# Patient Record
Sex: MALE | Race: WHITE | Employment: OTHER | URBAN - NONMETROPOLITAN AREA
[De-identification: names, ages, dates, MRNs, and addresses within clinical notes are randomized per-mention and may not be internally consistent; named-entity substitution may affect disease eponyms.]

---

## 2019-09-16 RX ORDER — SILDENAFIL 100 MG/1
100 TABLET, FILM COATED ORAL DAILY PRN
COMMUNITY

## 2019-09-16 RX ORDER — MELOXICAM 15 MG/1
15 TABLET ORAL DAILY
COMMUNITY
End: 2019-10-10 | Stop reason: HOSPADM

## 2019-09-16 RX ORDER — LOSARTAN POTASSIUM 100 MG/1
100 TABLET ORAL DAILY
COMMUNITY

## 2019-09-16 RX ORDER — PENTOXIFYLLINE 400 MG/1
400 TABLET, EXTENDED RELEASE ORAL
COMMUNITY

## 2019-09-16 RX ORDER — ASPIRIN 81 MG/1
81 TABLET ORAL DAILY
COMMUNITY
End: 2019-10-10 | Stop reason: HOSPADM

## 2019-09-16 RX ORDER — PRAVASTATIN SODIUM 40 MG
40 TABLET ORAL DAILY
COMMUNITY

## 2019-09-16 RX ORDER — LEVOTHYROXINE SODIUM 0.1 MG/1
100 TABLET ORAL DAILY
COMMUNITY

## 2019-09-20 ENCOUNTER — APPOINTMENT (OUTPATIENT)
Dept: GENERAL RADIOLOGY | Age: 69
End: 2019-09-20
Payer: OTHER GOVERNMENT

## 2019-09-20 ENCOUNTER — HOSPITAL ENCOUNTER (EMERGENCY)
Age: 69
Discharge: HOME | End: 2019-09-20
Payer: OTHER GOVERNMENT

## 2019-09-20 VITALS
BODY MASS INDEX: 25.07 KG/M2 | HEIGHT: 66 IN | DIASTOLIC BLOOD PRESSURE: 86 MMHG | RESPIRATION RATE: 17 BRPM | SYSTOLIC BLOOD PRESSURE: 155 MMHG | TEMPERATURE: 97.9 F | WEIGHT: 156 LBS | HEART RATE: 62 BPM | OXYGEN SATURATION: 96 %

## 2019-09-20 DIAGNOSIS — R07.89 CHEST WALL PAIN: Primary | ICD-10-CM

## 2019-09-20 DIAGNOSIS — R10.13 ABDOMINAL PAIN, EPIGASTRIC: ICD-10-CM

## 2019-09-20 LAB
ALBUMIN SERPL-MCNC: 5.2 G/DL (ref 3.5–5.2)
ALP BLD-CCNC: 118 U/L (ref 40–130)
ALT SERPL-CCNC: 41 U/L (ref 5–41)
ANION GAP SERPL CALCULATED.3IONS-SCNC: 12 MMOL/L (ref 7–19)
AST SERPL-CCNC: 35 U/L (ref 5–40)
BASOPHILS ABSOLUTE: 0.1 K/UL (ref 0–0.2)
BASOPHILS RELATIVE PERCENT: 0.6 % (ref 0–1)
BILIRUB SERPL-MCNC: 0.7 MG/DL (ref 0.2–1.2)
BUN BLDV-MCNC: 18 MG/DL (ref 8–23)
CALCIUM SERPL-MCNC: 10.2 MG/DL (ref 8.8–10.2)
CHLORIDE BLD-SCNC: 101 MMOL/L (ref 98–111)
CO2: 29 MMOL/L (ref 22–29)
CREAT SERPL-MCNC: 0.9 MG/DL (ref 0.5–1.2)
EOSINOPHILS ABSOLUTE: 0.2 K/UL (ref 0–0.6)
EOSINOPHILS RELATIVE PERCENT: 1.7 % (ref 0–5)
GFR NON-AFRICAN AMERICAN: >60
GLUCOSE BLD-MCNC: 109 MG/DL (ref 74–109)
HCT VFR BLD CALC: 54.3 % (ref 42–52)
HEMOGLOBIN: 17.3 G/DL (ref 14–18)
IMMATURE GRANULOCYTES #: 0 K/UL
LIPASE: 43 U/L (ref 13–60)
LYMPHOCYTES ABSOLUTE: 4.3 K/UL (ref 1.1–4.5)
LYMPHOCYTES RELATIVE PERCENT: 40.2 % (ref 20–40)
MCH RBC QN AUTO: 28.4 PG (ref 27–31)
MCHC RBC AUTO-ENTMCNC: 31.9 G/DL (ref 33–37)
MCV RBC AUTO: 89.2 FL (ref 80–94)
MONOCYTES ABSOLUTE: 0.5 K/UL (ref 0–0.9)
MONOCYTES RELATIVE PERCENT: 4.5 % (ref 0–10)
NEUTROPHILS ABSOLUTE: 5.6 K/UL (ref 1.5–7.5)
NEUTROPHILS RELATIVE PERCENT: 52.6 % (ref 50–65)
PDW BLD-RTO: 15.3 % (ref 11.5–14.5)
PLATELET # BLD: 207 K/UL (ref 130–400)
PMV BLD AUTO: 10.6 FL (ref 9.4–12.4)
POTASSIUM SERPL-SCNC: 3.8 MMOL/L (ref 3.5–5)
PRO-BNP: 72 PG/ML (ref 0–900)
RBC # BLD: 6.09 M/UL (ref 4.7–6.1)
SODIUM BLD-SCNC: 142 MMOL/L (ref 136–145)
TOTAL PROTEIN: 8.6 G/DL (ref 6.6–8.7)
TROPONIN: <0.01 NG/ML (ref 0–0.03)
WBC # BLD: 10.6 K/UL (ref 4.8–10.8)

## 2019-09-20 PROCEDURE — 84484 ASSAY OF TROPONIN QUANT: CPT

## 2019-09-20 PROCEDURE — 93005 ELECTROCARDIOGRAM TRACING: CPT | Performed by: PHYSICIAN ASSISTANT

## 2019-09-20 PROCEDURE — 99285 EMERGENCY DEPT VISIT HI MDM: CPT

## 2019-09-20 PROCEDURE — 83880 ASSAY OF NATRIURETIC PEPTIDE: CPT

## 2019-09-20 PROCEDURE — 36415 COLL VENOUS BLD VENIPUNCTURE: CPT

## 2019-09-20 PROCEDURE — 85025 COMPLETE CBC W/AUTO DIFF WBC: CPT

## 2019-09-20 PROCEDURE — 6370000000 HC RX 637 (ALT 250 FOR IP): Performed by: PHYSICIAN ASSISTANT

## 2019-09-20 PROCEDURE — 83690 ASSAY OF LIPASE: CPT

## 2019-09-20 PROCEDURE — 80053 COMPREHEN METABOLIC PANEL: CPT

## 2019-09-20 PROCEDURE — 71046 X-RAY EXAM CHEST 2 VIEWS: CPT

## 2019-09-20 PROCEDURE — 6360000002 HC RX W HCPCS: Performed by: PHYSICIAN ASSISTANT

## 2019-09-20 PROCEDURE — 96374 THER/PROPH/DIAG INJ IV PUSH: CPT

## 2019-09-20 RX ORDER — PANTOPRAZOLE SODIUM 20 MG/1
20 TABLET, DELAYED RELEASE ORAL DAILY
Qty: 30 TABLET | Refills: 0 | Status: SHIPPED | OUTPATIENT
Start: 2019-09-20

## 2019-09-20 RX ORDER — CYCLOBENZAPRINE HCL 10 MG
10 TABLET ORAL 3 TIMES DAILY PRN
Qty: 21 TABLET | Refills: 0 | Status: SHIPPED | OUTPATIENT
Start: 2019-09-20 | End: 2019-09-30

## 2019-09-20 RX ORDER — ORPHENADRINE CITRATE 30 MG/ML
60 INJECTION INTRAMUSCULAR; INTRAVENOUS ONCE
Status: COMPLETED | OUTPATIENT
Start: 2019-09-20 | End: 2019-09-20

## 2019-09-20 RX ADMIN — ORPHENADRINE CITRATE 60 MG: 30 INJECTION INTRAMUSCULAR; INTRAVENOUS at 10:52

## 2019-09-20 RX ADMIN — LIDOCAINE HYDROCHLORIDE: 20 SOLUTION ORAL; TOPICAL at 10:12

## 2019-09-20 ASSESSMENT — ENCOUNTER SYMPTOMS
BACK PAIN: 0
SHORTNESS OF BREATH: 0
PHOTOPHOBIA: 0
EYE DISCHARGE: 0
COUGH: 0
EYE ITCHING: 0
NAUSEA: 0
APNEA: 0
DIARRHEA: 0
ABDOMINAL PAIN: 1
VOMITING: 0
COLOR CHANGE: 0

## 2019-09-20 NOTE — ED PROVIDER NOTES
[facility] EMERGENCY DEPT  eMERGENCYdEPARTMENT eNCOUnter      Pt Name: Rupal Shen  MRN: [id number]  [place] 1950  Date of evaluation: 9/20/2019  Provider:ADRIÁN Yang    CHIEF COMPLAINT       Chief Complaint   Patient presents with    Chest Pain     arrived via EMS from [facility] in [address] [place] [address] with reports of chest pains         HISTORY OF PRESENT ILLNESS  (Location/Symptom, Timing/Onset, Context/Setting, Quality, Duration, Modifying Factors, Severity.)   Rupal Shen is a 71 y.o. male who presents to the emergency department with complaints of left axillary tenderness with pressure palpation that is reproduced. He also complains of epigastric pain that radiates up into his chest.  Both of these have been going on for 4 weeks. Patient is very active mows multiple yards daily. He has a familial cardiac history as well as history of reflux. Patient is afebrile no anxiousness or chest pain otherwise. No shortness of breath or dyspnea on exertion. The patient denies any other abdominal symptoms. He has normal urinary output no dysuria. Denies any bowel habit changes or presentation. Denies any rectal pain or blood in stool. Patient denies that this pain about his left axilla is pleuritic in character with inspiration or expiration. Again it can be reproduced with certain positions or pressure applied. HPI    Nursing Notes were reviewed and I agree. REVIEW OF SYSTEMS    (2-9 systems for level 4, 10 or more for level 5)     Review of Systems   Constitutional: Negative for activity change, appetite change, chills and fever. HENT: Negative for congestion and dental problem. Eyes: Negative for photophobia, discharge and itching. Respiratory: Negative for apnea, cough and shortness of breath. Cardiovascular: Negative for chest pain. Gastrointestinal: Positive for abdominal pain. Negative for diarrhea, nausea and vomiting. Musculoskeletal: Positive for arthralgias and myalgias.  Negative organization: None     Attends meetings of clubs or organizations: None     Relationship status: None    Intimate partner violence:     Fear of current or ex partner: None     Emotionally abused: None     Physically abused: None     Forced sexual activity: None   Other Topics Concern    None   Social History Narrative    None       SCREENINGS           PHYSICAL EXAM    (up to 7 forlevel 4, 8 or more for level 5)     ED Triage Vitals [09/20/19 0924]   BP Temp Temp src Pulse Resp SpO2 Height Weight   (!) 154/76 97.9 °F (36.6 °C) -- 65 22 96 % 5' 6\" (1.676 m) 156 lb (70.8 kg)       Physical Exam   Constitutional: He is oriented to person, place, and time. He appears well-developed and well-nourished. No distress. HENT:   Head: Normocephalic and atraumatic. Right Ear: External ear normal.   Left Ear: External ear normal.   Nose: Nose normal.   Mouth/Throat: Oropharynx is clear and moist.   Eyes: Pupils are equal, round, and reactive to light. Conjunctivae and EOM are normal.   Neck: Normal range of motion. Neck supple. No tracheal deviation present. Cardiovascular: Normal rate, regular rhythm, normal heart sounds and intact distal pulses. No murmur heard. Pulmonary/Chest: Effort normal and breath sounds normal. No stridor. He has no wheezes. He exhibits no tenderness. Abdominal: Soft. Bowel sounds are normal. He exhibits no distension. There is no tenderness. Musculoskeletal: Normal range of motion. He exhibits tenderness. Arms:  Neurological: He is alert and oriented to person, place, and time. He displays normal reflexes. No cranial nerve deficit or sensory deficit. He exhibits normal muscle tone. Coordination normal.   Skin: Skin is warm and dry. Capillary refill takes less than 2 seconds. He is not diaphoretic. Psychiatric: He has a normal mood and affect. His behavior is normal. Judgment and thought content normal.   Nursing note and vitals reviewed.         DIAGNOSTIC RESULTS     RADIOLOGY: Non-plain film images such as CT, Ultrasound and MRI are read by the radiologist. Plain radiographic images are visualized and preliminarilyinterpreted by No att. providers found with the below findings:    Interpretation per the Radiologist below, if available at the time of this note:    XR CHEST STANDARD (2 VW)   Final Result   No active cardiopulmonary disease. Signed by Dr Mirela Sylvester on 9/20/2019 10:12 AM          LABS:  Labs Reviewed   CBC WITH AUTO DIFFERENTIAL - Abnormal; Notable for the following components:       Result Value    Hematocrit 54.3 (*)     MCHC 31.9 (*)     RDW 15.3 (*)     Lymphocytes % 40.2 (*)     All other components within normal limits   COMPREHENSIVE METABOLIC PANEL   LIPASE   TROPONIN   BRAIN NATRIURETIC PEPTIDE   URINE RT REFLEX TO CULTURE       All other labs were within normal range or notreturned as of this dictation. RE-ASSESSMENT        EMERGENCY DEPARTMENT COURSE and DIFFERENTIAL DIAGNOSIS/MDM:   Vitals:    Vitals:    09/20/19 0924 09/20/19 1032   BP: (!) 154/76 (!) 155/86   Pulse: 65 62   Resp: 22 17   Temp: 97.9 °F (36.6 °C)    SpO2: 96% 96%   Weight: 156 lb (70.8 kg)    Height: 5' 6\" (1.676 m)        MDM  Patient has improvement with GI cocktail. He has improvement with Norflex as well. The plan will be for PPI usage [facility] followed by muscle relaxers and cardiology evaluation in outpatient setting he seems stable with normal initial tests all normal and normal sinus EKG. PROCEDURES:    Procedures      FINAL IMPRESSION      1. Chest wall pain    2.  Abdominal pain, epigastric          DISPOSITION/PLAN   DISPOSITION Decision To Discharge 09/20/2019 11:40:26 AM      PATIENT REFERRED TO:  [facility]  [address] [place] [address]    If symptoms worsen    Zuhair Pierson MD  [address] [place] [address]. [address] [place] [address]    Schedule an appointment as soon as possible for a visit As needed    Petra.   [address] [id number] [phone]  Schedule an appointment as soon as possible for a visit         DISCHARGE MEDICATIONS:  Discharge Medication List as of 9/20/2019 11:41 AM      START taking these medications    Details   pantoprazole (PROTONIX) 20 MG tablet Take 1 tablet by mouth daily, Disp-30 tablet, R-0Print      cyclobenzaprine (FLEXERIL) 10 MG tablet Take 1 tablet by mouth 3 times daily as needed for Muscle spasms, Disp-21 tablet, R-0Print             (Please note that portions of this note were completed with a voice recognition program.  Efforts were made to edit the dictations but occasionallywords are mis-transcribed.)    Michelle Davies [address] [place] [address]  09/20/19 8987

## 2019-09-22 LAB
EKG P AXIS: 68 DEGREES
EKG P-R INTERVAL: 126 MS
EKG Q-T INTERVAL: 398 MS
EKG QRS DURATION: 104 MS
EKG QTC CALCULATION (BAZETT): 398 MS
EKG T AXIS: 71 DEGREES

## 2019-09-22 PROCEDURE — 93010 ELECTROCARDIOGRAM REPORT: CPT | Performed by: INTERNAL MEDICINE

## 2019-09-26 ENCOUNTER — OFFICE VISIT (OUTPATIENT)
Dept: GASTROENTEROLOGY | Facility: CLINIC | Age: 69
End: 2019-09-26

## 2019-09-26 VITALS
WEIGHT: 151 LBS | HEIGHT: 69 IN | TEMPERATURE: 97.4 F | BODY MASS INDEX: 22.36 KG/M2 | DIASTOLIC BLOOD PRESSURE: 80 MMHG | OXYGEN SATURATION: 99 % | SYSTOLIC BLOOD PRESSURE: 126 MMHG | HEART RATE: 80 BPM

## 2019-09-26 DIAGNOSIS — K21.9 GASTROESOPHAGEAL REFLUX DISEASE, ESOPHAGITIS PRESENCE NOT SPECIFIED: ICD-10-CM

## 2019-09-26 DIAGNOSIS — R10.10 PAIN OF UPPER ABDOMEN: ICD-10-CM

## 2019-09-26 DIAGNOSIS — Z12.11 ENCOUNTER FOR SCREENING FOR MALIGNANT NEOPLASM OF COLON: Primary | ICD-10-CM

## 2019-09-26 DIAGNOSIS — R63.4 WEIGHT LOSS, ABNORMAL: ICD-10-CM

## 2019-09-26 PROCEDURE — 99204 OFFICE O/P NEW MOD 45 MIN: CPT | Performed by: NURSE PRACTITIONER

## 2019-09-26 NOTE — PROGRESS NOTES
Chief Complaint   Patient presents with   • Colonoscopy     [facility] last3 months has lost 30 pounds       PCP: Luis Carlos Cee PA  REFER: No ref. provider found    Subjective     HPI    Today denies abdominal pain, no changes in bowels.  Normal bowel habit described as daily, formed stool.  No bright red blood per rectum, no melena.  unintentional 30 lb weight loss in 3 months.  appetite stable, no changes.  No nausea/vomitting.  Sudden onset of  heartburn 3 weeks ago. Zantac not providing relief. No previous history GERD related symptoms.  [facility] ER evaluation  For upper abdominal pain/chest wall pain.  Negative cardiac workup.  Started on Protonix, this has improved heartburn.  No dysphagia.  No previous EGD.  Daily use of meloxicom x 5-6 years  [facility]     Past Medical History:   Diagnosis Date   • Disease of thyroid gland    • Hyperlipidemia    • Hypertension      Past Surgical History:   Procedure Laterality Date   • APPENDECTOMY       Outpatient Medications Marked as Taking for the 9/26/19 encounter (Office Visit) with Los Barraza APRN   Medication Sig Dispense Refill   • levothyroxine (SYNTHROID, LEVOTHROID) 100 MCG tablet Take 100 mcg by mouth Daily.     • losartan (COZAAR) 100 MG tablet Take 100 mg by mouth Daily.     • meloxicam (MOBIC) 15 MG tablet Take 15 mg by mouth Daily.     • pentoxifylline (TRENtal) 400 MG CR tablet Take 400 mg by mouth 3 (Three) Times a Day With Meals.     • pravastatin (PRAVACHOL) 40 MG tablet Take 40 mg by mouth Daily.     • sildenafil (VIAGRA) 100 MG tablet Take 100 mg by mouth Daily As Needed for erectile dysfunction.       Allergies   Allergen Reactions   • Augmentin [Amoxicillin-Pot Clavulanate] Other (See Comments)     Not sure   • Codeine Other (See Comments)     Not sure   • Hydroxyzine Other (See Comments)     Not sure   • Tramadol Other (See Comments)     Not sure     Social History     Socioeconomic  History   • Marital status:      Spouse name: Not on file   • Number of children: Not on file   • Years of education: Not on file   • Highest education level: Not on file   Tobacco Use   • Smoking status: Current Some Day Smoker     Packs/day: 0.25     Years: 50.00     Pack years: 12.50   • Smokeless tobacco: Never Used   Substance and Sexual Activity   • Alcohol use: No     Frequency: Never   • Drug use: No     Family History   Problem Relation Age of Onset   • Colon cancer Neg Hx    • Colon polyps Neg Hx    • Esophageal cancer Neg Hx      Review of Systems   Constitutional: Positive for unexpected weight change. Negative for fatigue and fever.   HENT: Negative for hearing loss, sore throat and voice change.    Eyes: Negative for visual disturbance.   Respiratory: Negative for cough, shortness of breath and wheezing.    Cardiovascular: Negative for chest pain and palpitations.   Gastrointestinal: Negative for abdominal pain, blood in stool and vomiting.   Endocrine: Negative for polydipsia and polyuria.   Genitourinary: Negative for difficulty urinating, dysuria, hematuria and urgency.   Musculoskeletal: Negative for joint swelling and myalgias.   Skin: Negative for color change, rash and wound.   Neurological: Negative for dizziness, tremors, seizures and syncope.   Hematological: Does not bruise/bleed easily.   Psychiatric/Behavioral: Negative for agitation and confusion. The patient is not nervous/anxious.      Objective   Vitals:    09/26/19 1007   BP: 126/80   Pulse: 80   Temp: 97.4 °F (36.3 °C)   SpO2: 99%     Physical Exam   Constitutional: He is oriented to person, place, and time. He appears well-developed and well-nourished. He is cooperative.   HENT:   Head: Normocephalic and atraumatic.   Eyes: Conjunctivae are normal. Pupils are equal, round, and reactive to light. No scleral icterus.   Neck: Normal range of motion. Neck supple. No JVD present. No thyroid mass and no thyromegaly present.    Cardiovascular: Normal rate, regular rhythm and normal heart sounds. Exam reveals no gallop and no friction rub.   No murmur heard.  Pulmonary/Chest: Effort normal and breath sounds normal. No accessory muscle usage. No respiratory distress. He has no wheezes. He has no rales.   Abdominal: Soft. Normal appearance and bowel sounds are normal. He exhibits no distension, no ascites and no mass. There is no hepatosplenomegaly. There is no tenderness. There is no rebound and no guarding.   Musculoskeletal: Normal range of motion. He exhibits no edema or tenderness.     Vascular Status -  His right foot exhibits normal foot vasculature  and no edema. His left foot exhibits normal foot vasculature  and no edema.  Lymphadenopathy:     He has no cervical adenopathy.   Neurological: He is alert and oriented to person, place, and time. He has normal strength. Gait normal.   Skin: Skin is warm, dry and intact. No rash noted.     Imaging Results (most recent)     None        Body mass index is 22.3 kg/m².    Assessment/Plan   Jose was seen today for colonoscopy.    Diagnoses and all orders for this visit:    Encounter for screening for malignant neoplasm of colon  -     Implement Anesthesia Orders Day of Procedure; Standing  -     Obtain Informed Consent; Standing    Gastroesophageal reflux disease, esophagitis presence not specified  -     Case Request; Standing  -     Implement Anesthesia Orders Day of Procedure; Standing  -     Obtain Informed Consent; Standing  -     Case Request    Weight loss, abnormal    Pain of upper abdomen  -     Case Request; Standing  -     Case Request  -     polyethylene glycol (GoLYTELY) 236 g solution; Take 3,785 mL by mouth 1 (One) Time for 1 dose. Take as directed      COLONOSCOPY WITH ANESTHESIA (N/A)  2. ENDOSCOPY WITH ANESTHESIA     Take PPI 30 min prior to breakfast     The risk of the endoscopy were discussed in detail.  We discussed the risk of perforation (one out of [id number],  riskier with dilation), bleeding (one out of 500), and the rare risks of infection, adverse reaction to anesthesia, respiratory failure, cardiac failure including MI and adverse reaction to medications, etc.  We discussed consequences that could occur if a risk were to develop such as the need for hospitalization, blood transfusion, surgical intervention, medications, pain and disability and death.  Alternatives include not doing anything, or pursuing an UGI series which only offers a diagnosis with potential less accuracy compared to egd.  The patient verbalizes understanding and agrees to proceed.    All risks, benefits, alternatives, and indications of colonoscopy procedure have been discussed with the patient. Risks to include perforation of the colon requiring possible surgery or colostomy, risk of bleeding from biopsies or removal of colon tissue, possibility of missing a colon polyp or cancer, or adverse drug reaction.  Benefits to include the diagnosis and management of disease of the colon and rectum. Alternatives to include barium enema, radiographic evaluation, lab testing or no intervention. He verbalizes understanding and agrees.         Patient Instructions   Gastroesophageal Reflux Disease, Adult    Gastroesophageal reflux disease (GERD) happens when acid from your stomach flows up into the esophagus. When acid comes in contact with the esophagus, the acid causes soreness (inflammation) in the esophagus. Over time, GERD may create small holes (ulcers) in the lining of the esophagus.  CAUSES    · Increased body weight. This puts pressure on the stomach, making acid rise from the stomach into the esophagus.  · Smoking. This increases acid production in the stomach.  · Drinking alcohol. This causes decreased pressure in the lower esophageal sphincter (valve or ring of muscle between the esophagus and stomach), allowing acid from the stomach into the esophagus.  · Late evening meals and a full stomach.  This increases pressure and acid production in the stomach.  · A malformed lower esophageal sphincter.  Sometimes, no cause is found.  SYMPTOMS    · Burning pain in the lower part of the mid-chest behind the breastbone and in the mid-stomach area. This may occur twice a week or more often.  · Trouble swallowing.  · Sore throat.  · Dry cough.  · Asthma-like symptoms including chest tightness, shortness of breath, or wheezing.  DIAGNOSIS    Your caregiver may be able to diagnose GERD based on your symptoms. In some cases, X-rays and other tests may be done to check for complications or to check the condition of your stomach and esophagus.  TREATMENT    Your caregiver may recommend over-the-counter or prescription medicines to help decrease acid production. Ask your caregiver before starting or adding any new medicines.    HOME CARE INSTRUCTIONS    · Change the factors that you can control. Ask your caregiver for guidance concerning weight loss, quitting smoking, and alcohol consumption.  · Avoid foods and drinks that make your symptoms worse, such as:  ¨ Caffeine or alcoholic drinks.  ¨ Chocolate.  ¨ Peppermint or mint flavorings.  ¨ Garlic and onions.  ¨ Spicy foods.  ¨ Citrus fruits, such as oranges, per, or limes.  ¨ Tomato-based foods such as sauce, chili, salsa, and pizza.  ¨ Fried and fatty foods.  · Avoid lying down for the 3 hours prior to your bedtime or prior to taking a nap.  · Eat small, frequent meals instead of large meals.  · Wear loose-fitting clothing. Do not wear anything tight around your waist that causes pressure on your stomach.  · Raise the head of your bed 6 to 8 inches with wood blocks to help you sleep. Extra pillows will not help.  · Only take over-the-counter or prescription medicines for pain, discomfort, or fever as directed by your caregiver.  · Do not take aspirin, ibuprofen, or other nonsteroidal anti-inflammatory drugs (NSAIDs).  SEEK IMMEDIATE MEDICAL CARE IF:    · You have pain  in your arms, neck, jaw, teeth, or back.  · Your pain increases or changes in intensity or duration.  · You develop nausea, vomiting, or sweating (diaphoresis).  · You develop shortness of breath, or you faint.  · Your vomit is green, yellow, black, or looks like coffee grounds or blood.  · Your stool is red, bloody, or black.  These symptoms could be signs of other problems, such as heart disease, gastric bleeding, or esophageal bleeding.  MAKE SURE YOU:    · Understand these instructions.  · Will watch your condition.  · Will get help right away if you are not doing well or get worse.     This information is not intended to replace advice given to you by your health care provider. Make sure you discuss any questions you have with your health care provider.     Document Released: 09/27/2006 Document Revised: 01/08/2016 Document Reviewed: 04/13/2016  [employer] Interactive Patient Education ©2016 [employer]

## 2019-09-26 NOTE — PATIENT INSTRUCTIONS
Gastroesophageal Reflux Disease, Adult    Gastroesophageal reflux disease (GERD) happens when acid from your stomach flows up into the esophagus. When acid comes in contact with the esophagus, the acid causes soreness (inflammation) in the esophagus. Over time, GERD may create small holes (ulcers) in the lining of the esophagus.  CAUSES    · Increased body weight. This puts pressure on the stomach, making acid rise from the stomach into the esophagus.  · Smoking. This increases acid production in the stomach.  · Drinking alcohol. This causes decreased pressure in the lower esophageal sphincter (valve or ring of muscle between the esophagus and stomach), allowing acid from the stomach into the esophagus.  · Late evening meals and a full stomach. This increases pressure and acid production in the stomach.  · A malformed lower esophageal sphincter.  Sometimes, no cause is found.  SYMPTOMS    · Burning pain in the lower part of the mid-chest behind the breastbone and in the mid-stomach area. This may occur twice a week or more often.  · Trouble swallowing.  · Sore throat.  · Dry cough.  · Asthma-like symptoms including chest tightness, shortness of breath, or wheezing.  DIAGNOSIS    Your caregiver may be able to diagnose GERD based on your symptoms. In some cases, X-rays and other tests may be done to check for complications or to check the condition of your stomach and esophagus.  TREATMENT    Your caregiver may recommend over-the-counter or prescription medicines to help decrease acid production. Ask your caregiver before starting or adding any new medicines.    HOME CARE INSTRUCTIONS    · Change the factors that you can control. Ask your caregiver for guidance concerning weight loss, quitting smoking, and alcohol consumption.  · Avoid foods and drinks that make your symptoms worse, such as:  ¨ Caffeine or alcoholic drinks.  ¨ Chocolate.  ¨ Peppermint or mint flavorings.  ¨ Garlic and onions.  ¨ Spicy foods.  ¨ Citrus  fruits, such as oranges, per, or limes.  ¨ Tomato-based foods such as sauce, chili, salsa, and pizza.  ¨ Fried and fatty foods.  · Avoid lying down for the 3 hours prior to your bedtime or prior to taking a nap.  · Eat small, frequent meals instead of large meals.  · Wear loose-fitting clothing. Do not wear anything tight around your waist that causes pressure on your stomach.  · Raise the head of your bed 6 to 8 inches with wood blocks to help you sleep. Extra pillows will not help.  · Only take over-the-counter or prescription medicines for pain, discomfort, or fever as directed by your caregiver.  · Do not take aspirin, ibuprofen, or other nonsteroidal anti-inflammatory drugs (NSAIDs).  SEEK IMMEDIATE MEDICAL CARE IF:    · You have pain in your arms, neck, jaw, teeth, or back.  · Your pain increases or changes in intensity or duration.  · You develop nausea, vomiting, or sweating (diaphoresis).  · You develop shortness of breath, or you faint.  · Your vomit is green, yellow, black, or looks like coffee grounds or blood.  · Your stool is red, bloody, or black.  These symptoms could be signs of other problems, such as heart disease, gastric bleeding, or esophageal bleeding.  MAKE SURE YOU:    · Understand these instructions.  · Will watch your condition.  · Will get help right away if you are not doing well or get worse.     This information is not intended to replace advice given to you by your health care provider. Make sure you discuss any questions you have with your health care provider.     Document Released: 09/27/2006 Document Revised: 01/08/2016 Document Reviewed: 04/13/2016  [employer] Interactive Patient Education ©2016 [employer]

## 2019-10-10 ENCOUNTER — HOSPITAL ENCOUNTER (OUTPATIENT)
Facility: HOSPITAL | Age: 69
Setting detail: HOSPITAL OUTPATIENT SURGERY
Discharge: HOME | End: 2019-10-10
Attending: INTERNAL MEDICINE | Admitting: INTERNAL MEDICINE

## 2019-10-10 ENCOUNTER — TELEPHONE (OUTPATIENT)
Dept: GASTROENTEROLOGY | Facility: CLINIC | Age: 69
End: 2019-10-10

## 2019-10-10 ENCOUNTER — ANESTHESIA EVENT (OUTPATIENT)
Dept: GASTROENTEROLOGY | Facility: HOSPITAL | Age: 69
End: 2019-10-10

## 2019-10-10 ENCOUNTER — ANESTHESIA (OUTPATIENT)
Dept: GASTROENTEROLOGY | Facility: HOSPITAL | Age: 69
End: 2019-10-10

## 2019-10-10 VITALS
BODY MASS INDEX: 21.62 KG/M2 | RESPIRATION RATE: 19 BRPM | SYSTOLIC BLOOD PRESSURE: 146 MMHG | TEMPERATURE: 97.9 F | OXYGEN SATURATION: 99 % | HEART RATE: 62 BPM | WEIGHT: 146 LBS | HEIGHT: 69 IN | DIASTOLIC BLOOD PRESSURE: 66 MMHG

## 2019-10-10 DIAGNOSIS — K21.9 GASTROESOPHAGEAL REFLUX DISEASE, ESOPHAGITIS PRESENCE NOT SPECIFIED: ICD-10-CM

## 2019-10-10 PROCEDURE — 87081 CULTURE SCREEN ONLY: CPT | Performed by: INTERNAL MEDICINE

## 2019-10-10 PROCEDURE — 43239 EGD BIOPSY SINGLE/MULTIPLE: CPT | Performed by: INTERNAL MEDICINE

## 2019-10-10 PROCEDURE — 25010000002 PROPOFOL 10 MG/ML EMULSION: Performed by: NURSE ANESTHETIST, CERTIFIED REGISTERED

## 2019-10-10 RX ORDER — CYCLOBENZAPRINE HCL 10 MG
10 TABLET ORAL 2 TIMES DAILY
Status: ON HOLD | COMMUNITY
End: 2019-10-14

## 2019-10-10 RX ORDER — PANTOPRAZOLE SODIUM 40 MG/1
40 TABLET, DELAYED RELEASE ORAL
COMMUNITY

## 2019-10-10 RX ORDER — PROPOFOL 10 MG/ML
VIAL (ML) INTRAVENOUS AS NEEDED
Status: DISCONTINUED | OUTPATIENT
Start: 2019-10-10 | End: 2019-10-10 | Stop reason: SURG

## 2019-10-10 RX ORDER — SODIUM CHLORIDE 0.9 % (FLUSH) 0.9 %
10 SYRINGE (ML) INJECTION AS NEEDED
Status: DISCONTINUED | OUTPATIENT
Start: 2019-10-10 | End: 2019-10-10 | Stop reason: HOSPADM

## 2019-10-10 RX ORDER — SODIUM CHLORIDE 9 MG/ML
500 INJECTION, SOLUTION INTRAVENOUS CONTINUOUS PRN
Status: DISCONTINUED | OUTPATIENT
Start: 2019-10-10 | End: 2019-10-10 | Stop reason: HOSPADM

## 2019-10-10 RX ADMIN — LIDOCAINE HYDROCHLORIDE 50 MG: 20 INJECTION, SOLUTION INTRAVENOUS at 11:16

## 2019-10-10 RX ADMIN — PROPOFOL 100 MG: 10 INJECTION, EMULSION INTRAVENOUS at 11:16

## 2019-10-10 RX ADMIN — SODIUM CHLORIDE 500 ML: 9 INJECTION, SOLUTION INTRAVENOUS at 10:02

## 2019-10-10 NOTE — ANESTHESIA PREPROCEDURE EVALUATION
Anesthesia Evaluation     Patient summary reviewed   no history of anesthetic complications:  NPO Solid Status: > 8 hours             Airway   Mallampati: II  TM distance: >3 FB  Neck ROM: full  Dental    (+) upper dentures and lower dentures    Pulmonary    (+) a smoker, COPD,   Cardiovascular   Exercise tolerance: good (4-7 METS)    (+) hypertension, hyperlipidemia,       Neuro/Psych- negative ROS  GI/Hepatic/Renal/Endo    (+)  GERD,  hypothyroidism,     Musculoskeletal     Abdominal    Substance History      OB/GYN          Other                        Anesthesia Plan    ASA 2     MAC     Anesthetic plan, all risks, benefits, and alternatives have been provided, discussed and informed consent has been obtained with: patient.

## 2019-10-10 NOTE — ANESTHESIA POSTPROCEDURE EVALUATION
"Patient: Jose Mendez    Procedure Summary     Date:  10/10/19 Room / Location:  [facility] ENDOSCOPY 5 /  [facility]    Anesthesia Start:  1113 Anesthesia Stop:  1121    Procedure:  ESOPHAGOGASTRODUODENOSCOPY WITH ANESTHESIA (N/A ) Diagnosis:       Gastroesophageal reflux disease, esophagitis presence not specified      (Gastroesophageal reflux disease, esophagitis presence not specified [K21.9])    Surgeon:  Crow Patton DO Provider:  Michelle Laurent CRNA    Anesthesia Type:  MAC ASA Status:  2          Anesthesia Type: MAC  Last vitals  BP   124/75 (10/10/19 0927)   Temp   97.9 °F (36.6 °C) (10/10/19 0927)   Pulse   75 (10/10/19 0927)   Resp   20 (10/10/19 0927)     SpO2   96 % (10/10/19 0927)     Post Anesthesia Care and Evaluation    Patient location during evaluation: PHASE II  Patient participation: complete - patient participated  Level of consciousness: awake and awake and alert  Pain score: 0  Pain management: adequate  Airway patency: patent  Anesthetic complications: No anesthetic complications  PONV Status: none  Cardiovascular status: acceptable  Respiratory status: acceptable  Hydration status: acceptable    Comments: Patient discharged according to acceptable Jed score per RN assessment. See nursing records for further information.     Blood pressure 124/75, pulse 75, temperature 97.9 °F (36.6 °C), temperature source Temporal, resp. rate 20, height 175.3 cm (69\"), weight 66.2 kg (146 lb), SpO2 96 %.        "

## 2019-10-11 LAB — UREASE TISS QL: NEGATIVE

## 2019-10-14 ENCOUNTER — TELEPHONE (OUTPATIENT)
Dept: GASTROENTEROLOGY | Facility: CLINIC | Age: 69
End: 2019-10-14

## 2019-10-14 ENCOUNTER — ANESTHESIA EVENT (OUTPATIENT)
Dept: GASTROENTEROLOGY | Facility: HOSPITAL | Age: 69
End: 2019-10-14

## 2019-10-14 ENCOUNTER — HOSPITAL ENCOUNTER (OUTPATIENT)
Facility: HOSPITAL | Age: 69
Setting detail: HOSPITAL OUTPATIENT SURGERY
Discharge: HOME | End: 2019-10-14
Attending: INTERNAL MEDICINE | Admitting: INTERNAL MEDICINE

## 2019-10-14 ENCOUNTER — ANESTHESIA (OUTPATIENT)
Dept: GASTROENTEROLOGY | Facility: HOSPITAL | Age: 69
End: 2019-10-14

## 2019-10-14 VITALS
HEART RATE: 60 BPM | OXYGEN SATURATION: 100 % | BODY MASS INDEX: 21.77 KG/M2 | SYSTOLIC BLOOD PRESSURE: 130 MMHG | TEMPERATURE: 98.5 F | DIASTOLIC BLOOD PRESSURE: 62 MMHG | HEIGHT: 69 IN | WEIGHT: 147 LBS | RESPIRATION RATE: 18 BRPM

## 2019-10-14 DIAGNOSIS — Z12.11 ENCOUNTER FOR SCREENING FOR MALIGNANT NEOPLASM OF COLON: ICD-10-CM

## 2019-10-14 PROCEDURE — 25010000002 PROPOFOL 10 MG/ML EMULSION: Performed by: NURSE ANESTHETIST, CERTIFIED REGISTERED

## 2019-10-14 PROCEDURE — 45385 COLONOSCOPY W/LESION REMOVAL: CPT | Performed by: INTERNAL MEDICINE

## 2019-10-14 RX ORDER — FINASTERIDE 5 MG/1
5 TABLET, FILM COATED ORAL DAILY
COMMUNITY

## 2019-10-14 RX ORDER — SODIUM CHLORIDE 0.9 % (FLUSH) 0.9 %
10 SYRINGE (ML) INJECTION AS NEEDED
Status: DISCONTINUED | OUTPATIENT
Start: 2019-10-14 | End: 2019-10-14 | Stop reason: HOSPADM

## 2019-10-14 RX ORDER — PROPOFOL 10 MG/ML
VIAL (ML) INTRAVENOUS AS NEEDED
Status: DISCONTINUED | OUTPATIENT
Start: 2019-10-14 | End: 2019-10-14 | Stop reason: SURG

## 2019-10-14 RX ORDER — CLOPIDOGREL BISULFATE 75 MG/1
75 TABLET ORAL DAILY
COMMUNITY

## 2019-10-14 RX ORDER — SODIUM CHLORIDE 9 MG/ML
500 INJECTION, SOLUTION INTRAVENOUS CONTINUOUS PRN
Status: DISCONTINUED | OUTPATIENT
Start: 2019-10-14 | End: 2019-10-14 | Stop reason: HOSPADM

## 2019-10-14 RX ADMIN — PROPOFOL 70 MG: 10 INJECTION, EMULSION INTRAVENOUS at 10:34

## 2019-10-14 RX ADMIN — PROPOFOL 50 MG: 10 INJECTION, EMULSION INTRAVENOUS at 10:49

## 2019-10-14 RX ADMIN — PROPOFOL 70 MG: 10 INJECTION, EMULSION INTRAVENOUS at 10:38

## 2019-10-14 RX ADMIN — SODIUM CHLORIDE 500 ML: 9 INJECTION, SOLUTION INTRAVENOUS at 10:13

## 2019-10-14 RX ADMIN — PROPOFOL 60 MG: 10 INJECTION, EMULSION INTRAVENOUS at 10:43

## 2019-10-14 RX ADMIN — LIDOCAINE HYDROCHLORIDE 50 MG: 20 INJECTION, SOLUTION INTRAVENOUS at 10:33

## 2019-10-14 NOTE — ANESTHESIA POSTPROCEDURE EVALUATION
Patient: Jose Mendez    Procedure Summary     Date:  10/14/19 Room / Location:  [facility] ENDOSCOPY 5 / [facility] ENDOSCOPY    Anesthesia Start:  1028 Anesthesia Stop:  1056    Procedure:  COLONOSCOPY WITH ANESTHESIA (N/A ) Diagnosis:       Encounter for screening for malignant neoplasm of colon      (Encounter for screening for malignant neoplasm of colon [Z12.11])    Surgeon:  Crow Patton DO Provider:  Tomas Kearsn CRNA    Anesthesia Type:  MAC ASA Status:  2          Anesthesia Type: MAC  Last vitals  BP   138/70 (10/14/19 0934)   Temp   98.5 °F (36.9 °C) (10/14/19 0934)   Pulse   65 (10/14/19 0934)   Resp   20 (10/14/19 0934)     SpO2   97 % (10/14/19 0934)     Post Anesthesia Care and Evaluation    Patient location during evaluation: PHASE II  Patient participation: complete - patient participated  Level of consciousness: awake  Pain score: 1  Pain management: adequate  Airway patency: patent  Anesthetic complications: No anesthetic complications  PONV Status: none  Cardiovascular status: acceptable  Respiratory status: acceptable  Hydration status: acceptable

## 2019-10-14 NOTE — ANESTHESIA PREPROCEDURE EVALUATION
Anesthesia Evaluation     Patient summary reviewed   no history of anesthetic complications:  NPO Solid Status: > 8 hours  NPO Liquid Status: > 2 hours           Airway   Mallampati: II  TM distance: >3 FB  Neck ROM: full  Dental    (+) upper dentures and lower dentures    Pulmonary    (+) a smoker Current Smoked day of surgery, COPD,   Cardiovascular   Exercise tolerance: good (4-7 METS)    (+) hypertension, hyperlipidemia,       Neuro/Psych- negative ROS  GI/Hepatic/Renal/Endo    (+)  GERD,  hypothyroidism,     Musculoskeletal     Abdominal    Substance History      OB/GYN          Other                          Anesthesia Plan    ASA 2     MAC     Anesthetic plan, all risks, benefits, and alternatives have been provided, discussed and informed consent has been obtained with: patient.

## 2019-10-16 ENCOUNTER — TRANSCRIBE ORDERS (OUTPATIENT)
Dept: ADMINISTRATIVE | Facility: HOSPITAL | Age: 69
End: 2019-10-16

## 2019-10-16 DIAGNOSIS — K77 LIVER DISORDERS IN DISEASES CLASSIFIED ELSEWHERE: Primary | ICD-10-CM

## 2019-10-23 ENCOUNTER — APPOINTMENT (OUTPATIENT)
Dept: ULTRASOUND IMAGING | Facility: HOSPITAL | Age: 69
End: 2019-10-23

## 2019-10-24 ENCOUNTER — HOSPITAL ENCOUNTER (OUTPATIENT)
Dept: ULTRASOUND IMAGING | Facility: HOSPITAL | Age: 69
Discharge: HOME | End: 2019-10-24
Admitting: NURSE PRACTITIONER

## 2019-10-24 DIAGNOSIS — K77 LIVER DISORDERS IN DISEASES CLASSIFIED ELSEWHERE: ICD-10-CM

## 2019-10-24 PROCEDURE — 76705 ECHO EXAM OF ABDOMEN: CPT

## 2019-10-31 ENCOUNTER — OFFICE VISIT (OUTPATIENT)
Dept: GASTROENTEROLOGY | Facility: CLINIC | Age: 69
End: 2019-10-31

## 2019-10-31 VITALS
SYSTOLIC BLOOD PRESSURE: 130 MMHG | WEIGHT: 144 LBS | HEART RATE: 84 BPM | TEMPERATURE: 97.5 F | BODY MASS INDEX: 21.33 KG/M2 | DIASTOLIC BLOOD PRESSURE: 68 MMHG | OXYGEN SATURATION: 98 % | HEIGHT: 69 IN

## 2019-10-31 DIAGNOSIS — K25.3 ACUTE GASTRIC ULCER WITHOUT HEMORRHAGE OR PERFORATION: Primary | ICD-10-CM

## 2019-10-31 PROCEDURE — 99212 OFFICE O/P EST SF 10 MIN: CPT | Performed by: INTERNAL MEDICINE

## 2019-10-31 NOTE — PROGRESS NOTES
Chief Complaint   Patient presents with   • Endoscopy     3 wk fu 10/10/19 endo non bleeding gastric ulcer w/o stigmata bleeding        PCP: Luis Carlos Cee PA  REFER: No ref. provider found    Subjective     Peptic Ulcer Disease   This is a new problem. The current episode started more than 1 month ago. The problem occurs rarely. The problem has been resolved. Associated symptoms include fatigue. Pertinent negatives include no abdominal pain, anorexia, chest pain, coughing, fever, joint swelling, myalgias, nausea, rash, sore throat or vomiting. The symptoms are aggravated by smoking (anti -inflammations). Treatments tried: ppi  The treatment provided significant relief.       Past Medical History:   Diagnosis Date   • Atherosclerosis    • Disease of thyroid gland    • Hyperlipidemia    • Hypertension        Past Surgical History:   Procedure Laterality Date   • APPENDECTOMY     • COLONOSCOPY N/A 10/14/2019    Procedure: COLONOSCOPY WITH ANESTHESIA;  Surgeon: Crow Patton DO;  Location: [facility] ENDOSCOPY;  Service: Gastroenterology   • ENDOSCOPY N/A 10/10/2019    Procedure: ESOPHAGOGASTRODUODENOSCOPY WITH ANESTHESIA;  Surgeon: Crow Patton DO;  Location: [facility] ENDOSCOPY;  Service: Gastroenterology   • FEMORAL ARTERY STENT Right    • OTHER SURGICAL HISTORY Right approx.2008    stent right leg       Outpatient Medications Marked as Taking for the 10/31/19 encounter (Office Visit) with Crow Patton DO   Medication Sig Dispense Refill   • cholecalciferol (VITAMIN D3) 48005 units capsule Take 2,000 Units by mouth Daily.     • clopidogrel (PLAVIX) 75 MG tablet Take 75 mg by mouth Daily.     • finasteride (PROSCAR) 5 MG tablet Take 5 mg by mouth Daily.     • levothyroxine (SYNTHROID, LEVOTHROID) 100 MCG tablet Take 100 mcg by mouth Daily.     • losartan (COZAAR) 100 MG tablet Take 100 mg by mouth Daily.     • pantoprazole (PROTONIX) 40 MG EC tablet Take 40 mg by mouth.     • pentoxifylline (TRENtal)  400 MG CR tablet Take 400 mg by mouth 3 (Three) Times a Day With Meals.     • pravastatin (PRAVACHOL) 40 MG tablet Take 40 mg by mouth Daily.     • sildenafil (VIAGRA) 100 MG tablet Take 100 mg by mouth Daily As Needed for erectile dysfunction.         Allergies   Allergen Reactions   • Augmentin [Amoxicillin-Pot Clavulanate] Other (See Comments)     Not sure   • Codeine Other (See Comments)     Not sure   • Hydroxyzine Other (See Comments)     Not sure   • Tramadol Other (See Comments)     Not sure       Social History     Socioeconomic History   • Marital status:      Spouse name: Not on file   • Number of children: Not on file   • Years of education: Not on file   • Highest education level: Not on file   Tobacco Use   • Smoking status: Current Some Day Smoker     Packs/day: 0.25     Years: 50.00     Pack years: 12.50   • Smokeless tobacco: Never Used   Substance and Sexual Activity   • Alcohol use: No     Frequency: Never   • Drug use: No   • Sexual activity: Defer       Family History   Problem Relation Age of Onset   • Colon cancer Neg Hx    • Colon polyps Neg Hx    • Esophageal cancer Neg Hx        Review of Systems   Constitutional: Positive for fatigue. Negative for fever and unexpected weight change.   HENT: Negative for hearing loss, sore throat and voice change.    Eyes: Negative for visual disturbance.   Respiratory: Negative for cough, shortness of breath and wheezing.    Cardiovascular: Negative for chest pain and palpitations.   Gastrointestinal: Negative for abdominal pain, anorexia, blood in stool, nausea and vomiting.   Endocrine: Negative for polydipsia and polyuria.   Genitourinary: Negative for difficulty urinating, dysuria, hematuria and urgency.   Musculoskeletal: Negative for joint swelling and myalgias.   Skin: Negative for color change, rash and wound.   Neurological: Negative for dizziness, tremors, seizures and syncope.   Hematological: Does not bruise/bleed easily.  "  Psychiatric/Behavioral: Negative for agitation and confusion. The patient is not nervous/anxious.        Objective     Vitals:    10/31/19 1339   BP: 130/68   Pulse: 84   Temp: 97.5 °F (36.4 °C)   SpO2: 98%   Weight: 65.3 kg (144 lb)   Height: 175.3 cm (69.02\")     Body mass index is 21.26 kg/m².    Physical Exam   Constitutional: He is oriented to person, place, and time. He appears well-developed and well-nourished.   HENT:   Head: Normocephalic and atraumatic.   Eyes:   Pink, Nonicteric   Neck:   Global Assessment- supple. No JVD or lymphadenopathy   Cardiovascular: Normal rate, regular rhythm and normal heart sounds. Exam reveals no gallop and no friction rub.   No murmur heard.  Pulmonary/Chest: Effort normal and breath sounds normal. No respiratory distress. He has no wheezes. He has no rales.   Inspection: Movements-Symmetrical   Abdominal: Soft. Bowel sounds are normal. He exhibits no distension and no mass. There is no tenderness. There is no rebound and no guarding.   Neurological: He is alert and oriented to person, place, and time.   General Exam-Deemed a reliable historian, able to converse without difficulty and Able to move all extremities without difficulty       Imaging Results (Most Recent)     None          Body mass index is 21.26 kg/m².    Assessment/Plan     Jose was seen today for endoscopy.    Diagnoses and all orders for this visit:    Acute gastric ulcer without hemorrhage or perforation  -     Case Request; Standing  -     Case Request    Other orders  -     Follow Anesthesia Guidelines / Standing Orders; Future  -     Obtain Informed Consent; Future  -     Implement Anesthesia Orders Day of Procedure; Standing  -     Obtain Informed Consent; Standing        ESOPHAGOGASTRODUODENOSCOPY WITH ANESTHESIA (N/A)    Patient's Body mass index is 21.26 kg/m². BMI is within normal parameters. No follow-up required..  Repeat egd in 2 months  Continue PPI and avoid Meloxicam  Off plavix 5-7 days " prior     There are no Patient Instructions on file for this visit.

## 2019-11-01 ENCOUNTER — TRANSCRIBE ORDERS (OUTPATIENT)
Dept: ADMINISTRATIVE | Facility: HOSPITAL | Age: 69
End: 2019-11-01

## 2019-11-01 DIAGNOSIS — M25.521 RIGHT ELBOW PAIN: Primary | ICD-10-CM

## 2019-11-04 ENCOUNTER — TRANSCRIBE ORDERS (OUTPATIENT)
Dept: SLEEP MEDICINE | Facility: HOSPITAL | Age: 69
End: 2019-11-04

## 2019-11-04 DIAGNOSIS — G47.09 OTHER INSOMNIA: Primary | ICD-10-CM

## 2019-11-18 ENCOUNTER — HOSPITAL ENCOUNTER (OUTPATIENT)
Dept: NEUROLOGY | Facility: HOSPITAL | Age: 69
Discharge: HOME | End: 2019-11-18
Admitting: NURSE PRACTITIONER

## 2019-11-18 DIAGNOSIS — M25.521 RIGHT ELBOW PAIN: ICD-10-CM

## 2019-11-18 PROCEDURE — 95909 NRV CNDJ TST 5-6 STUDIES: CPT

## 2019-11-18 PROCEDURE — 95886 MUSC TEST DONE W/N TEST COMP: CPT

## 2019-11-19 ENCOUNTER — HOSPITAL ENCOUNTER (OUTPATIENT)
Dept: SLEEP MEDICINE | Facility: HOSPITAL | Age: 69
End: 2019-11-19

## 2019-11-20 ENCOUNTER — APPOINTMENT (OUTPATIENT)
Dept: SLEEP MEDICINE | Facility: HOSPITAL | Age: 69
End: 2019-11-20

## 2019-11-25 ENCOUNTER — APPOINTMENT (OUTPATIENT)
Dept: SLEEP MEDICINE | Facility: HOSPITAL | Age: 69
End: 2019-11-25

## 2020-01-03 ENCOUNTER — ANESTHESIA EVENT (OUTPATIENT)
Dept: GASTROENTEROLOGY | Facility: HOSPITAL | Age: 70
End: 2020-01-03

## 2020-01-03 ENCOUNTER — ANESTHESIA (OUTPATIENT)
Dept: GASTROENTEROLOGY | Facility: HOSPITAL | Age: 70
End: 2020-01-03

## 2020-01-03 ENCOUNTER — HOSPITAL ENCOUNTER (OUTPATIENT)
Facility: HOSPITAL | Age: 70
Setting detail: HOSPITAL OUTPATIENT SURGERY
Discharge: HOME | End: 2020-01-03
Attending: INTERNAL MEDICINE | Admitting: INTERNAL MEDICINE

## 2020-01-03 VITALS
HEIGHT: 69 IN | TEMPERATURE: 97.5 F | SYSTOLIC BLOOD PRESSURE: 116 MMHG | OXYGEN SATURATION: 96 % | HEART RATE: 78 BPM | WEIGHT: 151 LBS | DIASTOLIC BLOOD PRESSURE: 92 MMHG | RESPIRATION RATE: 17 BRPM | BODY MASS INDEX: 22.36 KG/M2

## 2020-01-03 DIAGNOSIS — K25.3 ACUTE GASTRIC ULCER WITHOUT HEMORRHAGE OR PERFORATION: ICD-10-CM

## 2020-01-03 PROCEDURE — 25010000002 PROPOFOL 10 MG/ML EMULSION: Performed by: NURSE ANESTHETIST, CERTIFIED REGISTERED

## 2020-01-03 PROCEDURE — 43239 EGD BIOPSY SINGLE/MULTIPLE: CPT | Performed by: INTERNAL MEDICINE

## 2020-01-03 PROCEDURE — 87081 CULTURE SCREEN ONLY: CPT | Performed by: INTERNAL MEDICINE

## 2020-01-03 RX ORDER — SODIUM CHLORIDE 0.9 % (FLUSH) 0.9 %
10 SYRINGE (ML) INJECTION AS NEEDED
Status: DISCONTINUED | OUTPATIENT
Start: 2020-01-03 | End: 2020-01-03 | Stop reason: HOSPADM

## 2020-01-03 RX ORDER — SODIUM CHLORIDE 9 MG/ML
500 INJECTION, SOLUTION INTRAVENOUS CONTINUOUS PRN
Status: DISCONTINUED | OUTPATIENT
Start: 2020-01-03 | End: 2020-01-03 | Stop reason: HOSPADM

## 2020-01-03 RX ORDER — PROPOFOL 10 MG/ML
VIAL (ML) INTRAVENOUS AS NEEDED
Status: DISCONTINUED | OUTPATIENT
Start: 2020-01-03 | End: 2020-01-03 | Stop reason: SURG

## 2020-01-03 RX ADMIN — PROPOFOL 100 MG: 10 INJECTION, EMULSION INTRAVENOUS at 10:28

## 2020-01-03 RX ADMIN — LIDOCAINE HYDROCHLORIDE 100 MG: 20 INJECTION, SOLUTION INTRAVENOUS at 10:28

## 2020-01-03 RX ADMIN — SODIUM CHLORIDE 500 ML: 9 INJECTION, SOLUTION INTRAVENOUS at 09:40

## 2020-01-03 NOTE — ANESTHESIA PREPROCEDURE EVALUATION
Anesthesia Evaluation     Patient summary reviewed   no history of anesthetic complications:  NPO Solid Status: > 8 hours             Airway   Mallampati: II  TM distance: >3 FB  Neck ROM: full  Dental    (+) upper dentures and lower dentures    Pulmonary    (+) a smoker, COPD,   Cardiovascular   Exercise tolerance: good (4-7 METS)    (+) hypertension, hyperlipidemia,       Neuro/Psych- negative ROS  GI/Hepatic/Renal/Endo    (+)  GERD,      Musculoskeletal     Abdominal    Substance History      OB/GYN          Other                          Anesthesia Plan    ASA 2     MAC       Anesthetic plan, all risks, benefits, and alternatives have been provided, discussed and informed consent has been obtained with: patient.

## 2020-01-03 NOTE — H&P
[facility] Gastroenterology  Pre Procedure History & Physical    Chief Complaint:       Subjective     HPI:       Past Medical History:   Past Medical History:   Diagnosis Date   • Atherosclerosis    • Disease of thyroid gland    • Hyperlipidemia    • Hypertension        Past Surgical History:  Past Surgical History:   Procedure Laterality Date   • ANKLE FUSION Bilateral    • APPENDECTOMY     • COLONOSCOPY N/A 10/14/2019    Procedure: COLONOSCOPY WITH ANESTHESIA;  Surgeon: Crow Patton DO;  Location: [facility] ENDOSCOPY;  Service: Gastroenterology   • ENDOSCOPY N/A 10/10/2019    Procedure: ESOPHAGOGASTRODUODENOSCOPY WITH ANESTHESIA;  Surgeon: Crow Patton DO;  Location: [facility] ENDOSCOPY;  Service: Gastroenterology   • FEMORAL ARTERY STENT Right    • OTHER SURGICAL HISTORY Right approx.2008    stent right leg       Family History:  Family History   Problem Relation Age of Onset   • Colon cancer Neg Hx    • Colon polyps Neg Hx    • Esophageal cancer Neg Hx        Social History:   reports that he has been smoking. He has a 12.50 pack-year smoking history. He has never used smokeless tobacco. He reports that he does not drink alcohol or use drugs.    Medications:   Prior to Admission medications    Medication Sig Start Date End Date Taking? Authorizing Provider   cholecalciferol (VITAMIN D3) 13678 units capsule Take 2,000 Units by mouth Daily.   Yes Deja Bill MD   finasteride (PROSCAR) 5 MG tablet Take 5 mg by mouth Daily.   Yes Deja Bill MD   levothyroxine (SYNTHROID, LEVOTHROID) 100 MCG tablet Take 100 mcg by mouth Daily.   Yes Deja Bill MD   losartan (COZAAR) 100 MG tablet Take 100 mg by mouth Daily.   Yes Deja Bill MD   pantoprazole (PROTONIX) 40 MG EC tablet Take 40 mg by mouth.   Yes Deja Bill MD   pentoxifylline (TRENtal) 400 MG CR tablet Take 400 mg by mouth 3 (Three) Times a Day With Meals.   Yes Deja Bill MD   pravastatin  "(PRAVACHOL) 40 MG tablet Take 40 mg by mouth Daily.   Yes Provider, MD Deja   clopidogrel (PLAVIX) 75 MG tablet Take 75 mg by mouth Daily.    Provider, Historical, MD   sildenafil (VIAGRA) 100 MG tablet Take 100 mg by mouth Daily As Needed for erectile dysfunction.    Provider, Historical, MD       Allergies:  Augmentin [amoxicillin-pot clavulanate]; Codeine; Hydroxyzine; and Tramadol    ROS:    General: Weight stable  Resp: No SOA  Cardiovascular: No CP    Objective     Blood pressure 130/64, pulse 96, temperature 97.5 °F (36.4 °C), temperature source Temporal, resp. rate 18, height 175.3 cm (69\"), weight 68.5 kg (151 lb), SpO2 96 %.    Physical Exam   Constitutional: Pt is oriented to person, place, and in no distress.   HENT: Mouth/Throat: Oropharynx is clear.   Cardiovascular: Normal rate, regular rhythm.    Pulmonary/Chest: Effort normal. No respiratory distress. No  wheezes.   Abdominal: Soft. Non-distended.  Skin: Skin is warm and dry.   Psychiatric: Mood, memory, affect and judgment appear normal.     Assessment/Plan     Diagnosis:      Anticipated Surgical Procedure:  EGD    The risks, benefits, and alternatives of this procedure have been discussed with the patient or the responsible party- the patient understands and agrees to proceed.        "

## 2020-01-04 LAB — UREASE TISS QL: NEGATIVE

## 2020-01-07 ENCOUNTER — HOSPITAL ENCOUNTER (OUTPATIENT)
Dept: SLEEP MEDICINE | Facility: HOSPITAL | Age: 70
Discharge: HOME | End: 2020-01-07
Admitting: NURSE PRACTITIONER

## 2020-01-07 DIAGNOSIS — G47.09 OTHER INSOMNIA: ICD-10-CM

## 2020-01-07 PROCEDURE — 95800 SLP STDY UNATTENDED: CPT | Performed by: PSYCHIATRY & NEUROLOGY

## 2020-01-07 PROCEDURE — 95800 SLP STDY UNATTENDED: CPT

## 2020-01-21 ENCOUNTER — TRANSCRIBE ORDERS (OUTPATIENT)
Dept: ADMINISTRATIVE | Facility: HOSPITAL | Age: 70
End: 2020-01-21

## 2020-01-21 DIAGNOSIS — M54.50 LOW BACK PAIN, UNSPECIFIED BACK PAIN LATERALITY, UNSPECIFIED CHRONICITY, UNSPECIFIED WHETHER SCIATICA PRESENT: Primary | ICD-10-CM

## 2020-01-24 ENCOUNTER — HOSPITAL ENCOUNTER (OUTPATIENT)
Dept: GENERAL RADIOLOGY | Facility: HOSPITAL | Age: 70
Discharge: HOME | End: 2020-01-24

## 2020-01-24 ENCOUNTER — TRANSCRIBE ORDERS (OUTPATIENT)
Dept: ADMINISTRATIVE | Facility: HOSPITAL | Age: 70
End: 2020-01-24

## 2020-01-24 ENCOUNTER — HOSPITAL ENCOUNTER (OUTPATIENT)
Dept: MRI IMAGING | Facility: HOSPITAL | Age: 70
Discharge: HOME | End: 2020-01-24
Admitting: NURSE PRACTITIONER

## 2020-01-24 DIAGNOSIS — M54.50 LOW BACK PAIN, UNSPECIFIED BACK PAIN LATERALITY, UNSPECIFIED CHRONICITY, UNSPECIFIED WHETHER SCIATICA PRESENT: ICD-10-CM

## 2020-01-24 DIAGNOSIS — M54.50 LOW BACK PAIN, UNSPECIFIED BACK PAIN LATERALITY, UNSPECIFIED CHRONICITY, UNSPECIFIED WHETHER SCIATICA PRESENT: Primary | ICD-10-CM

## 2020-01-24 PROCEDURE — 72110 X-RAY EXAM L-2 SPINE 4/>VWS: CPT

## 2020-01-24 PROCEDURE — 72148 MRI LUMBAR SPINE W/O DYE: CPT

## 2021-04-05 NOTE — PROGRESS NOTES
proportionally decreased and exhibit immunophenotypic evidence of mild left shift. CD34+ blasts comprise 0.3% of all analyzed white blood cells. COMMENT: Correlate with morphologic findings and other laboratory testing for final classification. The analyzed WBCs in the sample include 83% lymphocytes, 1% monocytes and 16% granulocytes. Granulocytes and lymphocytes were selected for analysis based on CD45 staining intensity, forward scatter and side scatter. · 4/6/2021-he was first seen by me. Discussed the results of his CBC and flow cytometry. CLL and also neutrophilia. Discussed at length about management and prognosis. Recommended watch/wait approach. Recommended CT lung cancer low-dose lung cancer screening. Past Medical History:    Past Medical History:   Diagnosis Date    Arthritis     Benign prostate hyperplasia     Carotid stenosis     Colon polyp     COPD (chronic obstructive pulmonary disease) (HCC)     Cubital tunnel syndrome     Hyperlipidemia     Hypertension     IBS (irritable bowel syndrome)     Peripheral vascular disease (HCC)     Thyroid disease     TIA (transient ischemic attack)        Past Surgical History:    Past Surgical History:   Procedure Laterality Date    COLONOSCOPY  01/2020    UPPER GASTROINTESTINAL ENDOSCOPY  01/2020    VASCULAR SURGERY      VASECTOMY         Social History:    Marital status:    Smoking status: yes, 60 years  ETOH status: currently no, quit 1981  [facility]    Family History:   Family History   Problem Relation Age of Onset    Breast Cancer Mother 48        breast cancer       Current Hospital Medications:    Current Outpatient Medications   Medication Sig Dispense Refill    vitamin D (CHOLECALCIFEROL) 75999 UNIT CAPS Take 2,000 Units by mouth daily      clopidogrel (PLAVIX) 75 MG tablet Take 75 mg by mouth daily      diclofenac (VOLTAREN) 75 MG EC tablet TAKE 1 TABLET BY MOUTH TWICE DAILY      finasteride (PROSCAR) 5 MG tablet Take 5 mg by mouth daily      levothyroxine (SYNTHROID) 100 MCG tablet Take 100 mcg by mouth daily      losartan (COZAAR) 100 MG tablet Take 100 mg by mouth daily      pentoxifylline (TRENTAL) 400 MG extended release tablet Take 400 mg by mouth 3 times daily (with meals)      pravastatin (PRAVACHOL) 40 MG tablet Take 40 mg by mouth daily      sildenafil (VIAGRA) 100 MG tablet Take 100 mg by mouth daily as needed      pantoprazole (PROTONIX) 40 MG tablet Take 40 mg by mouth      pantoprazole (PROTONIX) 20 MG tablet Take 1 tablet by mouth daily 30 tablet 0     No current facility-administered medications for this visit. Allergies: Allergies   Allergen Reactions    Amoxicillin-Pot Clavulanate      Other reaction(s): Other (See Comments)  Not sure    Codeine      Other reaction(s): Other (See Comments)  Not sure    Hydroxyzine      Other reaction(s): Other (See Comments)  Not sure    Tramadol      Other reaction(s): Other (See Comments)  Not sure         Subjective   REVIEW OF SYSTEMS:   CONSTITUTIONAL: no fever, no night sweats, no fatigue, unintentional weight loss;   HEENT: no blurring of vision, no double vision, no hearing difficulty, no tinnitus, no ulceration, no dysplasia, no epistaxis;  LUNGS: no cough, no hemoptysis, no wheeze,  shortness of breath on exertion;  CARDIOVASCULAR: no palpitation, no chest pain, shortness of breath on exertion;  GI: no abdominal pain, no nausea, no vomiting, no diarrhea, no constipation;  ASHLEY: no dysuria, no hematuria, no frequency or urgency, no nephrolithiasis;  MUSCULOSKELETAL: back pain,  no joint pain, no swelling, no stiffness;  ENDOCRINE: no polyuria, no polydipsia, no cold or heat intolerance;  HEMATOLOGY: easy bruising, no bleeding, no history of clotting disorder;  DERMATOLOGY: no skin rash, no eczema, no pruritus;  PSYCHIATRY: no depression, no anxiety, no panic attacks, no suicidal ideation, no homicidal ideation;  NEUROLOGY: no syncope, no seizures, no numbness or tingling of hands, no numbness or tingling of feet, no paresis;    Objective   /78   Pulse 86   Temp 97.1 °F (36.2 °C)   Ht 5' 9\" (1.753 m)   Wt 142 lb 3.2 oz (64.5 kg)   SpO2 96%   BMI 21.00 kg/m²     PHYSICAL EXAM:  CONSTITUTIONAL: Alert, appropriate, no acute distress  EYES: Non icteric, EOM intact, pupils equal round   ENT: Mucus membranes moist, no oral pharyngeal lesions, external inspection of ears and nose are normal  NECK: Supple, no masses. No palpable thyroid mass  CHEST/LUNGS: CTA bilaterally, normal respiratory effort   CARDIOVASCULAR: RRR, no murmurs. No lower extremity edema  ABDOMEN: soft non-tender, active bowel sounds, no HSM. No palpable masses  EXTREMITIES: warm, full ROM in all 4 extremities, no focal weakness. SKIN: warm, dry with no rashes or lesions  LYMPH: No cervical, clavicular, axillary, or inguinal lymphadenopathy  NEUROLOGIC: follows commands, non focal   PSYCH: mood and affect appropriate.   Alert and oriented to time, place, person      LABORATORY RESULTS REVIEWED/ANALYZED BY ME:  Lab Results   Component Value Date    WBC 19.90 (H) 04/06/2021    HGB 15.6 04/06/2021    HCT 47.9 04/06/2021    MCV 93.2 (H) 04/06/2021     04/06/2021     Lab Results   Component Value Date    NEUTROABS 11.38 (H) 04/06/2021       RADIOLOGY STUDIES REVIEWED BY ME:    ASSESSMENT:    Orders Placed This Encounter   Procedures    CT ABDOMEN PELVIS W IV CONTRAST Additional Contrast? Radiologist Recommendation     Standing Status:   Future     Standing Expiration Date:   4/6/2022     Order Specific Question:   Additional Contrast?     Answer:   Radiologist Recommendation     Order Specific Question:   Reason for exam:     Answer:   new dx CLL-evaluate lymph nodes    CT CHEST W CONTRAST     Standing Status:   Future     Standing Expiration Date:   4/6/2022     Order Specific Question:   Reason for exam:     Answer:   new dx CLL to evaluate lymph nodes      Jong cortez appropriated.        (Please note that portions of this note were completed with a voice recognition program. Efforts were made to edit the dictations but occasionally words are mis-transcribed.)    Ritu Kaplan MD    04/06/21  10:43 AM

## 2021-04-06 ENCOUNTER — OFFICE VISIT (OUTPATIENT)
Dept: HEMATOLOGY | Age: 71
End: 2021-04-06
Payer: OTHER GOVERNMENT

## 2021-04-06 ENCOUNTER — HOSPITAL ENCOUNTER (OUTPATIENT)
Dept: INFUSION THERAPY | Age: 71
Discharge: HOME | End: 2021-04-06
Payer: OTHER GOVERNMENT

## 2021-04-06 VITALS
BODY MASS INDEX: 21.06 KG/M2 | OXYGEN SATURATION: 96 % | WEIGHT: 142.2 LBS | HEIGHT: 69 IN | SYSTOLIC BLOOD PRESSURE: 122 MMHG | TEMPERATURE: 97.1 F | DIASTOLIC BLOOD PRESSURE: 78 MMHG | HEART RATE: 86 BPM

## 2021-04-06 DIAGNOSIS — Z00.00 HEALTHCARE MAINTENANCE: ICD-10-CM

## 2021-04-06 DIAGNOSIS — Z71.89 CARE PLAN DISCUSSED WITH PATIENT: ICD-10-CM

## 2021-04-06 DIAGNOSIS — D72.829 LEUKOCYTOSIS, UNSPECIFIED TYPE: Primary | ICD-10-CM

## 2021-04-06 DIAGNOSIS — C91.10 CLL (CHRONIC LYMPHOCYTIC LEUKEMIA) (HCC): Primary | ICD-10-CM

## 2021-04-06 DIAGNOSIS — Z72.0 TOBACCO ABUSE: ICD-10-CM

## 2021-04-06 DIAGNOSIS — D72.829 LEUKOCYTOSIS, UNSPECIFIED TYPE: ICD-10-CM

## 2021-04-06 DIAGNOSIS — D72.9 NEUTROPHILIC LEUKOCYTOSIS: ICD-10-CM

## 2021-04-06 DIAGNOSIS — Z71.6 TOBACCO ABUSE COUNSELING: ICD-10-CM

## 2021-04-06 LAB
BASOPHILS ABSOLUTE: 0.04 K/UL (ref 0.01–0.08)
BASOPHILS RELATIVE PERCENT: 0.2 % (ref 0.1–1.2)
EOSINOPHILS ABSOLUTE: 0.14 K/UL (ref 0.04–0.54)
EOSINOPHILS RELATIVE PERCENT: 0.7 % (ref 0.7–7)
HCT VFR BLD CALC: 47.9 % (ref 40.1–51)
HEMOGLOBIN: 15.6 G/DL (ref 13.7–17.5)
LYMPHOCYTES ABSOLUTE: 6.75 K/UL (ref 1.18–3.74)
LYMPHOCYTES RELATIVE PERCENT: 33.9 % (ref 19.3–53.1)
MCH RBC QN AUTO: 30.4 PG (ref 25.7–32.2)
MCHC RBC AUTO-ENTMCNC: 32.6 G/DL (ref 32.3–36.5)
MCV RBC AUTO: 93.2 FL (ref 79–92.2)
MONOCYTES ABSOLUTE: 1.59 K/UL (ref 0.24–0.82)
MONOCYTES RELATIVE PERCENT: 8 % (ref 4.7–12.5)
NEUTROPHILS ABSOLUTE: 11.38 K/UL (ref 1.56–6.13)
NEUTROPHILS RELATIVE PERCENT: 57.2 % (ref 34–71.1)
PDW BLD-RTO: 14.1 % (ref 11.6–14.4)
PLATELET # BLD: 173 K/UL (ref 163–337)
PMV BLD AUTO: 10.3 FL (ref 7.4–10.4)
RBC # BLD: 5.14 M/UL (ref 4.63–6.08)
WBC # BLD: 19.9 K/UL (ref 4.23–9.07)

## 2021-04-06 PROCEDURE — 1123F ACP DISCUSS/DSCN MKR DOCD: CPT | Performed by: INTERNAL MEDICINE

## 2021-04-06 PROCEDURE — 99203 OFFICE O/P NEW LOW 30 MIN: CPT

## 2021-04-06 PROCEDURE — 99204 OFFICE O/P NEW MOD 45 MIN: CPT | Performed by: INTERNAL MEDICINE

## 2021-04-06 PROCEDURE — 4004F PT TOBACCO SCREEN RCVD TLK: CPT | Performed by: INTERNAL MEDICINE

## 2021-04-06 PROCEDURE — 3017F COLORECTAL CA SCREEN DOC REV: CPT | Performed by: INTERNAL MEDICINE

## 2021-04-06 PROCEDURE — 85025 COMPLETE CBC W/AUTO DIFF WBC: CPT

## 2021-04-06 PROCEDURE — G8420 CALC BMI NORM PARAMETERS: HCPCS | Performed by: INTERNAL MEDICINE

## 2021-04-06 PROCEDURE — 4040F PNEUMOC VAC/ADMIN/RCVD: CPT | Performed by: INTERNAL MEDICINE

## 2021-04-06 PROCEDURE — G8427 DOCREV CUR MEDS BY ELIG CLIN: HCPCS | Performed by: INTERNAL MEDICINE

## 2021-04-06 RX ORDER — LEVOTHYROXINE SODIUM 0.1 MG/1
100 TABLET ORAL DAILY
COMMUNITY

## 2021-04-06 RX ORDER — PRAVASTATIN SODIUM 40 MG
40 TABLET ORAL DAILY
COMMUNITY

## 2021-04-06 RX ORDER — CLOPIDOGREL BISULFATE 75 MG/1
75 TABLET ORAL DAILY
COMMUNITY

## 2021-04-06 RX ORDER — PENTOXIFYLLINE 400 MG/1
400 TABLET, EXTENDED RELEASE ORAL
COMMUNITY

## 2021-04-06 RX ORDER — FINASTERIDE 5 MG/1
5 TABLET, FILM COATED ORAL DAILY
COMMUNITY

## 2021-04-06 RX ORDER — SILDENAFIL 100 MG/1
100 TABLET, FILM COATED ORAL DAILY PRN
COMMUNITY

## 2021-04-06 RX ORDER — PANTOPRAZOLE SODIUM 40 MG/1
40 TABLET, DELAYED RELEASE ORAL
COMMUNITY

## 2021-04-06 RX ORDER — LOSARTAN POTASSIUM 100 MG/1
100 TABLET ORAL DAILY
COMMUNITY

## 2021-04-06 RX ORDER — DICLOFENAC SODIUM 75 MG/1
TABLET, DELAYED RELEASE ORAL
COMMUNITY
Start: 2021-03-04

## 2021-04-15 ENCOUNTER — HOSPITAL ENCOUNTER (OUTPATIENT)
Dept: CT IMAGING | Age: 71
Discharge: HOME | End: 2021-04-15
Payer: OTHER GOVERNMENT

## 2021-04-15 ENCOUNTER — CLINICAL DOCUMENTATION (OUTPATIENT)
Dept: HEMATOLOGY | Age: 71
End: 2021-04-15

## 2021-04-15 DIAGNOSIS — C91.10 CLL (CHRONIC LYMPHOCYTIC LEUKEMIA) (HCC): ICD-10-CM

## 2021-04-15 LAB
GFR AFRICAN AMERICAN: >60
GFR NON-AFRICAN AMERICAN: >60
PERFORMED ON: NORMAL
POC CREATININE: 0.8 MG/DL (ref 0.3–1.3)
POC SAMPLE TYPE: NORMAL

## 2021-04-15 PROCEDURE — 71260 CT THORAX DX C+: CPT

## 2021-04-15 PROCEDURE — 82565 ASSAY OF CREATININE: CPT

## 2021-04-15 PROCEDURE — 74177 CT ABD & PELVIS W/CONTRAST: CPT

## 2021-04-15 PROCEDURE — 6360000004 HC RX CONTRAST MEDICATION: Performed by: INTERNAL MEDICINE

## 2021-04-15 RX ADMIN — IOPAMIDOL 75 ML: 755 INJECTION, SOLUTION INTRAVENOUS at 08:48

## 2021-04-15 NOTE — PROGRESS NOTES
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

## 2021-04-16 ENCOUNTER — TELEPHONE (OUTPATIENT)
Dept: HEMATOLOGY | Age: 71
End: 2021-04-16

## 2021-04-16 DIAGNOSIS — R93.89 ABNORMAL CT SCAN: ICD-10-CM

## 2021-04-16 DIAGNOSIS — I70.0 ATHEROSCLEROSIS OF ABDOMINAL AORTA (HCC): Primary | ICD-10-CM

## 2021-04-16 DIAGNOSIS — I70.8 ATHEROSCLEROSIS OF BOTH ILIAC ARTERIES: ICD-10-CM

## 2021-04-16 NOTE — TELEPHONE ENCOUNTER
Orders rec'd from Dr. Gladys Pack for referral to [facility] due to CT scan results showing \"severe atheromatous change of the iliac artery\". Dr. Gladys Pack attempted to call pt yesterday with VM left and signer attempted to call today with VM left with request for return call. Will attempt to contact pt again later. Referral created for [facility].

## 2021-04-19 ENCOUNTER — HOSPITAL ENCOUNTER (OUTPATIENT)
Dept: VASCULAR LAB | Age: 71
Discharge: HOME | End: 2021-04-19
Payer: OTHER GOVERNMENT

## 2021-04-19 ENCOUNTER — OFFICE VISIT (OUTPATIENT)
Dept: VASCULAR SURGERY | Age: 71
End: 2021-04-19
Payer: OTHER GOVERNMENT

## 2021-04-19 VITALS
HEIGHT: 69 IN | HEART RATE: 90 BPM | WEIGHT: 145 LBS | OXYGEN SATURATION: 98 % | SYSTOLIC BLOOD PRESSURE: 114 MMHG | BODY MASS INDEX: 21.48 KG/M2 | RESPIRATION RATE: 18 BRPM | DIASTOLIC BLOOD PRESSURE: 71 MMHG

## 2021-04-19 DIAGNOSIS — I73.9 PVD (PERIPHERAL VASCULAR DISEASE) WITH CLAUDICATION (HCC): ICD-10-CM

## 2021-04-19 DIAGNOSIS — I73.9 PVD (PERIPHERAL VASCULAR DISEASE) WITH CLAUDICATION (HCC): Primary | ICD-10-CM

## 2021-04-19 PROCEDURE — 93922 UPR/L XTREMITY ART 2 LEVELS: CPT

## 2021-04-19 PROCEDURE — 93926 LOWER EXTREMITY STUDY: CPT

## 2021-04-19 PROCEDURE — 99203 OFFICE O/P NEW LOW 30 MIN: CPT | Performed by: PHYSICIAN ASSISTANT

## 2021-04-19 NOTE — PROGRESS NOTES
Patient Care Team:  Lavina Kehr, MD as Consulting Physician (Vascular Surgery)      Thalia Barahona (:  1950) is a 70 y.o. male,New patient, here for evaluation of the following chief complaint(s):  New Patient (athersclerosis of abd aorta abd both iliac arteries )      SUBJECTIVE/OBJECTIVE:  Mr. Anat Malhotra is a 71 yo male who has a history that includes PVD, hyperlipidemia, HTN, COPD, Carotid Artery Stenosis, tobacco abuse and CLL. Today he presents as a referral from Dr. Rosalinda Beck for evaluation of PVD. He reports claudication on right leg in the form of tiredness, weakness and aching at < 50 yds. No IRP no non healing wounds. He is a smoker. He is on Trental, Plavix and statin therapy. Thalia Barahona is a 70 y.o. male with the following history as recorded in EpicCare: There are no active problems to display for this patient. Current Outpatient Medications   Medication Sig Dispense Refill    vitamin D (CHOLECALCIFEROL) 40929 UNIT CAPS Take 2,000 Units by mouth daily      clopidogrel (PLAVIX) 75 MG tablet Take 75 mg by mouth daily      diclofenac (VOLTAREN) 75 MG EC tablet TAKE 1 TABLET BY MOUTH TWICE DAILY      finasteride (PROSCAR) 5 MG tablet Take 5 mg by mouth daily      levothyroxine (SYNTHROID) 100 MCG tablet Take 100 mcg by mouth daily      losartan (COZAAR) 100 MG tablet Take 100 mg by mouth daily      pentoxifylline (TRENTAL) 400 MG extended release tablet Take 400 mg by mouth 3 times daily (with meals)      pravastatin (PRAVACHOL) 40 MG tablet Take 40 mg by mouth daily      sildenafil (VIAGRA) 100 MG tablet Take 100 mg by mouth daily as needed      pantoprazole (PROTONIX) 40 MG tablet Take 40 mg by mouth      pantoprazole (PROTONIX) 20 MG tablet Take 1 tablet by mouth daily 30 tablet 0     No current facility-administered medications for this visit.       Allergies: Amoxicillin-pot clavulanate, Codeine, Hydroxyzine, and Tramadol  Past Medical History:   Diagnosis Date    normal.    Risk factors for atherosclerosis of all vascular beds have been reviewed with the patient including:  Family history, tobacco abuse in all forms, elevated cholesterol, hyperlipidemia, and diabetes. Reviewed on this visit: speciality care notes from Heme/Onc      ASSESSMENT/PLAN:      1. Athersclerosis of bilateral LE native arteries with claudication      Recommend he continue Plavix, Trental and statin   Recommend no smoking  Recommend good BP control  We will send him for a right  A'scan today and call him with the results and further recommendations      Addendum:  Right A'scan with KG's -   Impression        Right common femoral artery stent with high grade in stent stenosis.        Signature        ----------------------------------------------------------------    Electronically signed by Leo Jones(Interpreting    physician) on 04/19/2021 02:45 PM    ----------------------------------------------------------------       Stents     - The stent originated from the Right Prox Common Femoral to the Right       Dist Common Femoral.       Stent velocities are as follow:Prox Stent: cm/s. Mid Stent: 367 cm/s. Dist   Stent: 331 cm/s.       +-----------------------------+---+---+-----+------------------------------+   ! Location                     !PSV! EDV! Ratio!% Stenosis                    !   +-----------------------------+---+---+-----+------------------------------+   ! Mid Stent                    !461!   !     !                              !   +-----------------------------+---+---+-----+------------------------------+   ! Dist Stent                   !331!   !0.9  !                              !   +-----------------------------+---+---+-----+------------------------------+   Velocities are measured in cm/s ; Diameters are measured in mm       LE Duplex Measurements       +---------------++-----+-----+----+-----------++---+-----+---+-------------+   !               ! ! Right!     !Left!           !!   !     !   !             !   +---------------++-----+-----+----+-----------++---+-----+---+-------------+   ! Location       !!PSV  !Ratio! EDV ! Wave Desc. !!PSV! Ratio! EDV! Wave Desc.   !   +---------------++-----+-----+----+-----------++---+-----+---+-------------+   ! Common Femoral !!131  !     !    !           !!   !     !   !             !   +---------------++-----+-----+----+-----------++---+-----+---+-------------+   ! Prox PFA       !!115  !     !    !           !!   !     !   !             !   +---------------++-----+-----+----+-----------++---+-----+---+-------------+   ! Prox SFA       !!83.8 !     !    !           !!   !     !   !             !   +---------------++-----+-----+----+-----------++---+-----+---+-------------+   ! Mid SFA        !!68   !0.81 !    !           !!   !     !   !             !   +---------------++-----+-----+----+-----------++---+-----+---+-------------+   ! Dist SFA       !!128  !1.88 !    !           !!   !     !   !             !   +---------------++-----+-----+----+-----------++---+-----+---+-------------+   ! Prox Popliteal !!140  !1.09 !    !           !!   !     !   !             !   +---------------++-----+-----+----+-----------++---+-----+---+-------------+   ! Dist Popliteal !!36.9 !0.26 !    !           !!   !     !   !             !   +---------------++-----+-----+----+-----------++---+-----+---+-------------+   ! Mid PTA        !!34.2 !0.93 !    !           !!   !     !   !             !   +---------------++-----+-----+----+-----------++---+-----+---+-------------+   ! Mid MEHDI        !!16.1 !0.44 !    !           !!   !     !   !             !   +---------------++-----+-----+----+-----------++---+-----+---+-------------+   ! Mid Peroneal   !!18.3 !0.5  !    !           !!   !     !   !             !   +---------------++-----+-----+----+-----------++---+-----+---+-------------+         Impression        The patient has moderately diminished ankle-brachial indices bilateral    lower extremity(ies) which would be consistent with claudication level    symptoms. However right leg waveform is monophasic which could signify    inflow stenosis vs occlusion.        Signature        ----------------------------------------------------------------    Electronically signed by Ines SinghInterpreting    physician) on 04/20/2021 11:25 AM    ----------------------------------------------------------------       Velocities are measured in cm/s ; Diameters are measured in mm       Pressures   +--------------------------------------++--------+-----+----+--------+-----+   !                                      ! ! Right   !     !Left!        !     !   +--------------------------------------++--------+-----+----+--------+-----+   ! Location                              ! ! Pressure! Ratio!    !Pressure! Ratio! +--------------------------------------++--------+-----+----+--------+-----+   ! Ankle PT                              !!113     !0.84 !    !116     !0.87 !   +--------------------------------------++--------+-----+----+--------+-----+   ! DP                                    !!99      !0.74 !    !106     !0.79 !   +--------------------------------------++--------+-----+----+--------+-----+   ! Great Toe                             !!21      !0.16 !    !44      !0.33 !   +--------------------------------------++--------+-----+----+--------+-----+         - Brachial Pressure:Right: 134. Left:129.         - KG:Right: 0.84. Left: 0.87.       Plethysmographic Digit Evaluation   +---------++--------+-----+---------------++--------+-----+----------------+   !         ! ! Right   !     ! Left           !!        !     !                !   +---------++--------+-----+---------------++--------+-----+----------------+   ! Location ! !Pressure! Ratio! PPG Wave Form  !!Pressure! Ratio! PPG Wave Form   !   +---------++--------+-----+---------------++--------+-----+----------------+   ! Great Toe!!21      !0.16 !               !!44      !0.33 !                !   +---------++--------+-----+---------------++--------+-----+----------------+         Options were discussed with the patient including continued medical management versus proceeding with angiography and potential intervention for PVD with claudication. Patient has opted to proceed with angiography. Risks have been discussed with the patient including but not limited to MI, death, CVA, bleed, nerve injury, infection, contrast nephropathy, possible need for dialysis, and need for further surgery. Proceed with aortogram with runoff possible angioplasty/atherectomy/stent      An electronic signature was used to authenticate this note.     --Nupur Thompson PA-C

## 2021-05-10 ENCOUNTER — TELEPHONE (OUTPATIENT)
Dept: VASCULAR SURGERY | Age: 71
End: 2021-05-10

## 2021-05-10 DIAGNOSIS — Z01.818 PRE-OP TESTING: Primary | ICD-10-CM

## 2021-05-10 NOTE — TELEPHONE ENCOUNTER
instructed to hold: See Above  13. Please stop at your local [employer] or pharmacy and buy a bottle of Hibiclens. Wash thoroughly with this the night before and the morning of the procedure, paying special attention to the area that will be operated on. Make sure you rinse very well. The Hibiclens should only be used prior to surgery. 14. You will need to register at the [address] [place], [place] [address] on [address] 5/13/2021 for lab. You will need to go to the back of the [facility] and enter through that door which will be on the 2nd floor. The lab is located on the 2nd floor. 15. New guidelines require a COVID 19 test to be done prior to procedure and we suggest self quarantine after the test until procedure. You will need to go to the [facility] on [address] 5/13 for the COVID 19 test. They open at 8 am , you will need to be swabbed prior to 11 am. You will go to the front of the building and drive under the awning and someone will come to your car. Do not get out of the car. Let them know that you are scheduled for a procedure with Dr. Queen Gamez on 5/17/2021. After your COVID 19 test we suggest that you self quarantine until your procedure. 12. New policy requires that anyone who comes into the hospital will be required to wear a face mask. A cloth mask is acceptable. 17. To ensure the health and safety of our patients and staff, [address] [place] [address],[address] has implemented visitor restrictions. Only one person will be allowed to accompany you for your procedure. If you or your visitor are exhibiting signs & symptoms of illness such as fever, cough, sore throat or body aches, we ask that you reschedule your procedure to a later date after your symptoms have been resolved. 18. Other Directions: If you have any questions or concerns please contact our office at [phone] and ask for Anabell Jones.      PLEASE NOTE:  If the patient is unable to sign his/her own paperwork, the appointed caregiver (POA, child, sibling, etc) must be present at the time of registration for all testing and procedures. Transportation to and from all testing and procedure appointments is the sole responsibility of the patient, caregiver, and/or nursing facility in which they reside. Please remember you will not be able to drive after you are discharged. Please call the office at [phone] with any questions or concerns. Please allow 48-72 hours notice for cancellations or rescheduling. We will attempt to accommodate your needs to the best of our capabilities, however, strict policies with procedure room availability does not allow much flexibility.

## 2021-05-12 ENCOUNTER — TELEPHONE (OUTPATIENT)
Dept: VASCULAR SURGERY | Age: 71
End: 2021-05-12

## 2021-05-12 NOTE — TELEPHONE ENCOUNTER
I returned the pt's call. The pt was asking for the address to the . I provided the pt with that information. I asked the pt to call our office if he has any issues finding the facility. Pt voiced understanding and is aware.

## 2021-05-12 NOTE — TELEPHONE ENCOUNTER
Keith Gomez requests that Marely  return their call. The best time to reach him is Anytime. The pt has questions about his upcoming test.     Thank you.

## 2021-05-13 ENCOUNTER — OFFICE VISIT (OUTPATIENT)
Age: 71
End: 2021-05-13

## 2021-05-13 ENCOUNTER — PREP FOR PROCEDURE (OUTPATIENT)
Dept: VASCULAR SURGERY | Age: 71
End: 2021-05-13

## 2021-05-13 VITALS — OXYGEN SATURATION: 98 % | HEART RATE: 80 BPM | TEMPERATURE: 97.8 F

## 2021-05-13 DIAGNOSIS — Z01.818 PRE-OP TESTING: ICD-10-CM

## 2021-05-13 DIAGNOSIS — Z11.59 SCREENING FOR VIRAL DISEASE: Primary | ICD-10-CM

## 2021-05-13 LAB
ANION GAP SERPL CALCULATED.3IONS-SCNC: 6 MMOL/L (ref 7–19)
BUN BLDV-MCNC: 17 MG/DL (ref 8–23)
CALCIUM SERPL-MCNC: 9.1 MG/DL (ref 8.8–10.2)
CHLORIDE BLD-SCNC: 105 MMOL/L (ref 98–111)
CO2: 30 MMOL/L (ref 22–29)
CREAT SERPL-MCNC: 0.9 MG/DL (ref 0.5–1.2)
GFR AFRICAN AMERICAN: >59
GFR NON-AFRICAN AMERICAN: >60
GLUCOSE BLD-MCNC: 104 MG/DL (ref 74–109)
HCT VFR BLD CALC: 48.9 % (ref 42–52)
HEMOGLOBIN: 15.9 G/DL (ref 14–18)
MCH RBC QN AUTO: 29.2 PG (ref 27–31)
MCHC RBC AUTO-ENTMCNC: 32.5 G/DL (ref 33–37)
MCV RBC AUTO: 89.9 FL (ref 80–94)
PDW BLD-RTO: 14.2 % (ref 11.5–14.5)
PLATELET # BLD: 164 K/UL (ref 130–400)
PMV BLD AUTO: 10.4 FL (ref 9.4–12.4)
POTASSIUM SERPL-SCNC: 4.4 MMOL/L (ref 3.5–5)
RBC # BLD: 5.44 M/UL (ref 4.7–6.1)
SARS-COV-2, PCR: NOT DETECTED
SODIUM BLD-SCNC: 141 MMOL/L (ref 136–145)
WBC # BLD: 13 K/UL (ref 4.8–10.8)

## 2021-05-13 PROCEDURE — 99999 PR OFFICE/OUTPT VISIT,PROCEDURE ONLY: CPT | Performed by: NURSE PRACTITIONER

## 2021-05-13 RX ORDER — SODIUM CHLORIDE 9 MG/ML
INJECTION, SOLUTION INTRAVENOUS CONTINUOUS
Status: CANCELLED | OUTPATIENT
Start: 2021-05-13

## 2021-05-13 RX ORDER — SODIUM CHLORIDE 9 MG/ML
25 INJECTION, SOLUTION INTRAVENOUS PRN
Status: CANCELLED | OUTPATIENT
Start: 2021-05-13

## 2021-05-13 RX ORDER — SODIUM CHLORIDE 0.9 % (FLUSH) 0.9 %
10 SYRINGE (ML) INJECTION PRN
Status: CANCELLED | OUTPATIENT
Start: 2021-05-13

## 2021-05-13 RX ORDER — ASPIRIN 81 MG/1
81 TABLET ORAL ONCE
Status: CANCELLED | OUTPATIENT
Start: 2021-05-13 | End: 2021-05-13

## 2021-05-13 NOTE — H&P (VIEW-ONLY)
Patient Care Team:  Cici Scott MD as Consulting Physician (Vascular Surgery)      Star Acosta (:  1950) is a 70 y.o. [facility] patient, here for evaluation of the following chief complaint(s):  No chief complaint on file. SUBJECTIVE/OBJECTIVE:  Mr. Charles Cooper is a 71 yo male who has a history that includes PVD, hyperlipidemia, HTN, COPD, Carotid Artery Stenosis, tobacco abuse and CLL. Today he presents as a referral from Dr. Stiven Anton for evaluation of PVD. He reports claudication on right leg in the form of tiredness, weakness and aching at < 50 yds. No IRP no non healing wounds. He is a smoker. He is on Trental, Plavix and statin therapy. Star Acosat is a 70 y.o. male with the following history as recorded in EpicCare: There are no active problems to display for this patient. Current Outpatient Medications   Medication Sig Dispense Refill    vitamin D (CHOLECALCIFEROL) 28611 UNIT CAPS Take 2,000 Units by mouth daily      clopidogrel (PLAVIX) 75 MG tablet Take 75 mg by mouth daily      diclofenac (VOLTAREN) 75 MG EC tablet TAKE 1 TABLET BY MOUTH TWICE DAILY      finasteride (PROSCAR) 5 MG tablet Take 5 mg by mouth daily      levothyroxine (SYNTHROID) 100 MCG tablet Take 100 mcg by mouth daily      losartan (COZAAR) 100 MG tablet Take 100 mg by mouth daily      pentoxifylline (TRENTAL) 400 MG extended release tablet Take 400 mg by mouth 3 times daily (with meals)      pravastatin (PRAVACHOL) 40 MG tablet Take 40 mg by mouth daily      sildenafil (VIAGRA) 100 MG tablet Take 100 mg by mouth daily as needed      pantoprazole (PROTONIX) 40 MG tablet Take 40 mg by mouth      pantoprazole (PROTONIX) 20 MG tablet Take 1 tablet by mouth daily 30 tablet 0     No current facility-administered medications for this visit.       Allergies: Amoxicillin-pot clavulanate, Codeine, Hydroxyzine, and Tramadol  Past Medical History:   Diagnosis Date    Arthritis     Benign prostate hyperplasia     Carotid stenosis     Colon polyp     COPD (chronic obstructive pulmonary disease) (HCC)     Cubital tunnel syndrome     Hyperlipidemia     Hypertension     IBS (irritable bowel syndrome)     Peripheral vascular disease (HCC)     Thyroid disease     TIA (transient ischemic attack)      Past Surgical History:   Procedure Laterality Date    COLONOSCOPY  01/2020    UPPER GASTROINTESTINAL ENDOSCOPY  01/2020    VASCULAR SURGERY Right     stent placed    VASECTOMY       Family History   Problem Relation Age of Onset    Breast Cancer Mother 48        breast cancer     Social History     Tobacco Use    Smoking status: Current Every Day Smoker     Years: 60.00     Types: Cigars    Smokeless tobacco: Never Used   Substance Use Topics    Alcohol use: Not Currently       ROS  Eyes  no sudden vision change or amaurosis. Respiratory  no significant shortness of breath,  Cardiovascular  no chest pain or syncope. No  significant leg swelling.  (see HPI)  Musculoskeletal  no gait disturbance  Skin  no new wound. Neurologic   No speech difficulty or lateralizing weakness. All other review of systems are negative. Physical Exam    There were no vitals taken for this visit. Neck- carotid pulses 2+ to palpation with no bruit  Cardiovascular  Regular rate and rhythm. Pulmonary  effort appears normal.  No respiratory distress. Lungs - Breath sounds normal. No wheezes or rales. Extremities - Radial and ulnar pulses are 2+ to palpation bilaterally. Femoral pulses are palpable. DP and PT pulses are NP. No cyanosis, clubbing, or significant edema. No signs atheroembolic event. Neurologic  alert and oriented X 3. Physiologic. Face symmetric. Skin  warm, dry, and intact.    Psychiatric  mood, affect, and behavior appear normal.  Judgment and thought processes appear normal.    Risk factors for atherosclerosis of all vascular beds have been reviewed with the patient including: Family history, tobacco abuse in all forms, elevated cholesterol, hyperlipidemia, and diabetes. Reviewed on this visit: speciality care notes from Heme/Onc      ASSESSMENT/PLAN:      1. Athersclerosis of bilateral LE native arteries with claudication      Recommend he continue Plavix, Trental and statin   Recommend no smoking  Recommend good BP control  We will send him for a right  A'scan today and call him with the results and further recommendations      Addendum:  Right A'scan with KG's -   Impression        Right common femoral artery stent with high grade in stent stenosis.        Signature        ----------------------------------------------------------------    Electronically signed by Jayla Jones(Interpreting    physician) on 04/19/2021 02:45 PM    ----------------------------------------------------------------       Stents     - The stent originated from the Right Prox Common Femoral to the Right       Dist Common Femoral.       Stent velocities are as follow:Prox Stent: cm/s. Mid Stent: 367 cm/s. Dist   Stent: 331 cm/s.       +-----------------------------+---+---+-----+------------------------------+   ! Location                     !PSV! EDV! Ratio!% Stenosis                    !   +-----------------------------+---+---+-----+------------------------------+   ! Mid Stent                    !746!   !     !                              !   +-----------------------------+---+---+-----+------------------------------+   ! Dist Stent                   !331!   !0.9  !                              !   +-----------------------------+---+---+-----+------------------------------+   Velocities are measured in cm/s ; Diameters are measured in mm       LE Duplex Measurements       +---------------++-----+-----+----+-----------++---+-----+---+-------------+   !               ! ! Right!     !Left!           !!   !     !   !             ! +---------------++-----+-----+----+-----------++---+-----+---+-------------+   ! Location       !!PSV  !Ratio! EDV ! Wave Desc. !!PSV! Ratio! EDV! Wave Desc.   !   +---------------++-----+-----+----+-----------++---+-----+---+-------------+   ! Common Femoral !!131  !     !    !           !!   !     !   !             !   +---------------++-----+-----+----+-----------++---+-----+---+-------------+   ! Prox PFA       !!115  !     !    !           !!   !     !   !             !   +---------------++-----+-----+----+-----------++---+-----+---+-------------+   ! Prox SFA       !!83.8 !     !    !           !!   !     !   !             !   +---------------++-----+-----+----+-----------++---+-----+---+-------------+   ! Mid SFA        !!68   !0.81 !    !           !!   !     !   !             !   +---------------++-----+-----+----+-----------++---+-----+---+-------------+   ! Dist SFA       !!128  !1.88 !    !           !!   !     !   !             !   +---------------++-----+-----+----+-----------++---+-----+---+-------------+   ! Prox Popliteal !!140  !1.09 !    !           !!   !     !   !             !   +---------------++-----+-----+----+-----------++---+-----+---+-------------+   ! Dist Popliteal !!36.9 !0.26 !    !           !!   !     !   !             !   +---------------++-----+-----+----+-----------++---+-----+---+-------------+   ! Mid PTA        !!34.2 !0.93 !    !           !!   !     !   !             !   +---------------++-----+-----+----+-----------++---+-----+---+-------------+   ! Mid MEHDI        !!16.1 !0.44 !    !           !!   !     !   !             !   +---------------++-----+-----+----+-----------++---+-----+---+-------------+   ! Mid Peroneal   !!18.3 !0.5  !    !           !!   !     !   !             !   +---------------++-----+-----+----+-----------++---+-----+---+-------------+         Impression        The patient has moderately diminished ankle-brachial indices bilateral    lower extremity(ies) which would be consistent with claudication level    symptoms. However right leg waveform is monophasic which could signify    inflow stenosis vs occlusion.        Signature        ----------------------------------------------------------------    Electronically signed by Jayla SinghInterpreting    physician) on 04/20/2021 11:25 AM    ----------------------------------------------------------------       Velocities are measured in cm/s ; Diameters are measured in mm       Pressures   +--------------------------------------++--------+-----+----+--------+-----+   !                                      ! ! Right   !     !Left!        !     !   +--------------------------------------++--------+-----+----+--------+-----+   ! Location                              ! ! Pressure! Ratio!    !Pressure! Ratio! +--------------------------------------++--------+-----+----+--------+-----+   ! Ankle PT                              !!113     !0.84 !    !116     !0.87 !   +--------------------------------------++--------+-----+----+--------+-----+   ! DP                                    !!99      !0.74 !    !106     !0.79 !   +--------------------------------------++--------+-----+----+--------+-----+   ! Great Toe                             !!21      !0.16 !    !44      !0.33 !   +--------------------------------------++--------+-----+----+--------+-----+         - Brachial Pressure:Right: 134. Left:129.         - KG:Right: 0.84. Left: 0.87.       Plethysmographic Digit Evaluation   +---------++--------+-----+---------------++--------+-----+----------------+   !         ! ! Right   !     ! Left           !!        !     !                !   +---------++--------+-----+---------------++--------+-----+----------------+   ! Location ! !Pressure! Ratio! PPG Wave Form  !!Pressure! Ratio! PPG Wave Form   !   +---------++--------+-----+---------------++--------+-----+----------------+   ! Great Toe!!21      !0.16 !               !!40      !0.33 !                !   +---------++--------+-----+---------------++--------+-----+----------------+         Options were discussed with the patient including continued medical management versus proceeding with angiography and potential intervention for PVD with claudication. Patient has opted to proceed with angiography. Risks have been discussed with the patient including but not limited to MI, death, CVA, bleed, nerve injury, infection, contrast nephropathy, possible need for dialysis, and need for further surgery. Proceed with aortogram with runoff possible angioplasty/atherectomy/stent      An electronic signature was used to authenticate this note.     --Jayashree Thompson PA-C

## 2021-05-13 NOTE — H&P
Patient Care Team:  Lavina Kehr, MD as Consulting Physician (Vascular Surgery)      Thalia Barahona (:  1950) is a 70 y.o. [facility] patient, here for evaluation of the following chief complaint(s):  No chief complaint on file. SUBJECTIVE/OBJECTIVE:  Mr. Anat Malhotra is a 69 yo male who has a history that includes PVD, hyperlipidemia, HTN, COPD, Carotid Artery Stenosis, tobacco abuse and CLL. Today he presents as a referral from Dr. Rosalinda Beck for evaluation of PVD. He reports claudication on right leg in the form of tiredness, weakness and aching at < 50 yds. No IRP no non healing wounds. He is a smoker. He is on Trental, Plavix and statin therapy. Thalia Barahona is a 70 y.o. male with the following history as recorded in EpicCare: There are no active problems to display for this patient. Current Outpatient Medications   Medication Sig Dispense Refill    vitamin D (CHOLECALCIFEROL) 49137 UNIT CAPS Take 2,000 Units by mouth daily      clopidogrel (PLAVIX) 75 MG tablet Take 75 mg by mouth daily      diclofenac (VOLTAREN) 75 MG EC tablet TAKE 1 TABLET BY MOUTH TWICE DAILY      finasteride (PROSCAR) 5 MG tablet Take 5 mg by mouth daily      levothyroxine (SYNTHROID) 100 MCG tablet Take 100 mcg by mouth daily      losartan (COZAAR) 100 MG tablet Take 100 mg by mouth daily      pentoxifylline (TRENTAL) 400 MG extended release tablet Take 400 mg by mouth 3 times daily (with meals)      pravastatin (PRAVACHOL) 40 MG tablet Take 40 mg by mouth daily      sildenafil (VIAGRA) 100 MG tablet Take 100 mg by mouth daily as needed      pantoprazole (PROTONIX) 40 MG tablet Take 40 mg by mouth      pantoprazole (PROTONIX) 20 MG tablet Take 1 tablet by mouth daily 30 tablet 0     No current facility-administered medications for this visit.       Allergies: Amoxicillin-pot clavulanate, Codeine, Hydroxyzine, and Tramadol  Past Medical History:   Diagnosis Date    Arthritis     Benign prostate Family history, tobacco abuse in all forms, elevated cholesterol, hyperlipidemia, and diabetes. Reviewed on this visit: speciality care notes from Heme/Onc      ASSESSMENT/PLAN:      1. Athersclerosis of bilateral LE native arteries with claudication      Recommend he continue Plavix, Trental and statin   Recommend no smoking  Recommend good BP control  We will send him for a right  A'scan today and call him with the results and further recommendations      Addendum:  Right A'scan with KG's -   Impression        Right common femoral artery stent with high grade in stent stenosis.        Signature        ----------------------------------------------------------------    Electronically signed by Jayla Jones(Interpreting    physician) on 04/19/2021 02:45 PM    ----------------------------------------------------------------       Stents     - The stent originated from the Right Prox Common Femoral to the Right       Dist Common Femoral.       Stent velocities are as follow:Prox Stent: cm/s. Mid Stent: 367 cm/s. Dist   Stent: 331 cm/s.       +-----------------------------+---+---+-----+------------------------------+   ! Location                     !PSV! EDV! Ratio!% Stenosis                    !   +-----------------------------+---+---+-----+------------------------------+   ! Mid Stent                    !517!   !     !                              !   +-----------------------------+---+---+-----+------------------------------+   ! Dist Stent                   !331!   !0.9  !                              !   +-----------------------------+---+---+-----+------------------------------+   Velocities are measured in cm/s ; Diameters are measured in mm       LE Duplex Measurements       +---------------++-----+-----+----+-----------++---+-----+---+-------------+   !               ! ! Right!     !Left!           !!   !     !   !             ! +---------------++-----+-----+----+-----------++---+-----+---+-------------+   ! Location       !!PSV  !Ratio! EDV ! Wave Desc. !!PSV! Ratio! EDV! Wave Desc.   !   +---------------++-----+-----+----+-----------++---+-----+---+-------------+   ! Common Femoral !!131  !     !    !           !!   !     !   !             !   +---------------++-----+-----+----+-----------++---+-----+---+-------------+   ! Prox PFA       !!115  !     !    !           !!   !     !   !             !   +---------------++-----+-----+----+-----------++---+-----+---+-------------+   ! Prox SFA       !!83.8 !     !    !           !!   !     !   !             !   +---------------++-----+-----+----+-----------++---+-----+---+-------------+   ! Mid SFA        !!68   !0.81 !    !           !!   !     !   !             !   +---------------++-----+-----+----+-----------++---+-----+---+-------------+   ! Dist SFA       !!128  !1.88 !    !           !!   !     !   !             !   +---------------++-----+-----+----+-----------++---+-----+---+-------------+   ! Prox Popliteal !!140  !1.09 !    !           !!   !     !   !             !   +---------------++-----+-----+----+-----------++---+-----+---+-------------+   ! Dist Popliteal !!36.9 !0.26 !    !           !!   !     !   !             !   +---------------++-----+-----+----+-----------++---+-----+---+-------------+   ! Mid PTA        !!34.2 !0.93 !    !           !!   !     !   !             !   +---------------++-----+-----+----+-----------++---+-----+---+-------------+   ! Mid MEHDI        !!16.1 !0.44 !    !           !!   !     !   !             !   +---------------++-----+-----+----+-----------++---+-----+---+-------------+   ! Mid Peroneal   !!18.3 !0.5  !    !           !!   !     !   !             !   +---------------++-----+-----+----+-----------++---+-----+---+-------------+         Impression        The patient has moderately diminished ankle-brachial indices bilateral    lower extremity(ies) which would be consistent with claudication level    symptoms. However right leg waveform is monophasic which could signify    inflow stenosis vs occlusion.        Signature        ----------------------------------------------------------------    Electronically signed by Rosina SinghInterpreting    physician) on 04/20/2021 11:25 AM    ----------------------------------------------------------------       Velocities are measured in cm/s ; Diameters are measured in mm       Pressures   +--------------------------------------++--------+-----+----+--------+-----+   !                                      ! ! Right   !     !Left!        !     !   +--------------------------------------++--------+-----+----+--------+-----+   ! Location                              ! ! Pressure! Ratio!    !Pressure! Ratio! +--------------------------------------++--------+-----+----+--------+-----+   ! Ankle PT                              !!113     !0.84 !    !116     !0.87 !   +--------------------------------------++--------+-----+----+--------+-----+   ! DP                                    !!99      !0.74 !    !106     !0.79 !   +--------------------------------------++--------+-----+----+--------+-----+   ! Great Toe                             !!21      !0.16 !    !44      !0.33 !   +--------------------------------------++--------+-----+----+--------+-----+         - Brachial Pressure:Right: 134. Left:129.         - KG:Right: 0.84. Left: 0.87.       Plethysmographic Digit Evaluation   +---------++--------+-----+---------------++--------+-----+----------------+   !         ! ! Right   !     ! Left           !!        !     !                !   +---------++--------+-----+---------------++--------+-----+----------------+   ! Location ! !Pressure! Ratio! PPG Wave Form  !!Pressure! Ratio! PPG Wave Form   !   +---------++--------+-----+---------------++--------+-----+----------------+   ! Great Toe!!21      !0.16 !               !!40      !0.33 !                !   +---------++--------+-----+---------------++--------+-----+----------------+         Options were discussed with the patient including continued medical management versus proceeding with angiography and potential intervention for PVD with claudication. Patient has opted to proceed with angiography. Risks have been discussed with the patient including but not limited to MI, death, CVA, bleed, nerve injury, infection, contrast nephropathy, possible need for dialysis, and need for further surgery. Proceed with aortogram with runoff possible angioplasty/atherectomy/stent      An electronic signature was used to authenticate this note.     --Sarah Thompson PA-C

## 2021-05-17 ENCOUNTER — HOSPITAL ENCOUNTER (OUTPATIENT)
Dept: INTERVENTIONAL RADIOLOGY/VASCULAR | Age: 71
Discharge: HOME | End: 2021-05-17
Payer: OTHER GOVERNMENT

## 2021-05-17 VITALS
HEIGHT: 69 IN | WEIGHT: 146 LBS | TEMPERATURE: 97.1 F | HEART RATE: 61 BPM | RESPIRATION RATE: 22 BRPM | SYSTOLIC BLOOD PRESSURE: 118 MMHG | DIASTOLIC BLOOD PRESSURE: 84 MMHG | BODY MASS INDEX: 21.62 KG/M2 | OXYGEN SATURATION: 95 %

## 2021-05-17 DIAGNOSIS — I10 BENIGN ESSENTIAL HTN: ICD-10-CM

## 2021-05-17 DIAGNOSIS — I73.9 PVD (PERIPHERAL VASCULAR DISEASE) (HCC): ICD-10-CM

## 2021-05-17 DIAGNOSIS — I65.23 CAROTID STENOSIS, ASYMPTOMATIC, BILATERAL: ICD-10-CM

## 2021-05-17 DIAGNOSIS — C91.10 CLL (CHRONIC LYMPHOCYTIC LEUKEMIA) (HCC): ICD-10-CM

## 2021-05-17 DIAGNOSIS — N40.0 BENIGN PROSTATIC HYPERPLASIA WITHOUT LOWER URINARY TRACT SYMPTOMS: ICD-10-CM

## 2021-05-17 DIAGNOSIS — I70.219 ATHEROSCLEROSIS WITH CLAUDICATION OF EXTREMITY (HCC): ICD-10-CM

## 2021-05-17 PROBLEM — J43.9 PULMONARY EMPHYSEMA (HCC): Status: ACTIVE | Noted: 2021-05-17

## 2021-05-17 PROCEDURE — 75716 ARTERY X-RAYS ARMS/LEGS: CPT | Performed by: SURGERY

## 2021-05-17 PROCEDURE — 37221 PR REVSC OPN/PRQ ILIAC ART W/STNT PLMT & ANGIOPLSTY: CPT | Performed by: SURGERY

## 2021-05-17 PROCEDURE — 99152 MOD SED SAME PHYS/QHP 5/>YRS: CPT | Performed by: SURGERY

## 2021-05-17 PROCEDURE — 6360000004 HC RX CONTRAST MEDICATION: Performed by: SURGERY

## 2021-05-17 PROCEDURE — 37225 HC ATHERECTOMY FEM/POP: CPT | Performed by: SURGERY

## 2021-05-17 PROCEDURE — 93922 UPR/L XTREMITY ART 2 LEVELS: CPT

## 2021-05-17 PROCEDURE — 2500000003 HC RX 250 WO HCPCS: Performed by: SURGERY

## 2021-05-17 PROCEDURE — 75625 CONTRAST EXAM ABDOMINL AORTA: CPT | Performed by: SURGERY

## 2021-05-17 PROCEDURE — 99153 MOD SED SAME PHYS/QHP EA: CPT | Performed by: SURGERY

## 2021-05-17 PROCEDURE — 2580000003 HC RX 258: Performed by: PHYSICIAN ASSISTANT

## 2021-05-17 PROCEDURE — 37221 HC PLASTY ILIAC W STENT: CPT | Performed by: SURGERY

## 2021-05-17 PROCEDURE — 6360000002 HC RX W HCPCS: Performed by: SURGERY

## 2021-05-17 PROCEDURE — 6370000000 HC RX 637 (ALT 250 FOR IP): Performed by: PHYSICIAN ASSISTANT

## 2021-05-17 PROCEDURE — 6360000002 HC RX W HCPCS: Performed by: PHYSICIAN ASSISTANT

## 2021-05-17 PROCEDURE — 37225 PR REVSC OPN/PRQ FEM/POP W/ATHRC/ANGIOP SM VSL: CPT | Performed by: SURGERY

## 2021-05-17 PROCEDURE — 2709999900 IR AORTAGRAM ABDOMINAL SERIALOGRAM

## 2021-05-17 RX ORDER — SODIUM CHLORIDE 9 MG/ML
25 INJECTION, SOLUTION INTRAVENOUS PRN
Status: DISCONTINUED | OUTPATIENT
Start: 2021-05-17 | End: 2021-05-19 | Stop reason: HOSPADM

## 2021-05-17 RX ORDER — MIDAZOLAM HYDROCHLORIDE 1 MG/ML
INJECTION INTRAMUSCULAR; INTRAVENOUS
Status: COMPLETED | OUTPATIENT
Start: 2021-05-17 | End: 2021-05-17

## 2021-05-17 RX ORDER — NITROGLYCERIN 20 MG/100ML
INJECTION INTRAVENOUS CONTINUOUS PRN
Status: COMPLETED | OUTPATIENT
Start: 2021-05-17 | End: 2021-05-17

## 2021-05-17 RX ORDER — ASPIRIN 81 MG/1
81 TABLET ORAL ONCE
Status: COMPLETED | OUTPATIENT
Start: 2021-05-17 | End: 2021-05-17

## 2021-05-17 RX ORDER — ONDANSETRON 2 MG/ML
4 INJECTION INTRAMUSCULAR; INTRAVENOUS EVERY 8 HOURS PRN
Status: DISCONTINUED | OUTPATIENT
Start: 2021-05-17 | End: 2021-05-19 | Stop reason: HOSPADM

## 2021-05-17 RX ORDER — IODIXANOL 320 MG/ML
INJECTION, SOLUTION INTRAVASCULAR
Status: COMPLETED | OUTPATIENT
Start: 2021-05-17 | End: 2021-05-17

## 2021-05-17 RX ORDER — SODIUM CHLORIDE 9 MG/ML
INJECTION, SOLUTION INTRAVENOUS CONTINUOUS
Status: DISCONTINUED | OUTPATIENT
Start: 2021-05-17 | End: 2021-05-19 | Stop reason: HOSPADM

## 2021-05-17 RX ORDER — FENTANYL CITRATE 50 UG/ML
INJECTION, SOLUTION INTRAMUSCULAR; INTRAVENOUS
Status: COMPLETED | OUTPATIENT
Start: 2021-05-17 | End: 2021-05-17

## 2021-05-17 RX ORDER — SODIUM CHLORIDE 0.9 % (FLUSH) 0.9 %
10 SYRINGE (ML) INJECTION PRN
Status: DISCONTINUED | OUTPATIENT
Start: 2021-05-17 | End: 2021-05-19 | Stop reason: HOSPADM

## 2021-05-17 RX ORDER — HEPARIN SODIUM 5000 [USP'U]/ML
INJECTION, SOLUTION INTRAVENOUS; SUBCUTANEOUS
Status: COMPLETED | OUTPATIENT
Start: 2021-05-17 | End: 2021-05-17

## 2021-05-17 RX ORDER — ACETAMINOPHEN 325 MG/1
650 TABLET ORAL EVERY 4 HOURS PRN
Status: DISCONTINUED | OUTPATIENT
Start: 2021-05-17 | End: 2021-05-19 | Stop reason: HOSPADM

## 2021-05-17 RX ADMIN — NITROGLYCERIN 200 MCG: 20 INJECTION INTRAVENOUS at 14:17

## 2021-05-17 RX ADMIN — FENTANYL CITRATE 25 MCG: 50 INJECTION, SOLUTION INTRAMUSCULAR; INTRAVENOUS at 13:46

## 2021-05-17 RX ADMIN — FENTANYL CITRATE 50 MCG: 50 INJECTION, SOLUTION INTRAMUSCULAR; INTRAVENOUS at 14:04

## 2021-05-17 RX ADMIN — NITROGLYCERIN 400 MCG: 20 INJECTION INTRAVENOUS at 14:25

## 2021-05-17 RX ADMIN — NITROGLYCERIN 200 MCG: 20 INJECTION INTRAVENOUS at 14:10

## 2021-05-17 RX ADMIN — SODIUM CHLORIDE: 9 INJECTION, SOLUTION INTRAVENOUS at 09:59

## 2021-05-17 RX ADMIN — HEPARIN SODIUM 5000 UNITS: 5000 INJECTION, SOLUTION INTRAVENOUS; SUBCUTANEOUS at 13:44

## 2021-05-17 RX ADMIN — MIDAZOLAM 0.5 MG: 1 INJECTION INTRAMUSCULAR; INTRAVENOUS at 13:45

## 2021-05-17 RX ADMIN — MIDAZOLAM 0.5 MG: 1 INJECTION INTRAMUSCULAR; INTRAVENOUS at 14:30

## 2021-05-17 RX ADMIN — IODIXANOL 165 ML: 320 INJECTION, SOLUTION INTRAVASCULAR at 14:43

## 2021-05-17 RX ADMIN — MIDAZOLAM 0.5 MG: 1 INJECTION INTRAMUSCULAR; INTRAVENOUS at 13:46

## 2021-05-17 RX ADMIN — Medication 1000 MG: at 13:15

## 2021-05-17 RX ADMIN — NITROGLYCERIN 200 MCG: 20 INJECTION INTRAVENOUS at 14:06

## 2021-05-17 RX ADMIN — ASPIRIN 81 MG: 81 TABLET, COATED ORAL at 11:30

## 2021-05-17 NOTE — PROGRESS NOTES
Pt tolerated bedrest without problems. Ingested sandwich and drink during bedrest.  Vascular Lab came at 1645 to perform Geovanni's at [facility]. Pt was assisted up to ambulate in hallway with RN and tolerated activity without  Problems. Pt also ambulated to  to void and tolerated activity without problems. Discharge instructions were explained to patient with voiced understanding and a copy of the instructions was given to the patient to take home. Pt stable.

## 2021-05-17 NOTE — OP NOTE
Preprocedure diagnosis:  1. Atherosclerosis of native arteries bilateral lower extremities with lifestyle limiting claudication right lower extremity  2. Chronic obstructive pulmonary disease  3. Chronic lymphocytic leukemia  4. Cigarette abuse    Post procedure diagnosis: Same    Procedure:  1. Ultrasound-guided cannulation left common femoral artery with 5 Western Priti and later 7 Western Priti destination sheath  2. Suprarenal abdominal aortogram with bilateral iliofemoral arteriogram and bilateral lower extremity arteriogram  3. Right external iliac artery stent (Icast 7 mm x 38 mm covered balloon expandable stent)  4. Selective right lower extremity arteriograms  5. Atherectomy right popliteal artery behind the knee with jetstream 2.0/3.0  6. Balloon angioplasty right popliteal artery with 4 mm x 80 mm Sebastian balloon and 4 mm x 100 mm Lutonix drug-coated balloon  7. Completion right lower extremity arteriograms  8. Mynx closure left common femoral eye puncture site    Surgeon: Odilia Avery MD    Anesthesia: Intravenous/moderate sedation plus local    Estimated blood loss: Less than 50 mL    Findings:  1. There is no significant renal artery stenosis. The infrarenal abdominal aorta is fairly widely patent without any significant high-grade stenosis. Patient has fairly extensive stents in the right mid common iliac artery extending to the distal external iliac artery. There is a 70 to 80% stenosis of the mid/distal right external iliac artery and side of the uncovered self-expanding stents. The left common and external iliac arteries are patent without any stenosis more than 20 to 30%. 2.  The right common femoral profunda femoris arteries are both fairly widely patent. There is diffuse disease in the superficial femoral artery but no stenosis more than 50%. Of note, the patient's arteries are fairly small diameter in both lower extremities.   There is an 80 to 90% stenosis of the popliteal artery behind the knee. The anterior tibial artery appears to be occluded in the mid calf distal.  The tibial peroneal trunk is patent. The peroneal and posterior tibial arteries are patent but diseased. 3.  The left common femoral profunda femoris arteries are both patent. There is a stenosis in the distal left superficial femoral artery around 90%. The remainder of the superficial femoral artery is also diseased. The left popliteal artery is patent. The left posterior tibial artery is the main runoff vessel to the foot but it is diseased throughout. The anterior tibial artery is occluded in the mid calf and beyond. Peroneal artery is patent. Procedure in detail:    After the patient was consented and given intravenous antibiotics, was brought to angiography and placed on the table supine position. Intravenous/moderate sedation was administered and both groins were prepped and draped in usual sterile procedure. Skin and subcutaneous tissues and left groin were anesthetized lidocaine. Micropuncture needle was used to cannulate the left common femoral artery over the femoral head under fluoroscopic visualization and ultrasound guidance. Seldinger technique was utilized place a 5 Western Priti glide sheath. Over an 035 wire, an Omni Flush catheter was placed up into the suprarenal abdominal aorta. Suprarenal abdominal aortogram with bilateral iliofemoral arteriograms were performed with the above findings. The catheter was withdrawn to the distal abdominal aorta and distal abdominal aortogram with bilateral lower extremity arteriograms were performed with the above findings. The patient was given 5000 and's of intravenous heparin. Utilizing the Omni Flush catheter and angled Glidewire, I was able to pass the Glidewire down into the right superficial femoral artery. I exchanged the 5 Western Priti sheath for a 7 Western Priti destination sheath. The tip is initially placed into the distal right common iliac artery. Utilizing roadmap assistance, a 7 mm x 38 mm covered balloon expandable stent was placed into the right external iliac artery across the high-grade stenosis. Right iliofemoral arteriograms reveal an excellent result. There is no extravasation of dye. There is no significant residual stenosis. The dilator was placed again through the sheath and then the sheath was placed down into the right proximal superficial femoral artery. Selective right lower extremity arteriograms were performed. I decided that a filter would be high risk because the patient's very small arteries and we do not have much room between the stenosis and the distal popliteal artery. Also, does not appear to be any clot within the stenosis. Atherectomy was then performed very slowly across the popliteal artery high-grade stenosis. 2-3 passes were performed with blades down. I did make 1 pass with blades up but, with blades up, the atherectomy device bogged down fairly significantly so I remove the orbital atherectomy device and performed right lower extremity arteriograms. It reveals that there is a no extravasation of dye, but likely fairly significant spasm. Low pressure balloon inflation of the popliteal artery after atherectomy was performed with a 4 mm x 80 mm Sebastian balloon. Following this, there is a very good result but distal to the balloon and there was some spasm. There is no sign for embolization. Low pressure balloon inflation was performed with a drug-coated balloon of the popliteal artery with a 4 mm x 100 mm Lutonix. This balloon was left inflated for 4 minutes. Completion arteriogram show very good result. There is no significant residual stenosis nor flow-limiting dissection. There was some spasm in the distal tibial arteries and I gave nitroglycerin intra-arterially for this. He has no pain behind the knee nor any pain in the foot.     The sheath was removed and the puncture site closed with a minx device. The patient tolerated the procedure well. He is brought to cath holding in good condition.

## 2021-05-17 NOTE — INTERVAL H&P NOTE
I agree with the history and physical exam in chart. In addition, today's complete ROS was performed and the only positives are noted in the HPI. I examined the patient preoperatively and discussed the surgery, including the risks, benefits, and alternatives. They seem to understand and agree to proceed.   ASA 3, Mallenpati 2

## 2021-06-10 ENCOUNTER — OFFICE VISIT (OUTPATIENT)
Dept: VASCULAR SURGERY | Age: 71
End: 2021-06-10
Payer: OTHER GOVERNMENT

## 2021-06-10 VITALS
SYSTOLIC BLOOD PRESSURE: 128 MMHG | OXYGEN SATURATION: 97 % | TEMPERATURE: 98.8 F | DIASTOLIC BLOOD PRESSURE: 80 MMHG | HEART RATE: 90 BPM

## 2021-06-10 DIAGNOSIS — I70.219 ATHEROSCLEROSIS WITH CLAUDICATION OF EXTREMITY (HCC): Primary | ICD-10-CM

## 2021-06-10 PROCEDURE — 99212 OFFICE O/P EST SF 10 MIN: CPT | Performed by: PHYSICIAN ASSISTANT

## 2021-07-22 DIAGNOSIS — I73.9 PVD (PERIPHERAL VASCULAR DISEASE) WITH CLAUDICATION (HCC): Primary | ICD-10-CM

## 2021-07-22 NOTE — PROGRESS NOTES
Patient Care Team:  Alba Starr MD as Consulting Physician (Vascular Surgery)    Darling Suárez is a 35-year-old male who has a history of peripheral vascular disease. Today we are following up post angiogram.  Underwent a aortogram with runoff on 5/17/2021 by Dr. Bryon Fountain with 1. Ultrasound-guided cannulation left common femoral artery with 5 Western Priti and later 7 Western Priti destination sheath  2. Suprarenal abdominal aortogram with bilateral iliofemoral arteriogram and bilateral lower extremity arteriogram  3. Right external iliac artery stent (Icast 7 mm x 38 mm covered balloon expandable stent)  4. Selective right lower extremity arteriograms  5. Atherectomy right popliteal artery behind the knee with jetstream 2.0/3.0  6. Balloon angioplasty right popliteal artery with 4 mm x 80 mm Sebastian balloon and 4 mm x 100 mm Lutonix drug-coated balloon  7. Completion right lower extremity arteriograms  8. Mynx closure left common femoral eye puncture site. He reports that he is walking better currently he is on Plavix Trental and a statin daily.       Grayson Liao is a 70 y.o. male with the following history reviewed and recorded in [facility]:  Patient Active Problem List    Diagnosis Date Noted    Atherosclerosis with claudication of extremity ([facility] [address] 05/17/2021    Benign essential HTN 05/17/2021    BPH (benign prostatic hyperplasia) 05/17/2021    Pulmonary emphysema ([facility] [address] 05/17/2021    Carotid stenosis, asymptomatic, bilateral 05/17/2021    CLL (chronic lymphocytic leukemia) ([facility] [address] 05/17/2021    PVD (peripheral vascular disease) ([facility])      Current Outpatient Medications   Medication Sig Dispense Refill    vitamin D (CHOLECALCIFEROL) 26868 UNIT CAPS Take 2,000 Units by mouth daily      clopidogrel (PLAVIX) 75 MG tablet Take 75 mg by mouth daily      diclofenac (VOLTAREN) 75 MG EC tablet TAKE 1 TABLET BY MOUTH TWICE DAILY      finasteride (PROSCAR) 5 MG tablet Take 5 mg by mouth daily      levothyroxine (SYNTHROID) 100 MCG tablet Take 100 mcg by mouth daily      losartan (COZAAR) 100 MG tablet Take 100 mg by mouth daily      pentoxifylline (TRENTAL) 400 MG extended release tablet Take 400 mg by mouth 3 times daily (with meals)      pravastatin (PRAVACHOL) 40 MG tablet Take 40 mg by mouth daily      sildenafil (VIAGRA) 100 MG tablet Take 100 mg by mouth daily as needed      pantoprazole (PROTONIX) 40 MG tablet Take 40 mg by mouth (Patient not taking: Reported on 6/10/2021)      pantoprazole (PROTONIX) 20 MG tablet Take 1 tablet by mouth daily (Patient not taking: Reported on 6/10/2021) 30 tablet 0     No current facility-administered medications for this visit. Allergies: Amoxicillin-pot clavulanate, Codeine, Hydroxyzine, and Tramadol  Past Medical History:   Diagnosis Date    Arthritis     Benign prostate hyperplasia     Carotid stenosis     Colon polyp     COPD (chronic obstructive pulmonary disease) (HCC)     Cubital tunnel syndrome     Hyperlipidemia     Hypertension     IBS (irritable bowel syndrome)     Peripheral vascular disease (HCC)     Thyroid disease     TIA (transient ischemic attack)      Past Surgical History:   Procedure Laterality Date    COLONOSCOPY  01/2020    ELBOW SURGERY Right     Removal of spot on elbow    MOUTH SURGERY      Removal of permanent teeth    UPPER GASTROINTESTINAL ENDOSCOPY  01/2020    VASCULAR SURGERY Right     stent placed    VASECTOMY       Family History   Problem Relation Age of Onset    Breast Cancer Mother 48        breast cancer    Heart Disease Sister      Social History     Tobacco Use    Smoking status: Current Every Day Smoker     Years: 60.00     Types: Cigars    Smokeless tobacco: Never Used   Substance Use Topics    Alcohol use: Not Currently       Review of Systems    Constitutional -   No fever or chills   HENT - no HA's, tinnitus, rhinorrhea, sore throat  Eyes - no sudden vision change or amaurosis.   Respiratory - SOB or chest pain  Cardiovascular - no chest pain, syncope, or significant dizziness. No palpitations. See HPI   Gastrointestinal - no significant abdominal pain. No blood in stool. No diarrhea, nausea, or vomiting. Genitourinary - No difficulty urinating, dysuria, frequency, or urgency. No flank pain or hematuria. Musculoskeletal - no back pain or myalgia. Skin - no rashes or wounds   Neurologic - no dizziness, facial asymmetry, or light headedness. No seizures. No new onset of partial or complete loss of vision affecting only one eye, speech difficulty or lateralizing weakness, numbness/tingling   Hematologic - no excessive bleeding. Psychiatric - no severe anxiety or nervousness. No confusion. All other review of systems are negative. Physical Exam    /80 (Site: Left Upper Arm)   Pulse 90   Temp 98.8 °F (37.1 °C)   SpO2 97%     Constitutional - No acute distress. HENT - head normocephalic. Hearing is intact   Eyes - conjunctiva normal.  EOMS normal.  No exudate. No icterus. Neck- ROM appears normal, no tracheal deviation. Cardiovascular - Regular rate and rhythm. Heart sounds are normal.  No murmur, rub, or gallop. Carotid pulses bilaterally without bruit. Extremities - Radial and ulnar pulses are 2+ to palpation bilaterally. DP and PT pulses 2+ patient. Left groin site clear. nN cyanosis, clubbing, or significant edema. No signs atheroembolic event. Pulmonary - effort appears normal.  No respiratory distress. Lungs - Breath sounds normal.   GI - Abdomen - No distension or palpable mass. Genitourinary - deferred. Musculoskeletal - ROM appears normal.  No significant edema. Neurologic - alert and oriented X 3. Face symmetric. No lateralizing weakness noted. Skin - warm, dry, and intact. No rash, erythema, or pallor.   Psychiatric - mood, affect, and behavior appear normal.  Judgment and thought processes appear normal.  Risk factors for atherosclerosis of all vascular beds have been reviewed with the patient including:  Family history, tobacco abuse in all forms, elevated cholesterol, hyperlipidemia, and diabetes. Assessment      1. Atherosclerosis with claudication of extremity ([address]          Plan        Recommend he continue Plavix, Trental and statin   Recommend no smoking  Recommend good BP control  Recommend low fat, low cholesterol diet  Recommend daily exercise in moderation  We will follow-up in 3 months with a lower extremity arterial study or sooner if claudication worsens/develops, develops new wound or IRP      Cc - No primary care provider on file.

## 2021-08-04 NOTE — PROGRESS NOTES
MEDICAL ONCOLOGY PROGRESS NOTE    Pt Name: Richard Koyanagi  MRN: [id number]  YOB: 1950  Date of evaluation: 8/6/2021      HISTORY OF PRESENT ILLNESS:    The patient is a very pleasant 70years old male who has been diagnosed with CLL. He presents today for continued monitoring. He denies any new B symptoms, peripheral adenopathy. He follows up with his primary care regularly. He was also found high-grade stenosis in the iliac artery. He is followed by vascular. Unfortunately, he continues to smoke. Diagnosis  · CLL, April 2021    Treatment Summary  · Watch and wait approach    Hematology/Cancer History  Richard Koyanagi was first seen by me on 4/6/2021. He was referred for findings of CLL on flow cytometry. He denies any B symptoms. He denies any family history of CLL. This was incidentally discovered during blood work. The patient is a smoker for more than 50 years. He has never had CT lung cancer screening although had a CT chest about 2 years ago reportedly unremarkable. He also reports a 40 pound weight loss over the last few years although this has been stable recently. Denies any hematuria, melena, hematochezia. Denies any hemoptysis. · 2/26/21 CBC: WBC 18.1 HGB 15.4 MCV 88.8   · 2/26/21 Peripheral blood smear: Slightly left shifted granulocytosis suggestive of a reactive or infectious process. Absolute monocytosis. Monocytosis which might be cause by chronic inflammatory disorder, parasitic or viral infection and collagen vascular disease. Absolute mature lymphocytosis with some atypical lymphocytes. The findings are suspicious for chronic lymphocytic leukemia. Suggest flow cytometry, for confirmation of suspected CLL, if clinically indicated. · 3/10/21 Leukemia/Lymphoma evaluation: Consistent with chronic lymphocytic leukemia; granulocytopenia with mild left shift.  Clonal CD20+ B cells detected that coexpress CD19, CD5, CD23 and dim surface lambda light chain, comprising 64% of lymphocytes and approximately 53% of all analyzed white blood cells. CD20 expression is moderate. CD38 is expressed in 22% of CD19+CD5+ B cells; less than 30% is considered prognostically favorable in CLL. Granulocytes are proportionally decreased and exhibit immunophenotypic evidence of mild left shift. CD34+ blasts comprise 0.3% of all analyzed white blood cells. COMMENT: Correlate with morphologic findings and other laboratory testing for final classification. The analyzed WBCs in the sample include 83% lymphocytes, 1% monocytes and 16% granulocytes. Granulocytes and lymphocytes were selected for analysis based on CD45 staining intensity, forward scatter and side scatter. · 4/6/2021-he was first seen by me. Discussed the results of his CBC and flow cytometry. CLL and also neutrophilia. Discussed at length about management and prognosis. Recommended watch/wait approach. Recommended CT lung cancer low-dose lung cancer screening. · 4/15/21 CT chest: No acute cardiopulmonary process. · 4/15/21 CT abd/pelvis: No acute abnormality of the abdomen or pelvis. No evidence of a mass or lymphadenopathy. Large amount of stool in the colon. Severe atheromatous changes of the abdominal aorta and iliac arteries. 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 A patent right external iliac stent.         Past Medical History:    Past Medical History:   Diagnosis Date    Arthritis     Benign prostate hyperplasia     Carotid stenosis     Colon polyp     COPD (chronic obstructive pulmonary disease) (HCC)     Cubital tunnel syndrome     Hyperlipidemia     Hypertension     IBS (irritable bowel syndrome)     Peripheral vascular disease (HCC)     Thyroid disease     TIA (transient ischemic attack)        Past Surgical History:    Past Surgical History:   Procedure Laterality Date    COLONOSCOPY  01/2020    ELBOW SURGERY Right     Removal of spot on elbow    MOUTH SURGERY      Removal of permanent teeth    UPPER GASTROINTESTINAL ENDOSCOPY  01/2020    VASCULAR SURGERY Right     stent placed    VASECTOMY         Social History:    Marital status:   Smoking status: yes, 60 years  ETOH status: currently no, quit 1981  Res[place]    Family History:   Family History   Problem Relation Age of Onset    Breast Cancer Mother 48        breast cancer    Heart Disease Sister        Current Hospital Medications:    Current Outpatient Medications   Medication Sig Dispense Refill    vitamin D (CHOLECALCIFEROL) 67932 UNIT CAPS Take 2,000 Units by mouth daily      clopidogrel (PLAVIX) 75 MG tablet Take 75 mg by mouth daily      diclofenac (VOLTAREN) 75 MG EC tablet TAKE 1 TABLET BY MOUTH TWICE DAILY      finasteride (PROSCAR) 5 MG tablet Take 5 mg by mouth daily      levothyroxine (SYNTHROID) 100 MCG tablet Take 100 mcg by mouth daily      losartan (COZAAR) 100 MG tablet Take 100 mg by mouth daily      pentoxifylline (TRENTAL) 400 MG extended release tablet Take 400 mg by mouth 3 times daily (with meals)      pravastatin (PRAVACHOL) 40 MG tablet Take 40 mg by mouth daily      sildenafil (VIAGRA) 100 MG tablet Take 100 mg by mouth daily as needed      pantoprazole (PROTONIX) 40 MG tablet Take 40 mg by mouth       pantoprazole (PROTONIX) 20 MG tablet Take 1 tablet by mouth daily 30 tablet 0     No current facility-administered medications for this visit. Allergies: Allergies   Allergen Reactions    Amoxicillin-Pot Clavulanate      Other reaction(s): Other (See Comments)  Not sure    Codeine      Other reaction(s): Other (See Comments)  Not sure    Hydroxyzine      Other reaction(s): Other (See Comments)  Not sure    Tramadol      Other reaction(s): Other (See Comments)  Not sure         Subjective   REVIEW OF SYSTEMS:   CONSTITUTIONAL: no fever, no night sweats, no fatigue, unintentional weight loss;   HEENT: no blurring of vision, no double vision, no hearing difficulty, no tinnitus, no ulceration, no dysplasia, no epistaxis;  LUNGS: no cough, no hemoptysis, no wheeze,  shortness of breath on exertion;  CARDIOVASCULAR: no palpitation, no chest pain, shortness of breath on exertion;  GI: no abdominal pain, no nausea, no vomiting, no diarrhea, no constipation;  ASHLEY: no dysuria, no hematuria, no frequency or urgency, no nephrolithiasis;  MUSCULOSKELETAL: back pain,  no joint pain, no swelling, no stiffness;  ENDOCRINE: no polyuria, no polydipsia, no cold or heat intolerance;  HEMATOLOGY: easy bruising, no bleeding, no history of clotting disorder;  DERMATOLOGY: no skin rash, no eczema, no pruritus;  PSYCHIATRY: no depression, no anxiety, no panic attacks, no suicidal ideation, no homicidal ideation;  NEUROLOGY: no syncope, no seizures, no numbness or tingling of hands, no numbness or tingling of feet, no paresis;    Objective   /70   Pulse 96   Ht 5' 9\" (1.753 m)   Wt 144 lb 12.8 oz (65.7 kg)   SpO2 98%   BMI 21.38 kg/m²     PHYSICAL EXAM:  CONSTITUTIONAL: Alert, appropriate, no acute distress  EYES: Non icteric, EOM intact, pupils equal round   ENT: Mucus membranes moist, no oral pharyngeal lesions, external inspection of ears and nose are normal  NECK: Supple, no masses. No palpable thyroid mass  CHEST/LUNGS: CTA bilaterally, normal respiratory effort   CARDIOVASCULAR: RRR, no murmurs. No lower extremity edema  ABDOMEN: soft non-tender, active bowel sounds, no HSM. No palpable masses  EXTREMITIES: warm, full ROM in all 4 extremities, no focal weakness. SKIN: warm, dry with no rashes or lesions  LYMPH: No cervical, clavicular, axillary, or inguinal lymphadenopathy  NEUROLOGIC: follows commands, non focal   PSYCH: mood and affect appropriate. Alert and oriented to time, place, person      LABORATORY RESULTS REVIEWED/ANALYZED BY ME:  WBC 15,000    RADIOLOGY STUDIES REVIEWED BY ME:  4/15/21 CT chest: No acute cardiopulmonary process.     4/15/21 CT abd/pelvis: No acute abnormality of the abdomen or pelvis. No evidence of a mass or lymphadenopathy. Large amount of stool in the colon. Severe atheromatous changes of the abdominal aorta and iliac arteries. 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 A patent right external iliac stent. ASSESSMENT:    No orders of the defined types were placed in this encounter. Leigh Ann Alexis was seen today for follow-up. Diagnoses and all orders for this visit:    Care plan discussed with patient    CLL (chronic lymphocytic leukemia) ([facility] [address]       CLL, February 2021  Essentially, CLL. Unremarkable CT chest abdomen pelvis for lymphadenopathy  No evidence of peripheral adenopathy. Recommend watch/wait approach    Tobacco abuse-patient has been smoked for more than 50 years 1 pack daily. Recommend CT lung cancer screening    Healthcare maintenance: Colonoscopy 1/2020, EGD 1/2020. Recommend CT low-dose lung cancer screening annually. At risk lung cancer-CT low-dose annually. Next April 2022    PLAN:  · Recommend yearly CT low dose chest due to long standing smoking history, April 2022  · RTC with MD 6 months      Dangelo BUCHANAN, am scribing for Nakia Carcamo MD. Electronically signed by Dangelo Coombs RN on 8/6/2021 at 5:13 PM CDT. I, Dr Yessica Sanford, personally performed the services described in this documentation as scribed by Dangelo Coombs RN in my presence and is both accurate and complete. I have seen, examined and reviewed this patient medication list, appropriate labs and imaging studies. I reviewed relevant medical records and others physicians notes. I discussed the plans of care with the patient. I answered all the questions to the patients satisfaction. I have also reviewed the chief complaint (CC) and part of the history (History of Present Illness (HPI), Past Family Social History [facility]), or Review of Systems (ROS) and made changes when appropriated.        (Please note that portions of this note were completed with a voice recognition program. Efforts were made to edit the dictations but occasionally words are mis-transcribed.)    Vanessa Carroll MD    08/06/21  8:54 AM

## 2021-08-06 ENCOUNTER — OFFICE VISIT (OUTPATIENT)
Dept: HEMATOLOGY | Age: 71
End: 2021-08-06
Payer: OTHER GOVERNMENT

## 2021-08-06 ENCOUNTER — HOSPITAL ENCOUNTER (OUTPATIENT)
Dept: INFUSION THERAPY | Age: 71
Discharge: HOME | End: 2021-08-06
Payer: OTHER GOVERNMENT

## 2021-08-06 VITALS
HEART RATE: 96 BPM | BODY MASS INDEX: 21.45 KG/M2 | DIASTOLIC BLOOD PRESSURE: 70 MMHG | HEIGHT: 69 IN | OXYGEN SATURATION: 98 % | WEIGHT: 144.8 LBS | SYSTOLIC BLOOD PRESSURE: 112 MMHG

## 2021-08-06 DIAGNOSIS — C91.10 CLL (CHRONIC LYMPHOCYTIC LEUKEMIA) (HCC): ICD-10-CM

## 2021-08-06 DIAGNOSIS — Z71.89 CARE PLAN DISCUSSED WITH PATIENT: Primary | ICD-10-CM

## 2021-08-06 DIAGNOSIS — D72.829 LEUKOCYTOSIS, UNSPECIFIED TYPE: ICD-10-CM

## 2021-08-06 LAB
HCT VFR BLD CALC: 46.5 % (ref 40.1–51)
HEMOGLOBIN: 15.5 G/DL (ref 13.7–17.5)
MCH RBC QN AUTO: 29.7 PG (ref 25.7–32.2)
MCHC RBC AUTO-ENTMCNC: 33.3 G/DL (ref 32.3–36.5)
MCV RBC AUTO: 89.1 FL (ref 79–92.2)
PDW BLD-RTO: 13.4 % (ref 11.6–14.4)
PLATELET # BLD: 169 K/UL (ref 163–337)
PMV BLD AUTO: 9.5 FL (ref 7.4–10.4)
RBC # BLD: 5.22 M/UL (ref 4.63–6.08)
WBC # BLD: 15.56 K/UL (ref 4.23–9.07)

## 2021-08-06 PROCEDURE — 3017F COLORECTAL CA SCREEN DOC REV: CPT | Performed by: INTERNAL MEDICINE

## 2021-08-06 PROCEDURE — 85027 COMPLETE CBC AUTOMATED: CPT

## 2021-08-06 PROCEDURE — G8420 CALC BMI NORM PARAMETERS: HCPCS | Performed by: INTERNAL MEDICINE

## 2021-08-06 PROCEDURE — 4040F PNEUMOC VAC/ADMIN/RCVD: CPT | Performed by: INTERNAL MEDICINE

## 2021-08-06 PROCEDURE — 4004F PT TOBACCO SCREEN RCVD TLK: CPT | Performed by: INTERNAL MEDICINE

## 2021-08-06 PROCEDURE — G8427 DOCREV CUR MEDS BY ELIG CLIN: HCPCS | Performed by: INTERNAL MEDICINE

## 2021-08-06 PROCEDURE — 99213 OFFICE O/P EST LOW 20 MIN: CPT | Performed by: INTERNAL MEDICINE

## 2021-08-06 PROCEDURE — 1123F ACP DISCUSS/DSCN MKR DOCD: CPT | Performed by: INTERNAL MEDICINE

## 2021-08-06 PROCEDURE — 99211 OFF/OP EST MAY X REQ PHY/QHP: CPT

## 2021-08-25 ENCOUNTER — HOSPITAL ENCOUNTER (OUTPATIENT)
Dept: VASCULAR LAB | Age: 71
Discharge: HOME | End: 2021-08-25
Payer: OTHER GOVERNMENT

## 2021-08-25 ENCOUNTER — OFFICE VISIT (OUTPATIENT)
Dept: VASCULAR SURGERY | Age: 71
End: 2021-08-25
Payer: OTHER GOVERNMENT

## 2021-08-25 VITALS
OXYGEN SATURATION: 99 % | TEMPERATURE: 96.7 F | DIASTOLIC BLOOD PRESSURE: 90 MMHG | HEART RATE: 75 BPM | SYSTOLIC BLOOD PRESSURE: 143 MMHG

## 2021-08-25 DIAGNOSIS — I73.9 PVD (PERIPHERAL VASCULAR DISEASE) (HCC): Primary | ICD-10-CM

## 2021-08-25 DIAGNOSIS — I73.9 PVD (PERIPHERAL VASCULAR DISEASE) WITH CLAUDICATION (HCC): ICD-10-CM

## 2021-08-25 PROCEDURE — 93923 UPR/LXTR ART STDY 3+ LVLS: CPT

## 2021-08-25 PROCEDURE — 99213 OFFICE O/P EST LOW 20 MIN: CPT | Performed by: PHYSICIAN ASSISTANT

## 2021-08-25 NOTE — PROGRESS NOTES
Patient Care Team:  Abelardo Melgar MD as Consulting Physician (Vascular Surgery)      History and Physical  Mr. Surekha Perez is a 31-year-old male has a history of peripheral vascular disease. Today we are following up for this. Currently he is on Plavix, Trental and a statin daily. He reports that he still smoking cigars. He denies any lifestyle limiting claudication, ischemic rest pain or nonhealing wounds on his lower extremities. Nimisha Cosby is a 70 y.o. male with the following history reviewed and recorded in [facility]:  Patient Active Problem List    Diagnosis Date Noted    Atherosclerosis with claudication of extremity ([facility] 05/17/2021    Benign essential HTN 05/17/2021    BPH (benign prostatic hyperplasia) 05/17/2021    Pulmonary emphysema ([facility] 05/17/2021    Carotid stenosis, asymptomatic, bilateral 05/17/2021    CLL (chronic lymphocytic leukemia) ([facility] 05/17/2021    PVD (peripheral vascular disease) ([facility])      Current Outpatient Medications   Medication Sig Dispense Refill    vitamin D (CHOLECALCIFEROL) 41851 UNIT CAPS Take 2,000 Units by mouth daily      clopidogrel (PLAVIX) 75 MG tablet Take 75 mg by mouth daily      diclofenac (VOLTAREN) 75 MG EC tablet TAKE 1 TABLET BY MOUTH TWICE DAILY      finasteride (PROSCAR) 5 MG tablet Take 5 mg by mouth daily      levothyroxine (SYNTHROID) 100 MCG tablet Take 100 mcg by mouth daily      losartan (COZAAR) 100 MG tablet Take 100 mg by mouth daily      pentoxifylline (TRENTAL) 400 MG extended release tablet Take 400 mg by mouth 3 times daily (with meals)      pravastatin (PRAVACHOL) 40 MG tablet Take 40 mg by mouth daily      sildenafil (VIAGRA) 100 MG tablet Take 100 mg by mouth daily as needed      pantoprazole (PROTONIX) 40 MG tablet Take 40 mg by mouth       pantoprazole (PROTONIX) 20 MG tablet Take 1 tablet by mouth daily 30 tablet 0     No current facility-administered medications for this visit.      Allergies: Amoxicillin-pot clavulanate, Codeine, Hydroxyzine, and Tramadol  Past Medical History:   Diagnosis Date    Arthritis     Benign prostate hyperplasia     Carotid stenosis     Colon polyp     COPD (chronic obstructive pulmonary disease) (HCC)     Cubital tunnel syndrome     Hyperlipidemia     Hypertension     IBS (irritable bowel syndrome)     Peripheral vascular disease (HCC)     Thyroid disease     TIA (transient ischemic attack)      Past Surgical History:   Procedure Laterality Date    COLONOSCOPY  01/2020    ELBOW SURGERY Right     Removal of spot on elbow    MOUTH SURGERY      Removal of permanent teeth    UPPER GASTROINTESTINAL ENDOSCOPY  01/2020    VASCULAR SURGERY Right     stent placed    VASECTOMY       Family History   Problem Relation Age of Onset    Breast Cancer Mother 48        breast cancer    Heart Disease Sister      Social History     Tobacco Use    Smoking status: Current Every Day Smoker     Years: 60.00     Types: Cigars    Smokeless tobacco: Never Used   Substance Use Topics    Alcohol use: Not Currently       Review of Systems    Constitutional -   No fever or chills   HENT - no HA's, tinnitus, rhinorrhea, sore throat  Eyes - no sudden vision change or amaurosis. Respiratory - SOB or chest pain  Cardiovascular - no chest pain, syncope, or significant dizziness. No palpitations. see HPI   Gastrointestinal - no significant abdominal pain. No blood in stool. No diarrhea, nausea, or vomiting. Genitourinary - No difficulty urinating, dysuria, frequency, or urgency. No flank pain or hematuria. Musculoskeletal - no back pain or myalgia. Skin - no rashes or wounds   Neurologic - no dizziness, facial asymmetry, or light headedness. No seizures. No new onset of partial or complete loss of vision affecting only one eye, speech difficulty or lateralizing weakness, numbness/tingling   Hematologic - no excessive bleeding. Psychiatric - no severe anxiety or nervousness. No confusion.   All other review of systems are negative. Physical Exam    BP (!) 143/90 (Site: Left Upper Arm, Position: Sitting, Cuff Size: Medium Adult)   Pulse 75   Temp 96.7 °F (35.9 °C) (Temporal)   SpO2 99%     Constitutional - No acute distress. HENT - head normocephalic. Hearing is intact   Eyes - conjunctiva normal.  EOMS normal.  No exudate. No icterus. Neck- ROM appears normal, no tracheal deviation. Cardiovascular - Regular rate and rhythm. Heart sounds are normal.  No murmur, rub, or gallop. Carotid pulses bilaterally without bruit. Extremities - Radial and ulnar pulses are 2+ to palpation bilaterally. DP and PT pulses are palpable. No cyanosis, clubbing, or significant edema. No signs atheroembolic event. Pulmonary - effort appears normal.  No respiratory distress. Lungs - Breath sounds normal.   GI - Abdomen - No distension or palpable mass. Genitourinary - deferred. Musculoskeletal - ROM appears normal.  No significant edema. Neurologic - alert and oriented X 3. Face symmetric. No lateralizing weakness noted. Skin - warm, dry, and intact. No rash, erythema, or pallor. Psychiatric - mood, affect, and behavior appear normal.  Judgment and thought processes appear normal.      Risk factors for atherosclerosis of all vascular beds have been reviewed with the patient including:  Family history, tobacco abuse in all forms, elevated cholesterol, hyperlipidemia, and diabetes.     Lower extremity arterial study:       Impression        Based on ankle brachial indices and doppler waveforms, the patient has    mildly diminished flow to the right lower extremity arterial system at   [facility]    The patient has mildly diminished ankle-brachial indices left lower    extremity which would be consistent with claudication level symptoms.    Based on segmental pressures and doppler waveforms, the patient likely has    disease in the left iliofemoral arterial segments.        Signature      ----------------------------------------------------------------    Electronically signed by Rosas GREENBERGInterpreting    physician) on 08/25/2021 11:59 AM    ----------------------------------------------------------------       Velocities are measured in cm/s ; Diameters are measured in mm       Pressures   +--------------------------------------++--------+-----+----+--------+-----+   !                                      ! ! Right   !     !Left!        !     !   +--------------------------------------++--------+-----+----+--------+-----+   ! Location                              ! ! Pressure! Ratio!    !Pressure! Ratio! +--------------------------------------++--------+-----+----+--------+-----+   ! Upper Thigh                           !!148     !1.06 !    !130     !0.94 !   +--------------------------------------++--------+-----+----+--------+-----+   ! Lower Thigh                           !!139     !1    !    !130     !0.94 !   +--------------------------------------++--------+-----+----+--------+-----+   ! Calf                                  !!137     !0.99 !    !127     !0.91 !   +--------------------------------------++--------+-----+----+--------+-----+   ! Ankle PT                              !!126     !0.91 !    !144     !1.04 !   +--------------------------------------++--------+-----+----+--------+-----+   ! DP                                    !!121     !0.87 !    !126     !0.91 !   +--------------------------------------++--------+-----+----+--------+-----+   ! Great Toe                             !!80      !0.58 !   [place]      !0.6  !   +--------------------------------------++--------+-----+----+--------+-----+         - Brachial Pressure:Right: 135. Left:139.         - KG:Right: 0.91. Left: 1.04.       Plethysmographic Digit Evaluation   +---------++--------+-----+---------------++--------+-----+----------------+   !         ! ! Right   !     ! Left           !!        !     !                ! +---------++--------+-----+---------------++--------+-----+----------------+   ! Location ! !Pressure! Ratio! PPG Wave Form  !!Pressure! Ratio! PPG Wave Form   !   +---------++--------+-----+---------------++--------+-----+----------------+   ! Great Toe! !81      !0.58 !               !!83      !0.6  !                !   +---------++--------+-----+---------------++--------+-----+----------------+       Post Exercise   Exercise Time: 300 sec.       +------------+----+------------+---------+--------+------------+-----------+   !            !    ! Right       !         ! Left    !            !           !   +------------+----+------------+---------+--------+------------+-----------+   ! Location    !    !Pressure    !Ratio    !        !Pressure    !Ratio      !   +------------+----+------------+---------+--------+------------+-----------+   ! PTA         !    !121         !0.83     !        !136         !0.93       !   +------------+----+------------+---------+--------+------------+-----------+     - Brachial Pressure:Left:146.         - KG:Right: 0.83. Left: 0.93.         Individual films reviewed: Yes. Test results were reviewed with the patient. Assessment      1.  PVD (peripheral vascular disease) ([address]          Plan      Recommend he continue Plavix, Trental and a statin   Recommend no smoking  Recommend good BP control  Recommend low fat, low cholesterol diet  Recommend daily exercise in moderation  We will follow up in 6 months with a repeat arterial study or sooner if claudication worsens/develops, develops new wound or IRP

## 2022-02-09 DIAGNOSIS — Z00.00 HEALTHCARE MAINTENANCE: Primary | ICD-10-CM

## 2022-02-25 ENCOUNTER — TELEPHONE (OUTPATIENT)
Dept: HEMATOLOGY | Age: 72
End: 2022-02-25

## 2022-02-25 NOTE — TELEPHONE ENCOUNTER
Pt's daughter called to cancel appointment due to trying to get Pt into [facility].  Pt's daughter stated she would call back and reschedule after pt was seen with [facility]

## 2022-02-28 ENCOUNTER — HOSPITAL ENCOUNTER (OUTPATIENT)
Dept: INFUSION THERAPY | Age: 72
End: 2022-02-28

## 2022-03-11 ENCOUNTER — TRANSCRIBE ORDERS (OUTPATIENT)
Dept: LAB | Facility: HOSPITAL | Age: 72
End: 2022-03-11

## 2022-03-11 DIAGNOSIS — Z01.818 PREOP TESTING: Primary | ICD-10-CM

## 2022-03-12 ENCOUNTER — LAB (OUTPATIENT)
Dept: LAB | Facility: HOSPITAL | Age: 72
End: 2022-03-12

## 2022-03-12 LAB — SARS-COV-2 ORF1AB RESP QL NAA+PROBE: NOT DETECTED

## 2022-03-12 PROCEDURE — U0004 COV-19 TEST NON-CDC HGH THRU: HCPCS | Performed by: UROLOGY

## 2022-03-12 PROCEDURE — C9803 HOPD COVID-19 SPEC COLLECT: HCPCS | Performed by: UROLOGY

## 2022-05-24 ENCOUNTER — TELEPHONE (OUTPATIENT)
Dept: HEMATOLOGY | Age: 72
End: 2022-05-24

## 2022-06-06 NOTE — PROGRESS NOTES
MEDICAL ONCOLOGY PROGRESS NOTE    Pt Name: Eileen Borges  MRN: [id number]  YOB: 1950  Date of evaluation: 6/7/2022      HISTORY OF PRESENT ILLNESS:    The patient is a very pleasant 67years old male who has been diagnosed with CLL. He presents today for continued monitoring. He denies any new B symptoms, peripheral adenopathy. He follows up with his primary care regularly. He was told by his primary care that his CBC was 22,000. Therefore, he was asked to see us again. In addition, since he was last seen by me he has been diagnosed with high-grade urothelial carcinoma, stage 0. He underwent transurethral resection of bladder tumor and is currently receiving intrathecal BCG at [address] [place] [address]. Diagnosis  · CLL, April 2021  · High-grade urothelial carcinoma, March 2022    Treatment Summary  · Watch and wait approach  · TURBT followed by Intravesicular BCG at [address] [place] [address] for his bladder cancer    Hematology/Cancer History  Eileen Borges was first seen by me on 4/6/2021. He was referred for findings of CLL on flow cytometry. He denies any B symptoms. He denies any family history of CLL. This was incidentally discovered during blood work. The patient is a smoker for more than 50 years. He has never had CT lung cancer screening although had a CT chest about 2 years ago reportedly unremarkable. He also reports a 40 pound weight loss over the last few years although this has been stable recently. Denies any hematuria, melena, hematochezia. Denies any hemoptysis. · 2/26/21 CBC: WBC 18.1 HGB 15.4 MCV 88.8   · 2/26/21 Peripheral blood smear: Slightly left shifted granulocytosis suggestive of a reactive or infectious process. Absolute monocytosis. Monocytosis which might be cause by chronic inflammatory disorder, parasitic or viral infection and collagen vascular disease. Absolute mature lymphocytosis with some atypical lymphocytes. The findings are suspicious for chronic lymphocytic leukemia. Suggest flow cytometry, for confirmation of suspected CLL, if clinically indicated. · 3/10/21 Leukemia/Lymphoma evaluation: Consistent with chronic lymphocytic leukemia; granulocytopenia with mild left shift. Clonal CD20+ B cells detected that coexpress CD19, CD5, CD23 and dim surface lambda light chain, comprising 64% of lymphocytes and approximately 53% of all analyzed white blood cells. CD20 expression is moderate. CD38 is expressed in 22% of CD19+CD5+ B cells; less than 30% is considered prognostically favorable in CLL. Granulocytes are proportionally decreased and exhibit immunophenotypic evidence of mild left shift. CD34+ blasts comprise 0.3% of all analyzed white blood cells. COMMENT: Correlate with morphologic findings and other laboratory testing for final classification. The analyzed WBCs in the sample include 83% lymphocytes, 1% monocytes and 16% granulocytes. Granulocytes and lymphocytes were selected for analysis based on CD45 staining intensity, forward scatter and side scatter. · 4/6/2021-he was first seen by me. Discussed the results of his CBC and flow cytometry. CLL and also neutrophilia. Discussed at length about management and prognosis. Recommended watch/wait approach. Recommended CT lung cancer low-dose lung cancer screening. · 4/15/21 CT chest: No acute cardiopulmonary process. · 4/15/21 CT abd/pelvis: No acute abnormality of the abdomen or pelvis. No evidence of a mass or lymphadenopathy. Large amount of stool in the colon. Severe atheromatous changes of the abdominal aorta and iliac arteries. 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 A patent right external iliac stent. Stage 0 bladder cancer  · 3/15/2022 Bladder,prior resection site, transurethral Resection [facility]): Denuded Urothelium with granulation tissue and necroinflammatory debris, consistent with prior procedure site; Muscularis propria is present.    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 The neoplastic cells are positive for GATA3, p63 and pancytokeratin with focal staining for cytokeratin 5/6. HCG stains scattered cells with trophoblastic differentiation. NKX3.1 and P501S are negative. · Currently receiving intravesical adjuvant BCG therapy at [facility]        Past Medical History:    Past Medical History:   Diagnosis Date    Arthritis     Benign prostate hyperplasia     Carotid stenosis     Colon polyp     COPD (chronic obstructive pulmonary disease) (HCC)     Cubital tunnel syndrome     Hyperlipidemia     Hypertension     IBS (irritable bowel syndrome)     Peripheral vascular disease (HCC)     Thyroid disease     TIA (transient ischemic attack)        Past Surgical History:    Past Surgical History:   Procedure Laterality Date    COLONOSCOPY  01/2020    ELBOW SURGERY Right     Removal of spot on elbow    MOUTH SURGERY      Removal of permanent teeth    UPPER GASTROINTESTINAL ENDOSCOPY  01/2020    VASCULAR SURGERY Right     stent placed    VASECTOMY         Social History:    Marital status:    Smoking status: yes, 60 years  ETOH status: currently no, quit 1981  Resides: [place], [place]    Family History:   Family History   Problem Relation Age of Onset    Breast Cancer Mother 48        breast cancer    Heart Disease Sister        Current Hospital Medications: Current Outpatient Medications   Medication Sig Dispense Refill    vitamin D (CHOLECALCIFEROL) 34093 UNIT CAPS Take 2,000 Units by mouth daily      clopidogrel (PLAVIX) 75 MG tablet Take 75 mg by mouth daily      diclofenac (VOLTAREN) 75 MG EC tablet TAKE 1 TABLET BY MOUTH TWICE DAILY      finasteride (PROSCAR) 5 MG tablet Take 5 mg by mouth daily      levothyroxine (SYNTHROID) 100 MCG tablet Take 100 mcg by mouth daily      losartan (COZAAR) 100 MG tablet Take 100 mg by mouth daily      pentoxifylline (TRENTAL) 400 MG extended release tablet Take 400 mg by mouth 3 times daily (with meals)      pravastatin (PRAVACHOL) 40 MG tablet Take 40 mg by mouth daily      sildenafil (VIAGRA) 100 MG tablet Take 100 mg by mouth daily as needed      pantoprazole (PROTONIX) 40 MG tablet Take 40 mg by mouth       pantoprazole (PROTONIX) 20 MG tablet Take 1 tablet by mouth daily 30 tablet 0     No current facility-administered medications for this visit. Allergies: Allergies   Allergen Reactions    Amoxicillin-Pot Clavulanate      Other reaction(s): Other (See Comments)  Not sure    Codeine      Other reaction(s): Other (See Comments)  Not sure    Hydroxyzine      Other reaction(s): Other (See Comments)  Not sure    Tramadol      Other reaction(s):  Other (See Comments)  Not sure         Subjective   REVIEW OF SYSTEMS:   CONSTITUTIONAL: no fever, no night sweats, no fatigue; weight loss  HEENT: no blurring of vision, no double vision, no hearing difficulty, no tinnitus, no ulceration, no dysplasia, no epistaxis;   LUNGS: no cough, no hemoptysis, no wheeze,  no shortness of breath;  CARDIOVASCULAR: no palpitation, no chest pain, no shortness of breath;  GI: no abdominal pain, no nausea, no vomiting, no diarrhea, no constipation;  ASHLEY: no dysuria, no hematuria, no frequency or urgency, no nephrolithiasis;  MUSCULOSKELETAL: no joint pain, no swelling, no stiffness;  ENDOCRINE: no polyuria, no polydipsia, no cold or heat intolerance;  HEMATOLOGY: no easy bruising or bleeding, no history of clotting disorder;  DERMATOLOGY: no skin rash, no eczema, no pruritus;  PSYCHIATRY: no depression, no anxiety, no panic attacks, no suicidal ideation, no homicidal ideation;  NEUROLOGY: no syncope, no seizures, no numbness or tingling of hands, no numbness or tingling of feet, no paresis;    Objective   /72   Pulse 72   Ht 5' 9\" (1.753 m)   Wt 135 lb (61.2 kg)   SpO2 98%   BMI 19.94 kg/m²   Wt Readings from Last 3 Encounters:   06/07/22 135 lb (61.2 kg)   08/06/21 144 lb 12.8 oz (65.7 kg)   05/17/21 146 lb (66.2 kg)       PHYSICAL EXAM:  CONSTITUTIONAL: Alert, appropriate, no acute distress  EYES: Non icteric, EOM intact, pupils equal round   ENT: Mucus membranes moist, no oral pharyngeal lesions, external inspection of ears and nose are normal  NECK: Supple, no masses. No palpable thyroid mass  CHEST/LUNGS: CTA bilaterally, normal respiratory effort   CARDIOVASCULAR: RRR, no murmurs. No lower extremity edema  ABDOMEN: soft non-tender, active bowel sounds, no HSM. No palpable masses  EXTREMITIES: warm, full ROM in all 4 extremities, no focal weakness. SKIN: warm, dry with no rashes or lesions  LYMPH: No cervical, clavicular, axillary, or inguinal lymphadenopathy  NEUROLOGIC: follows commands, non focal   PSYCH: mood and affect appropriate. Alert and oriented to time, place, person      LABORATORY RESULTS REVIEWED/ANALYZED BY ME:  3/29/2022 URINARY BLADDER, RIGHT LATERAL WALL, BIOPSY:([facility])High-grade Urothelial carcinoma with trophoblastic differentiation, Invasive into the [facility]; No definitive muscularis propria present; negative for lymphovascular invasive; See Comment. 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 The neoplastic cells are positive for GATA3, p63 and pancytokeratin with focal staining for cytokeratin 5/6. HCG stains scattered cells with trophoblastic differentiation. NKX3.1 and P501S are negative. Lab Results   Component Value Date    WBC 17.00 (H) 06/07/2022    HGB 13.7 06/07/2022    HCT 43.1 06/07/2022    MCV 91.1 06/07/2022     06/07/2022     RADIOLOGY STUDIES REVIEWED BY ME:  None    ASSESSMENT:    Orders Placed This Encounter   Procedures    CBC with Auto Differential     Standing Status:   Future     Standing Expiration Date:   6/7/2023      Rakesh Fowler was seen today for follow-up. Diagnoses and all orders for this visit:    CLL (chronic lymphocytic leukemia) ([facility]  -     CBC with Auto Differential; Future    Care plan discussed with patient    Malignant neoplasm of urinary bladder, unspecified site [facility])       CLL, February 2021  Essentially, CLL. No peripheral adenopathy appreciated. No splenomegaly  No significant cytopenias. WBC 17,000 today. Patient tells me that he was on steroids another day when he had his WBC count 22,000. No indication for treatment at this time. Recommend watch/wait approach. -Repeat CBC in 3 months and see MD/PA 6-month    Tobacco abuse-patient has been smoked for more than 50 years 1 pack daily. Recommend CT lung cancer screening  -Apparently the patient is undergoing CT lung cancer screening at the [address] [place] [address] maintenance: Colonoscopy 1/2020, EGD 1/2020. Recommend CT low-dose lung cancer screening annually. At risk lung cancer-CT low-dose annually.   This is being taken care of by the [address] [place] [address]:  · RTC with DESMOND Garg, 6 months  · CBC today an 3 months  · Recommend yearly CT low dose chest due to long standing smoking history, April 2022  · Obtain medical records from [place] in [address] [place] [address] Yovani jered Blankenship pre charting  as Medical Assistant for Kylie Vasquez MD. Electronically signed by Angie Plata MA on 6/7/2022 at 11:40 AM CDT. Vasile Cuellar am scribing for Kylie Vasquez MD. Electronically signed by Tru Tobar RN on 6/7/2022 at 1:07 PM CDT. I, Dr Ced Gil, personally performed the services described in this documentation as scribed by Tru Tobar RN in my presence and is both accurate and complete. I have seen, examined and reviewed this patient medication list, appropriate labs and imaging studies. I reviewed relevant medical records and others physicians notes. I discussed the plans of care with the patient. I answered all the questions to the patients satisfaction. I have also reviewed the chief complaint (CC) and part of the history (History of Present Illness (HPI), Past Family Social History [facility]), or Review of Systems (ROS) and made changes when appropriated.        (Please note that portions of this note were completed with a voice recognition program. Efforts were made to edit the dictations but occasionally words are mis-transcribed.)    Electronically signed by Kylie Vasquez MD on 6/7/2022 at 4:08 PM

## 2022-06-07 ENCOUNTER — HOSPITAL ENCOUNTER (OUTPATIENT)
Dept: INFUSION THERAPY | Age: 72
Discharge: HOME | End: 2022-06-07
Payer: OTHER GOVERNMENT

## 2022-06-07 ENCOUNTER — OFFICE VISIT (OUTPATIENT)
Dept: HEMATOLOGY | Age: 72
End: 2022-06-07
Payer: OTHER GOVERNMENT

## 2022-06-07 VITALS
DIASTOLIC BLOOD PRESSURE: 72 MMHG | WEIGHT: 135 LBS | BODY MASS INDEX: 19.99 KG/M2 | OXYGEN SATURATION: 98 % | SYSTOLIC BLOOD PRESSURE: 134 MMHG | HEIGHT: 69 IN | HEART RATE: 72 BPM

## 2022-06-07 DIAGNOSIS — C91.10 CLL (CHRONIC LYMPHOCYTIC LEUKEMIA) (HCC): Primary | ICD-10-CM

## 2022-06-07 DIAGNOSIS — Z71.89 CARE PLAN DISCUSSED WITH PATIENT: ICD-10-CM

## 2022-06-07 DIAGNOSIS — C67.9 MALIGNANT NEOPLASM OF URINARY BLADDER, UNSPECIFIED SITE (HCC): ICD-10-CM

## 2022-06-07 DIAGNOSIS — Z00.00 HEALTHCARE MAINTENANCE: ICD-10-CM

## 2022-06-07 LAB
HCT VFR BLD CALC: 43.1 % (ref 40.1–51)
HEMOGLOBIN: 13.7 G/DL (ref 13.7–17.5)
LYMPHOCYTES ABSOLUTE: 8.1 K/UL (ref 1.18–3.74)
LYMPHOCYTES RELATIVE PERCENT: 47.4 % (ref 19.3–53.1)
MCH RBC QN AUTO: 29 PG (ref 25.7–32.2)
MCHC RBC AUTO-ENTMCNC: 31.8 G/DL (ref 32.3–36.5)
MCV RBC AUTO: 91.1 FL (ref 79–92.2)
MONOCYTES ABSOLUTE: 1 K/UL (ref 0.24–0.82)
MONOCYTES RELATIVE PERCENT: 5.7 % (ref 4.7–12.5)
NEUTROPHILS ABSOLUTE: 7.9 K/UL (ref 1.56–6.13)
NEUTROPHILS RELATIVE PERCENT: 46.9 % (ref 34–71.1)
PDW BLD-RTO: 13.8 % (ref 11.6–14.4)
PLATELET # BLD: 178 K/UL (ref 163–337)
PMV BLD AUTO: 9.4 FL (ref 7.4–10.4)
RBC # BLD: 4.73 M/UL (ref 4.63–6.08)
WBC # BLD: 17 K/UL (ref 4.23–9.07)

## 2022-06-07 PROCEDURE — 1123F ACP DISCUSS/DSCN MKR DOCD: CPT | Performed by: INTERNAL MEDICINE

## 2022-06-07 PROCEDURE — 85025 COMPLETE CBC W/AUTO DIFF WBC: CPT

## 2022-06-07 PROCEDURE — 99211 OFF/OP EST MAY X REQ PHY/QHP: CPT

## 2022-06-07 PROCEDURE — 99213 OFFICE O/P EST LOW 20 MIN: CPT | Performed by: INTERNAL MEDICINE

## 2022-09-26 DIAGNOSIS — I70.219 ATHEROSCLEROSIS WITH CLAUDICATION OF EXTREMITY (HCC): Primary | ICD-10-CM

## 2022-10-04 ENCOUNTER — TELEPHONE (OUTPATIENT)
Dept: VASCULAR SURGERY | Age: 72
End: 2022-10-04

## 2022-12-05 ENCOUNTER — HOSPITAL ENCOUNTER (OUTPATIENT)
Dept: INFUSION THERAPY | Age: 72
Discharge: HOME | End: 2022-12-05
Payer: OTHER GOVERNMENT

## 2022-12-05 ENCOUNTER — OFFICE VISIT (OUTPATIENT)
Dept: HEMATOLOGY | Age: 72
End: 2022-12-05
Payer: OTHER GOVERNMENT

## 2022-12-05 VITALS
SYSTOLIC BLOOD PRESSURE: 122 MMHG | WEIGHT: 133.3 LBS | HEIGHT: 69 IN | BODY MASS INDEX: 19.74 KG/M2 | HEART RATE: 94 BPM | DIASTOLIC BLOOD PRESSURE: 80 MMHG

## 2022-12-05 DIAGNOSIS — C91.10 CLL (CHRONIC LYMPHOCYTIC LEUKEMIA) (HCC): Primary | ICD-10-CM

## 2022-12-05 DIAGNOSIS — Z72.0 TOBACCO ABUSE: ICD-10-CM

## 2022-12-05 DIAGNOSIS — R42 DIZZINESS: ICD-10-CM

## 2022-12-05 DIAGNOSIS — Z71.89 CARE PLAN DISCUSSED WITH PATIENT: ICD-10-CM

## 2022-12-05 LAB
BASOPHILS ABSOLUTE: 0.13 K/UL (ref 0.01–0.08)
BASOPHILS RELATIVE PERCENT: 0.6 % (ref 0.1–1.2)
EOSINOPHILS ABSOLUTE: 0.24 K/UL (ref 0.04–0.54)
EOSINOPHILS RELATIVE PERCENT: 1.2 % (ref 0.7–7)
HCT VFR BLD CALC: 46.1 % (ref 40.1–51)
HEMOGLOBIN: 14.6 G/DL (ref 13.7–17.5)
LYMPHOCYTES ABSOLUTE: 12.2 K/UL (ref 1.18–3.74)
LYMPHOCYTES RELATIVE PERCENT: 59.3 % (ref 19.3–53.1)
MCH RBC QN AUTO: 29 PG (ref 25.7–32.2)
MCHC RBC AUTO-ENTMCNC: 31.7 G/DL (ref 32.3–36.5)
MCV RBC AUTO: 91.5 FL (ref 79–92.2)
MONOCYTES ABSOLUTE: 1.2 K/UL (ref 0.24–0.82)
MONOCYTES RELATIVE PERCENT: 5.8 % (ref 4.7–12.5)
NEUTROPHILS ABSOLUTE: 6.74 K/UL (ref 1.56–6.13)
NEUTROPHILS RELATIVE PERCENT: 32.8 % (ref 34–71.1)
PDW BLD-RTO: 14.6 % (ref 11.6–14.4)
PLATELET # BLD: 198 K/UL (ref 163–337)
PMV BLD AUTO: 9.8 FL (ref 7.4–10.4)
RBC # BLD: 5.04 M/UL (ref 4.63–6.08)
WBC # BLD: 20.58 K/UL (ref 4.23–9.07)

## 2022-12-05 PROCEDURE — 1123F ACP DISCUSS/DSCN MKR DOCD: CPT | Performed by: NURSE PRACTITIONER

## 2022-12-05 PROCEDURE — 85025 COMPLETE CBC W/AUTO DIFF WBC: CPT

## 2022-12-05 PROCEDURE — 3078F DIAST BP <80 MM HG: CPT | Performed by: NURSE PRACTITIONER

## 2022-12-05 PROCEDURE — 99212 OFFICE O/P EST SF 10 MIN: CPT

## 2022-12-05 PROCEDURE — G0296 VISIT TO DETERM LDCT ELIG: HCPCS | Performed by: NURSE PRACTITIONER

## 2022-12-05 PROCEDURE — 99214 OFFICE O/P EST MOD 30 MIN: CPT | Performed by: NURSE PRACTITIONER

## 2022-12-05 PROCEDURE — 3074F SYST BP LT 130 MM HG: CPT | Performed by: NURSE PRACTITIONER

## 2022-12-05 ASSESSMENT — ENCOUNTER SYMPTOMS
NAUSEA: 0
SORE THROAT: 0
VOMITING: 0
DIARRHEA: 0
CONSTIPATION: 0
ABDOMINAL PAIN: 0
EYE ITCHING: 0
TROUBLE SWALLOWING: 0
SHORTNESS OF BREATH: 0
EYE DISCHARGE: 0
COUGH: 0
WHEEZING: 0

## 2022-12-05 NOTE — PROGRESS NOTES
Progress Note      Pt Name: Ratna Hylton: 1950  MRN: [id number]    Date of evaluation: 12/5/2022  History Obtained From:  Patient, EMR    Portions of this note have been copied forward, however, changed to reflect the most current clinical status of this patient. Chief Complaint   Patient presents with    Follow-up     CLL (chronic lymphocytic leukemia) ([facility] [address]     INTERVAL HISTORY/HISTORY OF PRESENT ILLNESS:    Blanca Agrawal is a 66-year-old  gentleman who holds a diagnosis of CLL, made in April, 2021. He is on observation. He denies B symptoms. He returns to the clinic for continued surveillance with a WBC stable at 20,000 today. He also has a history of high-grade urothelial carcinoma dating to March, 2022. He is status post transurethral resection of bladder tumor. Josse Perales received maintenance BCG treatment #3/3 on 10/21/2022 at [facility]. He is scheduled for repeat cystoscopy 12/7/2022. Josse Perales denies hematuria. He is without  complaint. Jong complains of slow weight loss. He states he eats only 1 meal per day. This is not new, he has done this most of his life. He states he drinks coffee throughout the morning and eats 1 meal in the evening. He does not get hungry throughout the day. He also complains of dizziness, mainly when standing too quickly. Unfortunately he continues to smoke. Screening LD chest CT was previously discussed with patient per Dr. Mary Harris. Patient states he has not had this completed through the [place] and is agreeable to proceed. Endoscopy/colonoscopy were last completed 1/2020. Diagnosis  CLL, April 2021  High-grade urothelial carcinoma, March 2022     Treatment Summary  Watch and wait approach  TURBT followed by Intravesicular BCG at [facility] for his bladder cancer     Hematology/Cancer History  Blanca Agrawal was first seen by me on 4/6/2021. He was referred for findings of CLL on flow cytometry. He denies any B symptoms.   He denies any family history of CLL. This was incidentally discovered during blood work. The patient is a smoker for more than 50 years. He has never had CT lung cancer screening although had a CT chest about 2 years ago reportedly unremarkable. He also reports a 40 pound weight loss over the last few years although this has been stable recently. Denies any hematuria, melena, hematochezia. Denies any hemoptysis. 2/26/21 CBC: WBC 18.1 HGB 15.4 MCV 88.8   2/26/21 Peripheral blood smear: Slightly left shifted granulocytosis suggestive of a reactive or infectious process. Absolute monocytosis. Monocytosis which might be cause by chronic inflammatory disorder, parasitic or viral infection and collagen vascular disease. Absolute mature lymphocytosis with some atypical lymphocytes. The findings are suspicious for chronic lymphocytic leukemia. Suggest flow cytometry, for confirmation of suspected CLL, if clinically indicated. 3/10/21 Leukemia/Lymphoma evaluation: Consistent with chronic lymphocytic leukemia; granulocytopenia with mild left shift. Clonal CD20+ B cells detected that coexpress CD19, CD5, CD23 and dim surface lambda light chain, comprising 64% of lymphocytes and approximately 53% of all analyzed white blood cells. CD20 expression is moderate. CD38 is expressed in 22% of CD19+CD5+ B cells; less than 30% is considered prognostically favorable in CLL. Granulocytes are proportionally decreased and exhibit immunophenotypic evidence of mild left shift. CD34+ blasts comprise 0.3% of all analyzed white blood cells. COMMENT: Correlate with morphologic findings and other laboratory testing for final classification. The analyzed WBCs in the sample include 83% lymphocytes, 1% monocytes and 16% granulocytes. Granulocytes and lymphocytes were selected for analysis based on CD45 staining intensity, forward scatter and side scatter. 4/6/2021-he was first seen by Dr. Linnea Ball.   Discussed the results of his CBC and flow cytometry. CLL and also neutrophilia. Discussed at length about management and prognosis. Recommended watch/wait approach. Recommended CT lung cancer low-dose lung cancer screening. 4/15/21 CT chest: No acute cardiopulmonary process. 4/15/21 CT abd/pelvis: No acute abnormality of the abdomen or pelvis. No evidence of a mass or lymphadenopathy. Large amount of stool in the colon. Severe atheromatous changes of the abdominal aorta and iliac arteries. 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 A patent right external iliac stent. Stage 0 bladder cancer  3/15/2022 Bladder,prior resection site, transurethral Resection [facility]): Denuded Urothelium with granulation tissue and necroinflammatory debris, consistent with prior procedure site; Muscularis propria is present. 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 The neoplastic cells are positive for GATA3, p63 and pancytokeratin with focal staining for cytokeratin 5/6. HCG stains scattered cells with trophoblastic differentiation. NKX3.1 and P501S are negative.    Currently receiving intravesical adjuvant BCG therapy at [facility]     Past Medical History:   Diagnosis Date    Arthritis     Benign prostate hyperplasia     Carotid stenosis     Colon polyp     COPD (chronic obstructive pulmonary disease) (HCC)     Cubital tunnel syndrome     Hyperlipidemia     Hypertension     IBS (irritable bowel syndrome)     Peripheral vascular disease (HCC)     Thyroid disease     TIA (transient ischemic attack)      Past Surgical History:   Procedure Laterality Date    COLONOSCOPY  01/2020    ELBOW SURGERY Right     Removal of spot on elbow    MOUTH SURGERY      Removal of permanent teeth    UPPER GASTROINTESTINAL ENDOSCOPY  01/2020    VASCULAR SURGERY Right     stent placed    VASECTOMY       Current Outpatient Medications   Medication Sig Dispense Refill    vitamin D (CHOLECALCIFEROL) 02136 UNIT CAPS Take 2,000 Units by mouth daily      clopidogrel (PLAVIX) 75 MG tablet Take 75 mg by mouth daily      diclofenac (VOLTAREN) 75 MG EC tablet TAKE 1 TABLET BY MOUTH TWICE DAILY      finasteride (PROSCAR) 5 MG tablet Take 5 mg by mouth daily      levothyroxine (SYNTHROID) 100 MCG tablet Take 100 mcg by mouth daily      losartan (COZAAR) 100 MG tablet Take 100 mg by mouth daily      pentoxifylline (TRENTAL) 400 MG extended release tablet Take 400 mg by mouth 3 times daily (with meals)      pravastatin (PRAVACHOL) 40 MG tablet Take 40 mg by mouth daily      sildenafil (VIAGRA) 100 MG tablet Take 100 mg by mouth daily as needed      pantoprazole (PROTONIX) 40 MG tablet Take 40 mg by mouth       pantoprazole (PROTONIX) 20 MG tablet Take 1 tablet by mouth daily 30 tablet 0     No current facility-administered medications for this visit. Allergies   Allergen Reactions    Amoxicillin-Pot Clavulanate      Other reaction(s): Other (See Comments)  Not sure    Codeine      Other reaction(s): Other (See Comments)  Not sure    Hydroxyzine      Other reaction(s): Other (See Comments)  Not sure    Tramadol      Other reaction(s):  Other (See Comments)  Not sure     Social History     Tobacco Use    Smoking status: Every Day     Types: Cigars    Smokeless tobacco: Never   Vaping Use    Vaping Use: Never used   Substance Use Topics    Alcohol use: Not Currently    Drug use: Never     Family History   Problem Relation Age of Onset    Breast Cancer Mother 48        breast cancer    Heart Disease Sister        Review of Systems   Constitutional:  Positive for unexpected weight change (weight loss - 40 lbs). Negative for fatigue and fever. HENT:  Negative for dental problem, hearing loss, mouth sores, nosebleeds, sore throat and trouble swallowing. Eyes:  Negative for discharge and itching. Respiratory:  Negative for cough, shortness of breath and wheezing. Cardiovascular:  Negative for chest pain, palpitations and leg swelling. Gastrointestinal:  Negative for abdominal pain, constipation, diarrhea, nausea and vomiting. Endocrine: Negative for cold intolerance and heat intolerance. Genitourinary:  Negative for dysuria, frequency, hematuria and urgency. Musculoskeletal:  Negative for arthralgias, joint swelling and myalgias. Skin:  Negative for pallor and rash. Allergic/Immunologic: Negative for environmental allergies and immunocompromised state. Neurological:  Positive for dizziness (When standing too quickly). Negative for seizures, syncope and numbness. Hematological:  Negative for adenopathy. Does not bruise/bleed easily. Psychiatric/Behavioral:  Negative for agitation, behavioral problems and confusion. The patient is not nervous/anxious. Physical Exam  Vitals reviewed. Constitutional:       General: He is not in acute distress. Appearance: He is well-developed. He is not toxic-appearing or diaphoretic. Comments: thin   HENT:      Head: Normocephalic and atraumatic. Right Ear: External ear normal.      Left Ear: External ear normal.      Nose: Nose normal.      Mouth/Throat:      Mouth: Mucous membranes are moist.   Eyes:      General: No scleral icterus. Right eye: No discharge. Left eye: No discharge. Conjunctiva/sclera: Conjunctivae normal.   Neck:      Trachea: No tracheal deviation.    Cardiovascular: Rate and Rhythm: Normal rate and regular rhythm. Pulmonary:      Effort: Pulmonary effort is normal. No respiratory distress. Breath sounds: Normal breath sounds. No wheezing or rales. Abdominal:      General: Bowel sounds are normal. There is no distension. Palpations: Abdomen is soft. Tenderness: There is no abdominal tenderness. There is no guarding. Genitourinary:     Comments: Exam deferred  Musculoskeletal:         General: No tenderness or deformity. Cervical back: Neck supple. No muscular tenderness. Comments: Normal ROM all four extremities   Lymphadenopathy:      Cervical:      Right cervical: No superficial or deep cervical adenopathy. Left cervical: No superficial or deep cervical adenopathy. Upper Body:      Right upper body: No supraclavicular adenopathy. Left upper body: No supraclavicular adenopathy. Comments:      Skin:     General: Skin is warm and dry. Findings: No rash. Neurological:      Mental Status: He is alert and oriented to person, place, and time. Comments: follows commands, non-focal   Psychiatric:         Behavior: Behavior normal. Behavior is cooperative. Thought Content: Thought content normal.         Judgment: Judgment normal.      Comments: Alert and oriented to person, place and time. Vitals:    12/05/22 1321   BP: 122/80   Pulse: 94   Weight: 133 lb 4.8 oz (60.5 kg)   Height: 5' 9\" (1.753 m)      Wt Readings from Last 3 Encounters:   12/05/22 133 lb 4.8 oz (60.5 kg)   06/07/22 135 lb (61.2 kg)   08/06/21 144 lb 12.8 oz (65.7 kg)     LABORATORY RESULTS REVIEWED/ANALYZED BY ME:    CBC:   Lab Results   Component Value Date    WBC 20.58 (HH) 12/05/2022    HGB 14.6 12/05/2022    HCT 46.1 12/05/2022    MCV 91.5 12/05/2022     12/05/2022   ALC 12.2    ASSESSMENT/PLAN:  1. CLL (chronic lymphocytic leukemia) ([facility] [address]    2. Tobacco abuse    3. Dizziness    4.  Care plan discussed with patient       CLL, February 2021  No B symptoms  No peripheral lymphadenopathies  No significant cytopenias  WBC 20,000  No indication for treatment at this time. Recommend watch/wait approach. Repeat CBC in 3 months  Follow-up 6 months     Tobacco abuse-patient has been smoked for more than 50 years 1 pack daily. Recommend and discussed complete smoking cessation  Recommend LD screening chest CT     At risk lung cancer-CT low-dose annually. This was previously thought to have been taking care of by [facility] [place], however patient states he has not had this completed at [place]  He desires to proceed. Arrangements will be made. Low Dose CT (LDCT) Lung Screening criteria met              Age 50-69              Pack year smoking >30              Still smoking or less than 15 year since quit              No sign or symptoms of lung cancer              > 11 months since last LDCT      Risks and benefits of lung cancer screening with LDCT scans discussed:     Significance of positive screen - False-positive LDCT results often occur. 95% of all positive results do not lead to a diagnosis of cancer. Usually further imaging can resolve most false-positive results; however, some patients may require invasive procedures. Over diagnosis risk - 10% to 12% of screen-detected lung cancer cases are over diagnosed--that is, the cancer would not have been detected in the patient's lifetime without the screening. Need for follow up screens annually to continue lung cancer screening effectiveness     Risks associated with radiation from annual LDCT -radiation exposure is about the same as per mammogram, which is about one third of the annual background radiation exposure from everyday life. Starting screening at age 54 is not likely to increase cancer risk for radiation exposure.     Patients with comorbidities resulting in the life expectancy of < 10 years, or that would preclude treatment of an abnormality identified on CT, should not be screened due to lack of benefit. To obtain maximal benefit from the screening, smoking cessation and long-term abstinence from smoking is critical.  All questions were answered, Lorrie Coronado desires to proceed and arrangements will be made. Dizziness  Blood pressure sitting 130/70, blood pressure standing 110/60  Recommend patient follow-up with PCP, he may need adjustments in antihypertensive medication due to weight loss and/or further evaluation as felt warranted PCP    Care plan discussed with patient    Orders Placed This Encounter   Procedures    CT lung screen [Initial/Annual]    CBC w/diff     Repeat CBC in 3 months. Return in about 6 months (around 6/5/2023) for follow up with DESMOND Shin Mc. I have seen, examined and reviewed this patient medication list, appropriate labs and imaging studies. I reviewed relevant medical records and others physicians notes. I discussed the plan of care with the patient. I answered all questions to the patients satisfaction. I have also reviewed the chief complaint (CC) and part of the history (History of Present Illness (HPI), Past Family Social History [facility]), or Review of Systems (ROS) and made changes when appropriate. Dictated utilizing Dragon transcription software.           DESMOND Guerin  9:54 AM  12/7/2022

## 2022-12-13 ENCOUNTER — HOSPITAL ENCOUNTER (OUTPATIENT)
Dept: CT IMAGING | Age: 72
Discharge: HOME | End: 2022-12-13
Payer: OTHER GOVERNMENT

## 2022-12-13 DIAGNOSIS — Z72.0 TOBACCO ABUSE: ICD-10-CM

## 2022-12-13 PROCEDURE — 71271 CT THORAX LUNG CANCER SCR C-: CPT

## 2022-12-27 ENCOUNTER — TELEPHONE (OUTPATIENT)
Dept: HEMATOLOGY | Age: 72
End: 2022-12-27

## 2022-12-27 NOTE — TELEPHONE ENCOUNTER
Discussed CT of Lung scan with Jong. Informed him that he has a small lesion seen in the lung and had reviewed with Dr Rachid Mcgovern for second opinion per DESMOND Cartagena recommendations. Dr Rachid Mcgovern said to repeat scan in 3 months. Pt verbalized understanding and had no questions at this time. Will have  make apt and contact pt with apt.

## 2023-01-20 ENCOUNTER — TELEPHONE (OUTPATIENT)
Dept: OTHER | Age: 73
End: 2023-01-20

## 2023-01-20 NOTE — TELEPHONE ENCOUNTER
Following up with patient recent lung cancer screening. Letter was sent to patient to call and schedule follow up imaging. Please place order for additional imaging. Recommendation order from radiologist was sent to ordering provider to review.

## 2023-02-01 ENCOUNTER — TELEPHONE (OUTPATIENT)
Dept: OTHER | Age: 73
End: 2023-02-01

## 2023-02-01 DIAGNOSIS — R91.8 ABNORMAL CT LUNG SCREENING: Primary | ICD-10-CM

## 2023-03-06 ENCOUNTER — HOSPITAL ENCOUNTER (OUTPATIENT)
Dept: INFUSION THERAPY | Age: 73
Discharge: HOME | End: 2023-03-06
Payer: OTHER GOVERNMENT

## 2023-03-06 DIAGNOSIS — C91.10 CLL (CHRONIC LYMPHOCYTIC LEUKEMIA) (HCC): ICD-10-CM

## 2023-03-06 LAB
BASOPHILS ABSOLUTE: 0.09 K/UL (ref 0.01–0.08)
BASOPHILS RELATIVE PERCENT: 0.4 % (ref 0.1–1.2)
EOSINOPHILS ABSOLUTE: 0.22 K/UL (ref 0.04–0.54)
EOSINOPHILS RELATIVE PERCENT: 1 % (ref 0.7–7)
HCT VFR BLD CALC: 48.1 % (ref 40.1–51)
HEMOGLOBIN: 15 G/DL (ref 13.7–17.5)
LYMPHOCYTES ABSOLUTE: 15.56 K/UL (ref 1.18–3.74)
LYMPHOCYTES RELATIVE PERCENT: 67.4 % (ref 19.3–53.1)
MCH RBC QN AUTO: 29 PG (ref 25.7–32.2)
MCHC RBC AUTO-ENTMCNC: 31.2 G/DL (ref 32.3–36.5)
MCV RBC AUTO: 93 FL (ref 79–92.2)
MONOCYTES ABSOLUTE: 1.87 K/UL (ref 0.24–0.82)
MONOCYTES RELATIVE PERCENT: 8.1 % (ref 4.7–12.5)
NEUTROPHILS ABSOLUTE: 5.32 K/UL (ref 1.56–6.13)
NEUTROPHILS RELATIVE PERCENT: 22.9 % (ref 34–71.1)
PDW BLD-RTO: 14.7 % (ref 11.6–14.4)
PLATELET # BLD: 152 K/UL (ref 163–337)
PMV BLD AUTO: 10.5 FL (ref 7.4–10.4)
RBC # BLD: 5.17 M/UL (ref 4.63–6.08)
WBC # BLD: 23.1 K/UL (ref 4.23–9.07)

## 2023-03-06 PROCEDURE — 85025 COMPLETE CBC W/AUTO DIFF WBC: CPT

## 2023-03-06 PROCEDURE — 36415 COLL VENOUS BLD VENIPUNCTURE: CPT

## 2023-03-09 ENCOUNTER — ANTI-COAG VISIT (OUTPATIENT)
Dept: HEMATOLOGY | Age: 73
End: 2023-03-09

## 2023-03-09 NOTE — PROGRESS NOTES
I returned a phone call to Mr. Loyd's daughter, Mikey Murillo.  She asked regarding a potential referral to [facility].    Dr. Boyle had received a phone call from Dr. Comfort Lewis regarding a skin biopsy that was performed on Mr. Loyd for rash that apparently revealed potential correlation with CLL.  Dr. Boyle recommended referrals to Dr. Skye Ridley, Oncology at [facility] and Dr. Radha Luis dermatology cutaneous oncology at [facility] for evaluation as patient is currently on observation only for CLL.  Uncertain if skin lesions would trigger treatment.    Discussed this with patient's daughter, Mikey.  All questions were answered.  She was agreeable to referrals.  I informed her referrals should be arranged by Dr. Lewis.  If she does not receive further information from Dr. Lewis office, she will contact me back.    Ileana Strange, DESMOND  03/09/23  5:31 PM

## 2023-04-19 ENCOUNTER — TELEPHONE (OUTPATIENT)
Dept: HEMATOLOGY | Age: 73
End: 2023-04-19

## 2023-04-19 DIAGNOSIS — R91.1 LESION OF LUNG: Primary | ICD-10-CM

## 2023-04-19 NOTE — TELEPHONE ENCOUNTER
Called to notify Mark Hurley him of his scheduled CT that is scheduled on May 11 at 1:00 with 12:45 arrival. Nothing to eat or drink 4 hours prior.

## 2023-04-19 NOTE — TELEPHONE ENCOUNTER
Spoke to daughter Amaury Storm need to repeat lung screen.   Need to contact him he has some apts coming up in [place]

## 2023-04-28 DIAGNOSIS — C91.10 CLL (CHRONIC LYMPHOCYTIC LEUKEMIA) (HCC): Primary | ICD-10-CM

## 2023-04-28 DIAGNOSIS — C68.9 UROTHELIAL CANCER (HCC): ICD-10-CM

## 2023-07-19 ENCOUNTER — HOSPITAL ENCOUNTER (OUTPATIENT)
Dept: INFUSION THERAPY | Age: 73
Discharge: HOME | End: 2023-07-19
Payer: OTHER GOVERNMENT

## 2023-07-19 ENCOUNTER — OFFICE VISIT (OUTPATIENT)
Dept: HEMATOLOGY | Age: 73
End: 2023-07-19
Payer: OTHER GOVERNMENT

## 2023-07-19 VITALS
SYSTOLIC BLOOD PRESSURE: 100 MMHG | HEIGHT: 69 IN | WEIGHT: 129.1 LBS | BODY MASS INDEX: 19.12 KG/M2 | OXYGEN SATURATION: 95 % | DIASTOLIC BLOOD PRESSURE: 70 MMHG | HEART RATE: 67 BPM

## 2023-07-19 DIAGNOSIS — Z72.0 TOBACCO ABUSE: ICD-10-CM

## 2023-07-19 DIAGNOSIS — R91.1 NODULE OF LEFT LUNG: ICD-10-CM

## 2023-07-19 DIAGNOSIS — Z71.89 CARE PLAN DISCUSSED WITH PATIENT: ICD-10-CM

## 2023-07-19 DIAGNOSIS — Z85.51 HISTORY OF BLADDER CANCER: ICD-10-CM

## 2023-07-19 DIAGNOSIS — C91.10 CLL (CHRONIC LYMPHOCYTIC LEUKEMIA) (HCC): Primary | ICD-10-CM

## 2023-07-19 DIAGNOSIS — C91.10 CLL (CHRONIC LYMPHOCYTIC LEUKEMIA) (HCC): ICD-10-CM

## 2023-07-19 DIAGNOSIS — R21 RASH, SKIN: ICD-10-CM

## 2023-07-19 LAB
BASOPHILS # BLD: 0.11 K/UL (ref 0.01–0.08)
BASOPHILS NFR BLD: 0.5 % (ref 0.1–1.2)
EOSINOPHIL # BLD: 0.13 K/UL (ref 0.04–0.54)
EOSINOPHIL NFR BLD: 0.6 % (ref 0.7–7)
ERYTHROCYTE [DISTWIDTH] IN BLOOD BY AUTOMATED COUNT: 13.7 % (ref 11.6–14.4)
HCT VFR BLD AUTO: 46.1 % (ref 40.1–51)
HGB BLD-MCNC: 14.4 G/DL (ref 13.7–17.5)
LYMPHOCYTES # BLD: 15.08 K/UL (ref 1.18–3.74)
LYMPHOCYTES NFR BLD: 66.4 % (ref 19.3–53.1)
MCH RBC QN AUTO: 28.5 PG (ref 25.7–32.2)
MCHC RBC AUTO-ENTMCNC: 31.2 G/DL (ref 32.3–36.5)
MCV RBC AUTO: 91.1 FL (ref 79–92.2)
MONOCYTES # BLD: 1.09 K/UL (ref 0.24–0.82)
MONOCYTES NFR BLD: 4.8 % (ref 4.7–12.5)
NEUTROPHILS # BLD: 6.25 K/UL (ref 1.56–6.13)
NEUTS SEG NFR BLD: 27.4 % (ref 34–71.1)
PLATELET # BLD AUTO: 143 K/UL (ref 163–337)
PMV BLD AUTO: 10.4 FL (ref 7.4–10.4)
RBC # BLD AUTO: 5.06 M/UL (ref 4.63–6.08)
WBC # BLD AUTO: 22.72 K/UL (ref 4.23–9.07)

## 2023-07-19 PROCEDURE — 99211 OFF/OP EST MAY X REQ PHY/QHP: CPT

## 2023-07-19 PROCEDURE — 3078F DIAST BP <80 MM HG: CPT | Performed by: NURSE PRACTITIONER

## 2023-07-19 PROCEDURE — 85025 COMPLETE CBC W/AUTO DIFF WBC: CPT

## 2023-07-19 PROCEDURE — 3074F SYST BP LT 130 MM HG: CPT | Performed by: NURSE PRACTITIONER

## 2023-07-19 PROCEDURE — 99215 OFFICE O/P EST HI 40 MIN: CPT | Performed by: NURSE PRACTITIONER

## 2023-07-19 PROCEDURE — 36415 COLL VENOUS BLD VENIPUNCTURE: CPT

## 2023-07-19 PROCEDURE — 1123F ACP DISCUSS/DSCN MKR DOCD: CPT | Performed by: NURSE PRACTITIONER

## 2023-07-23 ASSESSMENT — ENCOUNTER SYMPTOMS
SHORTNESS OF BREATH: 0
EYE ITCHING: 0
WHEEZING: 0
COUGH: 0
VOMITING: 0
DIARRHEA: 0
ABDOMINAL PAIN: 0
TROUBLE SWALLOWING: 0
NAUSEA: 0
CONSTIPATION: 0
EYE DISCHARGE: 0
SORE THROAT: 0

## 2023-07-25 ENCOUNTER — TELEPHONE (OUTPATIENT)
Dept: HEMATOLOGY | Age: 73
End: 2023-07-25

## 2023-07-25 NOTE — TELEPHONE ENCOUNTER
Called pt to inform of CT C/A/P Scheduled for 7-27-23 at 9am at [facility]. If pt calls back please inform him of this appt and NPO 4 hours prior. Thanks.

## 2023-08-23 ENCOUNTER — HOSPITAL ENCOUNTER (OUTPATIENT)
Dept: CT IMAGING | Age: 73
Discharge: HOME | End: 2023-08-23
Payer: OTHER GOVERNMENT

## 2023-08-23 DIAGNOSIS — C91.10 CLL (CHRONIC LYMPHOCYTIC LEUKEMIA) (HCC): ICD-10-CM

## 2023-08-23 DIAGNOSIS — R91.1 NODULE OF LEFT LUNG: ICD-10-CM

## 2023-08-23 LAB — CREAT SERPL-MCNC: 1.1 MG/DL (ref 0.3–1.3)

## 2023-08-23 PROCEDURE — 82565 ASSAY OF CREATININE: CPT

## 2023-08-23 PROCEDURE — 6360000004 HC RX CONTRAST MEDICATION: Performed by: NURSE PRACTITIONER

## 2023-08-23 PROCEDURE — 74177 CT ABD & PELVIS W/CONTRAST: CPT

## 2023-08-23 PROCEDURE — 71260 CT THORAX DX C+: CPT

## 2023-08-23 RX ADMIN — IOPAMIDOL 75 ML: 755 INJECTION, SOLUTION INTRAVENOUS at 12:18

## 2023-09-20 ENCOUNTER — OFFICE VISIT (OUTPATIENT)
Dept: HEMATOLOGY | Age: 73
End: 2023-09-20
Payer: OTHER GOVERNMENT

## 2023-09-20 ENCOUNTER — HOSPITAL ENCOUNTER (OUTPATIENT)
Dept: INFUSION THERAPY | Age: 73
Discharge: HOME | End: 2023-09-20
Payer: OTHER GOVERNMENT

## 2023-09-20 VITALS
HEIGHT: 69 IN | TEMPERATURE: 97.9 F | BODY MASS INDEX: 19.03 KG/M2 | OXYGEN SATURATION: 98 % | WEIGHT: 128.5 LBS | DIASTOLIC BLOOD PRESSURE: 68 MMHG | HEART RATE: 81 BPM | SYSTOLIC BLOOD PRESSURE: 106 MMHG

## 2023-09-20 DIAGNOSIS — R21 RASH, SKIN: ICD-10-CM

## 2023-09-20 DIAGNOSIS — Z71.89 CARE PLAN DISCUSSED WITH PATIENT: ICD-10-CM

## 2023-09-20 DIAGNOSIS — Z85.51 HISTORY OF BLADDER CANCER: ICD-10-CM

## 2023-09-20 DIAGNOSIS — C91.10 CLL (CHRONIC LYMPHOCYTIC LEUKEMIA) (HCC): ICD-10-CM

## 2023-09-20 DIAGNOSIS — R91.1 NODULE OF LEFT LUNG: ICD-10-CM

## 2023-09-20 DIAGNOSIS — C91.10 CLL (CHRONIC LYMPHOCYTIC LEUKEMIA) (HCC): Primary | ICD-10-CM

## 2023-09-20 LAB
ALBUMIN SERPL-MCNC: 4.8 G/DL (ref 3.5–5.2)
ALP SERPL-CCNC: 100 U/L (ref 40–130)
ALT SERPL-CCNC: 20 U/L (ref 21–72)
ANION GAP SERPL CALCULATED.3IONS-SCNC: 9 MMOL/L (ref 7–19)
AST SERPL-CCNC: 50 U/L (ref 17–59)
BASOPHILS # BLD: 0.07 K/UL (ref 0.01–0.08)
BASOPHILS NFR BLD: 0.3 % (ref 0.1–1.2)
BILIRUB SERPL-MCNC: 0.6 MG/DL (ref 0.2–1.3)
BUN SERPL-MCNC: 23 MG/DL (ref 9–20)
CALCIUM SERPL-MCNC: 9.6 MG/DL (ref 8.4–10.2)
CHLORIDE SERPL-SCNC: 101 MMOL/L (ref 98–111)
CO2 SERPL-SCNC: 33 MMOL/L (ref 22–29)
CREAT SERPL-MCNC: 1.2 MG/DL (ref 0.6–1.2)
EOSINOPHIL # BLD: 0.4 K/UL (ref 0.04–0.54)
EOSINOPHIL NFR BLD: 1.6 % (ref 0.7–7)
ERYTHROCYTE [DISTWIDTH] IN BLOOD BY AUTOMATED COUNT: 14.4 % (ref 11.6–14.4)
GLOBULIN: 2.6 G/DL
GLUCOSE SERPL-MCNC: 103 MG/DL (ref 74–106)
HBV SURFACE AG SERPL QL IA: NORMAL
HCT VFR BLD AUTO: 42.5 % (ref 40.1–51)
HGB BLD-MCNC: 14.1 G/DL (ref 13.7–17.5)
LDH SERPL-CCNC: 264 U/L (ref 120–246)
LYMPHOCYTES # BLD: 17.05 K/UL (ref 1.18–3.74)
LYMPHOCYTES NFR BLD: 69.3 % (ref 19.3–53.1)
MCH RBC QN AUTO: 28.5 PG (ref 25.7–32.2)
MCHC RBC AUTO-ENTMCNC: 33.2 G/DL (ref 32.3–36.5)
MCV RBC AUTO: 86 FL (ref 79–92.2)
MONOCYTES # BLD: 1.41 K/UL (ref 0.24–0.82)
MONOCYTES NFR BLD: 5.7 % (ref 4.7–12.5)
NEUTROPHILS # BLD: 5.6 K/UL (ref 1.56–6.13)
NEUTS SEG NFR BLD: 22.8 % (ref 34–71.1)
PLATELET # BLD AUTO: 142 K/UL (ref 163–337)
PMV BLD AUTO: 9.9 FL (ref 7.4–10.4)
POTASSIUM SERPL-SCNC: 4.4 MMOL/L (ref 3.5–5.1)
PROT SERPL-MCNC: 7.4 G/DL (ref 6.3–8.2)
RBC # BLD AUTO: 4.94 M/UL (ref 4.63–6.08)
SODIUM SERPL-SCNC: 143 MMOL/L (ref 137–145)
WBC # BLD AUTO: 24.61 K/UL (ref 4.23–9.07)

## 2023-09-20 PROCEDURE — 99212 OFFICE O/P EST SF 10 MIN: CPT

## 2023-09-20 PROCEDURE — 3078F DIAST BP <80 MM HG: CPT | Performed by: NURSE PRACTITIONER

## 2023-09-20 PROCEDURE — 3074F SYST BP LT 130 MM HG: CPT | Performed by: NURSE PRACTITIONER

## 2023-09-20 PROCEDURE — 85025 COMPLETE CBC W/AUTO DIFF WBC: CPT

## 2023-09-20 PROCEDURE — 80053 COMPREHEN METABOLIC PANEL: CPT

## 2023-09-20 PROCEDURE — 99215 OFFICE O/P EST HI 40 MIN: CPT | Performed by: NURSE PRACTITIONER

## 2023-09-20 PROCEDURE — 83615 LACTATE (LD) (LDH) ENZYME: CPT

## 2023-09-20 PROCEDURE — 1123F ACP DISCUSS/DSCN MKR DOCD: CPT | Performed by: NURSE PRACTITIONER

## 2023-09-20 PROCEDURE — 36415 COLL VENOUS BLD VENIPUNCTURE: CPT

## 2023-09-20 PROCEDURE — 93000 ELECTROCARDIOGRAM COMPLETE: CPT | Performed by: NURSE PRACTITIONER

## 2023-09-20 ASSESSMENT — ENCOUNTER SYMPTOMS
ABDOMINAL PAIN: 0
NAUSEA: 0
TROUBLE SWALLOWING: 0
CONSTIPATION: 0
SHORTNESS OF BREATH: 0
EYE ITCHING: 0
COUGH: 0
VOMITING: 0
SORE THROAT: 0
DIARRHEA: 0
EYE DISCHARGE: 0
WHEEZING: 0

## 2023-09-20 NOTE — PROGRESS NOTES
parenchymal mass or infiltrate  7.22 x 8.55 mm cavitating lesion seen in the left mid lung field (not seen on the prior CT)  Emphysematous changes of both lungs  No mediastinal, hilar or axillary lymphadenopathy is seen  Lung RADS 4A    PET scan 5/11/2023 at [facility] (comparison CT of chest 12/13/2022): The cavitary lesion described in the left lung on CT scan 12/13/2022 demonstrates an SUV of 0.79. No scintigraphic evidence of increased metabolic activity to suggest neoplastic process. Contrasted chest CT 8/24/2023 at [facility]:  Previously seen 7 mm nodule has decreased in size and is no longer cavitary, now appearing solid. Previously 10-11 mm, now 7.8 mm. This remains indeterminate. Recommend short interval follow-up exam in 4-6 months. (Plan at end of December, 2023)    History of bladder cancer  CT urogram 4/16/2023 at [facility]: Multiple enlarged lymph nodes in the abdomen and pelvis, appears slightly increased overall since 4/15/2021, concerning for metastatic disease    Urine cytology 12/17/2023: Negative    Urine cytology 4/17/2023: Negative    CT urogram 4/17/2023 at [facility] (for evaluation of bladder cancer):   Enlarged periportal lymph nodes. Numerous prominent gastrohepatic lymph nodes. Numerous prominent retroperitoneal lymph nodes. Prominent inguinal and bilateral iliac chain lymph nodes. Enlarged retrocrural lymph node. Index lesions annotated on series 4. These appear overall slightly increased since 4/15/2021.    Splenomegaly (size not provided)    Cystoscopy 7/17/2023 by Dr Karis Pelaez at [facility]: Normal cystoscopy, urine cytology negative    Plans per Dr. Karis Pelaez:   Cystoscopy in 3 months (10/2023)  Maintenance BCG x3 (12 month) July after cystoscopy, bone 36 months given high risk disease: 8/18/2023, 8/25/2023, 9/1/2023)  Cystoscopy ~10/23/2023     Care plan discussed with patient  All questions answered    Tumor Screening:  Endo/Colon completed by Dr Gil Flores 6/8/2023 at [facility]: Results unavailable,

## 2023-09-22 DIAGNOSIS — C91.10 CLL (CHRONIC LYMPHOCYTIC LEUKEMIA) (HCC): Primary | ICD-10-CM

## 2023-09-22 LAB
HBV CORE AB SERPL QL IA: NEGATIVE
HBV SURFACE AB SERPL IA-ACNC: <3.1 IU/L

## 2023-09-24 LAB
ALBUMIN SERPL-MCNC: 4.45 G/DL (ref 3.75–5.01)
ALPHA1 GLOB SERPL ELPH-MCNC: 0.36 G/DL (ref 0.19–0.46)
ALPHA2 GLOB SERPL ELPH-MCNC: 0.83 G/DL (ref 0.48–1.05)
B-GLOBULIN SERPL ELPH-MCNC: 0.67 G/DL (ref 0.48–1.1)
DEPRECATED KAPPA LC FREE/LAMBDA SER: 0.61 {RATIO} (ref 0.26–1.65)
EER MONOCLONAL PROTEIN AND FLC, SERUM: ABNORMAL
GAMMA GLOB SERPL ELPH-MCNC: 0.71 G/DL (ref 0.62–1.51)
IGA SERPL-MCNC: 58 MG/DL (ref 68–408)
IGG SERPL-MCNC: 728 MG/DL (ref 768–1632)
IGM SERPL-MCNC: 14 MG/DL (ref 35–263)
INTERPRETATION SERPL IFE-IMP: ABNORMAL
INTERPRETATION SERPL IFE-IMP: ABNORMAL
KAPPA LC FREE SER-MCNC: 16.44 MG/L (ref 3.3–19.4)
LAMBDA LC FREE SERPL-MCNC: 26.77 MG/L (ref 5.71–26.3)
MONOCLONAL PROTEIN, SERUM: ABNORMAL G/DL
PROT SERPL-MCNC: 7 G/DL (ref 6.3–8.2)

## 2023-09-27 ENCOUNTER — HOSPITAL ENCOUNTER (OUTPATIENT)
Dept: INFUSION THERAPY | Age: 73
Discharge: HOME | End: 2023-09-27
Payer: OTHER GOVERNMENT

## 2023-09-27 ENCOUNTER — HOSPITAL ENCOUNTER (OUTPATIENT)
Dept: NON INVASIVE DIAGNOSTICS | Age: 73
Discharge: HOME | End: 2023-09-27

## 2023-09-27 DIAGNOSIS — C91.10 CLL (CHRONIC LYMPHOCYTIC LEUKEMIA) (HCC): ICD-10-CM

## 2023-09-27 LAB
BASOPHILS # BLD: 0.09 K/UL (ref 0.01–0.08)
BASOPHILS NFR BLD: 0.4 % (ref 0.1–1.2)
EOSINOPHIL # BLD: 0.37 K/UL (ref 0.04–0.54)
EOSINOPHIL NFR BLD: 1.5 % (ref 0.7–7)
ERYTHROCYTE [DISTWIDTH] IN BLOOD BY AUTOMATED COUNT: 14.5 % (ref 11.6–14.4)
HCT VFR BLD AUTO: 40.7 % (ref 40.1–51)
HGB BLD-MCNC: 13.3 G/DL (ref 13.7–17.5)
LYMPHOCYTES # BLD: 18.14 K/UL (ref 1.18–3.74)
LYMPHOCYTES NFR BLD: 74.4 % (ref 19.3–53.1)
MCH RBC QN AUTO: 28.6 PG (ref 25.7–32.2)
MCHC RBC AUTO-ENTMCNC: 32.7 G/DL (ref 32.3–36.5)
MCV RBC AUTO: 87.5 FL (ref 79–92.2)
MONOCYTES # BLD: 1.4 K/UL (ref 0.24–0.82)
MONOCYTES NFR BLD: 5.7 % (ref 4.7–12.5)
NEUTROPHILS # BLD: 4.31 K/UL (ref 1.56–6.13)
NEUTS SEG NFR BLD: 17.8 % (ref 34–71.1)
PLATELET # BLD AUTO: 134 K/UL (ref 163–337)
PMV BLD AUTO: 9.7 FL (ref 7.4–10.4)
RBC # BLD AUTO: 4.65 M/UL (ref 4.63–6.08)
URATE SERPL-MCNC: 6.3 MG/DL (ref 3.5–8.5)
WBC # BLD AUTO: 24.37 K/UL (ref 4.23–9.07)

## 2023-09-27 PROCEDURE — 36415 COLL VENOUS BLD VENIPUNCTURE: CPT

## 2023-09-27 PROCEDURE — 85025 COMPLETE CBC W/AUTO DIFF WBC: CPT

## 2023-09-27 PROCEDURE — 84550 ASSAY OF BLOOD/URIC ACID: CPT

## 2023-10-01 LAB
EKG P AXIS: 74 DEGREES
EKG P-R INTERVAL: 140 MS
EKG Q-T INTERVAL: 390 MS
EKG QRS DURATION: 94 MS
EKG QTC CALCULATION (BAZETT): 404 MS
EKG T AXIS: 40 DEGREES

## 2023-10-11 ENCOUNTER — HOSPITAL ENCOUNTER (OUTPATIENT)
Dept: INFUSION THERAPY | Age: 73
Discharge: HOME | End: 2023-10-11
Payer: OTHER GOVERNMENT

## 2023-10-11 ENCOUNTER — APPOINTMENT (OUTPATIENT)
Dept: INFUSION THERAPY | Age: 73
End: 2023-10-11
Payer: OTHER GOVERNMENT

## 2023-10-11 DIAGNOSIS — C91.10 CLL (CHRONIC LYMPHOCYTIC LEUKEMIA) (HCC): ICD-10-CM

## 2023-10-11 LAB
BASOPHILS # BLD: 0.14 K/UL (ref 0.01–0.08)
BASOPHILS NFR BLD: 0.4 % (ref 0.1–1.2)
EOSINOPHIL # BLD: 0.31 K/UL (ref 0.04–0.54)
EOSINOPHIL NFR BLD: 0.8 % (ref 0.7–7)
ERYTHROCYTE [DISTWIDTH] IN BLOOD BY AUTOMATED COUNT: 14.5 % (ref 11.6–14.4)
HCT VFR BLD AUTO: 37.7 % (ref 40.1–51)
HGB BLD-MCNC: 12 G/DL (ref 13.7–17.5)
LYMPHOCYTES # BLD: 32.69 K/UL (ref 1.18–3.74)
LYMPHOCYTES NFR BLD: 87 % (ref 19.3–53.1)
MCH RBC QN AUTO: 28.8 PG (ref 25.7–32.2)
MCHC RBC AUTO-ENTMCNC: 31.8 G/DL (ref 32.3–36.5)
MCV RBC AUTO: 90.4 FL (ref 79–92.2)
MONOCYTES # BLD: 0.69 K/UL (ref 0.24–0.82)
MONOCYTES NFR BLD: 1.8 % (ref 4.7–12.5)
NEUTROPHILS # BLD: 3.7 K/UL (ref 1.56–6.13)
NEUTS SEG NFR BLD: 9.9 % (ref 34–71.1)
PLATELET # BLD AUTO: 87 K/UL (ref 163–337)
PMV BLD AUTO: 10.9 FL (ref 7.4–10.4)
RBC # BLD AUTO: 4.17 M/UL (ref 4.63–6.08)
WBC # BLD AUTO: 37.57 K/UL (ref 4.23–9.07)

## 2023-10-11 PROCEDURE — 36415 COLL VENOUS BLD VENIPUNCTURE: CPT

## 2023-10-11 PROCEDURE — 85025 COMPLETE CBC W/AUTO DIFF WBC: CPT

## 2023-10-17 ENCOUNTER — TELEPHONE (OUTPATIENT)
Dept: HEMATOLOGY | Age: 73
End: 2023-10-17

## 2023-10-17 NOTE — PROGRESS NOTES
Progress Note      Pt Name: Dominik Osorio: 1950  MRN: [id number]    Date of evaluation: 10/18/2023  History Obtained From:  Patient, EMR    Portions of this note have been copied forward, however, changed to reflect the most current clinical status of this patient. Chief Complaint   Patient presents with    Follow-up     CLL, chemotherapy management     INTERVAL HISTORY/HISTORY OF PRESENT ILLNESS:    Basilio Boggs is a 71-year-old  gentleman with the following histories:  CLL, diagnosed 4/2021; Elena Camacho initiated 10/2/2023 due to persistent, unrelieved rash  High-grade urothelial carcinoma dating to 3/2022, status post TURBT and receiving BCG every 3 months at [facility]  Chronic Tobacco Use, (declines cessation) Monitored with LD screening Chest CT  7.22 x 8.55 mm cavitating lesion in the left midlung field (negative on PET scan dated 5/11/2023). Plan repeat CT chest CT 12/2023    Su Peralta was diagnosed with CLL in April, 2021, on a wait and watch approach. 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 Gentry Valle, Dr. Lila Negron, Oncology at [facility] and Dr. Shawnee Nino dermatology - cutaneous oncology at [facility]. Su Perlata returns to the clinic for toxicity assessment and chemotherapy management. He states he is tolerating treatment well. \"I do not even know I am taking it\". Su Peralta denies fatigue. CBC is stable including a WBC of 25.48 with ALC 20.22. Hgb is 12.9 and platelet count 881,162. Rash is unimproved at this time. Su Peralta asks if there is anything else he may take for itching. He has taken Benadryl and Pepcid in the past without relief. Hydroxyzine is listed as allergy. He has been prescribed topical cream in the past without relief.     Su Peralta is also monitored for a 7.22 x 8.55 mm cavitating lesion in the

## 2023-10-18 ENCOUNTER — HOSPITAL ENCOUNTER (OUTPATIENT)
Dept: INFUSION THERAPY | Age: 73
Discharge: HOME | End: 2023-10-18
Payer: OTHER GOVERNMENT

## 2023-10-18 ENCOUNTER — OFFICE VISIT (OUTPATIENT)
Dept: HEMATOLOGY | Age: 73
End: 2023-10-18
Payer: OTHER GOVERNMENT

## 2023-10-18 VITALS
BODY MASS INDEX: 19.85 KG/M2 | HEIGHT: 69 IN | HEART RATE: 75 BPM | OXYGEN SATURATION: 98 % | SYSTOLIC BLOOD PRESSURE: 108 MMHG | WEIGHT: 134 LBS | DIASTOLIC BLOOD PRESSURE: 58 MMHG

## 2023-10-18 DIAGNOSIS — Z85.51 HISTORY OF BLADDER CANCER: ICD-10-CM

## 2023-10-18 DIAGNOSIS — C91.10 CLL (CHRONIC LYMPHOCYTIC LEUKEMIA) (HCC): ICD-10-CM

## 2023-10-18 DIAGNOSIS — C91.10 CLL (CHRONIC LYMPHOCYTIC LEUKEMIA) (HCC): Primary | ICD-10-CM

## 2023-10-18 DIAGNOSIS — Z71.89 CARE PLAN DISCUSSED WITH PATIENT: ICD-10-CM

## 2023-10-18 DIAGNOSIS — R91.1 NODULE OF LEFT LUNG: ICD-10-CM

## 2023-10-18 DIAGNOSIS — R21 RASH, SKIN: ICD-10-CM

## 2023-10-18 LAB
ALBUMIN SERPL-MCNC: 4.3 G/DL (ref 3.5–5.2)
ALP SERPL-CCNC: 69 U/L (ref 40–130)
ALT SERPL-CCNC: 25 U/L (ref 21–72)
ANION GAP SERPL CALCULATED.3IONS-SCNC: 9 MMOL/L (ref 7–19)
AST SERPL-CCNC: 30 U/L (ref 17–59)
BASOPHILS # BLD: 0.11 K/UL (ref 0.01–0.08)
BASOPHILS NFR BLD: 0.4 % (ref 0.1–1.2)
BILIRUB SERPL-MCNC: 0.8 MG/DL (ref 0.2–1.3)
BUN SERPL-MCNC: 23 MG/DL (ref 9–20)
CALCIUM SERPL-MCNC: 9.2 MG/DL (ref 8.4–10.2)
CHLORIDE SERPL-SCNC: 106 MMOL/L (ref 98–111)
CO2 SERPL-SCNC: 28 MMOL/L (ref 22–29)
CREAT SERPL-MCNC: 1.2 MG/DL (ref 0.6–1.2)
EOSINOPHIL # BLD: 0.48 K/UL (ref 0.04–0.54)
EOSINOPHIL NFR BLD: 1.9 % (ref 0.7–7)
ERYTHROCYTE [DISTWIDTH] IN BLOOD BY AUTOMATED COUNT: 14.5 % (ref 11.6–14.4)
GLOBULIN: 2.5 G/DL
GLUCOSE SERPL-MCNC: 107 MG/DL (ref 74–106)
HCT VFR BLD AUTO: 39.5 % (ref 40.1–51)
HGB BLD-MCNC: 12.9 G/DL (ref 13.7–17.5)
LYMPHOCYTES # BLD: 20.22 K/UL (ref 1.18–3.74)
LYMPHOCYTES NFR BLD: 79.4 % (ref 19.3–53.1)
MCH RBC QN AUTO: 28.9 PG (ref 25.7–32.2)
MCHC RBC AUTO-ENTMCNC: 32.7 G/DL (ref 32.3–36.5)
MCV RBC AUTO: 88.6 FL (ref 79–92.2)
MONOCYTES # BLD: 0.66 K/UL (ref 0.24–0.82)
MONOCYTES NFR BLD: 2.6 % (ref 4.7–12.5)
NEUTROPHILS # BLD: 3.97 K/UL (ref 1.56–6.13)
NEUTS SEG NFR BLD: 15.5 % (ref 34–71.1)
PLATELET # BLD AUTO: 128 K/UL (ref 163–337)
PMV BLD AUTO: 10.8 FL (ref 7.4–10.4)
POTASSIUM SERPL-SCNC: 4.5 MMOL/L (ref 3.5–5.1)
PROT SERPL-MCNC: 6.8 G/DL (ref 6.3–8.2)
RBC # BLD AUTO: 4.46 M/UL (ref 4.63–6.08)
SODIUM SERPL-SCNC: 143 MMOL/L (ref 137–145)
WBC # BLD AUTO: 25.48 K/UL (ref 4.23–9.07)

## 2023-10-18 PROCEDURE — 80053 COMPREHEN METABOLIC PANEL: CPT

## 2023-10-18 PROCEDURE — 3074F SYST BP LT 130 MM HG: CPT | Performed by: NURSE PRACTITIONER

## 2023-10-18 PROCEDURE — G8420 CALC BMI NORM PARAMETERS: HCPCS | Performed by: NURSE PRACTITIONER

## 2023-10-18 PROCEDURE — G8484 FLU IMMUNIZE NO ADMIN: HCPCS | Performed by: NURSE PRACTITIONER

## 2023-10-18 PROCEDURE — 1123F ACP DISCUSS/DSCN MKR DOCD: CPT | Performed by: NURSE PRACTITIONER

## 2023-10-18 PROCEDURE — 99212 OFFICE O/P EST SF 10 MIN: CPT

## 2023-10-18 PROCEDURE — 4004F PT TOBACCO SCREEN RCVD TLK: CPT | Performed by: NURSE PRACTITIONER

## 2023-10-18 PROCEDURE — G8427 DOCREV CUR MEDS BY ELIG CLIN: HCPCS | Performed by: NURSE PRACTITIONER

## 2023-10-18 PROCEDURE — 36415 COLL VENOUS BLD VENIPUNCTURE: CPT

## 2023-10-18 PROCEDURE — 85025 COMPLETE CBC W/AUTO DIFF WBC: CPT

## 2023-10-18 PROCEDURE — 3078F DIAST BP <80 MM HG: CPT | Performed by: NURSE PRACTITIONER

## 2023-10-18 PROCEDURE — 99214 OFFICE O/P EST MOD 30 MIN: CPT | Performed by: NURSE PRACTITIONER

## 2023-10-18 PROCEDURE — 3017F COLORECTAL CA SCREEN DOC REV: CPT | Performed by: NURSE PRACTITIONER

## 2023-10-18 RX ORDER — MONTELUKAST SODIUM 10 MG/1
10 TABLET ORAL DAILY
Qty: 30 TABLET | Refills: 3 | Status: SHIPPED | OUTPATIENT
Start: 2023-10-18

## 2023-10-18 ASSESSMENT — ENCOUNTER SYMPTOMS
SHORTNESS OF BREATH: 0
ABDOMINAL PAIN: 0
NAUSEA: 0
EYE DISCHARGE: 0
SORE THROAT: 0
WHEEZING: 0
CONSTIPATION: 0
VOMITING: 0
TROUBLE SWALLOWING: 0
EYE ITCHING: 0
DIARRHEA: 0
COUGH: 0

## 2023-10-25 ENCOUNTER — HOSPITAL ENCOUNTER (OUTPATIENT)
Dept: INFUSION THERAPY | Age: 73
Discharge: HOME | End: 2023-10-25
Payer: OTHER GOVERNMENT

## 2023-10-25 DIAGNOSIS — C91.10 CLL (CHRONIC LYMPHOCYTIC LEUKEMIA) (HCC): ICD-10-CM

## 2023-10-25 LAB
BASOPHILS # BLD: 0.09 K/UL (ref 0.01–0.08)
BASOPHILS NFR BLD: 0.5 % (ref 0.1–1.2)
EOSINOPHIL # BLD: 0.26 K/UL (ref 0.04–0.54)
EOSINOPHIL NFR BLD: 1.3 % (ref 0.7–7)
ERYTHROCYTE [DISTWIDTH] IN BLOOD BY AUTOMATED COUNT: 14.6 % (ref 11.6–14.4)
HCT VFR BLD AUTO: 38.5 % (ref 40.1–51)
HGB BLD-MCNC: 12.5 G/DL (ref 13.7–17.5)
LYMPHOCYTES # BLD: 14.01 K/UL (ref 1.18–3.74)
LYMPHOCYTES NFR BLD: 71.9 % (ref 19.3–53.1)
MCH RBC QN AUTO: 29 PG (ref 25.7–32.2)
MCHC RBC AUTO-ENTMCNC: 32.5 G/DL (ref 32.3–36.5)
MCV RBC AUTO: 89.3 FL (ref 79–92.2)
MONOCYTES # BLD: 0.68 K/UL (ref 0.24–0.82)
MONOCYTES NFR BLD: 3.5 % (ref 4.7–12.5)
NEUTROPHILS # BLD: 4.41 K/UL (ref 1.56–6.13)
NEUTS SEG NFR BLD: 22.6 % (ref 34–71.1)
PLATELET # BLD AUTO: 158 K/UL (ref 163–337)
PMV BLD AUTO: 10.9 FL (ref 7.4–10.4)
RBC # BLD AUTO: 4.31 M/UL (ref 4.63–6.08)
WBC # BLD AUTO: 19.48 K/UL (ref 4.23–9.07)

## 2023-10-25 PROCEDURE — 36415 COLL VENOUS BLD VENIPUNCTURE: CPT

## 2023-10-25 PROCEDURE — 85025 COMPLETE CBC W/AUTO DIFF WBC: CPT

## 2023-11-01 ENCOUNTER — HOSPITAL ENCOUNTER (OUTPATIENT)
Dept: INFUSION THERAPY | Age: 73
Discharge: HOME | End: 2023-11-01
Payer: OTHER GOVERNMENT

## 2023-11-01 DIAGNOSIS — C91.10 CLL (CHRONIC LYMPHOCYTIC LEUKEMIA) (HCC): ICD-10-CM

## 2023-11-01 LAB
BASOPHILS # BLD: 0.08 K/UL (ref 0.01–0.08)
BASOPHILS NFR BLD: 0.5 % (ref 0.1–1.2)
EOSINOPHIL # BLD: 0.26 K/UL (ref 0.04–0.54)
EOSINOPHIL NFR BLD: 1.7 % (ref 0.7–7)
ERYTHROCYTE [DISTWIDTH] IN BLOOD BY AUTOMATED COUNT: 14.6 % (ref 11.6–14.4)
HCT VFR BLD AUTO: 37.6 % (ref 40.1–51)
HGB BLD-MCNC: 12.4 G/DL (ref 13.7–17.5)
LYMPHOCYTES # BLD: 11.51 K/UL (ref 1.18–3.74)
LYMPHOCYTES NFR BLD: 73.1 % (ref 19.3–53.1)
MCH RBC QN AUTO: 29.2 PG (ref 25.7–32.2)
MCHC RBC AUTO-ENTMCNC: 33 G/DL (ref 32.3–36.5)
MCV RBC AUTO: 88.5 FL (ref 79–92.2)
MONOCYTES # BLD: 0.52 K/UL (ref 0.24–0.82)
MONOCYTES NFR BLD: 3.3 % (ref 4.7–12.5)
NEUTROPHILS # BLD: 3.35 K/UL (ref 1.56–6.13)
NEUTS SEG NFR BLD: 21.3 % (ref 34–71.1)
PLATELET # BLD AUTO: 139 K/UL (ref 163–337)
PMV BLD AUTO: 10.9 FL (ref 7.4–10.4)
RBC # BLD AUTO: 4.25 M/UL (ref 4.63–6.08)
WBC # BLD AUTO: 15.74 K/UL (ref 4.23–9.07)

## 2023-11-01 PROCEDURE — 85025 COMPLETE CBC W/AUTO DIFF WBC: CPT

## 2023-11-01 PROCEDURE — 36415 COLL VENOUS BLD VENIPUNCTURE: CPT

## 2023-11-08 ENCOUNTER — HOSPITAL ENCOUNTER (OUTPATIENT)
Dept: INFUSION THERAPY | Age: 73
Discharge: HOME | End: 2023-11-08
Payer: OTHER GOVERNMENT

## 2023-11-08 DIAGNOSIS — C91.10 CLL (CHRONIC LYMPHOCYTIC LEUKEMIA) (HCC): ICD-10-CM

## 2023-11-08 LAB
BASOPHILS # BLD: 0.06 K/UL (ref 0.01–0.08)
BASOPHILS NFR BLD: 0.5 % (ref 0.1–1.2)
EOSINOPHIL # BLD: 0.16 K/UL (ref 0.04–0.54)
EOSINOPHIL NFR BLD: 1.3 % (ref 0.7–7)
ERYTHROCYTE [DISTWIDTH] IN BLOOD BY AUTOMATED COUNT: 14.6 % (ref 11.6–14.4)
HCT VFR BLD AUTO: 38.7 % (ref 40.1–51)
HGB BLD-MCNC: 12.5 G/DL (ref 13.7–17.5)
LYMPHOCYTES # BLD: 8.45 K/UL (ref 1.18–3.74)
LYMPHOCYTES NFR BLD: 66.9 % (ref 19.3–53.1)
MCH RBC QN AUTO: 29 PG (ref 25.7–32.2)
MCHC RBC AUTO-ENTMCNC: 32.3 G/DL (ref 32.3–36.5)
MCV RBC AUTO: 89.8 FL (ref 79–92.2)
MONOCYTES # BLD: 0.53 K/UL (ref 0.24–0.82)
MONOCYTES NFR BLD: 4.2 % (ref 4.7–12.5)
NEUTROPHILS # BLD: 3.43 K/UL (ref 1.56–6.13)
NEUTS SEG NFR BLD: 27 % (ref 34–71.1)
PLATELET # BLD AUTO: 107 K/UL (ref 163–337)
PMV BLD AUTO: 11.1 FL (ref 7.4–10.4)
RBC # BLD AUTO: 4.31 M/UL (ref 4.63–6.08)
WBC # BLD AUTO: 12.64 K/UL (ref 4.23–9.07)

## 2023-11-08 PROCEDURE — 36415 COLL VENOUS BLD VENIPUNCTURE: CPT

## 2023-11-08 PROCEDURE — 85025 COMPLETE CBC W/AUTO DIFF WBC: CPT

## 2023-11-15 ENCOUNTER — HOSPITAL ENCOUNTER (OUTPATIENT)
Dept: INFUSION THERAPY | Age: 73
Discharge: HOME | End: 2023-11-15
Payer: OTHER GOVERNMENT

## 2023-11-15 DIAGNOSIS — C91.10 CLL (CHRONIC LYMPHOCYTIC LEUKEMIA) (HCC): ICD-10-CM

## 2023-11-15 LAB
BASOPHILS # BLD: 0.04 K/UL (ref 0.01–0.08)
BASOPHILS NFR BLD: 0.3 % (ref 0.1–1.2)
EOSINOPHIL # BLD: 0.13 K/UL (ref 0.04–0.54)
EOSINOPHIL NFR BLD: 1.1 % (ref 0.7–7)
ERYTHROCYTE [DISTWIDTH] IN BLOOD BY AUTOMATED COUNT: 14.3 % (ref 11.6–14.4)
HCT VFR BLD AUTO: 37 % (ref 40.1–51)
HGB BLD-MCNC: 12.1 G/DL (ref 13.7–17.5)
LYMPHOCYTES # BLD: 4.77 K/UL (ref 1.18–3.74)
LYMPHOCYTES NFR BLD: 41.7 % (ref 19.3–53.1)
MCH RBC QN AUTO: 29.4 PG (ref 25.7–32.2)
MCHC RBC AUTO-ENTMCNC: 32.7 G/DL (ref 32.3–36.5)
MCV RBC AUTO: 90 FL (ref 79–92.2)
MONOCYTES # BLD: 0.67 K/UL (ref 0.24–0.82)
MONOCYTES NFR BLD: 5.9 % (ref 4.7–12.5)
NEUTROPHILS # BLD: 5.79 K/UL (ref 1.56–6.13)
NEUTS SEG NFR BLD: 50.7 % (ref 34–71.1)
PLATELET # BLD AUTO: 102 K/UL (ref 163–337)
PMV BLD AUTO: 10.8 FL (ref 7.4–10.4)
RBC # BLD AUTO: 4.11 M/UL (ref 4.63–6.08)
WBC # BLD AUTO: 11.43 K/UL (ref 4.23–9.07)

## 2023-11-15 PROCEDURE — 85025 COMPLETE CBC W/AUTO DIFF WBC: CPT

## 2023-11-15 PROCEDURE — 36415 COLL VENOUS BLD VENIPUNCTURE: CPT

## 2023-11-22 ENCOUNTER — HOSPITAL ENCOUNTER (OUTPATIENT)
Dept: INFUSION THERAPY | Age: 73
Discharge: HOME | End: 2023-11-22
Payer: OTHER GOVERNMENT

## 2023-11-22 DIAGNOSIS — C91.10 CLL (CHRONIC LYMPHOCYTIC LEUKEMIA) (HCC): ICD-10-CM

## 2023-11-22 LAB
BASOPHILS # BLD: 0.04 K/UL (ref 0.01–0.08)
BASOPHILS NFR BLD: 0.3 % (ref 0.1–1.2)
EOSINOPHIL # BLD: 0.11 K/UL (ref 0.04–0.54)
EOSINOPHIL NFR BLD: 0.9 % (ref 0.7–7)
ERYTHROCYTE [DISTWIDTH] IN BLOOD BY AUTOMATED COUNT: 13.8 % (ref 11.6–14.4)
HCT VFR BLD AUTO: 39.7 % (ref 40.1–51)
HGB BLD-MCNC: 12.7 G/DL (ref 13.7–17.5)
LYMPHOCYTES # BLD: 4.92 K/UL (ref 1.18–3.74)
LYMPHOCYTES NFR BLD: 38.3 % (ref 19.3–53.1)
MCH RBC QN AUTO: 29.1 PG (ref 25.7–32.2)
MCHC RBC AUTO-ENTMCNC: 32 G/DL (ref 32.3–36.5)
MCV RBC AUTO: 90.8 FL (ref 79–92.2)
MONOCYTES # BLD: 0.87 K/UL (ref 0.24–0.82)
MONOCYTES NFR BLD: 6.8 % (ref 4.7–12.5)
NEUTROPHILS # BLD: 6.86 K/UL (ref 1.56–6.13)
NEUTS SEG NFR BLD: 53.3 % (ref 34–71.1)
PLATELET # BLD AUTO: 199 K/UL (ref 163–337)
PMV BLD AUTO: 10.9 FL (ref 7.4–10.4)
RBC # BLD AUTO: 4.37 M/UL (ref 4.63–6.08)
WBC # BLD AUTO: 12.85 K/UL (ref 4.23–9.07)

## 2023-11-22 PROCEDURE — 36415 COLL VENOUS BLD VENIPUNCTURE: CPT

## 2023-11-22 PROCEDURE — 85025 COMPLETE CBC W/AUTO DIFF WBC: CPT

## 2023-11-29 ENCOUNTER — OFFICE VISIT (OUTPATIENT)
Dept: HEMATOLOGY | Age: 73
End: 2023-11-29
Payer: OTHER GOVERNMENT

## 2023-11-29 ENCOUNTER — HOSPITAL ENCOUNTER (OUTPATIENT)
Dept: INFUSION THERAPY | Age: 73
Discharge: HOME | End: 2023-11-29
Payer: OTHER GOVERNMENT

## 2023-11-29 VITALS
SYSTOLIC BLOOD PRESSURE: 108 MMHG | DIASTOLIC BLOOD PRESSURE: 64 MMHG | TEMPERATURE: 97.8 F | HEIGHT: 69 IN | WEIGHT: 129 LBS | RESPIRATION RATE: 18 BRPM | HEART RATE: 86 BPM | OXYGEN SATURATION: 97 % | BODY MASS INDEX: 19.11 KG/M2

## 2023-11-29 DIAGNOSIS — R21 RASH, SKIN: ICD-10-CM

## 2023-11-29 DIAGNOSIS — Z85.51 HISTORY OF BLADDER CANCER: ICD-10-CM

## 2023-11-29 DIAGNOSIS — C91.10 CLL (CHRONIC LYMPHOCYTIC LEUKEMIA) (HCC): ICD-10-CM

## 2023-11-29 DIAGNOSIS — C91.10 CLL (CHRONIC LYMPHOCYTIC LEUKEMIA) (HCC): Primary | ICD-10-CM

## 2023-11-29 DIAGNOSIS — R91.1 NODULE OF LEFT LUNG: ICD-10-CM

## 2023-11-29 DIAGNOSIS — Z79.899 HIGH RISK MEDICATION USE: ICD-10-CM

## 2023-11-29 DIAGNOSIS — Z51.11 CHEMOTHERAPY MANAGEMENT, ENCOUNTER FOR: ICD-10-CM

## 2023-11-29 DIAGNOSIS — Z71.89 CARE PLAN DISCUSSED WITH PATIENT: ICD-10-CM

## 2023-11-29 LAB
ALBUMIN SERPL-MCNC: 3.8 G/DL (ref 3.5–5.2)
ALP SERPL-CCNC: 80 U/L (ref 40–130)
ALT SERPL-CCNC: 22 U/L (ref 21–72)
ANION GAP SERPL CALCULATED.3IONS-SCNC: 3 MMOL/L (ref 7–19)
AST SERPL-CCNC: 21 U/L (ref 17–59)
BASOPHILS # BLD: 0.11 K/UL (ref 0.01–0.08)
BASOPHILS NFR BLD: 0.7 % (ref 0.1–1.2)
BILIRUB SERPL-MCNC: 0.4 MG/DL (ref 0.2–1.3)
BUN SERPL-MCNC: 23 MG/DL (ref 9–20)
CALCIUM SERPL-MCNC: 9 MG/DL (ref 8.4–10.2)
CHLORIDE SERPL-SCNC: 108 MMOL/L (ref 98–111)
CO2 SERPL-SCNC: 33 MMOL/L (ref 22–29)
CREAT SERPL-MCNC: 1.1 MG/DL (ref 0.6–1.2)
EOSINOPHIL # BLD: 0.15 K/UL (ref 0.04–0.54)
EOSINOPHIL NFR BLD: 0.9 % (ref 0.7–7)
ERYTHROCYTE [DISTWIDTH] IN BLOOD BY AUTOMATED COUNT: 13.6 % (ref 11.6–14.4)
GLOBULIN: 3.1 G/DL
GLUCOSE SERPL-MCNC: 114 MG/DL (ref 74–106)
HCT VFR BLD AUTO: 40.9 % (ref 40.1–51)
HGB BLD-MCNC: 13 G/DL (ref 13.7–17.5)
LYMPHOCYTES # BLD: 7.13 K/UL (ref 1.18–3.74)
LYMPHOCYTES NFR BLD: 43.5 % (ref 19.3–53.1)
MCH RBC QN AUTO: 28.5 PG (ref 25.7–32.2)
MCHC RBC AUTO-ENTMCNC: 31.8 G/DL (ref 32.3–36.5)
MCV RBC AUTO: 89.7 FL (ref 79–92.2)
MONOCYTES # BLD: 0.75 K/UL (ref 0.24–0.82)
MONOCYTES NFR BLD: 4.6 % (ref 4.7–12.5)
NEUTROPHILS # BLD: 8.17 K/UL (ref 1.56–6.13)
NEUTS SEG NFR BLD: 49.9 % (ref 34–71.1)
PLATELET # BLD AUTO: 269 K/UL (ref 163–337)
PMV BLD AUTO: 9.7 FL (ref 7.4–10.4)
POTASSIUM SERPL-SCNC: 4.2 MMOL/L (ref 3.5–5.1)
PROT SERPL-MCNC: 6.9 G/DL (ref 6.3–8.2)
RBC # BLD AUTO: 4.56 M/UL (ref 4.63–6.08)
SODIUM SERPL-SCNC: 144 MMOL/L (ref 137–145)
WBC # BLD AUTO: 16.38 K/UL (ref 4.23–9.07)

## 2023-11-29 PROCEDURE — 99214 OFFICE O/P EST MOD 30 MIN: CPT | Performed by: NURSE PRACTITIONER

## 2023-11-29 PROCEDURE — 80053 COMPREHEN METABOLIC PANEL: CPT

## 2023-11-29 PROCEDURE — 3074F SYST BP LT 130 MM HG: CPT | Performed by: NURSE PRACTITIONER

## 2023-11-29 PROCEDURE — 36415 COLL VENOUS BLD VENIPUNCTURE: CPT

## 2023-11-29 PROCEDURE — 1123F ACP DISCUSS/DSCN MKR DOCD: CPT | Performed by: NURSE PRACTITIONER

## 2023-11-29 PROCEDURE — 99212 OFFICE O/P EST SF 10 MIN: CPT

## 2023-11-29 PROCEDURE — 85025 COMPLETE CBC W/AUTO DIFF WBC: CPT

## 2023-11-29 PROCEDURE — 3078F DIAST BP <80 MM HG: CPT | Performed by: NURSE PRACTITIONER

## 2023-11-29 RX ORDER — ALBUTEROL SULFATE 90 UG/1
2 AEROSOL, METERED RESPIRATORY (INHALATION) EVERY 4 HOURS PRN
COMMUNITY

## 2023-12-04 ASSESSMENT — ENCOUNTER SYMPTOMS
CONSTIPATION: 0
WHEEZING: 0
SORE THROAT: 0
SHORTNESS OF BREATH: 0
COUGH: 0
TROUBLE SWALLOWING: 0
ABDOMINAL PAIN: 0
VOMITING: 0
DIARRHEA: 0
EYE ITCHING: 0
EYE DISCHARGE: 0
NAUSEA: 0

## 2024-01-10 ENCOUNTER — HOSPITAL ENCOUNTER (OUTPATIENT)
Dept: CT IMAGING | Age: 74
Discharge: HOME | End: 2024-01-10
Payer: OTHER GOVERNMENT

## 2024-01-10 DIAGNOSIS — R91.1 NODULE OF LEFT LUNG: ICD-10-CM

## 2024-01-10 PROCEDURE — 71260 CT THORAX DX C+: CPT

## 2024-01-10 PROCEDURE — 6360000004 HC RX CONTRAST MEDICATION: Performed by: NURSE PRACTITIONER

## 2024-01-10 RX ADMIN — IOPAMIDOL 60 ML: 755 INJECTION, SOLUTION INTRAVENOUS at 13:19

## 2024-01-18 ENCOUNTER — TELEPHONE (OUTPATIENT)
Dept: VASCULAR SURGERY | Age: 74
End: 2024-01-18

## 2024-01-18 NOTE — TELEPHONE ENCOUNTER
Jong called to schedule a  appt. Pt. Is a previous pt. Of [facility]. Last [facility] 8/2021. [facility] information that is in media. I was unsure if prior testing would be needed since pt. Is est. And orders would need to be placed. .     Please be advised that the best time to call him to accommodate their needs is Anytime.     Thank you.

## 2024-01-22 ENCOUNTER — OFFICE VISIT (OUTPATIENT)
Dept: VASCULAR SURGERY | Age: 74
End: 2024-01-22
Payer: OTHER GOVERNMENT

## 2024-01-22 ENCOUNTER — HOSPITAL ENCOUNTER (OUTPATIENT)
Dept: PREADMISSION TESTING | Age: 74
Setting detail: OUTPATIENT SURGERY
Discharge: HOME | End: 2024-01-26
Payer: OTHER GOVERNMENT

## 2024-01-22 VITALS
TEMPERATURE: 97.4 F | OXYGEN SATURATION: 99 % | SYSTOLIC BLOOD PRESSURE: 117 MMHG | HEART RATE: 78 BPM | DIASTOLIC BLOOD PRESSURE: 68 MMHG

## 2024-01-22 VITALS — WEIGHT: 129 LBS | HEIGHT: 69 IN | BODY MASS INDEX: 19.11 KG/M2

## 2024-01-22 DIAGNOSIS — I70.245 ATHEROSCLEROSIS OF NATIVE ARTERY OF LEFT LOWER EXTREMITY WITH ULCERATION OF OTHER PART OF FOOT (HCC): ICD-10-CM

## 2024-01-22 DIAGNOSIS — I70.213 ATHEROSCLER OF NATIVE ARTERY OF BOTH LEGS WITH INTERMIT CLAUDICATION (HCC): Primary | ICD-10-CM

## 2024-01-22 LAB
ABO/RH: NORMAL
ANION GAP SERPL CALCULATED.3IONS-SCNC: 9 MMOL/L (ref 7–19)
ANTIBODY SCREEN: NORMAL
APTT PPP: 28.5 SEC (ref 26–36.2)
BASOPHILS # BLD: 0.1 K/UL (ref 0–0.2)
BASOPHILS NFR BLD: 0.7 % (ref 0–1)
BUN SERPL-MCNC: 18 MG/DL (ref 8–23)
CALCIUM SERPL-MCNC: 9.4 MG/DL (ref 8.8–10.2)
CHLORIDE SERPL-SCNC: 103 MMOL/L (ref 98–111)
CO2 SERPL-SCNC: 31 MMOL/L (ref 22–29)
CREAT SERPL-MCNC: 1.1 MG/DL (ref 0.5–1.2)
EOSINOPHIL # BLD: 0.1 K/UL (ref 0–0.6)
EOSINOPHIL NFR BLD: 1.8 % (ref 0–5)
ERYTHROCYTE [DISTWIDTH] IN BLOOD BY AUTOMATED COUNT: 14.3 % (ref 11.5–14.5)
GLUCOSE SERPL-MCNC: 100 MG/DL (ref 74–109)
HCT VFR BLD AUTO: 43.2 % (ref 42–52)
HGB BLD-MCNC: 14.3 G/DL (ref 14–18)
IMM GRANULOCYTES # BLD: 0 K/UL
INR PPP: 1.06 (ref 0.88–1.18)
LYMPHOCYTES # BLD: 2.4 K/UL (ref 1.1–4.5)
LYMPHOCYTES NFR BLD: 33.5 % (ref 20–40)
MCH RBC QN AUTO: 28.6 PG (ref 27–31)
MCHC RBC AUTO-ENTMCNC: 33.1 G/DL (ref 33–37)
MCV RBC AUTO: 86.4 FL (ref 80–94)
MONOCYTES # BLD: 0.5 K/UL (ref 0–0.9)
MONOCYTES NFR BLD: 7.1 % (ref 0–10)
NEUTROPHILS # BLD: 4.1 K/UL (ref 1.5–7.5)
NEUTS SEG NFR BLD: 56.6 % (ref 50–65)
PLATELET # BLD AUTO: 186 K/UL (ref 130–400)
PMV BLD AUTO: 11.2 FL (ref 9.4–12.4)
POTASSIUM SERPL-SCNC: 4 MMOL/L (ref 3.5–5)
PROTHROMBIN TIME: 13.5 SEC (ref 12–14.6)
RBC # BLD AUTO: 5 M/UL (ref 4.7–6.1)
SODIUM SERPL-SCNC: 143 MMOL/L (ref 136–145)
WBC # BLD AUTO: 7.3 K/UL (ref 4.8–10.8)

## 2024-01-22 PROCEDURE — 85730 THROMBOPLASTIN TIME PARTIAL: CPT

## 2024-01-22 PROCEDURE — 85025 COMPLETE CBC W/AUTO DIFF WBC: CPT

## 2024-01-22 PROCEDURE — 85610 PROTHROMBIN TIME: CPT

## 2024-01-22 PROCEDURE — 99214 OFFICE O/P EST MOD 30 MIN: CPT | Performed by: NURSE PRACTITIONER

## 2024-01-22 PROCEDURE — 86900 BLOOD TYPING SEROLOGIC ABO: CPT

## 2024-01-22 PROCEDURE — 80048 BASIC METABOLIC PNL TOTAL CA: CPT

## 2024-01-22 PROCEDURE — 86850 RBC ANTIBODY SCREEN: CPT

## 2024-01-22 PROCEDURE — 1123F ACP DISCUSS/DSCN MKR DOCD: CPT | Performed by: NURSE PRACTITIONER

## 2024-01-22 PROCEDURE — 3074F SYST BP LT 130 MM HG: CPT | Performed by: NURSE PRACTITIONER

## 2024-01-22 PROCEDURE — 86901 BLOOD TYPING SEROLOGIC RH(D): CPT

## 2024-01-22 PROCEDURE — 3078F DIAST BP <80 MM HG: CPT | Performed by: NURSE PRACTITIONER

## 2024-01-22 NOTE — PATIENT INSTRUCTIONS
[facility]    Surgery Directions 1/24    Report to the outpatient registration at [facility] on 8, surgery will call the day before to verify time of arrival  Nothing to eat or drink after midnight the night before the procedure.  Please take all medications as normally scheduled to take unless listed below with a sip of water  Do not take diclofenac, hctz.  If you have sleep apnea and require C-PAP, please bring this with you to the hospital.  Bring a list of all of your allergies and medications with you to the hospital.  Please let our nurse know if you have had an allergy to iodine, shellfish, or x-ray dye.  Let the nurse know if you take any of the following:  Over the counter herbal supplements  Diclofenec, indomethacin, ketoprofen, Caridopa/levadopa, naproxen, sulindac, piroxicam, glucosamine, Chondrotin, cocchine, or methotrexate.  Plan to stay at the hospital for 4 - 6 hours before being released  by the physician. You will need someone to drive you home after the procedure.  Please stop at your local [employer] or pharmacy and buy a bottle of Hibiclens. Wash thoroughly with this the night before and the morning of the procedure, paying special attention to the area that will be operated on.  Make sure you rinse very well. The Hibiclens should only be used prior to surgery.  14. Other Directions: none  15.  You will need a  to take you home  16.  Follow up appt 2/7 at 8:15    Unless instructed otherwise by your physician, cleanse incision/puncture site twice daily with soap and water.  Apply dry gauze. Do not get in tub.  Okay to shower.  Do not apply any salve, cream, peroxide or alcohol to the incision.  Call with any increasing redness or drainage

## 2024-01-22 NOTE — PROGRESS NOTES
Jong Loyd (:  1950) is a 73 y.o. male,Established patient, here for evaluation of the following chief complaint(s):  Follow-up            SUBJECTIVE/OBJECTIVE:  Jogn has a history of peripheral vascular disease of the lower extremities.  He has had this for 1 - 5 years. Current treatment includes clopidogrel 75 mg po qd, ASA EC daily.  Jong has new wounds. Recently, he reports claudication at a distance of  which varies.  Jong reports that the right leg is more signifcant than the left .  He reports claudication is worsened and is mostly in the form of crampy type pain starting in the thighs. He has a short recovery time. This is reproducible in nature. He reports ischemic rest pain 0 times per night.  He reports walking with cart does not help.  He also has back issues.  Wound is on left foot    I have personally reviewed the following: problem list, current meds, allergies, PMH, PSH, family hx, and social hx  Jong Loyd is a 73 y.o. male with the following history as recorded in [facility]:  Patient Active Problem List    Diagnosis Date Noted    Atherosclerosis with claudication of extremity (HCC) 2021    Benign essential HTN 2021    BPH (benign prostatic hyperplasia) 2021    Pulmonary emphysema ([facility]) 2021    Carotid stenosis, asymptomatic, bilateral 2021    CLL (chronic lymphocytic leukemia) ([facility]) 2021    PVD (peripheral vascular disease) ([facility])      Current Outpatient Medications   Medication Sig Dispense Refill    montelukast (SINGULAIR) 10 MG tablet Take 1 tablet by mouth daily 30 tablet 3    clopidogrel (PLAVIX) 75 MG tablet Take 1 tablet by mouth daily      diclofenac (VOLTAREN) 75 MG EC tablet Take 1 tablet by mouth Daily      finasteride (PROSCAR) 5 MG tablet Take 1 tablet by mouth daily      levothyroxine (SYNTHROID) 100 MCG tablet Take 1 tablet by mouth daily      losartan (COZAAR) 100 MG tablet Take 1 tablet by mouth nightly      pentoxifylline

## 2024-01-22 NOTE — DISCHARGE INSTRUCTIONS
The day before surgery you will receive a phone call from the surgery nurse to let you know what time to arrive on the day of surgery. This call will usually be between 2-4 PM. If you do not receive a phone call by 4 PM the day before your surgery please call [phone] and let them know you have not received an arrival time. If your surgery is on Monday, your call will be on the Friday before your Monday surgery.    The morning of surgery, you may take all your prescribed medications with a sip of water.  Any exceptions to this would be listed below: Plavix per prescribing doctor.       PREOPERATIVE GUIDELINES WHEN RECEIVING ANESTHESIA    Do not eat or drink anything after midnight, the night before your surgery. No gum or candy the morning of surgery.  This is extremely important for your safety.    Take a bath (or shower) the night before your surgery and you may brush your teeth the morning of your surgery.    You will be scheduled to arrive at the hospital 2 hours before your surgery, or follow your surgeon's instructions.    Dress comfortably.  Wear loose clothing that will be easy to remove and comfortable for your trip home.    You may wear eyeglasses or contacts but bring your cases with you as they must be remove before your surgery.    Hearing aids and dentures will need to be removed before your surgery.    Do not wear any jewelry, including body jewelry.  All jewelry will need to be removed prior to your surgery.    Do not wear fingernail polish or make-up.    It is best not to bring any valuables with you.    If you are to stay in the hospital overnight, bring your robe, slippers and personal toiletries that you may need.      POSTOPERATIVE GUIDELINES AFTER RECEIVING ANESTHESIA    If you are to go home after your surgery, you will need a responsible adult to drive you home.     You will not be able to take public transportation after your discharge from the Operative Care Unit unless you are

## 2024-01-23 RX ORDER — SODIUM CHLORIDE 0.9 % (FLUSH) 0.9 %
5-40 SYRINGE (ML) INJECTION EVERY 12 HOURS SCHEDULED
Status: CANCELLED | OUTPATIENT
Start: 2024-01-23

## 2024-01-23 RX ORDER — SODIUM CHLORIDE 0.9 % (FLUSH) 0.9 %
5-40 SYRINGE (ML) INJECTION PRN
Status: CANCELLED | OUTPATIENT
Start: 2024-01-23

## 2024-01-23 RX ORDER — ASPIRIN 81 MG/1
81 TABLET ORAL ONCE
Status: CANCELLED | OUTPATIENT
Start: 2024-01-23 | End: 2024-01-23

## 2024-01-23 RX ORDER — SODIUM CHLORIDE 9 MG/ML
INJECTION, SOLUTION INTRAVENOUS PRN
Status: CANCELLED | OUTPATIENT
Start: 2024-01-23

## 2024-01-23 NOTE — H&P (VIEW-ONLY)
Jong Loyd (:  1950) is a 73 y.o. male,Established patient, here for evaluation of the following chief complaint(s):  Follow-up            SUBJECTIVE/OBJECTIVE:  Jong has a history of peripheral vascular disease of the lower extremities.  He has had this for 1 - 5 years. Current treatment includes clopidogrel 75 mg po qd, ASA EC daily.  Jong has new wounds. Recently, he reports claudication at a distance of  which varies.  Jong reports that the right leg is more signifcant than the left .  He reports claudication is worsened and is mostly in the form of crampy type pain starting in the thighs. He has a short recovery time. This is reproducible in nature. He reports ischemic rest pain 0 times per night.  He reports walking with cart does not help.  He also has back issues.  Wound is on left foot    I have personally reviewed the following: problem list, current meds, allergies, PMH, PSH, family hx, and social hx  Jong Loyd is a 73 y.o. male with the following history as recorded in [facility]:  Patient Active Problem List    Diagnosis Date Noted    Atherosclerosis with claudication of extremity (HCC) 2021    Benign essential HTN 2021    BPH (benign prostatic hyperplasia) 2021    Pulmonary emphysema ([facility]) 2021    Carotid stenosis, asymptomatic, bilateral 2021    CLL (chronic lymphocytic leukemia) ([facility]) 2021    PVD (peripheral vascular disease) ([facility])      Current Outpatient Medications   Medication Sig Dispense Refill    montelukast (SINGULAIR) 10 MG tablet Take 1 tablet by mouth daily 30 tablet 3    clopidogrel (PLAVIX) 75 MG tablet Take 1 tablet by mouth daily      diclofenac (VOLTAREN) 75 MG EC tablet Take 1 tablet by mouth Daily      finasteride (PROSCAR) 5 MG tablet Take 1 tablet by mouth daily      levothyroxine (SYNTHROID) 100 MCG tablet Take 1 tablet by mouth daily      losartan (COZAAR) 100 MG tablet Take 1 tablet by mouth nightly      pentoxifylline

## 2024-01-23 NOTE — H&P
Jong Loyd (:  1950) is a 73 y.o. male,Established patient, here for evaluation of the following chief complaint(s):  Follow-up            SUBJECTIVE/OBJECTIVE:  Jong has a history of peripheral vascular disease of the lower extremities.  He has had this for 1 - 5 years. Current treatment includes clopidogrel 75 mg po qd, ASA EC daily.  Jong has new wounds. Recently, he reports claudication at a distance of  which varies.  Jong reports that the right leg is more signifcant than the left .  He reports claudication is worsened and is mostly in the form of crampy type pain starting in the thighs. He has a short recovery time. This is reproducible in nature. He reports ischemic rest pain 0 times per night.  He reports walking with cart does not help.  He also has back issues.  Wound is on left foot    I have personally reviewed the following: problem list, current meds, allergies, PMH, PSH, family hx, and social hx  Jong Loyd is a 73 y.o. male with the following history as recorded in [facility]:  Patient Active Problem List    Diagnosis Date Noted    Atherosclerosis with claudication of extremity (HCC) 2021    Benign essential HTN 2021    BPH (benign prostatic hyperplasia) 2021    Pulmonary emphysema ([facility]) 2021    Carotid stenosis, asymptomatic, bilateral 2021    CLL (chronic lymphocytic leukemia) ([facility]) 2021    PVD (peripheral vascular disease) ([facility])      Current Outpatient Medications   Medication Sig Dispense Refill    montelukast (SINGULAIR) 10 MG tablet Take 1 tablet by mouth daily 30 tablet 3    clopidogrel (PLAVIX) 75 MG tablet Take 1 tablet by mouth daily      diclofenac (VOLTAREN) 75 MG EC tablet Take 1 tablet by mouth Daily      finasteride (PROSCAR) 5 MG tablet Take 1 tablet by mouth daily      levothyroxine (SYNTHROID) 100 MCG tablet Take 1 tablet by mouth daily      losartan (COZAAR) 100 MG tablet Take 1 tablet by mouth nightly      pentoxifylline

## 2024-01-24 ENCOUNTER — APPOINTMENT (OUTPATIENT)
Dept: INTERVENTIONAL RADIOLOGY/VASCULAR | Age: 74
End: 2024-01-24
Attending: SURGERY
Payer: OTHER GOVERNMENT

## 2024-01-24 ENCOUNTER — ANESTHESIA (OUTPATIENT)
Dept: OPERATING ROOM | Age: 74
End: 2024-01-24
Payer: OTHER GOVERNMENT

## 2024-01-24 ENCOUNTER — HOSPITAL ENCOUNTER (OUTPATIENT)
Age: 74
Setting detail: OUTPATIENT SURGERY
Discharge: HOME | End: 2024-01-24
Attending: SURGERY | Admitting: SURGERY
Payer: OTHER GOVERNMENT

## 2024-01-24 ENCOUNTER — ANESTHESIA EVENT (OUTPATIENT)
Dept: OPERATING ROOM | Age: 74
End: 2024-01-24
Payer: OTHER GOVERNMENT

## 2024-01-24 VITALS
SYSTOLIC BLOOD PRESSURE: 102 MMHG | HEIGHT: 69 IN | BODY MASS INDEX: 18.51 KG/M2 | TEMPERATURE: 97.4 F | OXYGEN SATURATION: 100 % | HEART RATE: 68 BPM | DIASTOLIC BLOOD PRESSURE: 64 MMHG | WEIGHT: 125 LBS | RESPIRATION RATE: 18 BRPM

## 2024-01-24 LAB
ABO/RH: NORMAL
ANTIBODY SCREEN: NORMAL

## 2024-01-24 PROCEDURE — 2580000003 HC RX 258: Performed by: SURGERY

## 2024-01-24 PROCEDURE — 7100000000 HC PACU RECOVERY - FIRST 15 MIN: Performed by: SURGERY

## 2024-01-24 PROCEDURE — 6360000002 HC RX W HCPCS: Performed by: NURSE PRACTITIONER

## 2024-01-24 PROCEDURE — 6360000002 HC RX W HCPCS: Performed by: SURGERY

## 2024-01-24 PROCEDURE — 7100000011 HC PHASE II RECOVERY - ADDTL 15 MIN: Performed by: SURGERY

## 2024-01-24 PROCEDURE — 6370000000 HC RX 637 (ALT 250 FOR IP): Performed by: ANESTHESIOLOGY

## 2024-01-24 PROCEDURE — 2580000003 HC RX 258: Performed by: ANESTHESIOLOGY

## 2024-01-24 PROCEDURE — 2500000003 HC RX 250 WO HCPCS: Performed by: NURSE ANESTHETIST, CERTIFIED REGISTERED

## 2024-01-24 PROCEDURE — 86900 BLOOD TYPING SEROLOGIC ABO: CPT

## 2024-01-24 PROCEDURE — 2709999900 HC NON-CHARGEABLE SUPPLY: Performed by: SURGERY

## 2024-01-24 PROCEDURE — C1887 CATHETER, GUIDING: HCPCS | Performed by: SURGERY

## 2024-01-24 PROCEDURE — C1893 INTRO/SHEATH, FIXED,NON-PEEL: HCPCS | Performed by: SURGERY

## 2024-01-24 PROCEDURE — 7100000010 HC PHASE II RECOVERY - FIRST 15 MIN: Performed by: SURGERY

## 2024-01-24 PROCEDURE — 86850 RBC ANTIBODY SCREEN: CPT

## 2024-01-24 PROCEDURE — 75716 ARTERY X-RAYS ARMS/LEGS: CPT

## 2024-01-24 PROCEDURE — 3700000000 HC ANESTHESIA ATTENDED CARE: Performed by: SURGERY

## 2024-01-24 PROCEDURE — C1769 GUIDE WIRE: HCPCS | Performed by: SURGERY

## 2024-01-24 PROCEDURE — 7100000001 HC PACU RECOVERY - ADDTL 15 MIN: Performed by: SURGERY

## 2024-01-24 PROCEDURE — 75630 X-RAY AORTA LEG ARTERIES: CPT | Performed by: SURGERY

## 2024-01-24 PROCEDURE — C1894 INTRO/SHEATH, NON-LASER: HCPCS | Performed by: SURGERY

## 2024-01-24 PROCEDURE — C2623 CATH, TRANSLUMIN, DRUG-COAT: HCPCS | Performed by: SURGERY

## 2024-01-24 PROCEDURE — 36415 COLL VENOUS BLD VENIPUNCTURE: CPT

## 2024-01-24 PROCEDURE — 86901 BLOOD TYPING SEROLOGIC RH(D): CPT

## 2024-01-24 PROCEDURE — C1876 STENT, NON-COA/NON-COV W/DEL: HCPCS | Performed by: SURGERY

## 2024-01-24 PROCEDURE — A4217 STERILE WATER/SALINE, 500 ML: HCPCS | Performed by: SURGERY

## 2024-01-24 PROCEDURE — 94640 AIRWAY INHALATION TREATMENT: CPT

## 2024-01-24 PROCEDURE — 3700000001 HC ADD 15 MINUTES (ANESTHESIA): Performed by: SURGERY

## 2024-01-24 PROCEDURE — C1725 CATH, TRANSLUMIN NON-LASER: HCPCS | Performed by: SURGERY

## 2024-01-24 PROCEDURE — 2580000003 HC RX 258: Performed by: NURSE ANESTHETIST, CERTIFIED REGISTERED

## 2024-01-24 PROCEDURE — 37226 PR REVSC OPN/PRQ FEM/POP W/STNT/ANGIOP SM VSL: CPT | Performed by: SURGERY

## 2024-01-24 PROCEDURE — 75625 CONTRAST EXAM ABDOMINL AORTA: CPT

## 2024-01-24 PROCEDURE — 6370000000 HC RX 637 (ALT 250 FOR IP): Performed by: NURSE PRACTITIONER

## 2024-01-24 PROCEDURE — 6360000002 HC RX W HCPCS: Performed by: NURSE ANESTHETIST, CERTIFIED REGISTERED

## 2024-01-24 PROCEDURE — 3600000007 HC SURGERY HYBRID BASE: Performed by: SURGERY

## 2024-01-24 PROCEDURE — 3600000017 HC SURGERY HYBRID ADDL 15MIN: Performed by: SURGERY

## 2024-01-24 DEVICE — SELF-EXPANDING STENT SYSTEM
Type: IMPLANTABLE DEVICE | Status: FUNCTIONAL
Brand: INNOVA™ VASCULAR

## 2024-01-24 RX ORDER — SODIUM CHLORIDE 0.9 % (FLUSH) 0.9 %
5-40 SYRINGE (ML) INJECTION EVERY 12 HOURS SCHEDULED
Status: DISCONTINUED | OUTPATIENT
Start: 2024-01-24 | End: 2024-01-24 | Stop reason: HOSPADM

## 2024-01-24 RX ORDER — LIDOCAINE HYDROCHLORIDE 10 MG/ML
1 INJECTION, SOLUTION EPIDURAL; INFILTRATION; INTRACAUDAL; PERINEURAL
Status: DISCONTINUED | OUTPATIENT
Start: 2024-01-24 | End: 2024-01-24 | Stop reason: HOSPADM

## 2024-01-24 RX ORDER — IPRATROPIUM BROMIDE AND ALBUTEROL SULFATE 2.5; .5 MG/3ML; MG/3ML
1 SOLUTION RESPIRATORY (INHALATION) ONCE
Status: COMPLETED | OUTPATIENT
Start: 2024-01-24 | End: 2024-01-24

## 2024-01-24 RX ORDER — SODIUM CHLORIDE 9 MG/ML
INJECTION, SOLUTION INTRAVENOUS PRN
Status: DISCONTINUED | OUTPATIENT
Start: 2024-01-24 | End: 2024-01-24 | Stop reason: HOSPADM

## 2024-01-24 RX ORDER — SODIUM CHLORIDE 0.9 % (FLUSH) 0.9 %
5-40 SYRINGE (ML) INJECTION PRN
Status: DISCONTINUED | OUTPATIENT
Start: 2024-01-24 | End: 2024-01-24 | Stop reason: HOSPADM

## 2024-01-24 RX ORDER — FENTANYL CITRATE 50 UG/ML
INJECTION, SOLUTION INTRAMUSCULAR; INTRAVENOUS PRN
Status: DISCONTINUED | OUTPATIENT
Start: 2024-01-24 | End: 2024-01-24 | Stop reason: SDUPTHER

## 2024-01-24 RX ORDER — HYDROMORPHONE HYDROCHLORIDE 1 MG/ML
0.5 INJECTION, SOLUTION INTRAMUSCULAR; INTRAVENOUS; SUBCUTANEOUS EVERY 5 MIN PRN
Status: DISCONTINUED | OUTPATIENT
Start: 2024-01-24 | End: 2024-01-24 | Stop reason: HOSPADM

## 2024-01-24 RX ORDER — DIPHENHYDRAMINE HYDROCHLORIDE 50 MG/ML
12.5 INJECTION INTRAMUSCULAR; INTRAVENOUS
Status: DISCONTINUED | OUTPATIENT
Start: 2024-01-24 | End: 2024-01-24 | Stop reason: HOSPADM

## 2024-01-24 RX ORDER — ROCURONIUM BROMIDE 10 MG/ML
INJECTION, SOLUTION INTRAVENOUS PRN
Status: DISCONTINUED | OUTPATIENT
Start: 2024-01-24 | End: 2024-01-24 | Stop reason: SDUPTHER

## 2024-01-24 RX ORDER — SODIUM CHLORIDE 9 MG/ML
INJECTION, SOLUTION INTRAVENOUS CONTINUOUS
Status: DISCONTINUED | OUTPATIENT
Start: 2024-01-24 | End: 2024-01-24 | Stop reason: HOSPADM

## 2024-01-24 RX ORDER — LIDOCAINE HYDROCHLORIDE 10 MG/ML
INJECTION, SOLUTION EPIDURAL; INFILTRATION; INTRACAUDAL; PERINEURAL PRN
Status: DISCONTINUED | OUTPATIENT
Start: 2024-01-24 | End: 2024-01-24 | Stop reason: SDUPTHER

## 2024-01-24 RX ORDER — EPHEDRINE SULFATE 50 MG/ML
INJECTION, SOLUTION INTRAVENOUS PRN
Status: DISCONTINUED | OUTPATIENT
Start: 2024-01-24 | End: 2024-01-24 | Stop reason: SDUPTHER

## 2024-01-24 RX ORDER — LABETALOL HYDROCHLORIDE 5 MG/ML
10 INJECTION, SOLUTION INTRAVENOUS
Status: DISCONTINUED | OUTPATIENT
Start: 2024-01-24 | End: 2024-01-24 | Stop reason: HOSPADM

## 2024-01-24 RX ORDER — HYDROMORPHONE HYDROCHLORIDE 1 MG/ML
0.25 INJECTION, SOLUTION INTRAMUSCULAR; INTRAVENOUS; SUBCUTANEOUS EVERY 5 MIN PRN
Status: DISCONTINUED | OUTPATIENT
Start: 2024-01-24 | End: 2024-01-24 | Stop reason: HOSPADM

## 2024-01-24 RX ORDER — HEPARIN SODIUM 1000 [USP'U]/ML
INJECTION, SOLUTION INTRAVENOUS; SUBCUTANEOUS PRN
Status: DISCONTINUED | OUTPATIENT
Start: 2024-01-24 | End: 2024-01-24 | Stop reason: SDUPTHER

## 2024-01-24 RX ORDER — ONDANSETRON 2 MG/ML
INJECTION INTRAMUSCULAR; INTRAVENOUS PRN
Status: DISCONTINUED | OUTPATIENT
Start: 2024-01-24 | End: 2024-01-24 | Stop reason: SDUPTHER

## 2024-01-24 RX ORDER — PROPOFOL 10 MG/ML
INJECTION, EMULSION INTRAVENOUS PRN
Status: DISCONTINUED | OUTPATIENT
Start: 2024-01-24 | End: 2024-01-24 | Stop reason: SDUPTHER

## 2024-01-24 RX ORDER — FAMOTIDINE 20 MG/1
20 TABLET, FILM COATED ORAL ONCE
Status: COMPLETED | OUTPATIENT
Start: 2024-01-24 | End: 2024-01-24

## 2024-01-24 RX ORDER — IPRATROPIUM BROMIDE AND ALBUTEROL SULFATE 2.5; .5 MG/3ML; MG/3ML
1 SOLUTION RESPIRATORY (INHALATION)
Status: DISCONTINUED | OUTPATIENT
Start: 2024-01-24 | End: 2024-01-24 | Stop reason: HOSPADM

## 2024-01-24 RX ORDER — ASPIRIN 81 MG/1
81 TABLET ORAL ONCE
Status: COMPLETED | OUTPATIENT
Start: 2024-01-24 | End: 2024-01-24

## 2024-01-24 RX ORDER — HYDRALAZINE HYDROCHLORIDE 20 MG/ML
10 INJECTION INTRAMUSCULAR; INTRAVENOUS
Status: DISCONTINUED | OUTPATIENT
Start: 2024-01-24 | End: 2024-01-24 | Stop reason: HOSPADM

## 2024-01-24 RX ORDER — SODIUM CHLORIDE, SODIUM LACTATE, POTASSIUM CHLORIDE, CALCIUM CHLORIDE 600; 310; 30; 20 MG/100ML; MG/100ML; MG/100ML; MG/100ML
INJECTION, SOLUTION INTRAVENOUS CONTINUOUS
Status: DISCONTINUED | OUTPATIENT
Start: 2024-01-24 | End: 2024-01-24 | Stop reason: HOSPADM

## 2024-01-24 RX ORDER — METOCLOPRAMIDE HYDROCHLORIDE 5 MG/ML
10 INJECTION INTRAMUSCULAR; INTRAVENOUS
Status: DISCONTINUED | OUTPATIENT
Start: 2024-01-24 | End: 2024-01-24 | Stop reason: HOSPADM

## 2024-01-24 RX ORDER — ACALABRUTINIB 100 MG/1
400 TABLET, FILM COATED ORAL DAILY
Status: ON HOLD | COMMUNITY
End: 2024-01-24

## 2024-01-24 RX ADMIN — ROCURONIUM BROMIDE 50 MG: 10 INJECTION, SOLUTION INTRAVENOUS at 11:00

## 2024-01-24 RX ADMIN — ONDANSETRON 4 MG: 2 INJECTION INTRAMUSCULAR; INTRAVENOUS at 12:58

## 2024-01-24 RX ADMIN — PROPOFOL 50 MG: 10 INJECTION, EMULSION INTRAVENOUS at 11:24

## 2024-01-24 RX ADMIN — PROPOFOL 50 MG: 10 INJECTION, EMULSION INTRAVENOUS at 12:49

## 2024-01-24 RX ADMIN — Medication 1000 MG: at 12:25

## 2024-01-24 RX ADMIN — FENTANYL CITRATE 50 MCG: 0.05 INJECTION, SOLUTION INTRAMUSCULAR; INTRAVENOUS at 11:00

## 2024-01-24 RX ADMIN — LIDOCAINE HYDROCHLORIDE 50 MG: 10 INJECTION, SOLUTION EPIDURAL; INFILTRATION; INTRACAUDAL; PERINEURAL at 11:00

## 2024-01-24 RX ADMIN — SODIUM CHLORIDE, POTASSIUM CHLORIDE, SODIUM LACTATE AND CALCIUM CHLORIDE: 600; 310; 30; 20 INJECTION, SOLUTION INTRAVENOUS at 09:13

## 2024-01-24 RX ADMIN — ASPIRIN 81 MG: 81 TABLET, COATED ORAL at 09:14

## 2024-01-24 RX ADMIN — SUGAMMADEX 200 MG: 100 INJECTION, SOLUTION INTRAVENOUS at 12:58

## 2024-01-24 RX ADMIN — IPRATROPIUM BROMIDE AND ALBUTEROL SULFATE 1 DOSE: 2.5; .5 SOLUTION RESPIRATORY (INHALATION) at 10:02

## 2024-01-24 RX ADMIN — HEPARIN SODIUM 5000 UNITS: 1000 INJECTION, SOLUTION INTRAVENOUS; SUBCUTANEOUS at 11:48

## 2024-01-24 RX ADMIN — EPHEDRINE SULFATE 10 MG: 50 INJECTION INTRAMUSCULAR; INTRAVENOUS; SUBCUTANEOUS at 12:49

## 2024-01-24 RX ADMIN — PHENYLEPHRINE HYDROCHLORIDE 50 MCG/MIN: 10 INJECTION INTRAVENOUS at 11:05

## 2024-01-24 RX ADMIN — PROPOFOL 100 MG: 10 INJECTION, EMULSION INTRAVENOUS at 11:00

## 2024-01-24 RX ADMIN — FAMOTIDINE 20 MG: 20 TABLET, FILM COATED ORAL at 09:57

## 2024-01-24 ASSESSMENT — PAIN - FUNCTIONAL ASSESSMENT
PAIN_FUNCTIONAL_ASSESSMENT: NONE - DENIES PAIN

## 2024-01-24 ASSESSMENT — LIFESTYLE VARIABLES: SMOKING_STATUS: 1

## 2024-01-24 NOTE — INTERVAL H&P NOTE
Update History & Physical    The patient's History and Physical of January 23, 2024 was reviewed with the patient and I examined the patient. There was no change. The surgical site was confirmed by the patient and me.     Plan: The risks, benefits, expected outcome, and alternative to the recommended procedure have been discussed with the patient. Patient understands and wants to proceed with the procedure.     Electronically signed by Karen Morrow DO on 1/24/2024 at 10:51 AM

## 2024-01-24 NOTE — DISCHARGE INSTRUCTIONS
Peripheral Artery Angioplasty: What to Expect at Home  Your Recovery  Peripheral artery angioplasty (say \"eth-PUXR-fp-sharri GIBBS-ter-debbie MARTINHCH-stp-si-plass-renetta\") is a procedure that widens narrowed arteries in the pelvis or legs. Your doctor used a tube called a catheter to find narrowed arteries in your pelvis or legs and then widened them.  Your groin or leg may have a bruise or a small lump where the catheter was put in your groin. The area may feel sore for a day or two after the procedure. You can do light activities around the house but nothing strenuous for several days.  After surgery, blood may flow better throughout your leg. This can decrease leg pain, numbness, and cramping.  This care sheet gives you a general idea about how long it will take for you to recover. But each person recovers at a different pace. Follow the steps below to feel better as quickly as possible.  How can you care for yourself at home?  Activity    Do not do strenuous exercise and do not lift anything heavy until your doctor says it is okay. This may be for a day or two. You can walk around the house and do light activity, such as cooking.     Go back to regular exercise when your doctor says it is okay. Walking is a good choice.     If you work, you will probably need to take 1 or 2 days off. It depends on the type of work you do and how you feel.   Diet    You can eat your normal diet.     Drink plenty of fluids (unless your doctor tells you not to).   Medicines    Your doctor will tell you if and when you can restart your medicines. He or she will also give you instructions about taking any new medicines.     If you take aspirin or some other blood thinner, ask your doctor if and when to start taking it again. Make sure that you understand exactly what your doctor wants you to do.     Be safe with medicines. Take your medicines exactly as prescribed. Call your doctor if you think you are having a problem with your medicine.      Your doctor may prescribe a blood thinner when you go home. This helps prevent blood clots. Be sure you get instructions about how to take your medicine safely. Blood thinners can cause serious bleeding problems.   Care of the catheter site    Keep a bandage over the spot where the catheter was inserted for the first day, or for as long as your doctor recommends.     Put ice or a cold pack on the area for 10 to 20 minutes at a time to help with soreness or swelling. Do this every few hours. Put a thin cloth between the ice and your skin.     You may shower 24 to 48 hours after the procedure, if your doctor okays it. Pat the incision dry.     Do not soak the catheter site until it is healed. Don't take a bath for 1 week, or until your doctor tells you it is okay.     Watch for bleeding from the site. A small amount of blood (up to the size of a quarter) on the bandage can be normal.     If you are bleeding, lie down and press on the area for 15 minutes to try to make it stop. If the bleeding does not stop, call your doctor or seek immediate medical care.   Follow-up care is a key part of your treatment and safety. Be sure to make and go to all appointments, and call your doctor if you are having problems. It's also a good idea to know your test results and keep a list of the medicines you take.  When should you call for help?   Call 911 anytime you think you may need emergency care. For example, call if:    You passed out (lost consciousness).     You have severe trouble breathing.     You have sudden chest pain and shortness of breath, or you cough up blood.     Your groin is very swollen and you have a lump that is getting bigger under your skin where the catheter was put in.   Call your doctor now or seek immediate medical care if:    You have severe pain in your leg, or it becomes cold, pale, blue, tingly, or numb.     You are bleeding from the area where the catheter was put in your artery.     You have a

## 2024-01-24 NOTE — ANESTHESIA POSTPROCEDURE EVALUATION
Department of Anesthesiology  Postprocedure Note    Patient: Jong Loyd  MRN: [id number]  YOB: 1950  Date of evaluation: 1/24/2024    Procedure Summary     Date: 01/24/24 Room / Location: [facility] OR [address]    Anesthesia Start: 1054 Anesthesia Stop:     Procedure: INTRAOPERATIVE AORTOGRAM  WITH RUNOFF,  STENTING  OF LEFT SFA Diagnosis:       Atherosclerosis of native artery of both lower extremities with intermittent claudication (HCC)      (Atherosclerosis of native artery of both lower extremities with intermittent claudication (HCC) [I70.213])    Surgeons: Karen Morrow DO Responsible Provider: Lex Black APRN - CRNA    Anesthesia Type: general ASA Status: 3          Anesthesia Type: No value filed.    Malaika Phase I:      Malaika Phase II:      Anesthesia Post Evaluation    Patient location during evaluation: bedside  Level of consciousness: awake and alert  Pain score: 0  Airway patency: patent  Nausea & Vomiting: no nausea and no vomiting  Cardiovascular status: blood pressure returned to baseline  Respiratory status: acceptable  Hydration status: stable  Pain management: adequate        No notable events documented.

## 2024-01-24 NOTE — ANESTHESIA PRE PROCEDURE
CO2 31 01/22/2024 11:00 AM    BUN 18 01/22/2024 11:00 AM    CREATININE 1.1 01/22/2024 11:00 AM    GFRAA >59 05/13/2021 08:18 AM    LABGLOM >60 01/22/2024 11:00 AM    GLUCOSE 100 01/22/2024 11:00 AM    PROT 6.9 11/29/2023 09:30 AM    CALCIUM 9.4 01/22/2024 11:00 AM    BILITOT 0.4 11/29/2023 09:30 AM    ALKPHOS 80 11/29/2023 09:30 AM    AST 21 11/29/2023 09:30 AM    ALT 22 11/29/2023 09:30 AM       POC Tests: No results for input(s): \"POCGLU\", \"POCNA\", \"POCK\", \"POCCL\", \"POCBUN\", \"POCHEMO\", \"POCHCT\" in the last 72 hours.    Coags:   Lab Results   Component Value Date/Time    PROTIME 13.5 01/22/2024 11:00 AM    INR 1.06 01/22/2024 11:00 AM    APTT 28.5 01/22/2024 11:00 AM       HCG (If Applicable): No results found for: \"PREGTESTUR\", \"PREGSERUM\", \"HCG\", \"HCGQUANT\"     ABGs: No results found for: \"PHART\", \"PO2ART\", \"EKW7WOC\", \"NHW0KPX\", \"BEART\", \"T8EWQJYZ\"     Type & Screen (If Applicable):  No results found for: \"LABABO\", \"LABRH\"    Drug/Infectious Status (If Applicable):  No results found for: \"HIV\", \"HEPCAB\"    COVID-19 Screening (If Applicable):   Lab Results   Component Value Date/Time    COVID19 Not Detected 05/13/2021 08:35 AM           Anesthesia Evaluation  Patient summary reviewed   no history of anesthetic complications:   Airway: Mallampati: I  TM distance: >3 FB   Neck ROM: full  Mouth opening: > = 3 FB   Dental:    (+) edentulous      Pulmonary:   (+) pneumonia (finished antibiotics couple of weeks ago): resolved,  COPD:       wheezes (mild exp wheeze): LUF   current smoker (5-6 cigs per day)    (-) asthma and sleep apnea          Patient smoked on day of surgery.                 Cardiovascular:    (+) hypertension:, hyperlipidemia    (-) pacemaker, past MI, CAD, CABG/stent and dysrhythmias    ECG reviewed  Rhythm: regular  Rate: normal                    Neuro/Psych:   (+) neuromuscular disease:, TIA (years ago)   (-) seizures           GI/Hepatic/Renal:        (-) GERD, liver disease and no renal

## 2024-01-24 NOTE — OP NOTE
Operative Note      Patient: Jong Loyd  YOB: 1950  MRN: [id number]    Date of Procedure: 1/24/2024    Pre-Op Diagnosis Codes:     * Atherosclerosis of native artery of both lower extremities with intermittent claudication (HCC) [I70.213]    Post-Op Diagnosis: Same       Procedure(s):  INTRAOPERATIVE AORTOGRAM  WITH RUNOFF,  STENTING  OF LEFT SFA    Surgeon(s):  Karen Morrow DO    Assistant:   * No surgical staff found *    Anesthesia: General    Estimated Blood Loss (mL): less than 50     Complications: None    Specimens:   * No specimens in log *    Implants:  Implant Name Type Inv. Item Serial No.  Lot No. LRB No. Used Action   STENT PERIPH L150MM DIA5MM CATH L130CM BARE MTL HYBRID CELL - [id number] Peripheral stents STENT PERIPH L150MM DIA5MM CATH L130CM BARE MTL HYBRID CELL  [employer] [id number] N/A 1 Implanted         Drains:   Urinary Catheter 01/24/24 (Active)       Findings: Aorta dilated at the infrarenal portion, bilateral renal arteries patent.  Bilateral common iliac arteries with minimal plaque, bilateral external iliac arteries appear patent, right external iliac stent patent.  Left common femoral artery patent, profunda femoris patent, SFA with high-grade stenosis at the distal portion, above-knee popliteal artery with high-grade stenosis proximally, distal popliteal artery patent, one-vessel runoff.  Right common femoral artery patent, profunda femoris patent, SFA occluded at the distal portion with reconstitution to posterior tibial artery.  Posterior tibial artery runoff.        Detailed Description of Procedure:   Patient was brought to the hybrid operating room and placed in supine position.  His left leg in its entirety and right leg with the knee were prepped and draped in sterile fashion.  Timeout performed.  Right common femoral artery was accessed under continuous ultrasound guidance using sterile micropuncture technique.  5 [place] glide sheath  was inserted.  J-wire was folded proximally followed by Omni Flush catheter.  Using power injection aortogram with bilateral lower extremity runoff were performed with mentioned above findings.  Patient was given 5000 units heparin.  Using Omni Flush catheter and glide advantage wire and Omni Flush catheter those were navigated to the left SFA.  The catheter was removed and 5 x 70 Raabi sheath was placed.  Glide advantage wire was exchanged to the V18 wire using Ritchie cross catheter.  Using catheter injection we defined area of stenosis.  We then performed radiation to the stenotic area using Rodriguez cross catheter and V 18 wire.  It was then confirmed to be intravascular.  We then performed balloon angioplasty of the distal SFA/above-knee popliteal artery using 4 x 100 Sebatsian balloon, it was followed by 4 x 150 drug-coated balloon.  Post intervention angiogram showed area of dissection at the stenotic side and we decided to place the stent.  We then exchanged our sheath over glide advantage wire to 6 x 45 destination Terumo sheath.  Then 5 x 150 Innova stent was successfully deployed.  It was then postdilated using 4 x 150  balloon.  There was area of stenotic portion appeared to be spasm just above the stent and was then dilated using the same balloon lower inflation.  We also injected 300 mcg of nitroglycerin.  There was good resolution of stenosis, no signs of embolization with good outflow via posterior tibial artery supplying digital arteries.  Devices were removed and manual pressure held until hemostasis.  Patient tolerated procedure well and was transferred to PACU in stable condition.    Electronically signed by Karen Morrow DO on 1/24/2024 at 1:20 PM

## 2024-01-29 ENCOUNTER — TELEPHONE (OUTPATIENT)
Dept: HEMATOLOGY | Age: 74
End: 2024-01-29

## 2024-01-29 NOTE — TELEPHONE ENCOUNTER
Called pt to remind them of their appt on 1/31/2024 but was unable to leave message or speak to the patient due to invalid number or number was disconnected.

## 2024-01-30 NOTE — PROGRESS NOTES
focal staining for cytokeratin 5/6. HCG stains scattered cells with trophoblastic differentiation. NKX3.1 and P501S are negative.   6/17/2022: Completed induction BCG  8/19/2022: cysview TURBT at [facility]: Chronic inflammation, reactive atypia, benign  10/21/2022: Completed 3-month m BCG  1/27/2023: Completed 6-month m BCG  4/16/2023 CT urogram: Multiple enlarged lymph nodes in the abdomen and pelvis, slightly increased in size since 4/15/2021  7/17/2023 cystoscopy [facility] by Dr. Maradiaga: Negative, urine cytology: Negative  BCG 8/18/2023, 8/25/2023, 9/1/2023  10/23/2023- ancipitate cystoscopy      Past Medical History:   Diagnosis Date    Arthritis     Benign prostate hyperplasia     Bladder cancer (HCC) 03/15/2022    Carotid stenosis     CLL (chronic lymphocytic leukemia) (HCC) 03/10/2021    Colon polyp     COPD (chronic obstructive pulmonary disease) (HCC)     Cubital tunnel syndrome     Hyperlipidemia     Hypertension     IBS (irritable bowel syndrome)     Peripheral vascular disease (HCC)     Thyroid disease     TIA (transient ischemic attack) 2014    Left side weakness     Past Surgical History:   Procedure Laterality Date    COLONOSCOPY  01/2020    ELBOW SURGERY Right     Removal of spot on elbow    MOUTH SURGERY      Removal of permanent teeth    OTHER SURGICAL HISTORY Left 01/15/2024    Stent placed in L leg    UPPER GASTROINTESTINAL ENDOSCOPY  01/2020    VASCULAR SURGERY Right     bilateral iliofemoral arteriogram and bilateral lower extremity arteriogram 3.  Right external iliac artery stent (Icast 7 mm x 38 mm covered balloon expandable stent) 4.  Selective right lower extremity arteriograms 5.  Atherectomy right popliteal artery behind the knee with jetstream 2.0/3.0 6.  Balloon angioplasty right popliteal artery with 4 mm    VASCULAR SURGERY Right     SLU with stents ? right iliac    VASCULAR SURGERY N/A 01/24/2024    INTRAOPERATIVE AORTOGRAM  WITH RUNOFF,  STENTING  OF LEFT SFA performed by Cristhian

## 2024-01-31 ENCOUNTER — OFFICE VISIT (OUTPATIENT)
Dept: HEMATOLOGY | Age: 74
End: 2024-01-31
Payer: OTHER GOVERNMENT

## 2024-01-31 ENCOUNTER — OFFICE VISIT (OUTPATIENT)
Dept: VASCULAR SURGERY | Age: 74
End: 2024-01-31
Payer: OTHER GOVERNMENT

## 2024-01-31 ENCOUNTER — HOSPITAL ENCOUNTER (OUTPATIENT)
Dept: VASCULAR LAB | Age: 74
Discharge: HOME | End: 2024-01-31
Payer: OTHER GOVERNMENT

## 2024-01-31 ENCOUNTER — HOSPITAL ENCOUNTER (OUTPATIENT)
Dept: INFUSION THERAPY | Age: 74
Discharge: HOME | End: 2024-01-31
Payer: OTHER GOVERNMENT

## 2024-01-31 VITALS
OXYGEN SATURATION: 87 % | TEMPERATURE: 98.1 F | SYSTOLIC BLOOD PRESSURE: 120 MMHG | DIASTOLIC BLOOD PRESSURE: 65 MMHG | HEART RATE: 100 BPM

## 2024-01-31 VITALS
BODY MASS INDEX: 19.18 KG/M2 | SYSTOLIC BLOOD PRESSURE: 128 MMHG | HEIGHT: 69 IN | DIASTOLIC BLOOD PRESSURE: 68 MMHG | TEMPERATURE: 98.1 F | WEIGHT: 129.5 LBS | HEART RATE: 79 BPM | OXYGEN SATURATION: 98 %

## 2024-01-31 DIAGNOSIS — D64.9 NORMOCYTIC ANEMIA: ICD-10-CM

## 2024-01-31 DIAGNOSIS — I70.213 ATHEROSCLER OF NATIVE ARTERY OF BOTH LEGS WITH INTERMIT CLAUDICATION (HCC): ICD-10-CM

## 2024-01-31 DIAGNOSIS — C91.10 CLL (CHRONIC LYMPHOCYTIC LEUKEMIA) (HCC): Primary | ICD-10-CM

## 2024-01-31 DIAGNOSIS — Z85.51 HISTORY OF BLADDER CANCER: ICD-10-CM

## 2024-01-31 DIAGNOSIS — R21 RASH, SKIN: ICD-10-CM

## 2024-01-31 DIAGNOSIS — Z79.899 HIGH RISK MEDICATION USE: ICD-10-CM

## 2024-01-31 DIAGNOSIS — I70.213 ATHEROSCLER OF NATIVE ARTERY OF BOTH LEGS WITH INTERMIT CLAUDICATION (HCC): Primary | ICD-10-CM

## 2024-01-31 DIAGNOSIS — C91.10 CLL (CHRONIC LYMPHOCYTIC LEUKEMIA) (HCC): ICD-10-CM

## 2024-01-31 DIAGNOSIS — Z72.0 TOBACCO ABUSE: ICD-10-CM

## 2024-01-31 DIAGNOSIS — R91.1 NODULE OF LEFT LUNG: ICD-10-CM

## 2024-01-31 DIAGNOSIS — Z71.89 CARE PLAN DISCUSSED WITH PATIENT: ICD-10-CM

## 2024-01-31 DIAGNOSIS — Z51.11 CHEMOTHERAPY MANAGEMENT, ENCOUNTER FOR: ICD-10-CM

## 2024-01-31 LAB
BASOPHILS # BLD: 0.05 K/UL (ref 0.01–0.08)
BASOPHILS NFR BLD: 0.7 % (ref 0.1–1.2)
EOSINOPHIL # BLD: 0.12 K/UL (ref 0.04–0.54)
EOSINOPHIL NFR BLD: 1.7 % (ref 0.7–7)
ERYTHROCYTE [DISTWIDTH] IN BLOOD BY AUTOMATED COUNT: 14.6 % (ref 11.6–14.4)
HCT VFR BLD AUTO: 33.6 % (ref 40.1–51)
HGB BLD-MCNC: 10.4 G/DL (ref 13.7–17.5)
LYMPHOCYTES # BLD: 2.51 K/UL (ref 1.18–3.74)
LYMPHOCYTES NFR BLD: 36.5 % (ref 19.3–53.1)
MCH RBC QN AUTO: 28 PG (ref 25.7–32.2)
MCHC RBC AUTO-ENTMCNC: 31 G/DL (ref 32.3–36.5)
MCV RBC AUTO: 90.3 FL (ref 79–92.2)
MONOCYTES # BLD: 0.68 K/UL (ref 0.24–0.82)
MONOCYTES NFR BLD: 9.9 % (ref 4.7–12.5)
NEUTROPHILS # BLD: 3.49 K/UL (ref 1.56–6.13)
NEUTS SEG NFR BLD: 50.8 % (ref 34–71.1)
PLATELET # BLD AUTO: 195 K/UL (ref 163–337)
PMV BLD AUTO: 10.2 FL (ref 7.4–10.4)
RBC # BLD AUTO: 3.72 M/UL (ref 4.63–6.08)
WBC # BLD AUTO: 6.88 K/UL (ref 4.23–9.07)

## 2024-01-31 PROCEDURE — 1123F ACP DISCUSS/DSCN MKR DOCD: CPT | Performed by: NURSE PRACTITIONER

## 2024-01-31 PROCEDURE — 3078F DIAST BP <80 MM HG: CPT | Performed by: NURSE PRACTITIONER

## 2024-01-31 PROCEDURE — 93922 UPR/L XTREMITY ART 2 LEVELS: CPT

## 2024-01-31 PROCEDURE — 3074F SYST BP LT 130 MM HG: CPT | Performed by: NURSE PRACTITIONER

## 2024-01-31 PROCEDURE — 99213 OFFICE O/P EST LOW 20 MIN: CPT | Performed by: NURSE PRACTITIONER

## 2024-01-31 PROCEDURE — 85025 COMPLETE CBC W/AUTO DIFF WBC: CPT

## 2024-01-31 PROCEDURE — 36415 COLL VENOUS BLD VENIPUNCTURE: CPT

## 2024-01-31 PROCEDURE — 99212 OFFICE O/P EST SF 10 MIN: CPT

## 2024-01-31 PROCEDURE — 99214 OFFICE O/P EST MOD 30 MIN: CPT | Performed by: NURSE PRACTITIONER

## 2024-01-31 ASSESSMENT — ENCOUNTER SYMPTOMS
NAUSEA: 0
EYE ITCHING: 0
ABDOMINAL PAIN: 0
COUGH: 0
DIARRHEA: 0
VOMITING: 0
EYE DISCHARGE: 0
SORE THROAT: 0
SHORTNESS OF BREATH: 0
CONSTIPATION: 0
WHEEZING: 0
TROUBLE SWALLOWING: 0

## 2024-02-02 DIAGNOSIS — C91.10 CLL (CHRONIC LYMPHOCYTIC LEUKEMIA) (HCC): Primary | ICD-10-CM

## 2024-02-26 ENCOUNTER — TELEPHONE (OUTPATIENT)
Dept: VASCULAR SURGERY | Age: 74
End: 2024-02-26

## 2024-02-26 NOTE — TELEPHONE ENCOUNTER
Patient is requesting a return call from the office in regards to his telephone visit on 2/28. Patient has a ENT appt  around the same time. Patient ask if his telephone visit could be pushed back to that afternoon. Please return his call.    Thank yo!

## 2024-02-28 ENCOUNTER — SCHEDULED TELEPHONE ENCOUNTER (OUTPATIENT)
Dept: VASCULAR SURGERY | Age: 74
End: 2024-02-28
Payer: OTHER GOVERNMENT

## 2024-02-28 ENCOUNTER — OFFICE VISIT (OUTPATIENT)
Dept: ENT CLINIC | Age: 74
End: 2024-02-28
Payer: OTHER GOVERNMENT

## 2024-02-28 VITALS
WEIGHT: 125 LBS | DIASTOLIC BLOOD PRESSURE: 60 MMHG | HEIGHT: 69 IN | SYSTOLIC BLOOD PRESSURE: 100 MMHG | BODY MASS INDEX: 18.51 KG/M2

## 2024-02-28 DIAGNOSIS — I70.213 ATHEROSCLER OF NATIVE ARTERY OF BOTH LEGS WITH INTERMIT CLAUDICATION (HCC): Primary | ICD-10-CM

## 2024-02-28 DIAGNOSIS — Z91.09 ENVIRONMENTAL ALLERGIES: Primary | ICD-10-CM

## 2024-02-28 PROCEDURE — 1123F ACP DISCUSS/DSCN MKR DOCD: CPT | Performed by: NURSE PRACTITIONER

## 2024-02-28 PROCEDURE — 3074F SYST BP LT 130 MM HG: CPT | Performed by: NURSE PRACTITIONER

## 2024-02-28 PROCEDURE — 99203 OFFICE O/P NEW LOW 30 MIN: CPT | Performed by: NURSE PRACTITIONER

## 2024-02-28 PROCEDURE — 3078F DIAST BP <80 MM HG: CPT | Performed by: NURSE PRACTITIONER

## 2024-02-28 PROCEDURE — 99442 PR PHYS/QHP TELEPHONE EVALUATION 11-20 MIN: CPT | Performed by: NURSE PRACTITIONER

## 2024-02-28 RX ORDER — TAMSULOSIN HYDROCHLORIDE 0.4 MG/1
CAPSULE ORAL
COMMUNITY
Start: 2021-10-28

## 2024-02-28 RX ORDER — IPRATROPIUM BROMIDE 21 UG/1
2 SPRAY, METERED NASAL EVERY 12 HOURS
Qty: 30 ML | Refills: 3 | Status: SHIPPED | OUTPATIENT
Start: 2024-02-28

## 2024-02-28 RX ORDER — HYDROCHLOROTHIAZIDE 25 MG/1
TABLET ORAL
COMMUNITY

## 2024-02-28 ASSESSMENT — ENCOUNTER SYMPTOMS
ALLERGIC/IMMUNOLOGIC NEGATIVE: 1
GASTROINTESTINAL NEGATIVE: 1
RHINORRHEA: 1
EYES NEGATIVE: 1
RESPIRATORY NEGATIVE: 1

## 2024-02-28 NOTE — PROGRESS NOTES
Jong Loyd is a 74 y.o. male evaluated via telephone on 2/28/2024 for Follow-up  .       Consent:  He and/or health care decision maker is aware that that he may receive a bill for this telephone service, depending on his insurance coverage, and has provided verbal consent to proceed: Yes    Patient is located at home  Provider is located at [facility]   Also present during call is no one    Jong has a history of peripheral vascular disease of the lower extremities.  He has had this for 1 - 5 years. Current treatment includes clopidogrel 75 mg po qd, ASA EC daily.  Jong has new wounds. Recently, he reports claudication at a distance of  which varies.  Jong reports that the right leg is more signifcant than the left .  He reports claudication is worsened and is mostly in the form of crampy type pain starting in the thighs. He has a short recovery time. This is reproducible in nature. He reports ischemic rest pain 0 times per night.  He reports walking with cart does not help.  He also has back issues.  has wound on left foot.  He is now recovered from intervention on left leg and is ready to proceed with right leg.       I have personally reviewed the following: problem list, current meds, allergies, PMH, PSH, family hx, and social hx  Jong Loyd is a 74 y.o. male with the following history as recorded in [facility]:  Patient Active Problem List    Diagnosis Date Noted    Atherosclerosis with claudication of extremity (HCC) 05/17/2021    Benign essential HTN 05/17/2021    BPH (benign prostatic hyperplasia) 05/17/2021    Pulmonary emphysema (HCC) 05/17/2021    Carotid stenosis, asymptomatic, bilateral 05/17/2021    CLL (chronic lymphocytic leukemia) ([facility]) 05/17/2021    PVD (peripheral vascular disease) ([facility])      Current Outpatient Medications   Medication Sig Dispense Refill    hydroCHLOROthiazide (HYDRODIURIL) 25 MG tablet Take by mouth      tamsulosin (FLOMAX) 0.4 MG capsule Take 1 capsule every day

## 2024-02-28 NOTE — PROGRESS NOTES
2024    Jong Loyd (:  1950) is a 74 y.o. male, Established patient, here for evaluation of the following chief complaint(s):  New Patient (Rhinorrhea )      Vitals:    24 0913   BP: 100/60   Weight: 56.7 kg (125 lb)   Height: 1.753 m (5' 9\")       Wt Readings from Last 3 Encounters:   24 56.7 kg (125 lb)   24 58.7 kg (129 lb 8 oz)   24 58.5 kg (129 lb)       BP Readings from Last 3 Encounters:   24 100/60   24 120/65   24 128/68         SUBJECTIVE/OBJECTIVE:    Patient seen today for his nose. He states that for 3 years he has had constant clear drainage from his nose. He states it is so bad he has to constantly carry a tissue. He denies nasal congestion or drainage down his throat. He states that he has previously seen an allergist that gave him multiple allergy medications that did not help. He is currently only taking montelukast. He states he has tried Flonase, loratidine, Zyrtec, and Benadryl without any improvement.         Review of Systems   Constitutional: Negative.    HENT:  Positive for rhinorrhea.    Eyes: Negative.    Respiratory: Negative.     Cardiovascular: Negative.    Gastrointestinal: Negative.    Endocrine: Negative.    Musculoskeletal: Negative.    Skin: Negative.    Allergic/Immunologic: Negative.    Neurological: Negative.    Hematological: Negative.    Psychiatric/Behavioral: Negative.          Physical Exam  Vitals reviewed.   Constitutional:       Appearance: Normal appearance. He is normal weight.   HENT:      Head: Normocephalic and atraumatic.      Right Ear: External ear normal.      Left Ear: External ear normal.      Nose: Mucosal edema and rhinorrhea present. Rhinorrhea is clear.      Mouth/Throat:      Mouth: Mucous membranes are moist.      Pharynx: Oropharynx is clear.   Eyes:      Extraocular Movements: Extraocular movements intact.      Pupils: Pupils are equal, round, and reactive to light.   Cardiovascular:

## 2024-02-29 RX ORDER — SODIUM CHLORIDE 9 MG/ML
INJECTION, SOLUTION INTRAVENOUS PRN
OUTPATIENT
Start: 2024-02-29

## 2024-02-29 RX ORDER — SODIUM CHLORIDE 0.9 % (FLUSH) 0.9 %
5-40 SYRINGE (ML) INJECTION EVERY 12 HOURS SCHEDULED
OUTPATIENT
Start: 2024-02-29

## 2024-02-29 RX ORDER — ASPIRIN 81 MG/1
81 TABLET ORAL ONCE
OUTPATIENT
Start: 2024-02-29 | End: 2024-02-29

## 2024-02-29 RX ORDER — SODIUM CHLORIDE 0.9 % (FLUSH) 0.9 %
5-40 SYRINGE (ML) INJECTION PRN
OUTPATIENT
Start: 2024-02-29

## 2024-02-29 NOTE — H&P (VIEW-ONLY)
Jong Loyd is a 74 y.o. male evaluated via telephone on 2/28/2024 for Follow-up  .       Consent:  He and/or health care decision maker is aware that that he may receive a bill for this telephone service, depending on his insurance coverage, and has provided verbal consent to proceed: Yes    Patient is located at home  Provider is located at [facility]   Also present during call is no one    Jong has a history of peripheral vascular disease of the lower extremities.  He has had this for 1 - 5 years. Current treatment includes clopidogrel 75 mg po qd, ASA EC daily.  Jong has new wounds. Recently, he reports claudication at a distance of  which varies.  Jong reports that the right leg is more signifcant than the left .  He reports claudication is worsened and is mostly in the form of crampy type pain starting in the thighs. He has a short recovery time. This is reproducible in nature. He reports ischemic rest pain 0 times per night.  He reports walking with cart does not help.  He also has back issues.  has wound on left foot.  He is now recovered from intervention on left leg and is ready to proceed with right leg.       I have personally reviewed the following: problem list, current meds, allergies, PMH, PSH, family hx, and social hx  Jong Loyd is a 74 y.o. male with the following history as recorded in [facility]:  Patient Active Problem List    Diagnosis Date Noted    Atherosclerosis with claudication of extremity (HCC) 05/17/2021    Benign essential HTN 05/17/2021    BPH (benign prostatic hyperplasia) 05/17/2021    Pulmonary emphysema (HCC) 05/17/2021    Carotid stenosis, asymptomatic, bilateral 05/17/2021    CLL (chronic lymphocytic leukemia) ([facility]) 05/17/2021    PVD (peripheral vascular disease) ([facility])      Current Outpatient Medications   Medication Sig Dispense Refill    hydroCHLOROthiazide (HYDRODIURIL) 25 MG tablet Take by mouth      tamsulosin (FLOMAX) 0.4 MG capsule Take 1 capsule every day

## 2024-02-29 NOTE — H&P
of treatment  Assessment    1. 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   Details of this discussion including any medical advice provided: as above      I affirm this is a Patient Initiated Episode with a Patient who has not had a related appointment within my department in the past 7 days or scheduled within the next 24 hours.    Patient identification was verified at the start of the visit: Yes    Total Time: minutes: 11-20 minutes    Jong Loyd was evaluated through a synchronous (real-time) audio encounter. Patient identification was verified at the start of the visit. He (or guardian if applicable) is aware that this is a billable service, which includes applicable co-pays. This visit was conducted with the patient's (and/or legal guardian's) verbal consent. He has not had a related appointment within my department in the past 7 days or scheduled within the next 24 hours.   The patient was located at Home: [address] [place] [address] [place] [address].  The provider was located at Facility (Appt Dept): [address] [place] [address].  [address]  [place] [address].    Note: not billable if this call serves to triage the patient into an appointment for the relevant concern    DESMOND Gastelum

## 2024-03-01 DIAGNOSIS — Z01.818 PRE-OP TESTING: Primary | ICD-10-CM

## 2024-03-05 ENCOUNTER — TELEPHONE (OUTPATIENT)
Dept: VASCULAR SURGERY | Age: 74
End: 2024-03-05

## 2024-03-05 NOTE — TELEPHONE ENCOUNTER
Called and spoke to the patient to let him know the following.  The patient acknowledged.  I am calling [facility] per his request.         Left a voicemail for the patient to return my call to discuss surgery instructions.          Jong Loyd    Surgery Directions    Report to the outpatient registration at [facility] on Monday,        03/18/2024 @ 6:00 AM.  Nothing to eat or drink after midnight the night before the procedure.  Please take all medications as normally scheduled to take unless listed below with a sip of water.  Do not take hydrochlorothiazide or losartan the morning of the procedure.   You will need to use Bactroban in your nose 5 days prior to surgery.  This will be called to your local pharmacy on file.   If you have sleep apnea and require C-PAP, please bring this with you to the hospital.  Bring a list of all of your allergies and medications with you to the hospital.  Please let our nurse know if you have had an allergy to iodine, shellfish, or x-ray dye.  Let the nurse know if you take any of the following:  Over the counter herbal supplements  Diclofenec, indomethacin, ketoprofen, Caridopa/levadopa, naproxen, sulindac, piroxicam, glucosamine, Chondrotin, cocchine, or methotrexate.  Plan to stay at the hospital for 4 - 6 hours before being released  by the physician. You will need someone to drive you home after the procedure.  Please register at the outpatient area of the [facility] on 03/14/2024 @ 1:00 PM for pre-op testing.  You will not need to be fasting prior to this appointment.   12. Other Directions: For any questions or concerns please contact our office @        [phone] and ask to speak with Dorothea.   13.  You will need a  to take you home.  14.  Follow up appt: 04/01/2024 @ 11:00 AM with Dorothy.     Unless instructed otherwise by your physician, cleanse incision/puncture site twice daily with soap and water.  Apply dry gauze.

## 2024-03-12 RX ORDER — MONTELUKAST SODIUM 10 MG/1
10 TABLET ORAL DAILY
Qty: 30 TABLET | Refills: 3 | Status: SHIPPED | OUTPATIENT
Start: 2024-03-12

## 2024-03-13 ENCOUNTER — HOSPITAL ENCOUNTER (OUTPATIENT)
Dept: INFUSION THERAPY | Age: 74
Discharge: HOME | End: 2024-03-13
Payer: OTHER GOVERNMENT

## 2024-03-13 DIAGNOSIS — D64.9 NORMOCYTIC ANEMIA: ICD-10-CM

## 2024-03-13 DIAGNOSIS — C91.10 CLL (CHRONIC LYMPHOCYTIC LEUKEMIA) (HCC): ICD-10-CM

## 2024-03-13 LAB
BASOPHILS # BLD: 0.04 K/UL (ref 0.01–0.08)
BASOPHILS NFR BLD: 0.3 % (ref 0.1–1.2)
EOSINOPHIL # BLD: 0.07 K/UL (ref 0.04–0.54)
EOSINOPHIL NFR BLD: 0.5 % (ref 0.7–7)
ERYTHROCYTE [DISTWIDTH] IN BLOOD BY AUTOMATED COUNT: 14.4 % (ref 11.6–14.4)
FERRITIN SERPL-MCNC: 70.7 NG/ML (ref 30–400)
HCT VFR BLD AUTO: 44.3 % (ref 40.1–51)
HGB BLD-MCNC: 14.1 G/DL (ref 13.7–17.5)
IRON SATN MFR SERPL: 19 % (ref 14–50)
IRON SERPL-MCNC: 55 UG/DL (ref 59–158)
LYMPHOCYTES # BLD: 7.12 K/UL (ref 1.18–3.74)
LYMPHOCYTES NFR BLD: 52.7 % (ref 19.3–53.1)
MCH RBC QN AUTO: 27.9 PG (ref 25.7–32.2)
MCHC RBC AUTO-ENTMCNC: 31.8 G/DL (ref 32.3–36.5)
MCV RBC AUTO: 87.5 FL (ref 79–92.2)
MONOCYTES # BLD: 0.89 K/UL (ref 0.24–0.82)
MONOCYTES NFR BLD: 6.6 % (ref 4.7–12.5)
NEUTROPHILS # BLD: 5.32 K/UL (ref 1.56–6.13)
NEUTS SEG NFR BLD: 39.3 % (ref 34–71.1)
PLATELET # BLD AUTO: 216 K/UL (ref 163–337)
PMV BLD AUTO: 10.5 FL (ref 7.4–10.4)
RBC # BLD AUTO: 5.06 M/UL (ref 4.63–6.08)
TIBC SERPL-MCNC: 285 UG/DL (ref 250–400)
WBC # BLD AUTO: 13.52 K/UL (ref 4.23–9.07)

## 2024-03-13 PROCEDURE — 85025 COMPLETE CBC W/AUTO DIFF WBC: CPT

## 2024-03-13 PROCEDURE — 36415 COLL VENOUS BLD VENIPUNCTURE: CPT

## 2024-03-14 ENCOUNTER — HOSPITAL ENCOUNTER (OUTPATIENT)
Dept: GENERAL RADIOLOGY | Age: 74
Discharge: HOME | End: 2024-03-14
Payer: OTHER GOVERNMENT

## 2024-03-14 ENCOUNTER — HOSPITAL ENCOUNTER (OUTPATIENT)
Dept: PREADMISSION TESTING | Age: 74
Discharge: HOME | End: 2024-03-18
Payer: OTHER GOVERNMENT

## 2024-03-14 VITALS — WEIGHT: 125 LBS | BODY MASS INDEX: 18.46 KG/M2

## 2024-03-14 DIAGNOSIS — Z01.818 PRE-OP TESTING: ICD-10-CM

## 2024-03-14 LAB
ABO/RH: NORMAL
ANION GAP SERPL CALCULATED.3IONS-SCNC: 10 MMOL/L (ref 7–19)
ANTIBODY SCREEN: NORMAL
APTT PPP: 25.5 SEC (ref 26–36.2)
BUN SERPL-MCNC: 14 MG/DL (ref 8–23)
CALCIUM SERPL-MCNC: 9.4 MG/DL (ref 8.8–10.2)
CHLORIDE SERPL-SCNC: 101 MMOL/L (ref 98–111)
CO2 SERPL-SCNC: 31 MMOL/L (ref 22–29)
CREAT SERPL-MCNC: 0.8 MG/DL (ref 0.5–1.2)
ERYTHROCYTE [DISTWIDTH] IN BLOOD BY AUTOMATED COUNT: 14.3 % (ref 11.5–14.5)
GLUCOSE SERPL-MCNC: 83 MG/DL (ref 74–109)
HCT VFR BLD AUTO: 44.2 % (ref 42–52)
HGB BLD-MCNC: 14.4 G/DL (ref 14–18)
INR PPP: 0.96 (ref 0.88–1.18)
MCH RBC QN AUTO: 28 PG (ref 27–31)
MCHC RBC AUTO-ENTMCNC: 32.6 G/DL (ref 33–37)
MCV RBC AUTO: 85.8 FL (ref 80–94)
MRSA DNA SPEC QL NAA+PROBE: NOT DETECTED
PLATELET # BLD AUTO: 246 K/UL (ref 130–400)
PMV BLD AUTO: 10.7 FL (ref 9.4–12.4)
POTASSIUM SERPL-SCNC: 3.8 MMOL/L (ref 3.5–5)
PROTHROMBIN TIME: 12.5 SEC (ref 12–14.6)
RBC # BLD AUTO: 5.15 M/UL (ref 4.7–6.1)
SODIUM SERPL-SCNC: 142 MMOL/L (ref 136–145)
WBC # BLD AUTO: 13.2 K/UL (ref 4.8–10.8)

## 2024-03-14 PROCEDURE — 85610 PROTHROMBIN TIME: CPT

## 2024-03-14 PROCEDURE — 93005 ELECTROCARDIOGRAM TRACING: CPT

## 2024-03-14 PROCEDURE — 80048 BASIC METABOLIC PNL TOTAL CA: CPT

## 2024-03-14 PROCEDURE — 85027 COMPLETE CBC AUTOMATED: CPT

## 2024-03-14 PROCEDURE — 86901 BLOOD TYPING SEROLOGIC RH(D): CPT

## 2024-03-14 PROCEDURE — 87641 MR-STAPH DNA AMP PROBE: CPT

## 2024-03-14 PROCEDURE — 85730 THROMBOPLASTIN TIME PARTIAL: CPT

## 2024-03-14 PROCEDURE — 86900 BLOOD TYPING SEROLOGIC ABO: CPT

## 2024-03-14 PROCEDURE — 71046 X-RAY EXAM CHEST 2 VIEWS: CPT

## 2024-03-14 PROCEDURE — 86850 RBC ANTIBODY SCREEN: CPT

## 2024-03-14 NOTE — DISCHARGE INSTRUCTIONS
PREOPERATIVE GUIDELINES WHEN RECEIVING ANESTHESIA    Do not eat or drink anything after midnight, the night before your surgery. No gum or candy the morning of surgery.  This is extremely important for your safety.    Take a bath (or shower) the night before your surgery and you may brush your teeth the morning of your surgery.    You will be scheduled to arrive at the hospital 2 hours before your surgery, or follow your surgeon's instructions.    Dress comfortably.  Wear loose clothing that will be easy to remove and comfortable for your trip home.    You may wear eyeglasses or contacts but bring your cases with you as they must be remove before your surgery.    Hearing aids and dentures will need to be removed before your surgery.    Do not wear any jewelry, including body jewelry.  All jewelry will need to be removed prior to your surgery.    Do not wear fingernail polish or make-up.    It is best not to bring any valuables with you.    If you are to stay in the hospital overnight, bring your robe, slippers and personal toiletries that you may need.      POSTOPERATIVE GUIDELINES AFTER RECEIVING ANESTHESIA    If you are to go home after your surgery, you will need a responsible adult to drive you home.     You will not be able to take public transportation after your discharge from the Operative Care Unit unless you are accompanied by a        responsible adult.    On returning home, be sure to follow your physician's orders regarding diet, activity and medications.    Remember, surgery with general anesthesia or sedation may leave you sleepy, very tired and with a decreased appetite for 12 to 24 hours.    If you develop any post-surgical complications or problems, call your surgeon or [facility] ([phone]).        The day before surgery you will receive a phone call from the surgery nurse to let you know what time to arrive on the day of surgery. This call will usually be between 2-4 PM. If

## 2024-03-15 LAB
EKG P AXIS: 60 DEGREES
EKG P-R INTERVAL: 172 MS
EKG Q-T INTERVAL: 428 MS
EKG QRS DURATION: 92 MS
EKG QTC CALCULATION (BAZETT): 435 MS
EKG T AXIS: 73 DEGREES

## 2024-03-18 ENCOUNTER — ANESTHESIA EVENT (OUTPATIENT)
Dept: OPERATING ROOM | Age: 74
End: 2024-03-18
Payer: OTHER GOVERNMENT

## 2024-03-18 ENCOUNTER — HOSPITAL ENCOUNTER (OUTPATIENT)
Age: 74
Setting detail: OUTPATIENT SURGERY
Discharge: HOME | End: 2024-03-18
Attending: SURGERY | Admitting: SURGERY
Payer: OTHER GOVERNMENT

## 2024-03-18 ENCOUNTER — APPOINTMENT (OUTPATIENT)
Dept: INTERVENTIONAL RADIOLOGY/VASCULAR | Age: 74
End: 2024-03-18
Attending: SURGERY
Payer: OTHER GOVERNMENT

## 2024-03-18 ENCOUNTER — ANESTHESIA (OUTPATIENT)
Dept: OPERATING ROOM | Age: 74
End: 2024-03-18
Payer: OTHER GOVERNMENT

## 2024-03-18 VITALS
BODY MASS INDEX: 18.51 KG/M2 | HEIGHT: 69 IN | TEMPERATURE: 97.8 F | WEIGHT: 125 LBS | OXYGEN SATURATION: 97 % | HEART RATE: 60 BPM | RESPIRATION RATE: 18 BRPM | DIASTOLIC BLOOD PRESSURE: 63 MMHG | SYSTOLIC BLOOD PRESSURE: 105 MMHG

## 2024-03-18 LAB
ABO/RH: NORMAL
ANTIBODY SCREEN: NORMAL

## 2024-03-18 PROCEDURE — 75710 ARTERY X-RAYS ARM/LEG: CPT

## 2024-03-18 PROCEDURE — 6360000002 HC RX W HCPCS: Performed by: NURSE PRACTITIONER

## 2024-03-18 PROCEDURE — 3700000001 HC ADD 15 MINUTES (ANESTHESIA): Performed by: SURGERY

## 2024-03-18 PROCEDURE — 86900 BLOOD TYPING SEROLOGIC ABO: CPT

## 2024-03-18 PROCEDURE — 2580000003 HC RX 258: Performed by: ANESTHESIOLOGY

## 2024-03-18 PROCEDURE — 3700000000 HC ANESTHESIA ATTENDED CARE: Performed by: SURGERY

## 2024-03-18 PROCEDURE — 3600000007 HC SURGERY HYBRID BASE: Performed by: SURGERY

## 2024-03-18 PROCEDURE — 2580000003 HC RX 258: Performed by: NURSE ANESTHETIST, CERTIFIED REGISTERED

## 2024-03-18 PROCEDURE — C1725 CATH, TRANSLUMIN NON-LASER: HCPCS | Performed by: SURGERY

## 2024-03-18 PROCEDURE — C1760 CLOSURE DEV, VASC: HCPCS | Performed by: SURGERY

## 2024-03-18 PROCEDURE — A4217 STERILE WATER/SALINE, 500 ML: HCPCS | Performed by: SURGERY

## 2024-03-18 PROCEDURE — 86850 RBC ANTIBODY SCREEN: CPT

## 2024-03-18 PROCEDURE — 6370000000 HC RX 637 (ALT 250 FOR IP): Performed by: NURSE PRACTITIONER

## 2024-03-18 PROCEDURE — 2580000003 HC RX 258: Performed by: SURGERY

## 2024-03-18 PROCEDURE — C1894 INTRO/SHEATH, NON-LASER: HCPCS | Performed by: SURGERY

## 2024-03-18 PROCEDURE — C1887 CATHETER, GUIDING: HCPCS | Performed by: SURGERY

## 2024-03-18 PROCEDURE — 6360000002 HC RX W HCPCS: Performed by: ANESTHESIOLOGY

## 2024-03-18 PROCEDURE — 6360000002 HC RX W HCPCS: Performed by: NURSE ANESTHETIST, CERTIFIED REGISTERED

## 2024-03-18 PROCEDURE — 7100000000 HC PACU RECOVERY - FIRST 15 MIN: Performed by: SURGERY

## 2024-03-18 PROCEDURE — 7100000010 HC PHASE II RECOVERY - FIRST 15 MIN: Performed by: SURGERY

## 2024-03-18 PROCEDURE — C1874 STENT, COATED/COV W/DEL SYS: HCPCS | Performed by: SURGERY

## 2024-03-18 PROCEDURE — 7100000011 HC PHASE II RECOVERY - ADDTL 15 MIN: Performed by: SURGERY

## 2024-03-18 PROCEDURE — 6360000002 HC RX W HCPCS: Performed by: SURGERY

## 2024-03-18 PROCEDURE — C2623 CATH, TRANSLUMIN, DRUG-COAT: HCPCS | Performed by: SURGERY

## 2024-03-18 PROCEDURE — 2500000003 HC RX 250 WO HCPCS: Performed by: NURSE ANESTHETIST, CERTIFIED REGISTERED

## 2024-03-18 PROCEDURE — C1769 GUIDE WIRE: HCPCS | Performed by: SURGERY

## 2024-03-18 PROCEDURE — 3600000017 HC SURGERY HYBRID ADDL 15MIN: Performed by: SURGERY

## 2024-03-18 PROCEDURE — 6360000004 HC RX CONTRAST MEDICATION: Performed by: SURGERY

## 2024-03-18 PROCEDURE — 93926 LOWER EXTREMITY STUDY: CPT

## 2024-03-18 PROCEDURE — 36415 COLL VENOUS BLD VENIPUNCTURE: CPT

## 2024-03-18 PROCEDURE — 86901 BLOOD TYPING SEROLOGIC RH(D): CPT

## 2024-03-18 PROCEDURE — 2709999900 HC NON-CHARGEABLE SUPPLY: Performed by: SURGERY

## 2024-03-18 PROCEDURE — 7100000001 HC PACU RECOVERY - ADDTL 15 MIN: Performed by: SURGERY

## 2024-03-18 DEVICE — GRAFT EVAR L150MM DIA5MM CATH L120CM DIA6FR 0.035IN HEP: Type: IMPLANTABLE DEVICE | Site: LEG | Status: FUNCTIONAL

## 2024-03-18 RX ORDER — FENTANYL CITRATE 50 UG/ML
INJECTION, SOLUTION INTRAMUSCULAR; INTRAVENOUS PRN
Status: DISCONTINUED | OUTPATIENT
Start: 2024-03-18 | End: 2024-03-18 | Stop reason: SDUPTHER

## 2024-03-18 RX ORDER — SODIUM CHLORIDE 9 MG/ML
INJECTION, SOLUTION INTRAVENOUS CONTINUOUS
Status: CANCELLED | OUTPATIENT
Start: 2024-03-18

## 2024-03-18 RX ORDER — ROCURONIUM BROMIDE 10 MG/ML
INJECTION, SOLUTION INTRAVENOUS PRN
Status: DISCONTINUED | OUTPATIENT
Start: 2024-03-18 | End: 2024-03-18 | Stop reason: SDUPTHER

## 2024-03-18 RX ORDER — IODIXANOL 320 MG/ML
INJECTION, SOLUTION INTRAVASCULAR PRN
Status: DISCONTINUED | OUTPATIENT
Start: 2024-03-18 | End: 2024-03-18 | Stop reason: ALTCHOICE

## 2024-03-18 RX ORDER — SODIUM CHLORIDE 0.9 % (FLUSH) 0.9 %
5-40 SYRINGE (ML) INJECTION PRN
Status: CANCELLED | OUTPATIENT
Start: 2024-03-18

## 2024-03-18 RX ORDER — DEXAMETHASONE SODIUM PHOSPHATE 10 MG/ML
INJECTION, SOLUTION INTRAMUSCULAR; INTRAVENOUS PRN
Status: DISCONTINUED | OUTPATIENT
Start: 2024-03-18 | End: 2024-03-18 | Stop reason: SDUPTHER

## 2024-03-18 RX ORDER — SODIUM CHLORIDE, SODIUM LACTATE, POTASSIUM CHLORIDE, CALCIUM CHLORIDE 600; 310; 30; 20 MG/100ML; MG/100ML; MG/100ML; MG/100ML
INJECTION, SOLUTION INTRAVENOUS CONTINUOUS
Status: DISCONTINUED | OUTPATIENT
Start: 2024-03-18 | End: 2024-03-18 | Stop reason: HOSPADM

## 2024-03-18 RX ORDER — LIDOCAINE HYDROCHLORIDE 10 MG/ML
INJECTION, SOLUTION INFILTRATION; PERINEURAL PRN
Status: DISCONTINUED | OUTPATIENT
Start: 2024-03-18 | End: 2024-03-18 | Stop reason: SDUPTHER

## 2024-03-18 RX ORDER — SODIUM CHLORIDE 0.9 % (FLUSH) 0.9 %
5-40 SYRINGE (ML) INJECTION EVERY 12 HOURS SCHEDULED
Status: CANCELLED | OUTPATIENT
Start: 2024-03-18

## 2024-03-18 RX ORDER — DIPHENHYDRAMINE HYDROCHLORIDE 50 MG/ML
INJECTION INTRAMUSCULAR; INTRAVENOUS
Status: DISCONTINUED
Start: 2024-03-18 | End: 2024-03-18 | Stop reason: HOSPADM

## 2024-03-18 RX ORDER — PROPOFOL 10 MG/ML
INJECTION, EMULSION INTRAVENOUS PRN
Status: DISCONTINUED | OUTPATIENT
Start: 2024-03-18 | End: 2024-03-18 | Stop reason: SDUPTHER

## 2024-03-18 RX ORDER — SODIUM CHLORIDE 0.9 % (FLUSH) 0.9 %
5-40 SYRINGE (ML) INJECTION EVERY 12 HOURS SCHEDULED
Status: DISCONTINUED | OUTPATIENT
Start: 2024-03-18 | End: 2024-03-18 | Stop reason: HOSPADM

## 2024-03-18 RX ORDER — HEPARIN SODIUM 1000 [USP'U]/ML
INJECTION, SOLUTION INTRAVENOUS; SUBCUTANEOUS PRN
Status: DISCONTINUED | OUTPATIENT
Start: 2024-03-18 | End: 2024-03-18 | Stop reason: SDUPTHER

## 2024-03-18 RX ORDER — ONDANSETRON 2 MG/ML
INJECTION INTRAMUSCULAR; INTRAVENOUS PRN
Status: DISCONTINUED | OUTPATIENT
Start: 2024-03-18 | End: 2024-03-18 | Stop reason: SDUPTHER

## 2024-03-18 RX ORDER — ASPIRIN 81 MG/1
81 TABLET ORAL ONCE
Status: COMPLETED | OUTPATIENT
Start: 2024-03-18 | End: 2024-03-18

## 2024-03-18 RX ORDER — DIPHENHYDRAMINE HYDROCHLORIDE 50 MG/ML
25 INJECTION INTRAMUSCULAR; INTRAVENOUS EVERY 6 HOURS PRN
Status: DISCONTINUED | OUTPATIENT
Start: 2024-03-18 | End: 2024-03-18 | Stop reason: HOSPADM

## 2024-03-18 RX ORDER — SODIUM CHLORIDE 0.9 % (FLUSH) 0.9 %
5-40 SYRINGE (ML) INJECTION PRN
Status: DISCONTINUED | OUTPATIENT
Start: 2024-03-18 | End: 2024-03-18 | Stop reason: HOSPADM

## 2024-03-18 RX ORDER — CEFAZOLIN SODIUM 1 G/3ML
INJECTION, POWDER, FOR SOLUTION INTRAMUSCULAR; INTRAVENOUS PRN
Status: DISCONTINUED | OUTPATIENT
Start: 2024-03-18 | End: 2024-03-18 | Stop reason: SDUPTHER

## 2024-03-18 RX ORDER — SODIUM CHLORIDE 9 MG/ML
INJECTION, SOLUTION INTRAVENOUS PRN
Status: CANCELLED | OUTPATIENT
Start: 2024-03-18

## 2024-03-18 RX ORDER — SODIUM CHLORIDE 9 MG/ML
INJECTION, SOLUTION INTRAVENOUS PRN
Status: DISCONTINUED | OUTPATIENT
Start: 2024-03-18 | End: 2024-03-18 | Stop reason: HOSPADM

## 2024-03-18 RX ADMIN — SUGAMMADEX 250 MG: 100 INJECTION, SOLUTION INTRAVENOUS at 11:21

## 2024-03-18 RX ADMIN — ROCURONIUM BROMIDE 10 MG: 10 INJECTION, SOLUTION INTRAVENOUS at 11:11

## 2024-03-18 RX ADMIN — DEXAMETHASONE SODIUM PHOSPHATE 10 MG: 10 INJECTION, SOLUTION INTRAMUSCULAR; INTRAVENOUS at 09:00

## 2024-03-18 RX ADMIN — CEFAZOLIN 2 G: 1 INJECTION, POWDER, FOR SOLUTION INTRAMUSCULAR; INTRAVENOUS at 08:08

## 2024-03-18 RX ADMIN — ONDANSETRON 4 MG: 2 INJECTION INTRAMUSCULAR; INTRAVENOUS at 11:21

## 2024-03-18 RX ADMIN — FENTANYL CITRATE 50 MCG: 0.05 INJECTION, SOLUTION INTRAMUSCULAR; INTRAVENOUS at 09:59

## 2024-03-18 RX ADMIN — SODIUM CHLORIDE, POTASSIUM CHLORIDE, SODIUM LACTATE AND CALCIUM CHLORIDE: 600; 310; 30; 20 INJECTION, SOLUTION INTRAVENOUS at 06:56

## 2024-03-18 RX ADMIN — ROCURONIUM BROMIDE 10 MG: 10 INJECTION, SOLUTION INTRAVENOUS at 09:59

## 2024-03-18 RX ADMIN — LIDOCAINE HYDROCHLORIDE 50 MG: 10 INJECTION, SOLUTION INFILTRATION; PERINEURAL at 08:03

## 2024-03-18 RX ADMIN — HEPARIN SODIUM 3000 UNITS: 1000 INJECTION, SOLUTION INTRAVENOUS; SUBCUTANEOUS at 10:26

## 2024-03-18 RX ADMIN — FENTANYL CITRATE 25 MCG: 0.05 INJECTION, SOLUTION INTRAMUSCULAR; INTRAVENOUS at 08:03

## 2024-03-18 RX ADMIN — DIPHENHYDRAMINE HYDROCHLORIDE 25 MG: 50 INJECTION INTRAMUSCULAR; INTRAVENOUS at 07:38

## 2024-03-18 RX ADMIN — Medication 1000 MG: at 07:03

## 2024-03-18 RX ADMIN — FENTANYL CITRATE 50 MCG: 0.05 INJECTION, SOLUTION INTRAMUSCULAR; INTRAVENOUS at 11:11

## 2024-03-18 RX ADMIN — ASPIRIN 81 MG: 81 TABLET, COATED ORAL at 07:03

## 2024-03-18 RX ADMIN — PHENYLEPHRINE HYDROCHLORIDE 100 MCG/MIN: 10 INJECTION INTRAVENOUS at 08:26

## 2024-03-18 RX ADMIN — FENTANYL CITRATE 50 MCG: 0.05 INJECTION, SOLUTION INTRAMUSCULAR; INTRAVENOUS at 10:38

## 2024-03-18 RX ADMIN — FENTANYL CITRATE 25 MCG: 0.05 INJECTION, SOLUTION INTRAMUSCULAR; INTRAVENOUS at 08:22

## 2024-03-18 RX ADMIN — ROCURONIUM BROMIDE 50 MG: 10 INJECTION, SOLUTION INTRAVENOUS at 08:03

## 2024-03-18 RX ADMIN — PROPOFOL 120 MG: 10 INJECTION, EMULSION INTRAVENOUS at 08:03

## 2024-03-18 ASSESSMENT — PAIN - FUNCTIONAL ASSESSMENT
PAIN_FUNCTIONAL_ASSESSMENT: NONE - DENIES PAIN

## 2024-03-18 ASSESSMENT — LIFESTYLE VARIABLES: SMOKING_STATUS: 1

## 2024-03-18 NOTE — ANESTHESIA POSTPROCEDURE EVALUATION
Department of Anesthesiology  Postprocedure Note    Patient: Jong Loyd  MRN: [id number]  YOB: 1950  Date of evaluation: 3/18/2024    Procedure Summary       Date: 03/18/24 Room / Location: [address]    Anesthesia Start: 0756 Anesthesia Stop: 1135    Procedure: INTRAOPERATIVE ANGIOGRAM WITH RIGHT LEG RUNOFF, ANGIOPLASTY AND STENTING OF RIGHT POPLITEAL ARTERY, ANGIOPLASTY OF POSTERIOR TIBIAL ARTERY, RIGHT LEG PEDAL ACCESS. Diagnosis:       Atherosclerosis of native artery of both lower extremities with intermittent claudication (HCC)      (Atherosclerosis of native artery of both lower extremities with intermittent claudication (HCC) [I70.213])    Surgeons: Karen Morrow DO Responsible Provider: Adithya Joe APRN - CRNA    Anesthesia Type: general ASA Status: 3            Anesthesia Type: No value filed.    Malaika Phase I:      Malaika Phase II:      Anesthesia Post Evaluation    Patient location during evaluation: PACU  Patient participation: complete - patient participated  Level of consciousness: sleepy but conscious  Pain score: 0  Airway patency: patent  Nausea & Vomiting: no nausea and no vomiting  Cardiovascular status: hemodynamically stable  Respiratory status: acceptable, spontaneous ventilation and room air  Hydration status: euvolemic  Pain management: adequate    No notable events documented.

## 2024-03-18 NOTE — ANESTHESIA PRE PROCEDURE
Department of Anesthesiology  Preprocedure Note       Name:  Jong Loyd   Age:  74 y.o.  :  1950                                          MRN:  [id number]         Date:  3/18/2024      Surgeon: Surgeon(s):  Karen Morrow DO    Procedure: Procedure(s):  INTRAOPERATIVE ANGIOGRAM WITH POSSIBLE ANGIOPLASTY, ATHERECTOMY, STENT. RIGHT LEG PEDAL ACCESS    Medications prior to admission:   Prior to Admission medications    Medication Sig Start Date End Date Taking? Authorizing Provider   montelukast (SINGULAIR) 10 MG tablet TAKE ONE TABLET BY MOUTH ONCE A DAY 3/12/24   Ileana Strange APRN   hydroCHLOROthiazide (HYDRODIURIL) 25 MG tablet Take 1 tablet by mouth daily    Wilmer Luu MD   tamsulosin (FLOMAX) 0.4 MG capsule Take 1 capsule by mouth nightly 10/28/21   Wilmer Luu MD   ipratropium (ATROVENT) 0.03 % nasal spray 2 sprays by Each Nostril route in the morning and 2 sprays in the evening.  Patient taking differently: 2 sprays by Each Nostril route 2 times daily as needed 24   Vale Garvey, APRN - CNP   clopidogrel (PLAVIX) 75 MG tablet Take 1 tablet by mouth daily    Wilmer Luu MD   diclofenac (VOLTAREN) 75 MG EC tablet Take 1 tablet by mouth Daily 3/4/21   Wilmer Luu MD   finasteride (PROSCAR) 5 MG tablet Take 1 tablet by mouth daily    Wilmer Luu MD   levothyroxine (SYNTHROID) 100 MCG tablet Take 1 tablet by mouth daily    Wilmer Luu MD   losartan (COZAAR) 100 MG tablet Take 1 tablet by mouth nightly    Wilmer Luu MD   pravastatin (PRAVACHOL) 40 MG tablet Take 1 tablet by mouth daily    Wilmer Luu MD   sildenafil (VIAGRA) 100 MG tablet Take 1 tablet by mouth daily as needed    ProviderWilmer MD       Current medications:    Current Facility-Administered Medications   Medication Dose Route Frequency Provider Last Rate Last Admin    sodium chloride flush 0.9 % injection 5-40 mL  5-40 mL IntraVENous 2

## 2024-03-18 NOTE — PROGRESS NOTES
Patient remains alert, oriented and able to make his needs known. He denies pain. Daughter is at bedside. VSS.

## 2024-03-18 NOTE — OP NOTE
Operative Note      Patient: Jong Loyd  YOB: 1950  MRN: [id number]    Date of Procedure: 3/18/2024    Pre-Op Diagnosis Codes:     * Atherosclerosis of native artery of both lower extremities with intermittent claudication (HCC) [I70.213]    Post-Op Diagnosis: Same       Procedure(s):  INTRAOPERATIVE ANGIOGRAM WITH RIGHT LEG RUNOFF, ANGIOPLASTY AND STENTING OF RIGHT POPLITEAL ARTERY, ANGIOPLASTY OF POSTERIOR TIBIAL ARTERY, RIGHT LEG PEDAL ACCESS.    Surgeon(s):  Karen Morrow DO    Assistant:   * No surgical staff found *    Anesthesia: General    Estimated Blood Loss (mL): less than 100     Complications: None    Specimens:   * No specimens in log *    Implants:  Implant Name Type Inv. Item Serial No.  Lot No. LRB No. Used Action   GRAFT EVAR L150MM DIA5MM CATH L120CM DIA6FR 0.035IN HEP - [id number] Peripheral stents GRAFT EVAR L150MM DIA5MM CATH L120CM DIA6FR 0.035IN HEP [id number]  [employer]  Right 1 Implanted         Drains:   Urinary Catheter 03/18/24 2 Way (Active)       [REMOVED] Urinary Catheter 01/24/24 (Removed)       Findings: occlusion of right popliteal artery, diffuse high grade stenosis of posterior tibial artery, occluded TPT trunk and proximal anterior tibial artery        Detailed Description of Procedure:   Patient was brought to the operating room and placed in supine position.  His right leg in its entirety and left leg to the knee were prepped and draped in sterile fashion.  Antibiotics were given.  Timeouts performed.  Left common femoral artery was accessed under continuous ultrasound guidance using standard micropuncture technique.  J-wire was advanced proximally followed by Omni Flush catheter.  Using the catheter wire was navigated into right SFA.  The sheath and the catheter was removed and 6 x 45 destination Terumo sheath was placed.  During procedure patient was given 3000 units of heparin.  I attempted to use ultrasound to access

## 2024-03-18 NOTE — INTERVAL H&P NOTE
Update History & Physical    The patient's History and Physical of February 29, 2024 was reviewed with the patient and I examined the patient. There was no change. The surgical site was confirmed by the patient and me.     Plan: The risks, benefits, expected outcome, and alternative to the recommended procedure have been discussed with the patient. Patient understands and wants to proceed with the procedure.     Electronically signed by Karen Morrow DO on 3/18/2024 at 7:49 AM

## 2024-03-18 NOTE — DISCHARGE INSTRUCTIONS
POST ANGIOGRAM/STENTING INSTRUCTIONS    1.  You will be on bedrest the day of your procedure, up around the house and to the bathroom only on the day after your procedure.  2.  You must be transported home by a responsible person after your procedure, YOU CANNOT DRIVE YOURSELF HOME.     3.  Do not drive for 1 WEEK  after discharge home.  4.  Drink extra fluids for 24 hours after the procedure to help flush the contrast used (you will make more urine) .  5.  Check the puncture site after each activity for bleeding or swelling.    6.  If bleeding occurs, apply pressure to site and call 911 or rush to the nearest emergency room (DO NOT drive yourself!).  7.  Resume normal non-strenuous activity 48 hours after discharge home.  8.  Bruising and soreness at the puncture site may occur, this will heal and clear after several weeks.    9.  Keep your leg (with puncture site) mostly straight while sitting or lying for the first 24 hours after your procedure.    10. No heavy lifting or straining (more than 10 pounds) for 1 WEEK after discharge.          11. Keep the bandage over the puncture site for 24 hours after discharge home.  You may remove the bandage and shower after 24 hours. NO TUB BATH, NO HOT TUB, NO SWIMMING FOR 2 WEEKS. Cover the puncture with a bandaid for 4-5 days until puncture heals.  12.  Once a day for the next 7 days, look at the puncture site, call physician if you    notice:  *  red and/or hot at the puncture site  *  bloody drainage, foul-smelling, yellowish or greenish drainage from the puncture site  *  a very large bruise under/arond the puncture site (often firm to touch)  *  numbness, tingling in foot/leg/or loss of motion/sensation in foot or leg  *  itching/hives on any part of your body; or nausea/vomiting after receiving the dye  13.  If your are taking Glucophage please restart:  Date:

## 2024-03-18 NOTE — PROGRESS NOTES
Dr. Morrow to bedside. Updated on cold right foot, unable to find pulses. MD able to find high PT, area marked.

## 2024-03-27 ENCOUNTER — OFFICE VISIT (OUTPATIENT)
Dept: ENT CLINIC | Age: 74
End: 2024-03-27
Payer: OTHER GOVERNMENT

## 2024-03-27 VITALS
DIASTOLIC BLOOD PRESSURE: 60 MMHG | SYSTOLIC BLOOD PRESSURE: 102 MMHG | WEIGHT: 125 LBS | BODY MASS INDEX: 18.51 KG/M2 | HEIGHT: 69 IN

## 2024-03-27 DIAGNOSIS — Z91.09 ENVIRONMENTAL ALLERGIES: Primary | ICD-10-CM

## 2024-03-27 PROCEDURE — 3078F DIAST BP <80 MM HG: CPT | Performed by: NURSE PRACTITIONER

## 2024-03-27 PROCEDURE — 3074F SYST BP LT 130 MM HG: CPT | Performed by: NURSE PRACTITIONER

## 2024-03-27 PROCEDURE — 99213 OFFICE O/P EST LOW 20 MIN: CPT | Performed by: NURSE PRACTITIONER

## 2024-03-27 PROCEDURE — 1123F ACP DISCUSS/DSCN MKR DOCD: CPT | Performed by: NURSE PRACTITIONER

## 2024-03-27 RX ORDER — MONTELUKAST SODIUM 10 MG/1
10 TABLET ORAL NIGHTLY
Qty: 30 TABLET | Refills: 5 | Status: SHIPPED | OUTPATIENT
Start: 2024-03-27

## 2024-03-27 ASSESSMENT — ENCOUNTER SYMPTOMS
ALLERGIC/IMMUNOLOGIC NEGATIVE: 1
RHINORRHEA: 1
RESPIRATORY NEGATIVE: 1
GASTROINTESTINAL NEGATIVE: 1
EYES NEGATIVE: 1

## 2024-03-27 NOTE — PROGRESS NOTES
3/27/2024    Jong Loyd (:  1950) is a 74 y.o. male, Established patient, here for evaluation of the following chief complaint(s):  Follow-up (Allergies )      Vitals:    24 0826   BP: 102/60   Weight: 56.7 kg (125 lb)   Height: 1.753 m (5' 9\")       Wt Readings from Last 3 Encounters:   24 56.7 kg (125 lb)   24 56.7 kg (125 lb)   24 56.7 kg (125 lb)       BP Readings from Last 3 Encounters:   24 102/60   24 105/63   24 100/60         SUBJECTIVE/OBJECTIVE:    Patient seen today for follow up of allergies. He was started on ipratropium nasal spray at his last visit. He states that he is still having drainage from his nose. He states that he is not taking the montelukast at this time and is just using the nasal spray. He has taken montelukast in the past. He states that he was recently put on a new blood thinner and notes some blood when he blows his nose since starting that.         Review of Systems   Constitutional: Negative.    HENT:  Positive for nosebleeds and rhinorrhea.    Eyes: Negative.    Respiratory: Negative.     Cardiovascular: Negative.    Gastrointestinal: Negative.    Endocrine: Negative.    Musculoskeletal: Negative.    Skin: Negative.    Allergic/Immunologic: Negative.    Neurological: Negative.    Hematological: Negative.    Psychiatric/Behavioral: Negative.          Physical Exam  Vitals reviewed.   Constitutional:       Appearance: Normal appearance. He is normal weight.   HENT:      Head: Normocephalic and atraumatic.      Right Ear: External ear normal.      Left Ear: External ear normal.      Nose: Mucosal edema present.      Mouth/Throat:      Mouth: Mucous membranes are moist.      Pharynx: Oropharynx is clear.   Eyes:      Extraocular Movements: Extraocular movements intact.      Pupils: Pupils are equal, round, and reactive to light.   Cardiovascular:      Rate and Rhythm: Normal rate and regular rhythm.   Pulmonary:      Effort:

## 2024-04-01 ENCOUNTER — OFFICE VISIT (OUTPATIENT)
Dept: VASCULAR SURGERY | Age: 74
End: 2024-04-01
Payer: OTHER GOVERNMENT

## 2024-04-01 ENCOUNTER — HOSPITAL ENCOUNTER (OUTPATIENT)
Dept: VASCULAR LAB | Age: 74
Discharge: HOME | End: 2024-04-01
Payer: OTHER GOVERNMENT

## 2024-04-01 VITALS
TEMPERATURE: 98.4 F | HEART RATE: 77 BPM | DIASTOLIC BLOOD PRESSURE: 66 MMHG | OXYGEN SATURATION: 99 % | SYSTOLIC BLOOD PRESSURE: 111 MMHG

## 2024-04-01 DIAGNOSIS — M79.89 LEG SWELLING: Primary | ICD-10-CM

## 2024-04-01 DIAGNOSIS — M79.604 LEG PAIN, RIGHT: ICD-10-CM

## 2024-04-01 DIAGNOSIS — I72.4 PSEUDOANEURYSM OF LEFT FEMORAL ARTERY (HCC): ICD-10-CM

## 2024-04-01 DIAGNOSIS — I70.213 ATHEROSCLER OF NATIVE ARTERY OF BOTH LEGS WITH INTERMIT CLAUDICATION (HCC): ICD-10-CM

## 2024-04-01 DIAGNOSIS — M79.89 LEG SWELLING: ICD-10-CM

## 2024-04-01 PROCEDURE — 99213 OFFICE O/P EST LOW 20 MIN: CPT | Performed by: NURSE PRACTITIONER

## 2024-04-01 PROCEDURE — 3078F DIAST BP <80 MM HG: CPT | Performed by: NURSE PRACTITIONER

## 2024-04-01 PROCEDURE — 93971 EXTREMITY STUDY: CPT

## 2024-04-01 PROCEDURE — 1123F ACP DISCUSS/DSCN MKR DOCD: CPT | Performed by: NURSE PRACTITIONER

## 2024-04-01 PROCEDURE — 3074F SYST BP LT 130 MM HG: CPT | Performed by: NURSE PRACTITIONER

## 2024-04-01 PROCEDURE — 93922 UPR/L XTREMITY ART 2 LEVELS: CPT

## 2024-04-01 NOTE — PROGRESS NOTES
balloon expandable stent) 4.  Selective right lower extremity arteriograms 5.  Atherectomy right popliteal artery behind the knee with jetstream 2.0/3.0 6.  Balloon angioplasty right popliteal artery with 4 mm    VASCULAR SURGERY Right     SLU with stents ? right iliac    VASCULAR SURGERY N/A 01/24/2024    INTRAOPERATIVE AORTOGRAM  WITH RUNOFF,  STENTING  OF LEFT SFA performed by Karen Morrow DO at [facility] OR    VASCULAR SURGERY N/A 3/18/2024    INTRAOPERATIVE ANGIOGRAM WITH RIGHT LEG RUNOFF, ANGIOPLASTY AND STENTING OF RIGHT POPLITEAL ARTERY, ANGIOPLASTY OF POSTERIOR TIBIAL ARTERY, RIGHT LEG PEDAL ACCESS. performed by Karen Morrow DO at [facility] OR    VASECTOMY       Family History   Problem Relation Age of Onset    Breast Cancer Mother 53        breast cancer    Heart Disease Sister      Social History     Tobacco Use    Smoking status: Every Day     Types: Cigars    Smokeless tobacco: Never    Tobacco comments:     patient smokes \"3-4 mini cigars\" daily; but has history of 1ppd smoker (quit in 2013)   Substance Use Topics    Alcohol use: Not Currently         ROS  Eyes - no sudden vision change or amaurosis.  Respiratory - no significant shortness of breath,  Cardiovascular - no chest pain or syncope.  No  significant leg swelling.  No claudication.  Musculoskeletal - no gait disturbance  Skin - no new wound.  Neurologic -  No speech difficulty or lateralizing weakness.  All other review of systems are negative.    Physical Exam    /66 (Site: Right Upper Arm, Position: Sitting, Cuff Size: Medium Adult)   Pulse 77   Temp 98.4 °F (36.9 °C)   SpO2 99%       Neck- carotid pulses 2+ to palpation with no bruit  Cardiovascular - Regular rate and rhythm.    Pulmonary - effort appears normal.  No respiratory distress.    Lungs - Breath sounds normal. No wheezes or rales.    Extremities -  Radial and brachial pulses are 2+ to palpation bilaterally.  Right femoral pulse: present 2+; Right popliteal pulse:

## 2024-04-02 ENCOUNTER — TELEPHONE (OUTPATIENT)
Dept: VASCULAR SURGERY | Age: 74
End: 2024-04-02

## 2024-04-02 ENCOUNTER — PREP FOR PROCEDURE (OUTPATIENT)
Dept: VASCULAR SURGERY | Age: 74
End: 2024-04-02

## 2024-04-02 PROBLEM — I72.4 FEMORAL ARTERY PSEUDO-ANEURYSM, LEFT (HCC): Status: ACTIVE | Noted: 2024-04-02

## 2024-04-02 RX ORDER — SODIUM CHLORIDE 0.9 % (FLUSH) 0.9 %
5-40 SYRINGE (ML) INJECTION EVERY 12 HOURS SCHEDULED
Status: CANCELLED | OUTPATIENT
Start: 2024-04-02

## 2024-04-02 RX ORDER — SODIUM CHLORIDE 9 MG/ML
INJECTION, SOLUTION INTRAVENOUS CONTINUOUS
Status: CANCELLED | OUTPATIENT
Start: 2024-04-02

## 2024-04-02 RX ORDER — SODIUM CHLORIDE 0.9 % (FLUSH) 0.9 %
5-40 SYRINGE (ML) INJECTION PRN
Status: CANCELLED | OUTPATIENT
Start: 2024-04-02

## 2024-04-02 RX ORDER — CLONIDINE HYDROCHLORIDE 0.1 MG/1
0.1 TABLET ORAL PRN
Status: CANCELLED | OUTPATIENT
Start: 2024-04-02

## 2024-04-02 RX ORDER — SODIUM CHLORIDE 9 MG/ML
INJECTION, SOLUTION INTRAVENOUS PRN
Status: CANCELLED | OUTPATIENT
Start: 2024-04-02

## 2024-04-02 NOTE — TELEPHONE ENCOUNTER
Called and spoke to the patient to let him know the following.  The patient voiced understanding.           [facility]    Surgery Directions    Report to the [place] [facility] at [facility] (go in the front door and to the left) on Thursday, 04/04/2024 @ 6:00 AM.   Nothing to eat or drink after midnight the night before the procedure.  Please take all medications as normally scheduled to take unless listed below with a sip of water.  Do not take Eliquis or HCTZ the morning of the procedure.   If you have sleep apnea and require C-PAP, please bring this with you to the hospital.  Bring a list of all of your allergies and medications with you to the hospital.  Please let our nurse know if you have had an allergy to iodine, shellfish, or x-ray dye.  Let the nurse know if you take any of the following:  Over the counter herbal supplements  Diclofenec, indomethacin, ketoprofen, Caridopa/levadopa, naproxen, sulindac, piroxicam, glucosamine, Chondrotin, cocchine, or methotrexate.  Plan to stay at the hospital for 4 - 6 hours before being released  by the physician. You will need someone to drive you home after the procedure.  10. Other Directions: For any questions or concerns please contact our office @        [phone] and ask to speak to Dorothea.   11.  You will need a  to take you home.  12.  Follow up appt: 04/18/2024 @ 11:15 AM with Dorothy.     Unless instructed otherwise by your physician, cleanse incision/puncture site twice daily with soap and water.  Apply dry gauze. Do not get in tub.  Okay to shower.  Do not apply any salve, cream, peroxide or alcohol to the incision.  Call with any increasing redness or drainage

## 2024-04-02 NOTE — H&P
Jong Loyd (:  1950) is a 74 y.o. male,Established patient, here for evaluation of the following chief complaint(s):  Follow-up (2 week post op intraoperative angiogram. )            SUBJECTIVE/OBJECTIVE:  Patient presents for follow up after an arteriogram with INTRAOPERATIVE ANGIOGRAM WITH RIGHT LEG RUNOFF, ANGIOPLASTY AND STENTING OF RIGHT POPLITEAL ARTERY, ANGIOPLASTY OF POSTERIOR TIBIAL ARTERY, RIGHT LEG PEDAL ACCESS  done 2 weeks ago.  Procedure was done for peripheral vascular disease with claudication. Post op problems include none.  Angiogram complications were none.  Post op pain and swelling are minimal.  At present, he reports claudication at a distance of  which varies.  Jong reports that the right leg is equal to the left.  He reports claudication is improved and is mostly in the form of generalized weakness starting in the calves. He has a short recovery time. This is reproducible in nature. He reports ischemic rest pain 0 times per night.  He reports walking with cart does not help.      I have personally reviewed the following: problem list, current meds, allergies, PMH, PSH, family hx, and social hx  Jong Loyd is a 74 y.o. male with the following history as recorded in [facility]:  Patient Active Problem List    Diagnosis Date Noted    Atherosclerosis with claudication of extremity (HCC) 2021    Benign essential HTN 2021    BPH (benign prostatic hyperplasia) 2021    Pulmonary emphysema (HCC) 2021    Carotid stenosis, asymptomatic, bilateral 2021    CLL (chronic lymphocytic leukemia) ([facility]) 2021    PVD (peripheral vascular disease) ([facility])      Current Outpatient Medications   Medication Sig Dispense Refill    apixaban (ELIQUIS) 5 MG TABS tablet Take 1 tablet by mouth 2 times daily 60 tablet 5    montelukast (SINGULAIR) 10 MG tablet Take 1 tablet by mouth nightly 30 tablet 5    apixaban (ELIQUIS) 5 MG TABS tablet Take 1 tablet by mouth 2 times

## 2024-04-04 ENCOUNTER — HOSPITAL ENCOUNTER (OUTPATIENT)
Dept: INTERVENTIONAL RADIOLOGY/VASCULAR | Age: 74
Discharge: HOME | End: 2024-04-04
Payer: OTHER GOVERNMENT

## 2024-04-04 VITALS
HEIGHT: 69 IN | WEIGHT: 125 LBS | TEMPERATURE: 97.6 F | OXYGEN SATURATION: 97 % | BODY MASS INDEX: 18.51 KG/M2 | DIASTOLIC BLOOD PRESSURE: 57 MMHG | RESPIRATION RATE: 23 BRPM | SYSTOLIC BLOOD PRESSURE: 116 MMHG | HEART RATE: 63 BPM

## 2024-04-04 PROCEDURE — 2500000003 HC RX 250 WO HCPCS: Performed by: SURGERY

## 2024-04-04 PROCEDURE — 2580000003 HC RX 258: Performed by: NURSE PRACTITIONER

## 2024-04-04 PROCEDURE — 6360000002 HC RX W HCPCS: Performed by: SURGERY

## 2024-04-04 PROCEDURE — 93926 LOWER EXTREMITY STUDY: CPT

## 2024-04-04 PROCEDURE — C1769 GUIDE WIRE: HCPCS

## 2024-04-04 RX ORDER — LIDOCAINE HYDROCHLORIDE 20 MG/ML
INJECTION, SOLUTION EPIDURAL; INFILTRATION; INTRACAUDAL; PERINEURAL PRN
Status: COMPLETED | OUTPATIENT
Start: 2024-04-04 | End: 2024-04-04

## 2024-04-04 RX ORDER — SODIUM CHLORIDE 9 MG/ML
INJECTION, SOLUTION INTRAVENOUS PRN
Status: DISCONTINUED | OUTPATIENT
Start: 2024-04-04 | End: 2024-04-06 | Stop reason: HOSPADM

## 2024-04-04 RX ORDER — SODIUM CHLORIDE 0.9 % (FLUSH) 0.9 %
5-40 SYRINGE (ML) INJECTION PRN
Status: DISCONTINUED | OUTPATIENT
Start: 2024-04-04 | End: 2024-04-06 | Stop reason: HOSPADM

## 2024-04-04 RX ORDER — SODIUM CHLORIDE 9 MG/ML
INJECTION, SOLUTION INTRAVENOUS CONTINUOUS
Status: DISCONTINUED | OUTPATIENT
Start: 2024-04-04 | End: 2024-04-06 | Stop reason: HOSPADM

## 2024-04-04 RX ORDER — MIDAZOLAM HYDROCHLORIDE 1 MG/ML
INJECTION INTRAMUSCULAR; INTRAVENOUS PRN
Status: COMPLETED | OUTPATIENT
Start: 2024-04-04 | End: 2024-04-04

## 2024-04-04 RX ORDER — SODIUM CHLORIDE 0.9 % (FLUSH) 0.9 %
5-40 SYRINGE (ML) INJECTION EVERY 12 HOURS SCHEDULED
Status: DISCONTINUED | OUTPATIENT
Start: 2024-04-04 | End: 2024-04-06 | Stop reason: HOSPADM

## 2024-04-04 RX ORDER — CLONIDINE HYDROCHLORIDE 0.1 MG/1
0.1 TABLET ORAL PRN
Status: DISCONTINUED | OUTPATIENT
Start: 2024-04-04 | End: 2024-04-06 | Stop reason: HOSPADM

## 2024-04-04 RX ADMIN — LIDOCAINE HYDROCHLORIDE 10 ML: 20 INJECTION, SOLUTION EPIDURAL; INFILTRATION; INTRACAUDAL; PERINEURAL at 08:12

## 2024-04-04 RX ADMIN — SODIUM CHLORIDE: 9 INJECTION, SOLUTION INTRAVENOUS at 07:04

## 2024-04-04 RX ADMIN — MIDAZOLAM 1 MG: 1 INJECTION INTRAMUSCULAR; INTRAVENOUS at 08:10

## 2024-04-04 ASSESSMENT — PAIN SCALES - GENERAL: PAINLEVEL_OUTOF10: 0

## 2024-04-04 NOTE — PRE SEDATION
pravastatin (PRAVACHOL) 40 MG tablet Take 1 tablet by mouth daily    Provider, MD Wilmer   sildenafil (VIAGRA) 100 MG tablet Take 1 tablet by mouth daily as needed    Provider, MD Wilmer     Coumadin Use Last 7 Days:  no  Antiplatelet drug therapy use last 7 days: yes - plavix  Other anticoagulant use last 7 days: yes - eliquis- held  Additional Medication Information: See medication reconciliation      Pre-Sedation Documentation and Exam:   Vital signs have been reviewed (see flow sheet for vitals).  I have reviewed the patient's history and review of systems.    Mallampati Airway Assessment:  Mallampati Class III - (soft palate & base of uvula are visible)    Prior History of Anesthesia Complications:   none    ASA Classification:  Class 2 - A normal healthy patient with mild systemic disease    Sedation/ Anesthesia Plan:   intravenous sedation    Medications Planned:   midazolam (Versed) intravenously    Patient is an appropriate candidate for plan of sedation: yes    Electronically signed by Kailee Alaniz MD on 4/4/2024 at 7:57 AM

## 2024-04-04 NOTE — PROCEDURES
Patient name: Jong Loyd  MRN: [id number]  YOB: 1950    Date of procedure: 4/4/2024    Preprocedure diagnosis: Left common femoral artery pseudoaneurysm    Postprocedure diagnosis: Same    Procedure:  1.  Ultrasound-guided thrombin injection left common femoral artery pseudoaneurysm  2.  Post thrombin injection ultrasound exam left common femoral artery, profunda, SFA, CFV, FV    Surgeon: Kailee Alaniz MD    Anesthesia: 5 cc lidocaine with epinephrine local anesthesia.  Full moderate conscious sedation was not utilized for this patient, but anxiolytic was administered by Dr Alaniz.  The patient was independently monitored by a nurse assigned to the Department of radiology using automated blood pressure, EKG, and pulse oximetry.  The full medication record is permanently stored in the hospital administration system, the following is a brief record: Procedure start 08:04, procedure end 08:25, Versed 1 mg IV.    Indication for procedure:  this is a very pleasant 74-year-old gentleman status post percutaneous intervention through the left common femoral artery access 3/18/2024 now with a 2 x 3 cm pseudoaneurysm with adequate neck for thrombin injection off of the left common femoral artery.  I spoke with the patient this morning and did an exam.  The left groin is quite tender and we will plan to do a thrombin injection under ultrasound guidance with local anesthesia and anxiolytic.  Risk benefits and alternatives were explained and the patient is agreeable to proceed.  Informed consent was obtained.    Interpretation: The left common femoral artery and pseudoaneurysm were visualized under ultrasound.  Successful thrombin injection of the pseudoaneurysm.  Postinjection ultrasound confirmed good flow through the common femoral artery, SFA, profunda, common femoral vein, femoral vein.    Procedure: The patient was brought to the angiographic suite in stable condition.  His left groin was prepped

## 2024-04-04 NOTE — DISCHARGE INSTRUCTIONS
No driving today.    Ok to resume home medications. Take next doses of plavix and eliquis starting tomorrow morning.    Ok to resume home diet.    Ok to remove dressing tomorrow and shower. If no open area present tomorrow, then no new dressing needed.    Light activity the next 3 days. No lifting >10 lb. No strenuous exercise. Ok to ambulate, and go up and down stairs.    Follow up with vascular surgery clinic in 1-2 weeks.

## 2024-04-19 ENCOUNTER — OFFICE VISIT (OUTPATIENT)
Dept: VASCULAR SURGERY | Age: 74
End: 2024-04-19
Payer: OTHER GOVERNMENT

## 2024-04-19 VITALS
TEMPERATURE: 98 F | OXYGEN SATURATION: 98 % | HEART RATE: 73 BPM | DIASTOLIC BLOOD PRESSURE: 63 MMHG | SYSTOLIC BLOOD PRESSURE: 102 MMHG

## 2024-04-19 DIAGNOSIS — I70.213 ATHEROSCLER OF NATIVE ARTERY OF BOTH LEGS WITH INTERMIT CLAUDICATION (HCC): Primary | ICD-10-CM

## 2024-04-19 PROCEDURE — 99212 OFFICE O/P EST SF 10 MIN: CPT | Performed by: NURSE PRACTITIONER

## 2024-04-19 PROCEDURE — 3074F SYST BP LT 130 MM HG: CPT | Performed by: NURSE PRACTITIONER

## 2024-04-19 PROCEDURE — 3078F DIAST BP <80 MM HG: CPT | Performed by: NURSE PRACTITIONER

## 2024-04-19 PROCEDURE — 1123F ACP DISCUSS/DSCN MKR DOCD: CPT | Performed by: NURSE PRACTITIONER

## 2024-04-19 NOTE — PROGRESS NOTES
Atheroscler of native artery of both legs with intermit claudication (HCC)  -     VL KG BILATERAL LIMITED 1-2 LEVELS; Future  -     VL DUP LOWER EXTREMITY ARTERIES BILATERAL; Future     1. Atheroscler of native artery of both legs with intermit claudication (HCC)          Discussed management of pvd which includes:  continue plavix and pravachol to reduce risk of arterial thrombosis and to decrease rate of plaque buildup  Strongly encourage start/continue statin therapy   Recommend no smoking - discussed the effect tobacco has on illness;  Proceed with 3 months with ascan and kg       Patient instructed to walk as much as possible.  Call our office with any progressive pain in leg(s) or hip(s) with walking.  Take good care of your feet.  Let us know right away if you develop a wound on your foot.    An electronic signature was used to authenticate this note.    --DESMOND Gastelum

## 2024-04-22 ENCOUNTER — TELEPHONE (OUTPATIENT)
Dept: HEMATOLOGY | Age: 74
End: 2024-04-22

## 2024-04-24 ENCOUNTER — HOSPITAL ENCOUNTER (OUTPATIENT)
Dept: INFUSION THERAPY | Age: 74
Discharge: HOME | End: 2024-04-24
Payer: OTHER GOVERNMENT

## 2024-04-24 ENCOUNTER — OFFICE VISIT (OUTPATIENT)
Dept: HEMATOLOGY | Age: 74
End: 2024-04-24
Payer: OTHER GOVERNMENT

## 2024-04-24 VITALS
HEIGHT: 69 IN | BODY MASS INDEX: 18.66 KG/M2 | OXYGEN SATURATION: 97 % | DIASTOLIC BLOOD PRESSURE: 70 MMHG | TEMPERATURE: 98.7 F | HEART RATE: 69 BPM | SYSTOLIC BLOOD PRESSURE: 122 MMHG | WEIGHT: 126 LBS

## 2024-04-24 DIAGNOSIS — Z72.0 TOBACCO ABUSE: ICD-10-CM

## 2024-04-24 DIAGNOSIS — Z51.11 CHEMOTHERAPY MANAGEMENT, ENCOUNTER FOR: ICD-10-CM

## 2024-04-24 DIAGNOSIS — C91.10 CLL (CHRONIC LYMPHOCYTIC LEUKEMIA) (HCC): Primary | ICD-10-CM

## 2024-04-24 DIAGNOSIS — C91.10 CLL (CHRONIC LYMPHOCYTIC LEUKEMIA) (HCC): ICD-10-CM

## 2024-04-24 DIAGNOSIS — R21 RASH, SKIN: ICD-10-CM

## 2024-04-24 DIAGNOSIS — Z79.899 HIGH RISK MEDICATION USE: ICD-10-CM

## 2024-04-24 DIAGNOSIS — Z71.89 CARE PLAN DISCUSSED WITH PATIENT: ICD-10-CM

## 2024-04-24 LAB
BASOPHILS # BLD: 0.05 K/UL (ref 0.01–0.08)
BASOPHILS NFR BLD: 0.6 % (ref 0.1–1.2)
EOSINOPHIL # BLD: 0.06 K/UL (ref 0.04–0.54)
EOSINOPHIL NFR BLD: 0.7 % (ref 0.7–7)
ERYTHROCYTE [DISTWIDTH] IN BLOOD BY AUTOMATED COUNT: 15.3 % (ref 11.6–14.4)
FERRITIN SERPL-MCNC: 67 NG/ML (ref 30–400)
HCT VFR BLD AUTO: 43.5 % (ref 40.1–51)
HGB BLD-MCNC: 14 G/DL (ref 13.7–17.5)
IRON SATN MFR SERPL: 13 % (ref 14–50)
IRON SERPL-MCNC: 43 UG/DL (ref 59–158)
LYMPHOCYTES # BLD: 2.91 K/UL (ref 1.18–3.74)
LYMPHOCYTES NFR BLD: 36.3 % (ref 19.3–53.1)
MCH RBC QN AUTO: 29 PG (ref 25.7–32.2)
MCHC RBC AUTO-ENTMCNC: 32.2 G/DL (ref 32.3–36.5)
MCV RBC AUTO: 90.1 FL (ref 79–92.2)
MONOCYTES # BLD: 0.53 K/UL (ref 0.24–0.82)
MONOCYTES NFR BLD: 6.6 % (ref 4.7–12.5)
NEUTROPHILS # BLD: 4.44 K/UL (ref 1.56–6.13)
NEUTS SEG NFR BLD: 55.6 % (ref 34–71.1)
PLATELET # BLD AUTO: 178 K/UL (ref 163–337)
PMV BLD AUTO: 10.5 FL (ref 7.4–10.4)
RBC # BLD AUTO: 4.83 M/UL (ref 4.63–6.08)
TIBC SERPL-MCNC: 337 UG/DL (ref 250–400)
WBC # BLD AUTO: 8.01 K/UL (ref 4.23–9.07)

## 2024-04-24 PROCEDURE — 1123F ACP DISCUSS/DSCN MKR DOCD: CPT | Performed by: NURSE PRACTITIONER

## 2024-04-24 PROCEDURE — 99213 OFFICE O/P EST LOW 20 MIN: CPT | Performed by: NURSE PRACTITIONER

## 2024-04-24 PROCEDURE — G8420 CALC BMI NORM PARAMETERS: HCPCS | Performed by: NURSE PRACTITIONER

## 2024-04-24 PROCEDURE — 99212 OFFICE O/P EST SF 10 MIN: CPT

## 2024-04-24 PROCEDURE — 3017F COLORECTAL CA SCREEN DOC REV: CPT | Performed by: NURSE PRACTITIONER

## 2024-04-24 PROCEDURE — 4004F PT TOBACCO SCREEN RCVD TLK: CPT | Performed by: NURSE PRACTITIONER

## 2024-04-24 PROCEDURE — 3078F DIAST BP <80 MM HG: CPT | Performed by: NURSE PRACTITIONER

## 2024-04-24 PROCEDURE — G8427 DOCREV CUR MEDS BY ELIG CLIN: HCPCS | Performed by: NURSE PRACTITIONER

## 2024-04-24 PROCEDURE — 36415 COLL VENOUS BLD VENIPUNCTURE: CPT

## 2024-04-24 PROCEDURE — 85025 COMPLETE CBC W/AUTO DIFF WBC: CPT

## 2024-04-24 PROCEDURE — 3074F SYST BP LT 130 MM HG: CPT | Performed by: NURSE PRACTITIONER

## 2024-04-24 RX ORDER — LEVOTHYROXINE SODIUM 0.15 MG/1
150 TABLET ORAL DAILY
COMMUNITY

## 2024-04-24 NOTE — PROGRESS NOTES
monitoring due to the potential to cause serious morbidity or death.     I have seen, examined and reviewed this patient medication list, appropriate labs and imaging studies. I reviewed relevant medical records and others physician’s notes. I discussed the plans of care with the patient. I answered all the questions to the patient’s satisfaction. I have also reviewed the chief complaint (CC) and part of the history (History of Present Illness (HPI), Past Family Social History (PFSH), or Review of Systems (ROS) and made changes when appropriate.        (Please note that portions of this note were completed with a voice recognition program. Efforts were made to edit the dictations but occasionally words are mis-transcribed.)    The total time (20 min) I spent to see the patient today includes at least one or more of the following: preparing to see the patient by reviewing prior tests, prior notes or other relevant information, performing appropriate independent examination and evaluation, counseling, ordering of medications, tests or procedures, referrals, communicating with other healthcare professionals when appropriated to coordinate care, documenting clinic information in the electronic medical record or other health records, independently interpreting results of tests, managing test results and communicating the results to the patient/family or caregiver.          Ileana Strange, APRN  7:41 AM  4/26/2024

## 2024-04-25 ENCOUNTER — TELEPHONE (OUTPATIENT)
Dept: HEMATOLOGY | Age: 74
End: 2024-04-25

## 2024-04-25 NOTE — TELEPHONE ENCOUNTER
Iron is low.  Recommended he take iron pill.  States he is on one and takes it daily.  Will inform provider.

## 2024-04-26 ASSESSMENT — ENCOUNTER SYMPTOMS
WHEEZING: 0
VOMITING: 0
NAUSEA: 0
SHORTNESS OF BREATH: 0
COUGH: 0
EYE DISCHARGE: 0
SORE THROAT: 0
TROUBLE SWALLOWING: 0
CONSTIPATION: 0
ABDOMINAL PAIN: 0
DIARRHEA: 0
EYE ITCHING: 0

## 2024-05-13 NOTE — PROGRESS NOTES
Patient taken to vascular lab with transportation via stretcher.   Electronically signed by Francis Thomason RN on 5/17/2021 at 1:06 PM Do not drive or operate machinery/No heavy lifting/straining

## 2024-07-03 ENCOUNTER — OFFICE VISIT (OUTPATIENT)
Dept: VASCULAR SURGERY | Age: 74
End: 2024-07-03
Payer: OTHER GOVERNMENT

## 2024-07-03 ENCOUNTER — PREP FOR PROCEDURE (OUTPATIENT)
Dept: VASCULAR SURGERY | Age: 74
End: 2024-07-03

## 2024-07-03 ENCOUNTER — HOSPITAL ENCOUNTER (OUTPATIENT)
Dept: VASCULAR LAB | Age: 74
Discharge: HOME | End: 2024-07-05
Payer: OTHER GOVERNMENT

## 2024-07-03 VITALS
HEART RATE: 68 BPM | SYSTOLIC BLOOD PRESSURE: 119 MMHG | DIASTOLIC BLOOD PRESSURE: 73 MMHG | OXYGEN SATURATION: 97 % | TEMPERATURE: 99.1 F

## 2024-07-03 DIAGNOSIS — Z01.818 PRE-OP TESTING: Primary | ICD-10-CM

## 2024-07-03 DIAGNOSIS — I70.213 ATHEROSCLEROSIS OF NATIVE ARTERY OF BOTH LOWER EXTREMITIES WITH INTERMITTENT CLAUDICATION (HCC): ICD-10-CM

## 2024-07-03 DIAGNOSIS — I73.9 PVD (PERIPHERAL VASCULAR DISEASE) (HCC): Primary | ICD-10-CM

## 2024-07-03 DIAGNOSIS — I70.213 ATHEROSCLER OF NATIVE ARTERY OF BOTH LEGS WITH INTERMIT CLAUDICATION (HCC): Primary | ICD-10-CM

## 2024-07-03 DIAGNOSIS — I70.213 ATHEROSCLER OF NATIVE ARTERY OF BOTH LEGS WITH INTERMIT CLAUDICATION (HCC): ICD-10-CM

## 2024-07-03 PROCEDURE — 93925 LOWER EXTREMITY STUDY: CPT

## 2024-07-03 PROCEDURE — 3074F SYST BP LT 130 MM HG: CPT | Performed by: NURSE PRACTITIONER

## 2024-07-03 PROCEDURE — 93922 UPR/L XTREMITY ART 2 LEVELS: CPT

## 2024-07-03 PROCEDURE — 3078F DIAST BP <80 MM HG: CPT | Performed by: NURSE PRACTITIONER

## 2024-07-03 PROCEDURE — 1123F ACP DISCUSS/DSCN MKR DOCD: CPT | Performed by: NURSE PRACTITIONER

## 2024-07-03 PROCEDURE — 99214 OFFICE O/P EST MOD 30 MIN: CPT | Performed by: NURSE PRACTITIONER

## 2024-07-03 NOTE — PROGRESS NOTES
Jong Lyod (:  1950) is a 74 y.o. male,Established patient, here for evaluation of the following chief complaint(s):  Follow-up (3 month follow up LAURA/KG's. )            SUBJECTIVE/OBJECTIVE:  Jong has a history of peripheral vascular disease of the lower extremities.  He has had this for 1 - 5 years. Current treatment includes plavix, eliquis, and pravachol.  Jong has not had new wounds. Recently, he reports claudication at a distance of  which varies.  Jong reports that the right leg is equal to the left.  He reports claudication has progressed and is mostly in the form of crampy type pain and fatigue starting in the calves. He has a short recovery time. This is reproducible in nature. He reports ischemic rest pain 0 times per night.  He reports walking with cart does not help.    I have personally reviewed the following: problem list, current meds, allergies, PMH, PSH, family hx, and social hx  Jong Loyd is a 74 y.o. male with the following history as recorded in [facility]:  Patient Active Problem List    Diagnosis Date Noted    Atherosclerosis of native artery of both lower extremities with intermittent claudication (HCC) 2024    Femoral artery pseudo-aneurysm, left (HCC) 2024    Atherosclerosis with claudication of extremity (HCC) 2021    Benign essential HTN 2021    BPH (benign prostatic hyperplasia) 2021    Pulmonary emphysema (HCC) 2021    Carotid stenosis, asymptomatic, bilateral 2021    CLL (chronic lymphocytic leukemia) (HCC) 2021    PVD (peripheral vascular disease) ([facility])      Current Outpatient Medications   Medication Sig Dispense Refill    Zanubrutinib 80 MG CAPS Take 4 capsules by mouth daily      levothyroxine (SYNTHROID) 150 MCG tablet Take 1 tablet by mouth Daily      montelukast (SINGULAIR) 10 MG tablet Take 1 tablet by mouth nightly 30 tablet 5    apixaban (ELIQUIS) 5 MG TABS tablet Take 1 tablet by mouth 2 times daily 60

## 2024-07-04 LAB
VAS LEFT ABI: 0.74
VAS LEFT ANKLE BP: 97 MMHG
VAS LEFT ARM BP: 131 MMHG
VAS LEFT ATA PROX PSV: 41 CM/S
VAS LEFT CFA PROX PSV: 213 CM/S
VAS LEFT DIST OUTFLOW PSV: 44.7 CM/S
VAS LEFT DORSALIS PEDIS BP: 93 MMHG
VAS LEFT GRAFT 1: NORMAL
VAS LEFT MID OUTFLOW PSV: 41.5 CM/S
VAS LEFT PERONEAL MID PSV: 23 CM/S
VAS LEFT PFA PROX PSV: 117 CM/S
VAS LEFT POP A DIST PSV: 32.9 CM/S
VAS LEFT POP A PROX PSV: 42.9 CM/S
VAS LEFT POP A PROX VEL RATIO: 0.52
VAS LEFT PROX OUTFLOW PSV: 524 CM/S
VAS LEFT PTA BP: 97 MMHG
VAS LEFT PTA MID PSV: 37.9 CM/S
VAS LEFT SFA DIST PSV: 83.1 CM/S
VAS LEFT SFA DIST VEL RATIO: 0.95
VAS LEFT SFA MID PSV: 87.2 CM/S
VAS LEFT SFA MID VEL RATIO: 1.07
VAS LEFT SFA PROX PSV: 81.3 CM/S
VAS LEFT SFA PROX VEL RATIO: 0.38
VAS LEFT TBI: 1
VAS LEFT TOE PRESSURE: 131 MMHG
VAS RIGHT ABI: 1.1
VAS RIGHT ANKLE BP: 144 MMHG
VAS RIGHT ARM BP: 130 MMHG
VAS RIGHT CFA PROX PSV: 150 CM/S
VAS RIGHT DIST OUTFLOW PSV 2: 30.4 CM/S
VAS RIGHT DIST OUTFLOW PSV: 130 CM/S
VAS RIGHT DORSALIS PEDIS BP: 144 MMHG
VAS RIGHT GRAFT 1: NORMAL
VAS RIGHT GRAFT 2: NORMAL
VAS RIGHT MID OUTFLOW PSV 2: 42.2 CM/S
VAS RIGHT MID OUTFLOW PSV: 110 CM/S
VAS RIGHT OUTFLOW VESSEL PSV: 150 CM/S
VAS RIGHT PERONEAL MID PSV: 72.7 CM/S
VAS RIGHT PFA PROX PSV: 177 CM/S
VAS RIGHT POP A DIST PSV: 40.4 CM/S
VAS RIGHT PROX OUTFLOW PSV 2: 32.9 CM/S
VAS RIGHT PTA BP: 121 MMHG
VAS RIGHT PTA MID PSV: 67.1 CM/S
VAS RIGHT SFA DIST PSV: 32.3 CM/S
VAS RIGHT SFA DIST VEL RATIO: 0.26
VAS RIGHT SFA MID PSV: 124 CM/S
VAS RIGHT SFA MID VEL RATIO: 0.9
VAS RIGHT SFA PROX PSV: 142 CM/S
VAS RIGHT SFA PROX VEL RATIO: 0.9
VAS RIGHT TBI: 0.53
VAS RIGHT TOE PRESSURE: 70 MMHG

## 2024-07-08 ENCOUNTER — TELEPHONE (OUTPATIENT)
Dept: VASCULAR SURGERY | Age: 74
End: 2024-07-08

## 2024-07-08 RX ORDER — SODIUM CHLORIDE 0.9 % (FLUSH) 0.9 %
5-40 SYRINGE (ML) INJECTION EVERY 12 HOURS SCHEDULED
OUTPATIENT
Start: 2024-07-08

## 2024-07-08 RX ORDER — SODIUM CHLORIDE 0.9 % (FLUSH) 0.9 %
5-40 SYRINGE (ML) INJECTION PRN
OUTPATIENT
Start: 2024-07-08

## 2024-07-08 RX ORDER — SODIUM CHLORIDE 9 MG/ML
INJECTION, SOLUTION INTRAVENOUS PRN
OUTPATIENT
Start: 2024-07-08

## 2024-07-08 RX ORDER — ASPIRIN 81 MG/1
81 TABLET ORAL ONCE
OUTPATIENT
Start: 2024-07-08 | End: 2024-07-08

## 2024-07-08 NOTE — H&P (VIEW-ONLY)
Jong Loyd (:  1950) is a 74 y.o. male,Established patient, here for evaluation of the following chief complaint(s):  Follow-up (3 month follow up LAURA/KG's. )            SUBJECTIVE/OBJECTIVE:  Jong has a history of peripheral vascular disease of the lower extremities.  He has had this for 1 - 5 years. Current treatment includes plavix, eliquis, and pravachol.  Jong has not had new wounds. Recently, he reports claudication at a distance of  which varies.  Jong reports that the right leg is equal to the left.  He reports claudication has progressed and is mostly in the form of crampy type pain and fatigue starting in the calves. He has a short recovery time. This is reproducible in nature. He reports ischemic rest pain 0 times per night.  He reports walking with cart does not help.    I have personally reviewed the following: problem list, current meds, allergies, PMH, PSH, family hx, and social hx  Jong Loyd is a 74 y.o. male with the following history as recorded in [facility]:  Patient Active Problem List    Diagnosis Date Noted    Atherosclerosis of native artery of both lower extremities with intermittent claudication (HCC) 2024    Femoral artery pseudo-aneurysm, left (HCC) 2024    Atherosclerosis with claudication of extremity (HCC) 2021    Benign essential HTN 2021    BPH (benign prostatic hyperplasia) 2021    Pulmonary emphysema (HCC) 2021    Carotid stenosis, asymptomatic, bilateral 2021    CLL (chronic lymphocytic leukemia) (HCC) 2021    PVD (peripheral vascular disease) ([facility])      Current Outpatient Medications   Medication Sig Dispense Refill    Zanubrutinib 80 MG CAPS Take 4 capsules by mouth daily      levothyroxine (SYNTHROID) 150 MCG tablet Take 1 tablet by mouth Daily      montelukast (SINGULAIR) 10 MG tablet Take 1 tablet by mouth nightly 30 tablet 5    apixaban (ELIQUIS) 5 MG TABS tablet Take 1 tablet by mouth 2 times daily 60  Right PT absent; Left femoral pulse: present 2+; Left popliteal pulse: absent; Left DP: absent; Left PT: absent No cyanosis, clubbing, or significant edema.  No signs atheroembolic event.  Neurologic - alert and oriented X 3.  Physiologic.   Face symmetric.  Skin - warm, dry, and intact.  no  wound  Psychiatric - mood, affect, and behavior appear normal.  Judgment and thought processes appear normal.    Risk factors for atherosclerosis of all vascular beds have been reviewed with the patient including:  Family history, tobacco abuse in all forms, elevated cholesterol, hyperlipidemia, and diabetes.    Lower extremity arterial study: Right KG 1.1, Left KG 0.74.  Ascan - high grade stenosis of left sfa stnt and slow flow right popliteal stent  Individual films reviewed: Yes.  These results were reviewed with the patient.  Disease process is chronic illness with exacerbation, progression, or side effects of treatment  by review of waveforms, I see moderate progression of disease        Reviewed previous studies including: ascan   Individual images were reviewed.  I agree with the findings  Results were discussed with the patient.      Options have been discussed with the patient including continued medical management vs. proceeding with intraoperative angiogram with runoff and possible angioplasty/atherectomy/stent For left leg stent.  Patient has opted to proceed with this.  Risks have been discussed with the patient including but not limited to MI, death, CVA, bleed, nerve injury, renal failure and need for possible dialysis if contrast is used, and infection.      ASSESSMENT/PLAN:  1. Atheroscler of native artery of both legs with intermit claudication (HCC)        Discussed management of ascan  which includes:  continue plavix, eliquis, and pravachol to maintain patency of stents, to reduce risk of arterial thrombosis, and to decrease rate of plaque buildup  Strongly encourage start/continue statin therapy -

## 2024-07-08 NOTE — H&P
Jong Loyd (:  1950) is a 74 y.o. male,Established patient, here for evaluation of the following chief complaint(s):  Follow-up (3 month follow up LAURA/KG's. )            SUBJECTIVE/OBJECTIVE:  Jong has a history of peripheral vascular disease of the lower extremities.  He has had this for 1 - 5 years. Current treatment includes plavix, eliquis, and pravachol.  Jong has not had new wounds. Recently, he reports claudication at a distance of  which varies.  Jong reports that the right leg is equal to the left.  He reports claudication has progressed and is mostly in the form of crampy type pain and fatigue starting in the calves. He has a short recovery time. This is reproducible in nature. He reports ischemic rest pain 0 times per night.  He reports walking with cart does not help.    I have personally reviewed the following: problem list, current meds, allergies, PMH, PSH, family hx, and social hx  Jong Loyd is a 74 y.o. male with the following history as recorded in [facility]:  Patient Active Problem List    Diagnosis Date Noted    Atherosclerosis of native artery of both lower extremities with intermittent claudication (HCC) 2024    Femoral artery pseudo-aneurysm, left (HCC) 2024    Atherosclerosis with claudication of extremity (HCC) 2021    Benign essential HTN 2021    BPH (benign prostatic hyperplasia) 2021    Pulmonary emphysema (HCC) 2021    Carotid stenosis, asymptomatic, bilateral 2021    CLL (chronic lymphocytic leukemia) (HCC) 2021    PVD (peripheral vascular disease) ([facility])      Current Outpatient Medications   Medication Sig Dispense Refill    Zanubrutinib 80 MG CAPS Take 4 capsules by mouth daily      levothyroxine (SYNTHROID) 150 MCG tablet Take 1 tablet by mouth Daily      montelukast (SINGULAIR) 10 MG tablet Take 1 tablet by mouth nightly 30 tablet 5    apixaban (ELIQUIS) 5 MG TABS tablet Take 1 tablet by mouth 2 times daily 60

## 2024-07-12 ENCOUNTER — TELEPHONE (OUTPATIENT)
Dept: HEMATOLOGY | Age: 74
End: 2024-07-12

## 2024-07-12 NOTE — TELEPHONE ENCOUNTER
Contacted patient regarding upcoming vascular procedure and reminded him to hold his brukinsa for 1 week prior to procedure, and for 1 week after the procedure. He verbalized understanding of these instructions and is agreeable to plan.

## 2024-07-16 ENCOUNTER — HOSPITAL ENCOUNTER (OUTPATIENT)
Dept: GENERAL RADIOLOGY | Age: 74
Discharge: HOME | End: 2024-07-16
Payer: OTHER GOVERNMENT

## 2024-07-16 PROCEDURE — 71046 X-RAY EXAM CHEST 2 VIEWS: CPT

## 2024-07-22 ENCOUNTER — ANESTHESIA EVENT (OUTPATIENT)
Dept: OPERATING ROOM | Age: 74
End: 2024-07-22
Payer: OTHER GOVERNMENT

## 2024-07-22 ENCOUNTER — APPOINTMENT (OUTPATIENT)
Dept: INTERVENTIONAL RADIOLOGY/VASCULAR | Age: 74
End: 2024-07-22
Attending: SURGERY
Payer: OTHER GOVERNMENT

## 2024-07-22 ENCOUNTER — HOSPITAL ENCOUNTER (INPATIENT)
Age: 74
LOS: 2 days | Discharge: HOME | End: 2024-07-24
Attending: SURGERY | Admitting: SURGERY
Payer: OTHER GOVERNMENT

## 2024-07-22 ENCOUNTER — ANESTHESIA (OUTPATIENT)
Dept: OPERATING ROOM | Age: 74
End: 2024-07-22
Payer: OTHER GOVERNMENT

## 2024-07-22 DIAGNOSIS — I70.219 ATHEROSCLEROSIS WITH CLAUDICATION OF EXTREMITY (HCC): Primary | ICD-10-CM

## 2024-07-22 PROBLEM — I70.212 ATHEROSCLEROSIS OF NATIVE ARTERIES OF EXTREMITIES WITH INTERMITTENT CLAUDICATION, LEFT LEG (HCC): Status: ACTIVE | Noted: 2024-07-22

## 2024-07-22 LAB
ABO + RH BLD: NORMAL
ABO/RH: NORMAL
ANION GAP SERPL CALCULATED.3IONS-SCNC: 8 MMOL/L (ref 7–19)
ANTIBODY SCREEN: NORMAL
APTT PPP: 34.8 SEC (ref 26–36.2)
APTT PPP: >200 SEC (ref 26–36.2)
BUN SERPL-MCNC: 14 MG/DL (ref 8–23)
CALCIUM SERPL-MCNC: 8.3 MG/DL (ref 8.8–10.2)
CHLORIDE SERPL-SCNC: 106 MMOL/L (ref 98–111)
CO2 SERPL-SCNC: 25 MMOL/L (ref 22–29)
CREAT SERPL-MCNC: 0.8 MG/DL (ref 0.5–1.2)
ERYTHROCYTE [DISTWIDTH] IN BLOOD BY AUTOMATED COUNT: 13.8 % (ref 11.5–14.5)
ERYTHROCYTE [DISTWIDTH] IN BLOOD BY AUTOMATED COUNT: 13.9 % (ref 11.5–14.5)
FIBRINOGEN PPP-MCNC: 271 MG/DL (ref 238–505)
FIBRINOGEN PPP-MCNC: 287 MG/DL (ref 238–505)
GLUCOSE SERPL-MCNC: 100 MG/DL (ref 74–109)
HCT VFR BLD AUTO: 39.4 % (ref 42–52)
HCT VFR BLD AUTO: 41.4 % (ref 42–52)
HGB BLD-MCNC: 12.9 G/DL (ref 14–18)
HGB BLD-MCNC: 13.1 G/DL (ref 14–18)
MCH RBC QN AUTO: 28.7 PG (ref 27–31)
MCH RBC QN AUTO: 29 PG (ref 27–31)
MCHC RBC AUTO-ENTMCNC: 31.6 G/DL (ref 33–37)
MCHC RBC AUTO-ENTMCNC: 32.7 G/DL (ref 33–37)
MCV RBC AUTO: 88.5 FL (ref 80–94)
MCV RBC AUTO: 90.6 FL (ref 80–94)
PLATELET # BLD AUTO: 150 K/UL (ref 130–400)
PLATELET # BLD AUTO: 157 K/UL (ref 130–400)
PMV BLD AUTO: 10 FL (ref 9.4–12.4)
PMV BLD AUTO: 9.5 FL (ref 9.4–12.4)
POTASSIUM SERPL-SCNC: 4.1 MMOL/L (ref 3.5–5)
RBC # BLD AUTO: 4.45 M/UL (ref 4.7–6.1)
RBC # BLD AUTO: 4.57 M/UL (ref 4.7–6.1)
SODIUM SERPL-SCNC: 139 MMOL/L (ref 136–145)
WBC # BLD AUTO: 5.4 K/UL (ref 4.8–10.8)
WBC # BLD AUTO: 8.5 K/UL (ref 4.8–10.8)

## 2024-07-22 PROCEDURE — B41DZZZ FLUOROSCOPY OF AORTA AND BILATERAL LOWER EXTREMITY ARTERIES: ICD-10-PCS | Performed by: SURGERY

## 2024-07-22 PROCEDURE — 75625 CONTRAST EXAM ABDOMINL AORTA: CPT

## 2024-07-22 PROCEDURE — A4217 STERILE WATER/SALINE, 500 ML: HCPCS | Performed by: SURGERY

## 2024-07-22 PROCEDURE — 85730 THROMBOPLASTIN TIME PARTIAL: CPT

## 2024-07-22 PROCEDURE — 86901 BLOOD TYPING SEROLOGIC RH(D): CPT

## 2024-07-22 PROCEDURE — 2580000003 HC RX 258: Performed by: NURSE ANESTHETIST, CERTIFIED REGISTERED

## 2024-07-22 PROCEDURE — 047N3Z1 DILATION OF LEFT POPLITEAL ARTERY USING DRUG-COATED BALLOON, PERCUTANEOUS APPROACH: ICD-10-PCS | Performed by: SURGERY

## 2024-07-22 PROCEDURE — 94760 N-INVAS EAR/PLS OXIMETRY 1: CPT

## 2024-07-22 PROCEDURE — 6360000002 HC RX W HCPCS: Performed by: NURSE PRACTITIONER

## 2024-07-22 PROCEDURE — 3600000007 HC SURGERY HYBRID BASE: Performed by: SURGERY

## 2024-07-22 PROCEDURE — 3600000017 HC SURGERY HYBRID ADDL 15MIN: Performed by: SURGERY

## 2024-07-22 PROCEDURE — 2580000003 HC RX 258: Performed by: ANESTHESIOLOGY

## 2024-07-22 PROCEDURE — 2580000003 HC RX 258: Performed by: SURGERY

## 2024-07-22 PROCEDURE — 86850 RBC ANTIBODY SCREEN: CPT

## 2024-07-22 PROCEDURE — C1757 CATH, THROMBECTOMY/EMBOLECT: HCPCS | Performed by: SURGERY

## 2024-07-22 PROCEDURE — 2500000003 HC RX 250 WO HCPCS: Performed by: NURSE ANESTHETIST, CERTIFIED REGISTERED

## 2024-07-22 PROCEDURE — C1769 GUIDE WIRE: HCPCS | Performed by: SURGERY

## 2024-07-22 PROCEDURE — 36415 COLL VENOUS BLD VENIPUNCTURE: CPT

## 2024-07-22 PROCEDURE — 2709999900 HC NON-CHARGEABLE SUPPLY: Performed by: SURGERY

## 2024-07-22 PROCEDURE — 3700000001 HC ADD 15 MINUTES (ANESTHESIA): Performed by: SURGERY

## 2024-07-22 PROCEDURE — 6360000002 HC RX W HCPCS: Performed by: SURGERY

## 2024-07-22 PROCEDURE — 94150 VITAL CAPACITY TEST: CPT

## 2024-07-22 PROCEDURE — 85384 FIBRINOGEN ACTIVITY: CPT

## 2024-07-22 PROCEDURE — 86900 BLOOD TYPING SEROLOGIC ABO: CPT

## 2024-07-22 PROCEDURE — C1894 INTRO/SHEATH, NON-LASER: HCPCS | Performed by: SURGERY

## 2024-07-22 PROCEDURE — C1893 INTRO/SHEATH, FIXED,NON-PEEL: HCPCS | Performed by: SURGERY

## 2024-07-22 PROCEDURE — 3700000000 HC ANESTHESIA ATTENDED CARE: Performed by: SURGERY

## 2024-07-22 PROCEDURE — 85027 COMPLETE CBC AUTOMATED: CPT

## 2024-07-22 PROCEDURE — 80048 BASIC METABOLIC PNL TOTAL CA: CPT

## 2024-07-22 PROCEDURE — C2623 CATH, TRANSLUMIN, DRUG-COAT: HCPCS | Performed by: SURGERY

## 2024-07-22 PROCEDURE — 2500000003 HC RX 250 WO HCPCS: Performed by: SURGERY

## 2024-07-22 PROCEDURE — 6360000002 HC RX W HCPCS: Performed by: NURSE ANESTHETIST, CERTIFIED REGISTERED

## 2024-07-22 PROCEDURE — 75716 ARTERY X-RAYS ARMS/LEGS: CPT

## 2024-07-22 PROCEDURE — 2000000000 HC ICU R&B

## 2024-07-22 PROCEDURE — 6370000000 HC RX 637 (ALT 250 FOR IP): Performed by: SURGERY

## 2024-07-22 PROCEDURE — C1725 CATH, TRANSLUMIN NON-LASER: HCPCS | Performed by: SURGERY

## 2024-07-22 PROCEDURE — 3E03317 INTRODUCTION OF OTHER THROMBOLYTIC INTO PERIPHERAL VEIN, PERCUTANEOUS APPROACH: ICD-10-PCS | Performed by: SURGERY

## 2024-07-22 PROCEDURE — C1887 CATHETER, GUIDING: HCPCS | Performed by: SURGERY

## 2024-07-22 PROCEDURE — 6360000004 HC RX CONTRAST MEDICATION: Performed by: SURGERY

## 2024-07-22 PROCEDURE — 6370000000 HC RX 637 (ALT 250 FOR IP): Performed by: NURSE PRACTITIONER

## 2024-07-22 RX ORDER — SODIUM CHLORIDE 9 MG/ML
INJECTION, SOLUTION INTRAVENOUS PRN
Status: DISCONTINUED | OUTPATIENT
Start: 2024-07-22 | End: 2024-07-22 | Stop reason: HOSPADM

## 2024-07-22 RX ORDER — SODIUM CHLORIDE, SODIUM LACTATE, POTASSIUM CHLORIDE, CALCIUM CHLORIDE 600; 310; 30; 20 MG/100ML; MG/100ML; MG/100ML; MG/100ML
INJECTION, SOLUTION INTRAVENOUS CONTINUOUS
Status: DISCONTINUED | OUTPATIENT
Start: 2024-07-22 | End: 2024-07-22 | Stop reason: HOSPADM

## 2024-07-22 RX ORDER — SODIUM CHLORIDE 9 MG/ML
INJECTION, SOLUTION INTRAVENOUS CONTINUOUS PRN
Status: ACTIVE | OUTPATIENT
Start: 2024-07-22 | End: 2024-07-23

## 2024-07-22 RX ORDER — LIDOCAINE HYDROCHLORIDE 10 MG/ML
INJECTION, SOLUTION INFILTRATION; PERINEURAL PRN
Status: DISCONTINUED | OUTPATIENT
Start: 2024-07-22 | End: 2024-07-22 | Stop reason: SDUPTHER

## 2024-07-22 RX ORDER — FENTANYL CITRATE 50 UG/ML
INJECTION, SOLUTION INTRAMUSCULAR; INTRAVENOUS PRN
Status: DISCONTINUED | OUTPATIENT
Start: 2024-07-22 | End: 2024-07-22 | Stop reason: SDUPTHER

## 2024-07-22 RX ORDER — HYDROMORPHONE HYDROCHLORIDE 1 MG/ML
0.25 INJECTION, SOLUTION INTRAMUSCULAR; INTRAVENOUS; SUBCUTANEOUS EVERY 5 MIN PRN
Status: DISCONTINUED | OUTPATIENT
Start: 2024-07-22 | End: 2024-07-22 | Stop reason: HOSPADM

## 2024-07-22 RX ORDER — SODIUM CHLORIDE 0.9 % (FLUSH) 0.9 %
5-40 SYRINGE (ML) INJECTION EVERY 12 HOURS SCHEDULED
Status: DISCONTINUED | OUTPATIENT
Start: 2024-07-22 | End: 2024-07-22 | Stop reason: HOSPADM

## 2024-07-22 RX ORDER — DEXMEDETOMIDINE HYDROCHLORIDE 4 UG/ML
.1-1.5 INJECTION, SOLUTION INTRAVENOUS CONTINUOUS
Status: DISCONTINUED | OUTPATIENT
Start: 2024-07-22 | End: 2024-07-24 | Stop reason: HOSPADM

## 2024-07-22 RX ORDER — EPHEDRINE SULFATE/0.9% NACL/PF 25 MG/5 ML
SYRINGE (ML) INTRAVENOUS PRN
Status: DISCONTINUED | OUTPATIENT
Start: 2024-07-22 | End: 2024-07-22 | Stop reason: SDUPTHER

## 2024-07-22 RX ORDER — MEPERIDINE HYDROCHLORIDE 25 MG/ML
12.5 INJECTION INTRAMUSCULAR; INTRAVENOUS; SUBCUTANEOUS EVERY 5 MIN PRN
Status: DISCONTINUED | OUTPATIENT
Start: 2024-07-22 | End: 2024-07-22 | Stop reason: HOSPADM

## 2024-07-22 RX ORDER — SODIUM CHLORIDE 0.9 % (FLUSH) 0.9 %
5-40 SYRINGE (ML) INJECTION PRN
Status: DISCONTINUED | OUTPATIENT
Start: 2024-07-22 | End: 2024-07-22 | Stop reason: HOSPADM

## 2024-07-22 RX ORDER — ONDANSETRON 2 MG/ML
4 INJECTION INTRAMUSCULAR; INTRAVENOUS EVERY 6 HOURS PRN
Status: DISCONTINUED | OUTPATIENT
Start: 2024-07-22 | End: 2024-07-24 | Stop reason: HOSPADM

## 2024-07-22 RX ORDER — ROCURONIUM BROMIDE 10 MG/ML
INJECTION, SOLUTION INTRAVENOUS PRN
Status: DISCONTINUED | OUTPATIENT
Start: 2024-07-22 | End: 2024-07-22 | Stop reason: SDUPTHER

## 2024-07-22 RX ORDER — CLOPIDOGREL BISULFATE 75 MG/1
75 TABLET ORAL DAILY
Status: DISCONTINUED | OUTPATIENT
Start: 2024-07-23 | End: 2024-07-24 | Stop reason: HOSPADM

## 2024-07-22 RX ORDER — SODIUM CHLORIDE 9 MG/ML
25 INJECTION, SOLUTION INTRAVENOUS PRN
Status: DISCONTINUED | OUTPATIENT
Start: 2024-07-22 | End: 2024-07-24 | Stop reason: SDUPTHER

## 2024-07-22 RX ORDER — PROPOFOL 10 MG/ML
INJECTION, EMULSION INTRAVENOUS PRN
Status: DISCONTINUED | OUTPATIENT
Start: 2024-07-22 | End: 2024-07-22 | Stop reason: SDUPTHER

## 2024-07-22 RX ORDER — HEPARIN SODIUM AND DEXTROSE 10000; 5 [USP'U]/100ML; G/100ML
400 INJECTION INTRAVENOUS CONTINUOUS
Status: DISCONTINUED | OUTPATIENT
Start: 2024-07-22 | End: 2024-07-23

## 2024-07-22 RX ORDER — PRAVASTATIN SODIUM 20 MG
40 TABLET ORAL NIGHTLY
Status: DISCONTINUED | OUTPATIENT
Start: 2024-07-22 | End: 2024-07-24 | Stop reason: HOSPADM

## 2024-07-22 RX ORDER — SODIUM CHLORIDE, SODIUM LACTATE, POTASSIUM CHLORIDE, CALCIUM CHLORIDE 600; 310; 30; 20 MG/100ML; MG/100ML; MG/100ML; MG/100ML
INJECTION, SOLUTION INTRAVENOUS CONTINUOUS PRN
Status: DISCONTINUED | OUTPATIENT
Start: 2024-07-22 | End: 2024-07-22 | Stop reason: SDUPTHER

## 2024-07-22 RX ORDER — DIPHENHYDRAMINE HYDROCHLORIDE 50 MG/ML
12.5 INJECTION INTRAMUSCULAR; INTRAVENOUS
Status: DISCONTINUED | OUTPATIENT
Start: 2024-07-22 | End: 2024-07-22 | Stop reason: HOSPADM

## 2024-07-22 RX ORDER — NALOXONE HYDROCHLORIDE 0.4 MG/ML
INJECTION, SOLUTION INTRAMUSCULAR; INTRAVENOUS; SUBCUTANEOUS PRN
Status: DISCONTINUED | OUTPATIENT
Start: 2024-07-22 | End: 2024-07-22 | Stop reason: HOSPADM

## 2024-07-22 RX ORDER — MONTELUKAST SODIUM 10 MG/1
10 TABLET ORAL NIGHTLY
Status: DISCONTINUED | OUTPATIENT
Start: 2024-07-22 | End: 2024-07-24 | Stop reason: HOSPADM

## 2024-07-22 RX ORDER — LEVOTHYROXINE SODIUM 0.07 MG/1
150 TABLET ORAL DAILY
Status: DISCONTINUED | OUTPATIENT
Start: 2024-07-22 | End: 2024-07-24 | Stop reason: HOSPADM

## 2024-07-22 RX ORDER — IPRATROPIUM BROMIDE 21 UG/1
2 SPRAY, METERED NASAL 2 TIMES DAILY
Status: DISCONTINUED | OUTPATIENT
Start: 2024-07-22 | End: 2024-07-24 | Stop reason: HOSPADM

## 2024-07-22 RX ORDER — SODIUM CHLORIDE 0.9 % (FLUSH) 0.9 %
5-40 SYRINGE (ML) INJECTION EVERY 12 HOURS SCHEDULED
Status: DISCONTINUED | OUTPATIENT
Start: 2024-07-22 | End: 2024-07-24 | Stop reason: SDUPTHER

## 2024-07-22 RX ORDER — SODIUM CHLORIDE 0.9 % (FLUSH) 0.9 %
5-40 SYRINGE (ML) INJECTION PRN
Status: DISCONTINUED | OUTPATIENT
Start: 2024-07-22 | End: 2024-07-24 | Stop reason: SDUPTHER

## 2024-07-22 RX ORDER — HYDROCODONE BITARTRATE AND ACETAMINOPHEN 5; 325 MG/1; MG/1
1 TABLET ORAL EVERY 6 HOURS PRN
Status: DISCONTINUED | OUTPATIENT
Start: 2024-07-22 | End: 2024-07-24 | Stop reason: HOSPADM

## 2024-07-22 RX ORDER — ASPIRIN 81 MG/1
81 TABLET ORAL ONCE
Status: COMPLETED | OUTPATIENT
Start: 2024-07-22 | End: 2024-07-22

## 2024-07-22 RX ORDER — ONDANSETRON 2 MG/ML
INJECTION INTRAMUSCULAR; INTRAVENOUS PRN
Status: DISCONTINUED | OUTPATIENT
Start: 2024-07-22 | End: 2024-07-22 | Stop reason: SDUPTHER

## 2024-07-22 RX ORDER — METOCLOPRAMIDE HYDROCHLORIDE 5 MG/ML
10 INJECTION INTRAMUSCULAR; INTRAVENOUS
Status: DISCONTINUED | OUTPATIENT
Start: 2024-07-22 | End: 2024-07-22 | Stop reason: HOSPADM

## 2024-07-22 RX ORDER — HYDROMORPHONE HYDROCHLORIDE 1 MG/ML
0.5 INJECTION, SOLUTION INTRAMUSCULAR; INTRAVENOUS; SUBCUTANEOUS EVERY 5 MIN PRN
Status: DISCONTINUED | OUTPATIENT
Start: 2024-07-22 | End: 2024-07-22 | Stop reason: HOSPADM

## 2024-07-22 RX ORDER — GLYCOPYRROLATE 0.2 MG/ML
INJECTION INTRAMUSCULAR; INTRAVENOUS PRN
Status: DISCONTINUED | OUTPATIENT
Start: 2024-07-22 | End: 2024-07-22 | Stop reason: SDUPTHER

## 2024-07-22 RX ORDER — FINASTERIDE 5 MG/1
5 TABLET, FILM COATED ORAL DAILY
Status: DISCONTINUED | OUTPATIENT
Start: 2024-07-22 | End: 2024-07-24 | Stop reason: HOSPADM

## 2024-07-22 RX ORDER — ONDANSETRON 4 MG/1
4 TABLET, ORALLY DISINTEGRATING ORAL EVERY 8 HOURS PRN
Status: DISCONTINUED | OUTPATIENT
Start: 2024-07-22 | End: 2024-07-24 | Stop reason: HOSPADM

## 2024-07-22 RX ORDER — IODIXANOL 320 MG/ML
INJECTION, SOLUTION INTRAVASCULAR PRN
Status: DISCONTINUED | OUTPATIENT
Start: 2024-07-22 | End: 2024-07-22 | Stop reason: ALTCHOICE

## 2024-07-22 RX ORDER — HEPARIN SODIUM 1000 [USP'U]/ML
INJECTION, SOLUTION INTRAVENOUS; SUBCUTANEOUS PRN
Status: DISCONTINUED | OUTPATIENT
Start: 2024-07-22 | End: 2024-07-22 | Stop reason: SDUPTHER

## 2024-07-22 RX ADMIN — PHENYLEPHRINE HYDROCHLORIDE 50 MCG/MIN: 10 INJECTION INTRAVENOUS at 11:35

## 2024-07-22 RX ADMIN — FENTANYL CITRATE 50 MCG: 0.05 INJECTION, SOLUTION INTRAMUSCULAR; INTRAVENOUS at 11:30

## 2024-07-22 RX ADMIN — ALTEPLASE 1 MG/HR: 2.2 INJECTION, POWDER, LYOPHILIZED, FOR SOLUTION INTRAVENOUS at 22:39

## 2024-07-22 RX ADMIN — DEXMEDETOMIDINE HYDROCHLORIDE 1.5 MCG/KG/HR: 400 INJECTION, SOLUTION INTRAVENOUS at 22:39

## 2024-07-22 RX ADMIN — EPHEDRINE SULFATE 10 MG: 5 INJECTION INTRAVENOUS at 11:25

## 2024-07-22 RX ADMIN — LIDOCAINE HYDROCHLORIDE 50 MG: 10 INJECTION, SOLUTION INFILTRATION; PERINEURAL at 11:05

## 2024-07-22 RX ADMIN — Medication 1000 MG: at 10:30

## 2024-07-22 RX ADMIN — HYDROCODONE BITARTRATE AND ACETAMINOPHEN 1 TABLET: 5; 325 TABLET ORAL at 17:11

## 2024-07-22 RX ADMIN — MONTELUKAST 10 MG: 10 TABLET, FILM COATED ORAL at 20:41

## 2024-07-22 RX ADMIN — HEPARIN SODIUM 400 UNITS/HR: 10000 INJECTION, SOLUTION INTRAVENOUS at 13:10

## 2024-07-22 RX ADMIN — ROCURONIUM BROMIDE 50 MG: 10 INJECTION, SOLUTION INTRAVENOUS at 11:07

## 2024-07-22 RX ADMIN — SODIUM CHLORIDE, SODIUM LACTATE, POTASSIUM CHLORIDE, AND CALCIUM CHLORIDE: 600; 310; 30; 20 INJECTION, SOLUTION INTRAVENOUS at 12:31

## 2024-07-22 RX ADMIN — DEXMEDETOMIDINE HYDROCHLORIDE 0.2 MCG/KG/HR: 400 INJECTION, SOLUTION INTRAVENOUS at 15:47

## 2024-07-22 RX ADMIN — HEPARIN SODIUM 5000 UNITS: 1000 INJECTION, SOLUTION INTRAVENOUS; SUBCUTANEOUS at 12:07

## 2024-07-22 RX ADMIN — ASPIRIN 81 MG: 81 TABLET, COATED ORAL at 09:21

## 2024-07-22 RX ADMIN — PROPOFOL 100 MG: 10 INJECTION, EMULSION INTRAVENOUS at 11:06

## 2024-07-22 RX ADMIN — ALTEPLASE 1 MG/HR: 2.2 INJECTION, POWDER, LYOPHILIZED, FOR SOLUTION INTRAVENOUS at 13:35

## 2024-07-22 RX ADMIN — PRAVASTATIN SODIUM 40 MG: 20 TABLET ORAL at 20:41

## 2024-07-22 RX ADMIN — HYDROCODONE BITARTRATE AND ACETAMINOPHEN 1 TABLET: 5; 325 TABLET ORAL at 23:15

## 2024-07-22 RX ADMIN — SUGAMMADEX 150 MG: 100 INJECTION, SOLUTION INTRAVENOUS at 12:33

## 2024-07-22 RX ADMIN — SODIUM CHLORIDE, SODIUM LACTATE, POTASSIUM CHLORIDE, AND CALCIUM CHLORIDE: 600; 310; 30; 20 INJECTION, SOLUTION INTRAVENOUS at 11:02

## 2024-07-22 RX ADMIN — ONDANSETRON 4 MG: 2 INJECTION INTRAMUSCULAR; INTRAVENOUS at 12:31

## 2024-07-22 RX ADMIN — GLYCOPYRROLATE 0.2 MG: 0.2 INJECTION, SOLUTION INTRAMUSCULAR; INTRAVENOUS at 11:34

## 2024-07-22 RX ADMIN — SODIUM CHLORIDE, POTASSIUM CHLORIDE, SODIUM LACTATE AND CALCIUM CHLORIDE: 600; 310; 30; 20 INJECTION, SOLUTION INTRAVENOUS at 09:20

## 2024-07-22 RX ADMIN — FENTANYL CITRATE 50 MCG: 0.05 INJECTION, SOLUTION INTRAMUSCULAR; INTRAVENOUS at 11:05

## 2024-07-22 ASSESSMENT — PAIN SCALES - GENERAL
PAINLEVEL_OUTOF10: 6
PAINLEVEL_OUTOF10: 5
PAINLEVEL_OUTOF10: 0
PAINLEVEL_OUTOF10: 8

## 2024-07-22 ASSESSMENT — PAIN - FUNCTIONAL ASSESSMENT
PAIN_FUNCTIONAL_ASSESSMENT: NONE - DENIES PAIN
PAIN_FUNCTIONAL_ASSESSMENT: ACTIVITIES ARE NOT PREVENTED

## 2024-07-22 ASSESSMENT — PAIN DESCRIPTION - DESCRIPTORS
DESCRIPTORS: ACHING
DESCRIPTORS: SORE;TENDER;THROBBING

## 2024-07-22 ASSESSMENT — PAIN DESCRIPTION - PAIN TYPE: TYPE: SURGICAL PAIN;ACUTE PAIN

## 2024-07-22 ASSESSMENT — PAIN DESCRIPTION - LOCATION
LOCATION: LEG
LOCATION: LEG

## 2024-07-22 ASSESSMENT — PAIN DESCRIPTION - ONSET: ONSET: ON-GOING

## 2024-07-22 ASSESSMENT — LIFESTYLE VARIABLES: SMOKING_STATUS: 1

## 2024-07-22 ASSESSMENT — PAIN DESCRIPTION - ORIENTATION
ORIENTATION: LEFT;RIGHT
ORIENTATION: LEFT;UPPER

## 2024-07-22 ASSESSMENT — PAIN DESCRIPTION - FREQUENCY: FREQUENCY: INTERMITTENT

## 2024-07-22 NOTE — ANESTHESIA POSTPROCEDURE EVALUATION
Department of Anesthesiology  Postprocedure Note    Patient: Jong Loyd  MRN: [id number]  YOB: 1950  Date of evaluation: 7/22/2024    Procedure Summary       Date: 07/22/24 Room / Location: [facility] OR [address]    Anesthesia Start: 1102 Anesthesia Stop: 1304    Procedure: INTRAOPERATIVE ANGIOGRAM WITH RUNOFF POSSIBLE ANGIOPLASTY, ATHERECTOMY, STENT (Left) Diagnosis:       Atherosclerosis of native artery of both lower extremities with intermittent claudication (HCC)      (Atherosclerosis of native artery of both lower extremities with intermittent claudication (HCC) [I70.213])    Surgeons: Karen Morrow DO Responsible Provider: Santosh Alonzo APRN - CRNA    Anesthesia Type: general ASA Status: 3            Anesthesia Type: No value filed.    Malaika Phase I: Malaika Score: 10    Malaika Phase II:      Anesthesia Post Evaluation    Patient location during evaluation: ICU  Patient participation: complete - patient participated  Level of consciousness: sleepy but conscious  Pain score: 0  Airway patency: patent  Nausea & Vomiting: no nausea and no vomiting  Cardiovascular status: hemodynamically stable and blood pressure returned to baseline  Respiratory status: acceptable, spontaneous ventilation, nonlabored ventilation and room air  Hydration status: stable  Comments: /68   Pulse 82   Temp 97.6 °F (36.4 °C) (Skin)   Resp 29   Ht 1.753 m (5' 9\")   Wt 59 kg (130 lb)   SpO2 95%   BMI 19.20 kg/m²     Pain management: adequate    No notable events documented.

## 2024-07-22 NOTE — OP NOTE
Operative Note      Patient: Jong Loyd  YOB: 1950  MRN: [id number]    Date of Procedure: 7/22/2024    Pre-Op Diagnosis Codes:     * Atherosclerosis of native artery of both lower extremities with intermittent claudication (HCC) [I70.213]    Post-Op Diagnosis: Same       Procedure:   Aortogram with bilateral runoff. Balloon angioplasty of left SFA. Placement of 30cm infusion length lysis catheter    Surgeon(s):  Karen Morrow DO    Assistant:   * No surgical staff found *    Anesthesia: General    Estimated Blood Loss (mL): less than 100     Complications: None    Specimens:   * No specimens in log *    Implants:  * No implants in log *      Drains:   Urinary Catheter 07/22/24 [place] (Active)       Findings:  Infection Present At Time Of Surgery (PATOS) (choose all levels that have infection present):  {PATOS LEVELS:[id number]}  Other Findings: ***    Detailed Description of Procedure:   ***    Electronically signed by Karen Morrow DO on 7/22/2024 at 12:39 PM

## 2024-07-22 NOTE — ANESTHESIA PRE PROCEDURE
\"PREGTESTUR\", \"PREGSERUM\", \"HCG\", \"HCGQUANT\"     ABGs: No results found for: \"PHART\", \"PO2ART\", \"POY8WBR\", \"GRD6KWG\", \"BEART\", \"X4LKGSPA\"     Type & Screen (If Applicable):  No results found for: \"LABABO\"    Drug/Infectious Status (If Applicable):  No results found for: \"HIV\", \"HEPCAB\"    COVID-19 Screening (If Applicable):   Lab Results   Component Value Date/Time    COVID19 Not Detected 05/13/2021 08:35 AM           Anesthesia Evaluation  Patient summary reviewed   no history of anesthetic complications:   Airway: Mallampati: I  TM distance: >3 FB   Neck ROM: full  Mouth opening: > = 3 FB   Dental:    (+) edentulous      Pulmonary:   (+)  COPD:       wheezes (mild exp wheeze): LUF   current smoker (5-6 cigs per day)    (-) asthma and sleep apnea          Patient smoked on day of surgery.                 Cardiovascular:    (+) hypertension:, hyperlipidemia    (-) pacemaker, past MI, CAD, CABG/stent and dysrhythmias    ECG reviewed  Rhythm: regular  Rate: normal                    Neuro/Psych:   (+) neuromuscular disease:, TIA (years ago)   (-) seizures           GI/Hepatic/Renal:        (-) GERD, liver disease and no renal disease       Endo/Other:    (+) hypothyroidism, blood dyscrasia: anticoagulation therapy:., malignancy/cancer (CLL).    (-) diabetes mellitus, hyperthyroidism               Abdominal:             Vascular:   + PVD, aortic or cerebral.       Other Findings:         Anesthesia Plan      general     ASA 3       Induction: intravenous.    MIPS: Postoperative opioids intended and Prophylactic antiemetics administered.  Anesthetic plan and risks discussed with patient.    Use of blood products discussed with patient whom consented to blood products.      Attending anesthesiologist reviewed and agrees with Preprocedure content              Arnulfo Mabry MD   7/22/2024

## 2024-07-22 NOTE — INTERVAL H&P NOTE
Update History & Physical    The patient's History and Physical of July 8, 2024 was reviewed with the patient and I examined the patient. There was no change. The surgical site was confirmed by the patient and me.     Plan: The risks, benefits, expected outcome, and alternative to the recommended procedure have been discussed with the patient. Patient understands and wants to proceed with the procedure.       Electronically signed by Karen Morrow DO on 7/22/2024 at 10:47 AM

## 2024-07-22 NOTE — DISCHARGE INSTRUCTIONS
POST ANGIOGRAM/STENTING INSTRUCTIONS    1.  You will be on bedrest the day of your procedure, up around the house and to the bathroom only on the day after your procedure.  2.  You must be transported home by a responsible person after your procedure, YOU CANNOT DRIVE YOURSELF HOME.     3.  Do not drive for 1 WEEK  after discharge home.  4.  Drink extra fluids for 24 hours after the procedure to help flush the contrast used (you will make more urine) .  5.  Check the groin puncture site after each activity for bleeding or swelling.    6.  If bleeding occurs, apply pressure to site and call 911 or rush to the nearest emergency room (DO NOT drive yourself!).  7.  Resume normal non-strenuous activity 48 hours after discharge home.  8.  Bruising and soreness at the puncture site may occur, this will heal and clear after several weeks.    9.  Keep your leg (with puncture site) mostly straight while sitting or lying for the first 24 hours after your procedure.    10. No heavy lifting or straining (NO more than 10 pounds) for 1 WEEK after discharge.          11. Keep the bandage over the puncture site for 24 hours after discharge home.  You may remove the bandage and shower after 24 hours, place a Band-Aid over puncture site daily until site healed. NO TUB BATH, NO HOT TUB, NO SWIMMING FOR 2 WEEKS.  12.  Once a day for the next 7 days, look at the puncture site, call physician if you notice:  *  red and/or hot at the puncture site  *  bloody drainage, foul-smelling, yellowish or greenish drainage from the puncture site  *  a very large bruise under/arond the puncture site (often firm to touch)  *  numbness, tingling in foot/leg/or loss of motion/sensation in foot or leg  *  itching/hives on any part of your body; or nausea/vomiting after receiving the dye

## 2024-07-22 NOTE — PROGRESS NOTES
Jong Loyd arrived to room # 147.   Presented with: atherosclerosis of native artery of both lower extremities  Mental Status: Patient is oriented and alert.   Vitals:    07/22/24 1345   BP: 123/66   Pulse: 63   Resp: 23   Temp:    SpO2: 92%     Patient safety contract and falls prevention contract reviewed with patient Yes.  Oriented Patient to room.  Call light within reach. Yes.  Needs, issues or concerns expressed at this time: no.      Electronically signed by Olena Haque RN on 7/22/2024 at 1:53 PM

## 2024-07-23 ENCOUNTER — ANESTHESIA (OUTPATIENT)
Dept: OPERATING ROOM | Age: 74
End: 2024-07-23
Payer: OTHER GOVERNMENT

## 2024-07-23 ENCOUNTER — APPOINTMENT (OUTPATIENT)
Dept: INTERVENTIONAL RADIOLOGY/VASCULAR | Age: 74
End: 2024-07-23
Attending: SURGERY
Payer: OTHER GOVERNMENT

## 2024-07-23 ENCOUNTER — ANESTHESIA EVENT (OUTPATIENT)
Dept: OPERATING ROOM | Age: 74
End: 2024-07-23
Payer: OTHER GOVERNMENT

## 2024-07-23 LAB
ANION GAP SERPL CALCULATED.3IONS-SCNC: 10 MMOL/L (ref 7–19)
ANION GAP SERPL CALCULATED.3IONS-SCNC: 12 MMOL/L (ref 7–19)
ANTI-XA UNFRAC HEPARIN: 0.31 IU/ML (ref 0.3–0.7)
ANTI-XA UNFRAC HEPARIN: <0.1 IU/ML (ref 0.3–0.7)
APTT PPP: 33.9 SEC (ref 26–36.2)
APTT PPP: 36.8 SEC (ref 26–36.2)
APTT PPP: 42.5 SEC (ref 26–36.2)
APTT PPP: 44.5 SEC (ref 26–36.2)
BUN SERPL-MCNC: 13 MG/DL (ref 8–23)
BUN SERPL-MCNC: 14 MG/DL (ref 8–23)
CALCIUM SERPL-MCNC: 8.3 MG/DL (ref 8.8–10.2)
CALCIUM SERPL-MCNC: 8.6 MG/DL (ref 8.8–10.2)
CHLORIDE SERPL-SCNC: 105 MMOL/L (ref 98–111)
CHLORIDE SERPL-SCNC: 106 MMOL/L (ref 98–111)
CO2 SERPL-SCNC: 23 MMOL/L (ref 22–29)
CO2 SERPL-SCNC: 23 MMOL/L (ref 22–29)
CREAT SERPL-MCNC: 0.7 MG/DL (ref 0.5–1.2)
CREAT SERPL-MCNC: 0.7 MG/DL (ref 0.5–1.2)
ERYTHROCYTE [DISTWIDTH] IN BLOOD BY AUTOMATED COUNT: 13.3 % (ref 11.5–14.5)
ERYTHROCYTE [DISTWIDTH] IN BLOOD BY AUTOMATED COUNT: 13.4 % (ref 11.5–14.5)
ERYTHROCYTE [DISTWIDTH] IN BLOOD BY AUTOMATED COUNT: 13.5 % (ref 11.5–14.5)
ERYTHROCYTE [DISTWIDTH] IN BLOOD BY AUTOMATED COUNT: 13.5 % (ref 11.5–14.5)
FIBRINOGEN PPP-MCNC: 231 MG/DL (ref 238–505)
FIBRINOGEN PPP-MCNC: 236 MG/DL (ref 238–505)
FIBRINOGEN PPP-MCNC: 257 MG/DL (ref 238–505)
GLUCOSE SERPL-MCNC: 137 MG/DL (ref 74–109)
GLUCOSE SERPL-MCNC: 85 MG/DL (ref 74–109)
HCT VFR BLD AUTO: 37.8 % (ref 42–52)
HCT VFR BLD AUTO: 37.9 % (ref 42–52)
HCT VFR BLD AUTO: 40.8 % (ref 42–52)
HCT VFR BLD AUTO: 41 % (ref 42–52)
HGB BLD-MCNC: 12.1 G/DL (ref 14–18)
HGB BLD-MCNC: 12.3 G/DL (ref 14–18)
HGB BLD-MCNC: 13.1 G/DL (ref 14–18)
HGB BLD-MCNC: 13.3 G/DL (ref 14–18)
INR PPP: 1.16 (ref 0.88–1.18)
MCH RBC QN AUTO: 28.3 PG (ref 27–31)
MCH RBC QN AUTO: 28.8 PG (ref 27–31)
MCH RBC QN AUTO: 29.5 PG (ref 27–31)
MCH RBC QN AUTO: 29.9 PG (ref 27–31)
MCHC RBC AUTO-ENTMCNC: 32 G/DL (ref 33–37)
MCHC RBC AUTO-ENTMCNC: 32 G/DL (ref 33–37)
MCHC RBC AUTO-ENTMCNC: 32.5 G/DL (ref 33–37)
MCHC RBC AUTO-ENTMCNC: 32.6 G/DL (ref 33–37)
MCV RBC AUTO: 88.5 FL (ref 80–94)
MCV RBC AUTO: 90.1 FL (ref 80–94)
MCV RBC AUTO: 90.5 FL (ref 80–94)
MCV RBC AUTO: 92.2 FL (ref 80–94)
PLATELET # BLD AUTO: 153 K/UL (ref 130–400)
PLATELET # BLD AUTO: 161 K/UL (ref 130–400)
PLATELET # BLD AUTO: 163 K/UL (ref 130–400)
PLATELET # BLD AUTO: 169 K/UL (ref 130–400)
PMV BLD AUTO: 10.2 FL (ref 9.4–12.4)
PMV BLD AUTO: 10.4 FL (ref 9.4–12.4)
PMV BLD AUTO: 10.4 FL (ref 9.4–12.4)
PMV BLD AUTO: 9.7 FL (ref 9.4–12.4)
POTASSIUM SERPL-SCNC: 4.1 MMOL/L (ref 3.5–5)
POTASSIUM SERPL-SCNC: 4.4 MMOL/L (ref 3.5–5)
PROTHROMBIN TIME: 14.5 SEC (ref 12–14.6)
RBC # BLD AUTO: 4.11 M/UL (ref 4.7–6.1)
RBC # BLD AUTO: 4.27 M/UL (ref 4.7–6.1)
RBC # BLD AUTO: 4.51 M/UL (ref 4.7–6.1)
RBC # BLD AUTO: 4.55 M/UL (ref 4.7–6.1)
SODIUM SERPL-SCNC: 138 MMOL/L (ref 136–145)
SODIUM SERPL-SCNC: 141 MMOL/L (ref 136–145)
WBC # BLD AUTO: 11.3 K/UL (ref 4.8–10.8)
WBC # BLD AUTO: 6.9 K/UL (ref 4.8–10.8)
WBC # BLD AUTO: 8.6 K/UL (ref 4.8–10.8)
WBC # BLD AUTO: 8.8 K/UL (ref 4.8–10.8)

## 2024-07-23 PROCEDURE — 3700000000 HC ANESTHESIA ATTENDED CARE: Performed by: SURGERY

## 2024-07-23 PROCEDURE — 85520 HEPARIN ASSAY: CPT

## 2024-07-23 PROCEDURE — 2580000003 HC RX 258: Performed by: NURSE ANESTHETIST, CERTIFIED REGISTERED

## 2024-07-23 PROCEDURE — 85610 PROTHROMBIN TIME: CPT

## 2024-07-23 PROCEDURE — 2580000003 HC RX 258: Performed by: SURGERY

## 2024-07-23 PROCEDURE — C1874 STENT, COATED/COV W/DEL SYS: HCPCS | Performed by: SURGERY

## 2024-07-23 PROCEDURE — 2000000000 HC ICU R&B

## 2024-07-23 PROCEDURE — A4217 STERILE WATER/SALINE, 500 ML: HCPCS | Performed by: SURGERY

## 2024-07-23 PROCEDURE — 3700000001 HC ADD 15 MINUTES (ANESTHESIA): Performed by: SURGERY

## 2024-07-23 PROCEDURE — 3600000017 HC SURGERY HYBRID ADDL 15MIN: Performed by: SURGERY

## 2024-07-23 PROCEDURE — 94760 N-INVAS EAR/PLS OXIMETRY 1: CPT

## 2024-07-23 PROCEDURE — 2709999900 HC NON-CHARGEABLE SUPPLY: Performed by: SURGERY

## 2024-07-23 PROCEDURE — 3600000007 HC SURGERY HYBRID BASE: Performed by: SURGERY

## 2024-07-23 PROCEDURE — 99232 SBSQ HOSP IP/OBS MODERATE 35: CPT | Performed by: SURGERY

## 2024-07-23 PROCEDURE — 6360000002 HC RX W HCPCS: Performed by: NURSE ANESTHETIST, CERTIFIED REGISTERED

## 2024-07-23 PROCEDURE — 80048 BASIC METABOLIC PNL TOTAL CA: CPT

## 2024-07-23 PROCEDURE — C1760 CLOSURE DEV, VASC: HCPCS | Performed by: SURGERY

## 2024-07-23 PROCEDURE — C1894 INTRO/SHEATH, NON-LASER: HCPCS | Performed by: SURGERY

## 2024-07-23 PROCEDURE — 6370000000 HC RX 637 (ALT 250 FOR IP): Performed by: SURGERY

## 2024-07-23 PROCEDURE — 7100000000 HC PACU RECOVERY - FIRST 15 MIN: Performed by: SURGERY

## 2024-07-23 PROCEDURE — 6360000002 HC RX W HCPCS: Performed by: SURGERY

## 2024-07-23 PROCEDURE — C1769 GUIDE WIRE: HCPCS | Performed by: SURGERY

## 2024-07-23 PROCEDURE — 85730 THROMBOPLASTIN TIME PARTIAL: CPT

## 2024-07-23 PROCEDURE — 36415 COLL VENOUS BLD VENIPUNCTURE: CPT

## 2024-07-23 PROCEDURE — 85027 COMPLETE CBC AUTOMATED: CPT

## 2024-07-23 PROCEDURE — 047N3EZ DILATION OF LEFT POPLITEAL ARTERY WITH TWO INTRALUMINAL DEVICES, PERCUTANEOUS APPROACH: ICD-10-PCS | Performed by: SURGERY

## 2024-07-23 PROCEDURE — C1725 CATH, TRANSLUMIN NON-LASER: HCPCS | Performed by: SURGERY

## 2024-07-23 PROCEDURE — 2500000003 HC RX 250 WO HCPCS: Performed by: NURSE ANESTHETIST, CERTIFIED REGISTERED

## 2024-07-23 PROCEDURE — 7100000001 HC PACU RECOVERY - ADDTL 15 MIN: Performed by: SURGERY

## 2024-07-23 PROCEDURE — 85384 FIBRINOGEN ACTIVITY: CPT

## 2024-07-23 PROCEDURE — 6360000004 HC RX CONTRAST MEDICATION: Performed by: SURGERY

## 2024-07-23 DEVICE — IMPLANTABLE DEVICE: Type: IMPLANTABLE DEVICE | Status: FUNCTIONAL

## 2024-07-23 DEVICE — GRAFT EVAR L150MM DIA5MM CATH L120CM DIA6FR 0.035IN HEP: Type: IMPLANTABLE DEVICE | Status: FUNCTIONAL

## 2024-07-23 RX ORDER — FENTANYL CITRATE 50 UG/ML
INJECTION, SOLUTION INTRAMUSCULAR; INTRAVENOUS PRN
Status: DISCONTINUED | OUTPATIENT
Start: 2024-07-23 | End: 2024-07-23 | Stop reason: SDUPTHER

## 2024-07-23 RX ORDER — PROPOFOL 10 MG/ML
INJECTION, EMULSION INTRAVENOUS PRN
Status: DISCONTINUED | OUTPATIENT
Start: 2024-07-23 | End: 2024-07-23 | Stop reason: SDUPTHER

## 2024-07-23 RX ORDER — HYDROMORPHONE HYDROCHLORIDE 1 MG/ML
0.5 INJECTION, SOLUTION INTRAMUSCULAR; INTRAVENOUS; SUBCUTANEOUS EVERY 5 MIN PRN
Status: DISCONTINUED | OUTPATIENT
Start: 2024-07-23 | End: 2024-07-23 | Stop reason: HOSPADM

## 2024-07-23 RX ORDER — SODIUM CHLORIDE 0.9 % (FLUSH) 0.9 %
5-40 SYRINGE (ML) INJECTION EVERY 12 HOURS SCHEDULED
Status: DISCONTINUED | OUTPATIENT
Start: 2024-07-23 | End: 2024-07-23 | Stop reason: HOSPADM

## 2024-07-23 RX ORDER — SODIUM CHLORIDE, SODIUM LACTATE, POTASSIUM CHLORIDE, CALCIUM CHLORIDE 600; 310; 30; 20 MG/100ML; MG/100ML; MG/100ML; MG/100ML
INJECTION, SOLUTION INTRAVENOUS CONTINUOUS
Status: DISCONTINUED | OUTPATIENT
Start: 2024-07-23 | End: 2024-07-24 | Stop reason: HOSPADM

## 2024-07-23 RX ORDER — ONDANSETRON 2 MG/ML
INJECTION INTRAMUSCULAR; INTRAVENOUS PRN
Status: DISCONTINUED | OUTPATIENT
Start: 2024-07-23 | End: 2024-07-23 | Stop reason: SDUPTHER

## 2024-07-23 RX ORDER — ESMOLOL HYDROCHLORIDE 10 MG/ML
INJECTION INTRAVENOUS PRN
Status: DISCONTINUED | OUTPATIENT
Start: 2024-07-23 | End: 2024-07-23 | Stop reason: SDUPTHER

## 2024-07-23 RX ORDER — ONDANSETRON 2 MG/ML
4 INJECTION INTRAMUSCULAR; INTRAVENOUS
Status: DISCONTINUED | OUTPATIENT
Start: 2024-07-23 | End: 2024-07-23 | Stop reason: HOSPADM

## 2024-07-23 RX ORDER — SODIUM CHLORIDE, SODIUM LACTATE, POTASSIUM CHLORIDE, CALCIUM CHLORIDE 600; 310; 30; 20 MG/100ML; MG/100ML; MG/100ML; MG/100ML
INJECTION, SOLUTION INTRAVENOUS CONTINUOUS PRN
Status: DISCONTINUED | OUTPATIENT
Start: 2024-07-23 | End: 2024-07-23 | Stop reason: SDUPTHER

## 2024-07-23 RX ORDER — SODIUM CHLORIDE 9 MG/ML
INJECTION, SOLUTION INTRAVENOUS PRN
Status: DISCONTINUED | OUTPATIENT
Start: 2024-07-23 | End: 2024-07-24 | Stop reason: HOSPADM

## 2024-07-23 RX ORDER — SODIUM CHLORIDE 9 MG/ML
INJECTION, SOLUTION INTRAVENOUS CONTINUOUS
Status: DISCONTINUED | OUTPATIENT
Start: 2024-07-23 | End: 2024-07-24 | Stop reason: HOSPADM

## 2024-07-23 RX ORDER — ROCURONIUM BROMIDE 10 MG/ML
INJECTION, SOLUTION INTRAVENOUS PRN
Status: DISCONTINUED | OUTPATIENT
Start: 2024-07-23 | End: 2024-07-23 | Stop reason: SDUPTHER

## 2024-07-23 RX ORDER — LIDOCAINE HYDROCHLORIDE 10 MG/ML
INJECTION, SOLUTION INFILTRATION; PERINEURAL PRN
Status: DISCONTINUED | OUTPATIENT
Start: 2024-07-23 | End: 2024-07-23 | Stop reason: SDUPTHER

## 2024-07-23 RX ORDER — HEPARIN SODIUM 1000 [USP'U]/ML
80 INJECTION, SOLUTION INTRAVENOUS; SUBCUTANEOUS PRN
Status: DISCONTINUED | OUTPATIENT
Start: 2024-07-23 | End: 2024-07-24 | Stop reason: ALTCHOICE

## 2024-07-23 RX ORDER — SODIUM CHLORIDE 0.9 % (FLUSH) 0.9 %
5-40 SYRINGE (ML) INJECTION EVERY 12 HOURS SCHEDULED
Status: DISCONTINUED | OUTPATIENT
Start: 2024-07-23 | End: 2024-07-24 | Stop reason: HOSPADM

## 2024-07-23 RX ORDER — NALOXONE HYDROCHLORIDE 0.4 MG/ML
INJECTION, SOLUTION INTRAMUSCULAR; INTRAVENOUS; SUBCUTANEOUS PRN
Status: DISCONTINUED | OUTPATIENT
Start: 2024-07-23 | End: 2024-07-23 | Stop reason: HOSPADM

## 2024-07-23 RX ORDER — DEXAMETHASONE SODIUM PHOSPHATE 10 MG/ML
INJECTION, SOLUTION INTRAMUSCULAR; INTRAVENOUS PRN
Status: DISCONTINUED | OUTPATIENT
Start: 2024-07-23 | End: 2024-07-23 | Stop reason: SDUPTHER

## 2024-07-23 RX ORDER — SODIUM CHLORIDE 9 MG/ML
INJECTION, SOLUTION INTRAVENOUS PRN
Status: DISCONTINUED | OUTPATIENT
Start: 2024-07-23 | End: 2024-07-23 | Stop reason: HOSPADM

## 2024-07-23 RX ORDER — SODIUM CHLORIDE 0.9 % (FLUSH) 0.9 %
5-40 SYRINGE (ML) INJECTION PRN
Status: DISCONTINUED | OUTPATIENT
Start: 2024-07-23 | End: 2024-07-23 | Stop reason: HOSPADM

## 2024-07-23 RX ORDER — IODIXANOL 320 MG/ML
INJECTION, SOLUTION INTRAVASCULAR PRN
Status: DISCONTINUED | OUTPATIENT
Start: 2024-07-23 | End: 2024-07-23 | Stop reason: ALTCHOICE

## 2024-07-23 RX ORDER — CEFAZOLIN SODIUM 1 G/3ML
INJECTION, POWDER, FOR SOLUTION INTRAMUSCULAR; INTRAVENOUS PRN
Status: DISCONTINUED | OUTPATIENT
Start: 2024-07-23 | End: 2024-07-23 | Stop reason: SDUPTHER

## 2024-07-23 RX ORDER — HEPARIN SODIUM 1000 [USP'U]/ML
80 INJECTION, SOLUTION INTRAVENOUS; SUBCUTANEOUS ONCE
Status: DISCONTINUED | OUTPATIENT
Start: 2024-07-23 | End: 2024-07-24 | Stop reason: ALTCHOICE

## 2024-07-23 RX ORDER — SODIUM CHLORIDE 0.9 % (FLUSH) 0.9 %
5-40 SYRINGE (ML) INJECTION PRN
Status: DISCONTINUED | OUTPATIENT
Start: 2024-07-23 | End: 2024-07-24 | Stop reason: HOSPADM

## 2024-07-23 RX ORDER — HYDROMORPHONE HYDROCHLORIDE 1 MG/ML
0.25 INJECTION, SOLUTION INTRAMUSCULAR; INTRAVENOUS; SUBCUTANEOUS EVERY 5 MIN PRN
Status: DISCONTINUED | OUTPATIENT
Start: 2024-07-23 | End: 2024-07-23 | Stop reason: HOSPADM

## 2024-07-23 RX ORDER — HEPARIN SODIUM 1000 [USP'U]/ML
40 INJECTION, SOLUTION INTRAVENOUS; SUBCUTANEOUS PRN
Status: DISCONTINUED | OUTPATIENT
Start: 2024-07-23 | End: 2024-07-24 | Stop reason: ALTCHOICE

## 2024-07-23 RX ORDER — HEPARIN SODIUM 10000 [USP'U]/100ML
5-30 INJECTION, SOLUTION INTRAVENOUS CONTINUOUS
Status: DISCONTINUED | OUTPATIENT
Start: 2024-07-23 | End: 2024-07-24 | Stop reason: ALTCHOICE

## 2024-07-23 RX ORDER — NITROGLYCERIN 20 MG/100ML
5 INJECTION INTRAVENOUS CONTINUOUS
Status: DISCONTINUED | OUTPATIENT
Start: 2024-07-23 | End: 2024-07-24

## 2024-07-23 RX ADMIN — HYDROCODONE BITARTRATE AND ACETAMINOPHEN 1 TABLET: 5; 325 TABLET ORAL at 18:08

## 2024-07-23 RX ADMIN — PROPOFOL 20 MG: 10 INJECTION, EMULSION INTRAVENOUS at 15:28

## 2024-07-23 RX ADMIN — FENTANYL CITRATE 25 MCG: 0.05 INJECTION, SOLUTION INTRAMUSCULAR; INTRAVENOUS at 14:13

## 2024-07-23 RX ADMIN — NITROGLYCERIN 5 MCG/MIN: 20 INJECTION INTRAVENOUS at 17:29

## 2024-07-23 RX ADMIN — FENTANYL CITRATE 50 MCG: 0.05 INJECTION, SOLUTION INTRAMUSCULAR; INTRAVENOUS at 15:19

## 2024-07-23 RX ADMIN — FINASTERIDE 5 MG: 5 TABLET, FILM COATED ORAL at 10:04

## 2024-07-23 RX ADMIN — CEFAZOLIN 1 G: 1 INJECTION, POWDER, FOR SOLUTION INTRAMUSCULAR; INTRAVENOUS at 14:26

## 2024-07-23 RX ADMIN — MONTELUKAST 10 MG: 10 TABLET, FILM COATED ORAL at 21:21

## 2024-07-23 RX ADMIN — ONDANSETRON 4 MG: 2 INJECTION INTRAMUSCULAR; INTRAVENOUS at 15:11

## 2024-07-23 RX ADMIN — HYDROCODONE BITARTRATE AND ACETAMINOPHEN 1 TABLET: 5; 325 TABLET ORAL at 10:04

## 2024-07-23 RX ADMIN — HEPARIN SODIUM 18 UNITS/KG/HR: 10000 INJECTION, SOLUTION INTRAVENOUS at 16:52

## 2024-07-23 RX ADMIN — IPRATROPIUM BROMIDE 2 SPRAY: 21 SPRAY NASAL at 21:21

## 2024-07-23 RX ADMIN — DEXAMETHASONE SODIUM PHOSPHATE 10 MG: 10 INJECTION, SOLUTION INTRAMUSCULAR; INTRAVENOUS at 14:23

## 2024-07-23 RX ADMIN — CLOPIDOGREL BISULFATE 75 MG: 75 TABLET ORAL at 10:04

## 2024-07-23 RX ADMIN — ESMOLOL HYDROCHLORIDE 30 MG: 10 INJECTION, SOLUTION INTRAVENOUS at 14:15

## 2024-07-23 RX ADMIN — HYDROCODONE BITARTRATE AND ACETAMINOPHEN 1 TABLET: 5; 325 TABLET ORAL at 04:03

## 2024-07-23 RX ADMIN — SODIUM CHLORIDE, PRESERVATIVE FREE 10 ML: 5 INJECTION INTRAVENOUS at 08:00

## 2024-07-23 RX ADMIN — SODIUM CHLORIDE, POTASSIUM CHLORIDE, SODIUM LACTATE AND CALCIUM CHLORIDE: 600; 310; 30; 20 INJECTION, SOLUTION INTRAVENOUS at 09:59

## 2024-07-23 RX ADMIN — ROCURONIUM BROMIDE 100 MG: 10 INJECTION, SOLUTION INTRAVENOUS at 14:15

## 2024-07-23 RX ADMIN — ALTEPLASE 1 MG/HR: 2.2 INJECTION, POWDER, LYOPHILIZED, FOR SOLUTION INTRAVENOUS at 06:01

## 2024-07-23 RX ADMIN — PRAVASTATIN SODIUM 40 MG: 20 TABLET ORAL at 21:21

## 2024-07-23 RX ADMIN — PROPOFOL 80 MG: 10 INJECTION, EMULSION INTRAVENOUS at 14:15

## 2024-07-23 RX ADMIN — SODIUM CHLORIDE, SODIUM LACTATE, POTASSIUM CHLORIDE, AND CALCIUM CHLORIDE: 600; 310; 30; 20 INJECTION, SOLUTION INTRAVENOUS at 14:08

## 2024-07-23 RX ADMIN — LIDOCAINE HYDROCHLORIDE 70 MG: 10 INJECTION, SOLUTION INFILTRATION; PERINEURAL at 14:15

## 2024-07-23 RX ADMIN — ALTEPLASE 1 MG/HR: 2.2 INJECTION, POWDER, LYOPHILIZED, FOR SOLUTION INTRAVENOUS at 09:55

## 2024-07-23 RX ADMIN — PROPOFOL 20 MG: 10 INJECTION, EMULSION INTRAVENOUS at 15:36

## 2024-07-23 RX ADMIN — SODIUM CHLORIDE: 9 INJECTION, SOLUTION INTRAVENOUS at 16:47

## 2024-07-23 RX ADMIN — SUGAMMADEX 200 MG: 100 INJECTION, SOLUTION INTRAVENOUS at 15:22

## 2024-07-23 RX ADMIN — PHENYLEPHRINE HYDROCHLORIDE 30 MCG/MIN: 10 INJECTION INTRAVENOUS at 14:15

## 2024-07-23 RX ADMIN — FENTANYL CITRATE 25 MCG: 0.05 INJECTION, SOLUTION INTRAMUSCULAR; INTRAVENOUS at 14:35

## 2024-07-23 ASSESSMENT — PAIN DESCRIPTION - ORIENTATION
ORIENTATION: LEFT
ORIENTATION: LEFT

## 2024-07-23 ASSESSMENT — PAIN DESCRIPTION - LOCATION
LOCATION: FOOT;LEG
LOCATION: LEG

## 2024-07-23 ASSESSMENT — PAIN SCALES - GENERAL
PAINLEVEL_OUTOF10: 0
PAINLEVEL_OUTOF10: 10
PAINLEVEL_OUTOF10: 10
PAINLEVEL_OUTOF10: 7
PAINLEVEL_OUTOF10: 6
PAINLEVEL_OUTOF10: 0
PAINLEVEL_OUTOF10: 8

## 2024-07-23 ASSESSMENT — PAIN DESCRIPTION - DESCRIPTORS: DESCRIPTORS: ACHING

## 2024-07-23 ASSESSMENT — LIFESTYLE VARIABLES: SMOKING_STATUS: 1

## 2024-07-23 NOTE — PROGRESS NOTES
07/23/24 1350   Encounter Summary   Encounter Overview/Reason Initial Encounter   Service Provided For Patient and family together  (with daughter at bedside in ICU)   Referral/Consult From Rounding   Support System Family members;Temple/norma community   Complexity of Encounter Low   Begin Time 1030   End Time  1115   Total Time Calculated 45 min   Spiritual/Emotional needs   Type Spiritual Support   Advance Care Planning   Type ACP conversation   Assessment/Intervention/Outcome   Assessment Hopeful;Concerns with suffering   Intervention Active listening;Discussed belief system/Sikhism practices/norma;Prayer (assurance of)/[place]   Outcome Expressed feelings, needs, and concerns   Plan and Referrals   Plan/Referrals Continue to visit, (comment)   Does the patient have a [facility] PCP? Yes    to follow up after discharge? No     We completed ACP Notes.    Electronically signed by Chaplain Bushra Love on 7/23/2024 at 1:53 PM

## 2024-07-23 NOTE — PROGRESS NOTES
Pt now to OR with OR staff. Pt is alert and oriented. HR sinus 66 Sats 98% /56. R groin dressing remains saturated with blood, oozing from insertion site of IV line for Cathflo and Heparin. Electronically signed by Emily Prieto RN on 7/23/2024 at 2:22 PM

## 2024-07-23 NOTE — ANESTHESIA PRE PROCEDURE
Department of Anesthesiology  Preprocedure Note       Name:  Jong Loyd   Age:  74 y.o.  :  1950                                          MRN:  [id number]         Date:  2024      Surgeon: Surgeon(s):  Karen Morrow DO    Procedure: Procedure(s):  LEFT LOWER EXTREMITY ANGIOGRAM, POSSIBLE ANGIOPLASTY, POSSIBLE STENT, REMOVAL EKOS    Medications prior to admission:   Prior to Admission medications    Medication Sig Start Date End Date Taking? Authorizing Provider   ascorbic acid (VITAMIN C) 500 MG tablet Take 1 tablet by mouth daily    Wilmer Luu MD   ferrous sulfate (IRON 325) 325 (65 Fe) MG tablet Take 1 tablet by mouth daily (with breakfast)  Patient not taking: Reported on 2024    Wilmer Luu MD   Zanubrutinib 80 MG CAPS Take 4 capsules by mouth daily 24   Ileana Strange APRN   apixaban (ELIQUIS) 5 MG TABS tablet Take 1 tablet by mouth 2 times daily 24   Dorothy Poole APRN   levothyroxine (SYNTHROID) 150 MCG tablet Take 1 tablet by mouth Daily    Wilmer Luu MD   montelukast (SINGULAIR) 10 MG tablet Take 1 tablet by mouth nightly 3/27/24   Vale Garvey APRN - CNP   apixaban (ELIQUIS) 5 MG TABS tablet Take 1 tablet by mouth 2 times daily 3/18/24   Karen Morrow DO   hydroCHLOROthiazide (HYDRODIURIL) 25 MG tablet Take 1 tablet by mouth daily  Patient not taking: Reported on 2024    Wilmer Luu MD   ipratropium (ATROVENT) 0.03 % nasal spray 2 sprays by Each Nostril route in the morning and 2 sprays in the evening.  Patient taking differently: 2 sprays by Each Nostril route 2 times daily as needed 24   Vale Garvey APRN - CNP   clopidogrel (PLAVIX) 75 MG tablet Take 1 tablet by mouth daily    Wilmer Luu MD   finasteride (PROSCAR) 5 MG tablet Take 1 tablet by mouth daily    Wilmer Luu MD   losartan (COZAAR) 100 MG tablet Take 1 tablet by mouth nightly    Wilmer Luu MD   pravastatin

## 2024-07-23 NOTE — PROGRESS NOTES
Pt R groin oozing blood and dressing to IV insertion site for Heparin and Cathflo. Gauze dressing with clear Tegaderms in place have oozed to inner edge of inner thigh, but no leaking. I have covered with extra ABD and taped in place. I spoke with Leann Gama RN re: just reinforcing so not to remove any clot that formed. Previous shift had stated they had used Quick Clot. Electronically signed by Emily Prieto RN on 7/23/2024 at 2:31 PM

## 2024-07-23 NOTE — ACP (ADVANCE CARE PLANNING)
Advance Care Planning     Advance Care Planning Inpatient Note  [facility] Department    Today's Date: 7/23/2024  Unit: [facility] ICU    Received request from rounding.  Upon review of chart and communication with care team, patient's decision making abilities are not in question.. Patient and Healthcare Decision Maker was/were present in the room during visit.    Goals of ACP Conversation:  Discuss advance care planning documents    Health Care Decision Makers:       Primary Decision Maker: FRYEKRISTEN - Child - [phone]    Summary:  Verified Healthcare Decision Maker    Advance Care Planning Documents (Patient Wishes):  None     Assessment:  Pre-surgery visit. Mr. Loyd was peaceful and hopeful. He has three children and named daughter Kristen Frye as primary decision maker.    Interventions:  Assisted in the completion of documents according to patient's wishes at this time      Outcomes/Plan:  ACP Discussion: Completed    Electronically signed by Chaplain Tomlinson Mai on 7/23/2024 at 1:53 PM

## 2024-07-23 NOTE — PROGRESS NOTES
R pulses are doppled less than earlier this AM R DP is very faint 0.5+ monophasic and R PT is also weak doppled at 1+.  L continues with only the PT doppled very soft difficulty to find at 0.5 whoosh unchanged from this AM assessment. L foot remains cool. Pt states he has no numbness  or pain in foot. Pt states he has normal sensation to his feet. Electronically signed by Emily Prieto RN on 7/23/2024 at 2:35 PM

## 2024-07-23 NOTE — OP NOTE
Operative Note      Patient: Jong Loyd  YOB: 1950  MRN: [id number]    Date of Procedure: 7/23/2024    Pre-Op Diagnosis Codes:     * Atherosclerosis of native artery of both lower extremities with intermittent claudication (HCC) [I70.213]  tPA lysis catheter    Post-Op Diagnosis: Same       Procedure(s):  LEFT LOWER EXTREMITY ANGIOGRAM, ANGIOPLASTY, STENT PLACEMENT, REMOVAL LYSIS CATHETER    Surgeon(s):  Karen Morrow DO    Assistant:   * No surgical staff found *    Anesthesia: General    Estimated Blood Loss (mL): less than 100     Complications: None    Specimens:   * No specimens in log *    Implants:  Implant Name Type Inv. Item Serial No.  Lot No. LRB No. Used Action   GRAFT EVAR L150MM DIA5MM CATH L120CM DIA6FR 0.035IN HEP - [id number] Peripheral stents GRAFT EVAR L150MM DIA5MM CATH L120CM DIA6FR 0.035IN HEP [id number] [employer]  Left 1 Implanted   STENT PERIPH L100MM DIA5MM CATH L120CM DIA6FR 0.035IN HEP - [id number] Peripheral stents STENT PERIPH L100MM DIA5MM CATH L120CM DIA6FR 0.035IN HEP [id number] [employer]  Left 1 Implanted         Drains:   Urinary Catheter 07/22/24 [place] (Active)   Catheter Indications Perioperative use for selected surgical procedures;Need for fluid volume management of the critically ill patient in a critical care setting 07/23/24 0800   Site Assessment No urethral drainage 07/23/24 0800   Urine Color Yellow 07/23/24 0800   Urine Appearance Clear 07/23/24 0800   Collection Container Standard 07/23/24 0800   Securement Method Securing device (Describe) 07/23/24 0800   Catheter Care  Perineal wipes 07/23/24 0800   Catheter Best Practices  Drainage tube clipped to bed;Catheter secured to thigh;Tamper seal intact;Bag below bladder;Bag not on floor;Lack of dependent loop in tubing;Drainage bag less than half full 07/23/24 0800   Status Draining 07/23/24 0800   Output (mL) 75 mL 07/23/24 0900       Findings:  Left SFA

## 2024-07-23 NOTE — ANESTHESIA POSTPROCEDURE EVALUATION
Department of Anesthesiology  Postprocedure Note    Patient: Jong Loyd  MRN: [id number]  YOB: 1950  Date of evaluation: 7/23/2024    Procedure Summary       Date: 07/23/24 Room / Location: [facility] OR [address]    Anesthesia Start: 1408 Anesthesia Stop: 1538    Procedure: LEFT LOWER EXTREMITY ANGIOGRAM, ANGIOPLASTY, STENT PLACEMENT, REMOVAL LYSIS CATHETER (Left) Diagnosis:       Atherosclerosis of native artery of both lower extremities with intermittent claudication (HCC)      (Atherosclerosis of native artery of both lower extremities with intermittent claudication (HCC) [I70.213])    Surgeons: Karen Morrow DO Responsible Provider: Momo Garcia APRN - CRNA    Anesthesia Type: general ASA Status: 3            Anesthesia Type: No value filed.    Malaika Phase I: Malaika Score: 7    Malaika Phase II:      Anesthesia Post Evaluation    Patient location during evaluation: PACU  Patient participation: complete - patient participated  Level of consciousness: lethargic and responsive to light touch  Pain score: 0  Airway patency: patent  Nausea & Vomiting: no nausea and no vomiting  Cardiovascular status: hemodynamically stable  Respiratory status: spontaneous ventilation and nonlabored ventilation  Hydration status: stable  Multimodal analgesia pain management approach    No notable events documented.

## 2024-07-23 NOTE — PROGRESS NOTES
Vascular Surgery  Dr. Karen Morrow   Daily Progress Note    Pt Name: Jong Loyd  Medical Record Number: [id number]  Date of Birth 1950   Today's Date: 7/23/2024    SUBJECTIVE:     Patient was seen and examined.  Pain is not well controlled, stating left thigh  and left foot painful.   Has not had nausea/vomiting    OBJECTIVE:     Patient Vitals for the past 24 hrs:   BP Temp Temp src Pulse Resp SpO2   07/23/24 0802 110/61 98 °F (36.7 °C) Temporal (!) 48 18 97 %   07/23/24 0730 (!) 91/52 -- -- (!) 45 14 96 %   07/23/24 0700 (!) 88/52 -- -- (!) 42 13 97 %   07/23/24 0652 -- -- -- (!) 46 15 97 %   07/23/24 0600 (!) 89/46 -- -- 54 27 94 %   07/23/24 0500 (!) 72/45 -- -- (!) 44 15 95 %   07/23/24 0433 -- -- -- -- 20 --   07/23/24 0400 95/65 98.6 °F (37 °C) Temporal (!) 45 19 96 %   07/23/24 0300 (!) 99/58 -- -- (!) 45 16 96 %   07/23/24 0200 108/60 -- -- (!) 47 17 96 %   07/23/24 0100 (!) 101/55 -- -- (!) 48 18 95 %   07/23/24 0000 110/64 97.7 °F (36.5 °C) Temporal (!) 46 16 96 %   07/22/24 2315 -- -- -- -- 18 --   07/22/24 2300 114/62 -- -- 53 25 95 %   07/22/24 2200 (!) 124/58 -- -- 54 21 96 %   07/22/24 2100 (!) 121/55 -- -- 59 24 96 %   07/22/24 2000 (!) 143/61 97.4 °F (36.3 °C) Temporal 57 23 97 %   07/22/24 1900 129/72 -- -- 60 26 95 %   07/22/24 1830 125/69 -- -- 60 23 95 %   07/22/24 1800 (!) 122/59 -- -- 56 20 96 %   07/22/24 1730 (!) 104/58 -- -- 56 24 95 %   07/22/24 1615 103/64 -- -- 66 25 95 %   07/22/24 1600 102/68 97.9 °F (36.6 °C) Temporal 65 29 95 %   07/22/24 1545 (!) 96/46 -- -- 69 21 100 %   07/22/24 1544 -- -- -- 77 11 99 %   07/22/24 1530 (!) 149/54 -- -- 58 26 97 %   07/22/24 1515 (!) 119/52 -- -- 61 24 97 %   07/22/24 1500 (!) 131/55 -- -- 59 19 96 %   07/22/24 1445 (!) 127/59 -- -- 61 27 95 %   07/22/24 1430 121/66 -- -- 60 (!) 32 95 %   07/22/24 1415 113/66 -- -- 63 23 93 %   07/22/24 1400 116/64 -- -- 61 21 91 %   07/22/24 1350 127/65 -- -- 64 22 94 %   07/22/24 1345 123/66 -- -- 63

## 2024-07-23 NOTE — PROGRESS NOTES
Reinforced dressing placed early this AM now saturated and oozing out from under Tegaderm. Will take off just reinforced dressing and place new reinforced dressing. Pt has not been moving leg. BP stable 115/56, HR sinus 61. Sats 98% on RA. Pt denies pain at present. Electronically signed by Emily Prieto RN on 7/23/2024 at 1:34 PM

## 2024-07-23 NOTE — PROGRESS NOTES
Dorsal and Pedal pulses noted to right foot after surgical procedure.  Witnessed by RN and abel Diamond RN

## 2024-07-24 ENCOUNTER — APPOINTMENT (OUTPATIENT)
Dept: VASCULAR LAB | Age: 74
End: 2024-07-24
Attending: SURGERY
Payer: OTHER GOVERNMENT

## 2024-07-24 VITALS
SYSTOLIC BLOOD PRESSURE: 137 MMHG | TEMPERATURE: 99 F | OXYGEN SATURATION: 99 % | DIASTOLIC BLOOD PRESSURE: 50 MMHG | BODY MASS INDEX: 19.26 KG/M2 | RESPIRATION RATE: 15 BRPM | HEART RATE: 67 BPM | HEIGHT: 69 IN | WEIGHT: 130 LBS

## 2024-07-24 LAB
ANION GAP SERPL CALCULATED.3IONS-SCNC: 11 MMOL/L (ref 7–19)
ANTI-XA UNFRAC HEPARIN: 0.36 IU/ML (ref 0.3–0.7)
BUN SERPL-MCNC: 18 MG/DL (ref 8–23)
CALCIUM SERPL-MCNC: 7.7 MG/DL (ref 8.8–10.2)
CHLORIDE SERPL-SCNC: 105 MMOL/L (ref 98–111)
CO2 SERPL-SCNC: 23 MMOL/L (ref 22–29)
CREAT SERPL-MCNC: 0.8 MG/DL (ref 0.5–1.2)
ERYTHROCYTE [DISTWIDTH] IN BLOOD BY AUTOMATED COUNT: 13.6 % (ref 11.5–14.5)
GLUCOSE SERPL-MCNC: 128 MG/DL (ref 74–109)
HCT VFR BLD AUTO: 32.7 % (ref 42–52)
HGB BLD-MCNC: 10.6 G/DL (ref 14–18)
MCH RBC QN AUTO: 29.7 PG (ref 27–31)
MCHC RBC AUTO-ENTMCNC: 32.4 G/DL (ref 33–37)
MCV RBC AUTO: 91.6 FL (ref 80–94)
PLATELET # BLD AUTO: 172 K/UL (ref 130–400)
PMV BLD AUTO: 10.5 FL (ref 9.4–12.4)
POTASSIUM SERPL-SCNC: 4 MMOL/L (ref 3.5–5)
RBC # BLD AUTO: 3.57 M/UL (ref 4.7–6.1)
SODIUM SERPL-SCNC: 139 MMOL/L (ref 136–145)
WBC # BLD AUTO: 11 K/UL (ref 4.8–10.8)

## 2024-07-24 PROCEDURE — 93922 UPR/L XTREMITY ART 2 LEVELS: CPT | Performed by: SURGERY

## 2024-07-24 PROCEDURE — 93922 UPR/L XTREMITY ART 2 LEVELS: CPT

## 2024-07-24 PROCEDURE — 36415 COLL VENOUS BLD VENIPUNCTURE: CPT

## 2024-07-24 PROCEDURE — 85027 COMPLETE CBC AUTOMATED: CPT

## 2024-07-24 PROCEDURE — 6370000000 HC RX 637 (ALT 250 FOR IP): Performed by: SURGERY

## 2024-07-24 PROCEDURE — 80048 BASIC METABOLIC PNL TOTAL CA: CPT

## 2024-07-24 PROCEDURE — 85520 HEPARIN ASSAY: CPT

## 2024-07-24 PROCEDURE — 99232 SBSQ HOSP IP/OBS MODERATE 35: CPT | Performed by: SURGERY

## 2024-07-24 RX ADMIN — APIXABAN 5 MG: 5 TABLET, FILM COATED ORAL at 08:35

## 2024-07-24 RX ADMIN — LEVOTHYROXINE SODIUM 150 MCG: 75 TABLET ORAL at 06:05

## 2024-07-24 RX ADMIN — HYDROCODONE BITARTRATE AND ACETAMINOPHEN 1 TABLET: 5; 325 TABLET ORAL at 08:35

## 2024-07-24 RX ADMIN — HYDROCODONE BITARTRATE AND ACETAMINOPHEN 1 TABLET: 5; 325 TABLET ORAL at 00:07

## 2024-07-24 RX ADMIN — IPRATROPIUM BROMIDE 2 SPRAY: 21 SPRAY NASAL at 08:36

## 2024-07-24 RX ADMIN — CLOPIDOGREL BISULFATE 75 MG: 75 TABLET ORAL at 08:35

## 2024-07-24 RX ADMIN — FINASTERIDE 5 MG: 5 TABLET, FILM COATED ORAL at 08:35

## 2024-07-24 ASSESSMENT — PAIN DESCRIPTION - ORIENTATION: ORIENTATION: RIGHT;LEFT

## 2024-07-24 ASSESSMENT — PAIN SCALES - GENERAL
PAINLEVEL_OUTOF10: 1
PAINLEVEL_OUTOF10: 4
PAINLEVEL_OUTOF10: 5
PAINLEVEL_OUTOF10: 7

## 2024-07-24 ASSESSMENT — PAIN - FUNCTIONAL ASSESSMENT: PAIN_FUNCTIONAL_ASSESSMENT: ACTIVITIES ARE NOT PREVENTED

## 2024-07-24 ASSESSMENT — PAIN DESCRIPTION - DESCRIPTORS: DESCRIPTORS: ACHING

## 2024-07-24 ASSESSMENT — PAIN DESCRIPTION - LOCATION: LOCATION: LEG

## 2024-07-24 NOTE — CARE COORDINATION
Case Management Assessment  Initial Evaluation    Date/Time of Evaluation: 7/24/2024 9:41 AM  Assessment Completed by: JACKSON MERAZ    If patient is discharged prior to next notation, then this note serves as note for discharge by case management.    Patient Name: Jong Loyd                   YOB: 1950  Diagnosis: Atherosclerosis of native artery of both lower extremities with intermittent claudication (HCC) [I70.213]  Atherosclerosis of native arteries of extremities with intermittent claudication, left leg (HCC) [I70.212]                   Date / Time: 7/22/2024  8:31 AM    Patient Admission Status: Inpatient   Readmission Risk (Low < 19, Mod (19-27), High > 27): Readmission Risk Score: 15    Current PCP: Giselle Frias APRN - NP  PCP verified by CM? (P) Yes    Chart Reviewed: Yes      History Provided by: (P) Patient  Patient Orientation: (P) Alert and Oriented, Person, Place, Situation, Self    Patient Cognition: (P) Alert    Hospitalization in the last 30 days (Readmission):  No    If yes, Readmission Assessment in  Navigator will be completed.    Advance Directives:      Code Status: Full Code   Patient's Primary Decision Maker is: (P) Legal Next of Kin    Primary Decision Maker: KRISTEN FRYE - [place] - [phone]    Discharge Planning:    Patient lives with: (P) Spouse/Significant Other Type of Home: (P) House  Primary Care Giver: (P) Self  Patient Support Systems include: (P) Children, Family Members   Current Financial resources: (P) Medicare, [place] ([place]  Current community resources: (P) None  Current services prior to admission: (P) None            Current DME:              Type of Home Care services:  (P) None    ADLS  Prior functional level: (P) Independent in ADLs/IADLs  Current functional level: (P) Independent in ADLs/IADLs    PT AM-PAC:   /24  OT AM-PAC:   /24    Family can provide assistance at DC: (P) Yes  Would you like Case Management to discuss the discharge  Independent   Active  Yes   Mode of Transportation Car   Education n/a   Occupation Retired   Type of Occupation n/a   Discharge Planning   Type of Residence House   Living Arrangements Spouse/Significant Other   Current Services Prior To Admission None   Potential Assistance Needed N/A   DME Ordered? No   Potential Assistance Purchasing Medications No   Type of Home Care Services None   Patient expects to be discharged to: House   Follow Up Appointment: Best Day/Time  Wednesday AM   One/Two Story Residence One story   History of falls? 0   Services At/After Discharge   Transition of Care Consult (CM Consult) N/A   Services At/After Discharge None    Resource Information Provided? No   Mode of Transport at Discharge Self   Hospital Transport Time of Discharge   (unknown time of discharge at this time.)   Confirm Follow Up Transport Family   Condition of Participation: Discharge Planning   The Plan for Transition of Care is related to the following treatment goals: pt plans to d/c home once stable. Pt plans to follow up with pcp   The Patient and/or Patient Representative was provided with a Choice of Provider? Patient   The Patient and/Or Patient Representative agree with the Discharge Plan? Yes   Freedom of Choice list was provided with basic dialogue that supports the patient's individualized plan of care/goals, treatment preferences, and shares the quality data associated with the providers?  No

## 2024-07-24 NOTE — PROGRESS NOTES
Vascular lab preliminary notes.   Post Op KG performed.     Right:  PT 0.70  DP 0.72  Digit 0.39    Left:  PT 0.52  DP 0.55  Digit 0.36    Final report pending.

## 2024-07-24 NOTE — PROGRESS NOTES
21 days of Eliquis 5mg given to patient from [facility] Vascular office. These meds were signed out in office log as per protocol. Patient given instructions that his rx for Eliquis has been faxed to [facility] by Vascular office. Instructed patient he will need to call and confirm this is being sent monthly, he voices understanding.

## 2024-07-24 NOTE — PROGRESS NOTES
Vascular Surgery  Dr. Karen Morrow   Daily Progress Note    Pt Name: Jong Loyd  Medical Record Number: [id number]  Date of Birth 1950   Today's Date: 7/24/2024    SUBJECTIVE:     Patient was seen and examined.  Stating his left left feels great, left thigh a little sore but foot feels normal  Has not had nausea/vomiting  Asking if he will go home today    OBJECTIVE:     Patient Vitals for the past 24 hrs:   BP Temp Temp src Pulse Resp SpO2   07/24/24 0700 123/66 -- -- 67 15 98 %   07/24/24 0600 (!) 122/55 -- -- 66 15 95 %   07/24/24 0500 (!) 124/58 -- -- 67 17 96 %   07/24/24 0400 (!) 100/41 99 °F (37.2 °C) Temporal 70 16 95 %   07/24/24 0300 (!) 100/46 -- -- 68 18 94 %   07/24/24 0200 (!) 107/54 -- -- 70 19 94 %   07/24/24 0100 (!) 110/45 -- -- 74 23 94 %   07/24/24 0037 -- -- -- -- 20 --   07/24/24 0007 -- -- -- -- 15 --   07/24/24 0000 (!) 110/45 98.8 °F (37.1 °C) Temporal 73 20 94 %   07/23/24 2300 -- -- -- 82 22 94 %   07/23/24 2200 114/60 -- -- 80 21 94 %   07/23/24 2100 (!) 117/56 -- -- 74 20 96 %   07/23/24 2000 (!) 119/58 98.7 °F (37.1 °C) Temporal 78 22 94 %   07/23/24 1900 (!) 110/58 -- -- 78 22 93 %   07/23/24 1808 100/85 -- -- 78 20 96 %   07/23/24 1728 119/61 -- -- 62 -- --   07/23/24 1720 119/61 -- -- 63 17 96 %   07/23/24 1715 -- -- -- 72 20 94 %   07/23/24 1700 (!) 110/50 99.2 °F (37.3 °C) Temporal 65 17 95 %   07/23/24 1615 (!) 95/51 -- -- 61 -- --   07/23/24 1605 (!) 97/49 -- -- 65 14 100 %   07/23/24 1600 (!) 94/55 98.3 °F (36.8 °C) Temporal 63 13 100 %   07/23/24 1555 (!) 98/52 -- -- 64 14 100 %   07/23/24 1550 -- -- -- 69 17 100 %   07/23/24 1545 (!) 98/46 -- -- 71 17 99 %   07/23/24 1540 (!) 97/51 -- -- 81 15 --   07/23/24 1537 (!) 97/51 99.2 °F (37.3 °C) Temporal 66 14 --   07/23/24 1400 -- -- -- 66 22 98 %   07/23/24 1300 (!) 115/56 100 °F (37.8 °C) Temporal 61 21 98 %   07/23/24 1200 (!) 113/51 -- -- 57 15 98 %   07/23/24 1130 (!) 108/53 -- -- 56 19 96 %   07/23/24 1100 (!)

## 2024-07-24 NOTE — DISCHARGE SUMMARY
[facility] Vascular  Discharge Summary    Patient ID: Jong Loyd      Patient's PCP: Giselle Frias, APRN - NP    Admit Date: 7/22/2024     Discharge Date: 7/24/2024     Admitting Physician: Karen Morrow DO     Discharge Physician:  Karen Morrow DO    Discharge Diagnoses:   Primary:   Atherosclerosis of native artery of both lower extremities with intermittent claudication.     Procedures This Admit:   Date of Procedure: 7/23/2024   LEFT LOWER EXTREMITY ANGIOGRAM, ANGIOPLASTY, STENT PLACEMENT, REMOVAL LYSIS CATHETER   Surgeon(s):  Karen Morrow DO      Consults:   None    Complications: none    The patient was seen and examined on day of discharge and this discharge summary is in conjunction with any daily progress note from day of discharge.    History of Present Illness and Hospital Course:   The patient is a 74 year old male who has PVD. His current treatment includes plavix, eliquis, and pravachol. Pt has not had new wounds. Recently, he reports claudication at a distance of which varies, right equal to the left. He reports claudication has progressed and is mostly in the form of crampy type pain and fatigue starting in the calves. He has a short recovery time. This is reproducible in nature. Pt had Lower extremity arterial study showing: Right KG 1.1, Left KG 0.74 and Ascan that showed high grade stenosis of left sfa stnt and slow flow right popliteal stent. Therefore, options have been discussed with the patient including continued medical management vs. proceeding with intraoperative angiogram with runoff and possible angioplasty/atherectomy/stent for left leg stent. Patient has opted to proceed with this procedure. Risks have been discussed with the patient including but not limited to MI, death, CVA, bleed, nerve injury, renal failure and need for possible dialysis if contrast is used, and infection. The patient verbalized understanding and agrees with procedure.      On 7/23/2024 the patient was taken to OR and underwent left lower extremity angiogram, angioplasty, stent placement, removal lysis catheter. The patient tolerated procedure and was transferred to PACU in stable condition.     During the postoperative course the patient had no acute events overnight. On POD#1 the patient was out of bed. Right groin puncture without hematoma and mild bruising. Pt stable for discharge home per Dr. Morrow.     Disposition:  Home      Follow-up:    Appointment with DESMOND De La Cruz 8/05/24 at 2:30pm      Discharge Medications:     See Discharge Medication Reconciliation Page

## 2024-07-24 NOTE — PLAN OF CARE
Problem: Discharge Planning  Goal: Discharge to home or other facility with appropriate resources  Outcome: Progressing     Problem: ABCDS Injury Assessment  Goal: Absence of physical injury  Outcome: Progressing     Problem: Safety - Adult  Goal: Free from fall injury  Outcome: Progressing     Problem: Pain  Goal: Verbalizes/displays adequate comfort level or baseline comfort level  Outcome: Progressing  Flowsheets  Taken 7/24/2024 0400 by Deirdre Coronado RN  Verbalizes/displays adequate comfort level or baseline comfort level: Encourage patient to monitor pain and request assistance  Taken 7/24/2024 0000 by Deirdre Coronado RN  Verbalizes/displays adequate comfort level or baseline comfort level: Encourage patient to monitor pain and request assistance

## 2024-07-24 NOTE — PROGRESS NOTES
Patient given AVS, went over discharge instructions, medication changes, and follow up appointments. Patient leaves via wheelchair with daughter and transport.    Electronically signed by Yolis Collazo RN on 7/24/2024 at 2:31 PM

## 2024-07-25 LAB
ECHO BSA: 1.69 M2
VAS LEFT ABI: 0.55
VAS LEFT ARM BP: 130 MMHG
VAS LEFT DORSALIS PEDIS BP: 71 MMHG
VAS LEFT PTA BP: 68 MMHG
VAS LEFT TBI: 0.36
VAS LEFT TOE PRESSURE: 47 MMHG
VAS RIGHT ABI: 0.72
VAS RIGHT ARM BP: 128 MMHG
VAS RIGHT DORSALIS PEDIS BP: 93 MMHG
VAS RIGHT PTA BP: 91 MMHG
VAS RIGHT TBI: 0.39
VAS RIGHT TOE PRESSURE: 51 MMHG

## 2024-07-30 ENCOUNTER — TELEPHONE (OUTPATIENT)
Dept: HEMATOLOGY | Age: 74
End: 2024-07-30

## 2024-07-30 NOTE — TELEPHONE ENCOUNTER
Called Patient and reminded patient of their appointment on 08/05/2024 and patient confirmed they would be here. Reminded patient to just come at appointment time, and to not come at the lab appointment time. Reminded patient that we will not check them in any more than 30 minutes before appointment time.  We have now moved to the [facility] [place] that is located between our old office and the ER at the [facility]

## 2024-08-02 NOTE — PROGRESS NOTES
Progress Note      Pt Name: Jong Loyd  YOB: 1950  MRN: [id number]    Date of evaluation: 8/5/2024  History Obtained From:  Patient, EMR    Portions of this note have been copied forward, however, changed to reflect the most current clinical status of this patient.    Chief Complaint   Patient presents with    Follow-up     CLL (chronic lymphocytic leukemia) (HCC)     INTERVAL HISTORY/HISTORY OF PRESENT ILLNESS:    Jong Montiel has the following ongoing hematologic/oncologic issues:  CLL, diagnosed 4/2021; Zanubrutinib initiated 10/2/2023 due to persistent, unrelieved rash  High-grade urothelial carcinoma dating to 3/2022, status post TURBT and receiving BCG every 3 months at [facility]  Chronic Tobacco Use, (declines cessation), Monitored with annual LD screening Chest CT  7.22 x 8.55 mm cavitating lesion in the left midlung field (negative on PET scan dated 5/11/2023). Plan repeat CT chest CT 1/2025    Jong returns to the clinic for follow-up, toxicity assessment and chemotherapy management, treated with zanubrutinib 320 mg daily for symptom management of rash presumably related to CLL.  Patient has Mclaughlin stage 0 CLL with observation recommended.  He was evaluated by dermatology and dermatology oncology, rash felt possibly CLL related. Treatment was initiated due to pruritic rash, mainly occurring on his chest and waistline, that was interfering with his ability to sleep, and in turn, his quality of life.  Jong initiated zanubrutinib 320 mg po daily 10/2023, rash quickly resolved.  Attempt was made to decrease zanubrutinib to 160 mg daily 1/31/2024, unfortunately the rash returned within a couple of weeks.  Jong, therefore, has been maintained on 320 mg daily since that time.  When last evaluated, Jong again noted resolution of the rash.  Since that time however he states the rash has been occurring approximately once a month and lasts for approximately 3 days.  The rash continues to occur mainly

## 2024-08-05 ENCOUNTER — OFFICE VISIT (OUTPATIENT)
Dept: HEMATOLOGY | Age: 74
End: 2024-08-05
Payer: OTHER GOVERNMENT

## 2024-08-05 ENCOUNTER — OFFICE VISIT (OUTPATIENT)
Dept: VASCULAR SURGERY | Age: 74
End: 2024-08-05
Payer: OTHER GOVERNMENT

## 2024-08-05 ENCOUNTER — HOSPITAL ENCOUNTER (OUTPATIENT)
Dept: INFUSION THERAPY | Age: 74
Discharge: HOME | End: 2024-08-05
Payer: OTHER GOVERNMENT

## 2024-08-05 ENCOUNTER — HOSPITAL ENCOUNTER (OUTPATIENT)
Dept: VASCULAR LAB | Age: 74
Discharge: HOME | End: 2024-08-07
Payer: OTHER GOVERNMENT

## 2024-08-05 VITALS
DIASTOLIC BLOOD PRESSURE: 64 MMHG | SYSTOLIC BLOOD PRESSURE: 123 MMHG | TEMPERATURE: 97.9 F | OXYGEN SATURATION: 99 % | HEART RATE: 59 BPM

## 2024-08-05 VITALS
BODY MASS INDEX: 19.04 KG/M2 | HEIGHT: 69 IN | DIASTOLIC BLOOD PRESSURE: 60 MMHG | SYSTOLIC BLOOD PRESSURE: 124 MMHG | OXYGEN SATURATION: 99 % | TEMPERATURE: 98.4 F | HEART RATE: 66 BPM | WEIGHT: 128.58 LBS

## 2024-08-05 DIAGNOSIS — C91.10 CLL (CHRONIC LYMPHOCYTIC LEUKEMIA) (HCC): ICD-10-CM

## 2024-08-05 DIAGNOSIS — Z79.899 HIGH RISK MEDICATION USE: ICD-10-CM

## 2024-08-05 DIAGNOSIS — Z51.11 CHEMOTHERAPY MANAGEMENT, ENCOUNTER FOR: ICD-10-CM

## 2024-08-05 DIAGNOSIS — I70.213 ATHEROSCLER OF NATIVE ARTERY OF BOTH LEGS WITH INTERMIT CLAUDICATION (HCC): Primary | ICD-10-CM

## 2024-08-05 DIAGNOSIS — I70.213 ATHEROSCLER OF NATIVE ARTERY OF BOTH LEGS WITH INTERMIT CLAUDICATION (HCC): ICD-10-CM

## 2024-08-05 DIAGNOSIS — Z91.09 ENVIRONMENTAL ALLERGIES: ICD-10-CM

## 2024-08-05 DIAGNOSIS — R21 RASH, SKIN: ICD-10-CM

## 2024-08-05 DIAGNOSIS — Z87.891 FORMER TOBACCO USE: ICD-10-CM

## 2024-08-05 DIAGNOSIS — C91.10 CLL (CHRONIC LYMPHOCYTIC LEUKEMIA) (HCC): Primary | ICD-10-CM

## 2024-08-05 DIAGNOSIS — Z71.89 CARE PLAN DISCUSSED WITH PATIENT: ICD-10-CM

## 2024-08-05 LAB
BASOPHILS # BLD: 0.05 K/UL (ref 0.01–0.08)
BASOPHILS NFR BLD: 0.6 % (ref 0.1–1.2)
EOSINOPHIL # BLD: 0.11 K/UL (ref 0.04–0.54)
EOSINOPHIL NFR BLD: 1.4 % (ref 0.7–7)
ERYTHROCYTE [DISTWIDTH] IN BLOOD BY AUTOMATED COUNT: 14.8 % (ref 11.6–14.4)
FERRITIN SERPL-MCNC: 61 NG/ML (ref 30–400)
HCT VFR BLD AUTO: 35.8 % (ref 40.1–51)
HGB BLD-MCNC: 11.5 G/DL (ref 13.7–17.5)
IRON SATN MFR SERPL: 16 % (ref 14–50)
IRON SERPL-MCNC: 48 UG/DL (ref 59–158)
LYMPHOCYTES # BLD: 2.48 K/UL (ref 1.18–3.74)
LYMPHOCYTES NFR BLD: 31.3 % (ref 19.3–53.1)
MCH RBC QN AUTO: 29.4 PG (ref 25.7–32.2)
MCHC RBC AUTO-ENTMCNC: 32.1 G/DL (ref 32.3–36.5)
MCV RBC AUTO: 91.6 FL (ref 79–92.2)
MONOCYTES # BLD: 0.61 K/UL (ref 0.24–0.82)
MONOCYTES NFR BLD: 7.7 % (ref 4.7–12.5)
NEUTROPHILS # BLD: 4.65 K/UL (ref 1.56–6.13)
NEUTS SEG NFR BLD: 58.7 % (ref 34–71.1)
PLATELET # BLD AUTO: 246 K/UL (ref 163–337)
PMV BLD AUTO: 9.2 FL (ref 7.4–10.4)
RBC # BLD AUTO: 3.91 M/UL (ref 4.63–6.08)
TIBC SERPL-MCNC: 307 UG/DL (ref 250–400)
WBC # BLD AUTO: 7.92 K/UL (ref 4.23–9.07)

## 2024-08-05 PROCEDURE — 1111F DSCHRG MED/CURRENT MED MERGE: CPT | Performed by: NURSE PRACTITIONER

## 2024-08-05 PROCEDURE — 3074F SYST BP LT 130 MM HG: CPT | Performed by: NURSE PRACTITIONER

## 2024-08-05 PROCEDURE — 99214 OFFICE O/P EST MOD 30 MIN: CPT | Performed by: NURSE PRACTITIONER

## 2024-08-05 PROCEDURE — 99212 OFFICE O/P EST SF 10 MIN: CPT

## 2024-08-05 PROCEDURE — 3017F COLORECTAL CA SCREEN DOC REV: CPT | Performed by: NURSE PRACTITIONER

## 2024-08-05 PROCEDURE — G2211 COMPLEX E/M VISIT ADD ON: HCPCS | Performed by: NURSE PRACTITIONER

## 2024-08-05 PROCEDURE — 1123F ACP DISCUSS/DSCN MKR DOCD: CPT | Performed by: NURSE PRACTITIONER

## 2024-08-05 PROCEDURE — 3078F DIAST BP <80 MM HG: CPT | Performed by: NURSE PRACTITIONER

## 2024-08-05 PROCEDURE — 93922 UPR/L XTREMITY ART 2 LEVELS: CPT

## 2024-08-05 PROCEDURE — 36415 COLL VENOUS BLD VENIPUNCTURE: CPT

## 2024-08-05 PROCEDURE — G8420 CALC BMI NORM PARAMETERS: HCPCS | Performed by: NURSE PRACTITIONER

## 2024-08-05 PROCEDURE — G8427 DOCREV CUR MEDS BY ELIG CLIN: HCPCS | Performed by: NURSE PRACTITIONER

## 2024-08-05 PROCEDURE — 85025 COMPLETE CBC W/AUTO DIFF WBC: CPT

## 2024-08-05 PROCEDURE — 1036F TOBACCO NON-USER: CPT | Performed by: NURSE PRACTITIONER

## 2024-08-05 RX ORDER — MECLIZINE HYDROCHLORIDE 25 MG/1
TABLET ORAL
COMMUNITY

## 2024-08-05 RX ORDER — MONTELUKAST SODIUM 10 MG/1
10 TABLET ORAL NIGHTLY
Qty: 90 TABLET | Refills: 3 | Status: SHIPPED | OUTPATIENT
Start: 2024-08-05 | End: 2025-07-31

## 2024-08-05 ASSESSMENT — ENCOUNTER SYMPTOMS
WHEEZING: 0
ABDOMINAL PAIN: 0
CONSTIPATION: 0
TROUBLE SWALLOWING: 0
VOMITING: 0
EYE DISCHARGE: 0
NAUSEA: 0
EYE ITCHING: 0
SORE THROAT: 0
SHORTNESS OF BREATH: 0
COUGH: 0
DIARRHEA: 0

## 2024-08-06 ENCOUNTER — TELEPHONE (OUTPATIENT)
Dept: VASCULAR SURGERY | Age: 74
End: 2024-08-06

## 2024-08-06 DIAGNOSIS — E83.51 HYPOCALCEMIA: Primary | ICD-10-CM

## 2024-08-06 DIAGNOSIS — E83.51 HYPOCALCEMIA: ICD-10-CM

## 2024-08-06 RX ORDER — FERROUS SULFATE 325(65) MG
325 TABLET ORAL
Qty: 30 TABLET | Refills: 5 | Status: SHIPPED | OUTPATIENT
Start: 2024-08-06

## 2024-08-06 NOTE — PROGRESS NOTES
Jong Loyd (:  1950) is a 74 y.o. male,Established patient, here for evaluation of the following chief complaint(s):  Follow-up            SUBJECTIVE/OBJECTIVE:  Patient presents for follow up after an arteriogram with LEFT LOWER EXTREMITY ANGIOGRAM, ANGIOPLASTY, STENT PLACEMENT, REMOVAL LYSIS CATHETER  done 2 weeks ago.  Procedure was done for peripheral vascular disease with claudication. Post op problems include none.  Angiogram complications were none.  Post op pain and swelling are minimal.  At present, he reports minimal claudication.  Jong reports that the right leg is equal to the left.  He reports claudication is improved and is mostly in the form of crampy type pain starting in the calves. He has a short recovery time. This is reproducible in nature. He reports ischemic rest pain 0 times per night.  He reports walking with cart does not help.      I have personally reviewed the following: problem list, current meds, allergies, PMH, PSH, family hx, and social hx  Jong Loyd is a 74 y.o. male with the following history as recorded in [facility]:  Patient Active Problem List    Diagnosis Date Noted    Atherosclerosis of native arteries of extremities with intermittent claudication, left leg ([facility]) 2024    Atherosclerosis of native artery of both lower extremities with intermittent claudication ([facility]) 2024    Femoral artery pseudo-aneurysm, left ([facility]) 2024    Atherosclerosis with claudication of extremity ([facility]) 2021    Benign essential HTN 2021    BPH (benign prostatic hyperplasia) 2021    Pulmonary emphysema ([facility]) 2021    Carotid stenosis, asymptomatic, bilateral 2021    CLL (chronic lymphocytic leukemia) ([facility]) 2021    PVD (peripheral vascular disease) ([facility])      Current Outpatient Medications   Medication Sig Dispense Refill    montelukast (SINGULAIR) 10 MG tablet Take 1 tablet by mouth nightly 90 tablet 3    meclizine (ANTIVERT) 25 MG

## 2024-08-06 NOTE — TELEPHONE ENCOUNTER
Contacted the patient to let him know the following.  I will assure the 3 month orders are in the patient's chart.  I let him know we would mail him an appointment card.  The patient voiced understanding.             ----- Message from DESMOND Gastelum sent at 8/6/2024  6:57 AM CDT -----  Please let him know tri's are improved.  We will see him in 3 months

## 2024-08-07 LAB
ECHO BSA: 1.68 M2
VAS LEFT ABI: 0.78
VAS LEFT ANKLE BP: 118 MMHG
VAS LEFT ARM BP: 151 MMHG
VAS LEFT DORSALIS PEDIS BP: 110 MMHG
VAS LEFT PTA BP: 118 MMHG
VAS LEFT TBI: 0.68
VAS LEFT TOE PRESSURE: 103 MMHG
VAS RIGHT ABI: 0.81
VAS RIGHT ANKLE BP: 123 MMHG
VAS RIGHT ARM BP: 139 MMHG
VAS RIGHT DORSALIS PEDIS BP: 119 MMHG
VAS RIGHT PTA BP: 123 MMHG
VAS RIGHT TBI: 0.42
VAS RIGHT TOE PRESSURE: 64 MMHG

## 2024-08-07 PROCEDURE — 93922 UPR/L XTREMITY ART 2 LEVELS: CPT | Performed by: SURGERY

## 2024-09-23 NOTE — ANESTHESIA POSTPROCEDURE EVALUATION
"Patient: Jose Mendez    Procedure Summary     Date:  01/03/20 Room / Location:  [facility] ENDOSCOPY 6 / [facility] ENDOSCOPY    Anesthesia Start:  1025 Anesthesia Stop:  1045    Procedure:  ESOPHAGOGASTRODUODENOSCOPY WITH ANESTHESIA (N/A ) Diagnosis:       Acute gastric ulcer without hemorrhage or perforation      (Acute gastric ulcer without hemorrhage or perforation [K25.3])    Surgeon:  Crow Patton DO Provider:  Anupam Brown CRNA    Anesthesia Type:  MAC ASA Status:  2          Anesthesia Type: MAC    Vitals  No vitals data found for the desired time range.          Post Anesthesia Care and Evaluation    Patient location during evaluation: PACU  Patient participation: complete - patient participated  Level of consciousness: awake and alert  Pain management: adequate  Airway patency: patent  Anesthetic complications: No anesthetic complications    Cardiovascular status: acceptable  Respiratory status: acceptable  Hydration status: acceptable    Comments: Blood pressure 130/64, pulse 96, temperature 97.5 °F (36.4 °C), temperature source Temporal, resp. rate 18, height 175.3 cm (69\"), weight 68.5 kg (151 lb), SpO2 96 %.    Pt discharged from PACU based on kristyn score >8      " [facility]  Initial Inpatient Consult Note    Referring Provider: A     Reason for Consultation: Pancreatic mass, duodenal thickening    Subjective     History of present illness:      Thank you for allowing us to participate in the care of this patient.  83 y.o. female with history of hypertension, hypothyroid, OA, and anemia presented to hospital following syncope episode, nausea, and vomiting.  She is undergoing workup for the syncope.  Our service consulted for possible pancreatic mass and duodenal wall thickening noted on contrasted CT A/P: Intrathoracic and extrahepatic biliary dilatation with abrupt tapering at the ampulla where there is questionable hypoenhancing masslike soft tissue at the ampulla and pancreatic head, distended gallbladder.  Labs show elevated ALP at 140, ALT 41, AST 34, and normal bilirubin.  Lipase normal.    She denies abdominal pain, jaundice, fever.  She admits to nausea and a couple episodes of vomiting brown emesis.  She has chronic constipation but this does not bother her.  She denies melena or hematochezia.    Pending MRCP and CA 19-9.      Past Medical History:  Past Medical History:   Diagnosis Date    Abnormal CXR 12/18/2017    Adverse reaction to narcotic drug     SEVERE HALLUCINATIONS POST OP LEFT HIP PLACEMENT    Allergies     Arthritis     Arthritis of foot 04/17/2020    Arthropathy of pelvic region and thigh 12/13/2012    unspecified    Back pain 11/20/2007    Chronic back pain 07/13/2009    Chronic cough 12/18/2017    CKD (chronic kidney disease)     Closed nondisplaced fracture of lateral malleolus of fibula with delayed healing 01/13/2020    Closed nondisplaced fracture of medial cuneiform of left foot 02/19/2018    Constipation 03/24/2015    unspecified    COVID-19 vaccination refused     COVID-19 virus detected 10/17/2022    Diverticulosis     Dyspepsia 03/18/2008    Essential hypertension 11/20/2007    Exertional shortness of breath  2017    Fall 2018    Family history of abdominal aortic aneurysm (AAA) 2019    [place] aaa screen limited (04/10/2019 10:32)     Grief reaction 2011    new   11 of leukemia ( 11), were together 35 years    Hearing loss 2008    History of bone density study 2015    History of pneumonia     2017    History of skin cancer     Hypothyroidism, acquired 2007    Insomnia 2015    unspecified    Intervertebral disc disorder with myelopathy, cervical region 2010    Joint capsule tear 2012    unspecified    OA (osteoarthritis) of hip 2018    Other synovitis and tenosynovitis, right ankle and foot 2020    Peroneal tendonitis, right 2020    Rhinitis, allergic 2007    Right knee pain     Scoliosis     Screening breast examination 2007    Stress 2015    other acute reactions to stress    Stress incontinence     Trochanteric bursitis, left hip 2019    Urticaria 2015     Past Surgical History:  Past Surgical History:   Procedure Laterality Date    BREAST EXCISIONAL BIOPSY Right     50+ years ago    BRONCHOSCOPY N/A 2017    Procedure: BRONCHOSCOPY;  Surgeon: Mario Alanis MD;  Location: [facility] ENDOSCOPY;  Service:     CATARACT EXTRACTION, BILATERAL      COLONOSCOPY      HYSTERECTOMY      TOTAL HIP ARTHROPLASTY Left 2018    Procedure: LEFT TOTAL HIP ARTHROPLASTY;  Surgeon: Justen Mann MD;  Location: [facility] MAIN OR;  Service: Orthopedics    TOTAL HIP ARTHROPLASTY Right 2022    Procedure: Posterior RIGHT TOTAL HIP ARTHROPLASTY MARCELINO NAVIGATION;  Surgeon: Anupam Ruiz MD;  Location: [facility] OR OSC;  Service: Orthopedics;  Laterality: Right;    TOTAL KNEE ARTHROPLASTY Right 2024    Procedure: TOTAL KNEE ARTHROPLASTY;  Surgeon: Jesus Thayer MD;  Location: [facility] OR OSC;  Service: Orthopedics;  Laterality: Right;      Social History:   Social History     Tobacco Use     Smoking status: Never     Passive exposure: Never    Smokeless tobacco: Never   Substance Use Topics    Alcohol use: No      Family History:  Family History   Problem Relation Age of Onset    Heart disease Mother     Aneurysm Mother     Colon cancer Father     Aneurysm Brother     Breast cancer Paternal Grandmother     Asthma Paternal Grandfather     Malig Hyperthermia Neg Hx     Ovarian cancer Neg Hx        Home Meds:  Medications Prior to Admission   Medication Sig Dispense Refill Last Dose    acetaminophen (Tylenol) 325 MG tablet Take 2 tablets by mouth Every 6 (Six) Hours As Needed for Mild Pain for up to 40 doses. 40 tablet 0 9/22/2024    aspirin 81 MG EC tablet Take 1 tablet by mouth 2 (Two) Times a Day for 14 days, THEN 1 tablet Daily for 28 days. 60 tablet 0 9/22/2024    hydroCHLOROthiazide 12.5 MG tablet Take 1 tablet by mouth Daily. 30 tablet 11 9/22/2024    HYDROmorphone (Dilaudid) 2 MG tablet Take 1 tablet by mouth Every 4 (Four) Hours As Needed for Severe Pain for up to 40 doses. 40 tablet 0 9/22/2024    loperamide (IMODIUM) 2 MG capsule Take 1 capsule by mouth 4 (Four) Times a Day As Needed for Diarrhea. Indications: Diarrhea   9/22/2024    metroNIDAZOLE (METROCREAM) 0.75 % cream Apply 1 Application topically to the appropriate area as directed As Needed.   9/22/2024    ondansetron (Zofran) 4 MG tablet Take 1 tablet by mouth Every 8 (Eight) Hours As Needed for Nausea or Vomiting for up to 10 doses. 10 tablet 0 9/22/2024    pantoprazole (PROTONIX) 40 MG EC tablet Take 1 tablet by mouth Daily for 14 days. 14 tablet 0 9/22/2024    Synthroid 75 MCG tablet Take 1 tablet by mouth Daily. 30 tablet 11 9/22/2024    valsartan (DIOVAN) 80 MG tablet Take 1 tablet by mouth Daily. (Patient taking differently: Take 1 tablet by mouth Daily.) 30 tablet 11 9/22/2024    polyethylene glycol (MIRALAX) 17 GM/SCOOP powder Mix 17 g in 4-8 oz of liquid and take by mouth 2 (Two) Times a Day for 7 days. 238 g 0 More than a month      Current Meds:   levothyroxine, 75 mcg, Oral, Daily  ondansetron, 4 mg, Intravenous, Once  pantoprazole, 40 mg, Intravenous, BID AC  piperacillin-tazobactam, 3.375 g, Intravenous, Q8H  sodium chloride, 10 mL, Intravenous, Q12H      Allergies:  Allergies   Allergen Reactions    Hydrocodone Hallucinations    Paxlovid [Nirmatrelvir-Ritonavir] Other (See Comments)     insomnia    Ketek [Telithromycin] Hives    Nsaids Hives    Sulfa Antibiotics Hives         Objective     Vital Signs  Temp:  [97.2 °F (36.2 °C)-97.3 °F (36.3 °C)] 97.3 °F (36.3 °C)  Heart Rate:  [104-117] 110  Resp:  [15-18] 18  BP: ()/(47-62) 113/48  Physical Exam:   Constitutional:    Alert, cooperative, in no acute distress    Eyes:          conjunctivae and sclerae normal, no icterus    HENT:   normal hearing, mucosa moist    Neck:   Trachea midline,    Lungs:     normal respiratory effort    Abdomen:     Soft, nondistended, nontender; normal bowel sounds , no organomegaly    Psychiatric:  normal mood and affect; A&O x3     Results Review:   I reviewed the patient's new clinical results.    Results from last 7 days   Lab Units 09/23/24  0533   WBC 10*3/mm3 13.00*   HEMOGLOBIN g/dL 9.5*   HEMATOCRIT % 29.2*   PLATELETS 10*3/mm3 305     Results from last 7 days   Lab Units 09/23/24  0533   SODIUM mmol/L 137   POTASSIUM mmol/L 5.1   CHLORIDE mmol/L 102   CO2 mmol/L 24.0   BUN mg/dL 49*   CREATININE mg/dL 1.16*   CALCIUM mg/dL 9.3   BILIRUBIN mg/dL 0.4   ALK PHOS U/L 140*   ALT (SGPT) U/L 41*   AST (SGOT) U/L 34*   GLUCOSE mg/dL 144*     Results from last 7 days   Lab Units 09/23/24  0533   INR  0.96     Lab Results   Lab Value Date/Time    LIPASE 42 09/23/2024 0735    LIPASE 50 12/19/2017 1128       Radiology:  CT Angiogram Chest Pulmonary Embolism   Final Result       1. The study is negative for pulmonary embolism. No acute intrathoracic   abnormality.   2. Intrathoracic and extrahepatic biliary dilatation with abrupt   tapering at the ampulla  where there is questionable hypoenhancing   masslike soft tissue at the ampulla and pancreatic head. Could consider   correlating with ERCP or MRCP if clinically indicated.   3. Moderate fluid and air distended stomach and high attenuation fluid   distention of the proximal duodenum with duodenal wall thickening,   suspected duodenal diverticula, and mild proximal duodenal wall   thickening. Could correlate with endoscopy if clinically indicated.   4. Distended gallbladder.   5. Decreased hepatic attenuation could reflect hepatic steatosis.   6. Extensive colonic diverticula without CT evidence of diverticulitis.   7. Other chronic findings, as above.       This report was finalized on 9/23/2024 8:13 AM by Lorenzo Bui MD on   Workstation: [id number]          CT Abdomen Pelvis With Contrast   Final Result       1. The study is negative for pulmonary embolism. No acute intrathoracic   abnormality.   2. Intrathoracic and extrahepatic biliary dilatation with abrupt   tapering at the ampulla where there is questionable hypoenhancing   masslike soft tissue at the ampulla and pancreatic head. Could consider   correlating with ERCP or MRCP if clinically indicated.   3. Moderate fluid and air distended stomach and high attenuation fluid   distention of the proximal duodenum with duodenal wall thickening,   suspected duodenal diverticula, and mild proximal duodenal wall   thickening. Could correlate with endoscopy if clinically indicated.   4. Distended gallbladder.   5. Decreased hepatic attenuation could reflect hepatic steatosis.   6. Extensive colonic diverticula without CT evidence of diverticulitis.   7. Other chronic findings, as above.       This report was finalized on 9/23/2024 8:13 AM by Lorenzo Bui MD on   Workstation: [id number]          XR Chest 1 View   Final Result   1. Mild interstitial prominence of the lungs somewhat exaggerated by   underinflation but overall appear unchanged from the 12/29/2017  study   and therefore likely chronic in nature.   2. No definite acute infiltrates are thought to be present.       This report was finalized on 9/23/2024 7:33 AM by Dr. Arun Romero M.D on Workstation: [id number]          MRI abdomen w wo contrast mrcp    (Results Pending)   CT Head Without Contrast    (Results Pending)       Assessment & Plan   Active Hospital Problems    Diagnosis     **Syncope     Nausea & vomiting     Status post right knee replacement     Essential hypertension        Assessment:  Abnormal CT imaging of the pancreas and bile ducts  Abnormal CT imaging of the duodenum with wall thickening  Distended gallbladder  Syncope episode  Nausea and vomiting with brown emesis  Anemia with mild macrocytosis; drop in hemoglobin which was normal 2 weeks ago  Chronic constipation    Plan:  Will await findings of MRCP and CA 19-9 for possible pancreatic mass.  Further recommendations for this following that.  Likely will need upper endoscopy to evaluate duodenal wall thickening as well as anemia but will await findings on MRCP.  Recommend supportive care for nausea and vomiting with antiemetics.  Monitor for evidence of GI bleed.  Transfuse per primary hospital team.  Await iron profile.  Continue PPI.  Undergoing workup for syncope with neurology at this time.      I discussed the patients findings and my recommendations with patient and family.    Dragon dictation used throughout this note.            Diana Ray PA-C  [facility]  [address] [place] [address]  Office: [phone]

## 2024-09-24 ENCOUNTER — HOSPITAL ENCOUNTER (OUTPATIENT)
Dept: VASCULAR LAB | Age: 74
Discharge: HOME | End: 2024-09-26
Payer: OTHER GOVERNMENT

## 2024-09-24 ENCOUNTER — OFFICE VISIT (OUTPATIENT)
Dept: VASCULAR SURGERY | Age: 74
End: 2024-09-24
Payer: OTHER GOVERNMENT

## 2024-09-24 VITALS
HEART RATE: 57 BPM | SYSTOLIC BLOOD PRESSURE: 117 MMHG | TEMPERATURE: 98.1 F | DIASTOLIC BLOOD PRESSURE: 65 MMHG | OXYGEN SATURATION: 98 %

## 2024-09-24 DIAGNOSIS — I70.213 ATHEROSCLER OF NATIVE ARTERY OF BOTH LEGS WITH INTERMIT CLAUDICATION (HCC): ICD-10-CM

## 2024-09-24 DIAGNOSIS — I70.213 ATHEROSCLER OF NATIVE ARTERY OF BOTH LEGS WITH INTERMIT CLAUDICATION (HCC): Primary | ICD-10-CM

## 2024-09-24 PROCEDURE — 3074F SYST BP LT 130 MM HG: CPT | Performed by: NURSE PRACTITIONER

## 2024-09-24 PROCEDURE — 99214 OFFICE O/P EST MOD 30 MIN: CPT | Performed by: NURSE PRACTITIONER

## 2024-09-24 PROCEDURE — 1123F ACP DISCUSS/DSCN MKR DOCD: CPT | Performed by: NURSE PRACTITIONER

## 2024-09-24 PROCEDURE — 93922 UPR/L XTREMITY ART 2 LEVELS: CPT

## 2024-09-24 PROCEDURE — 93925 LOWER EXTREMITY STUDY: CPT

## 2024-09-24 PROCEDURE — 3078F DIAST BP <80 MM HG: CPT | Performed by: NURSE PRACTITIONER

## 2024-09-25 ENCOUNTER — TELEPHONE (OUTPATIENT)
Dept: VASCULAR SURGERY | Age: 74
End: 2024-09-25

## 2024-09-25 DIAGNOSIS — R04.0 EPISTAXIS: Primary | ICD-10-CM

## 2024-09-25 LAB
VAS LEFT ABI: 0.89
VAS LEFT ARM BP: 148 MMHG
VAS LEFT ATA MID PSV: 15.8 CM/S
VAS LEFT CFA PROX PSV: 204 CM/S
VAS LEFT DIST OUTFLOW PSV: 33 CM/S
VAS LEFT DORSALIS PEDIS BP: 136 MMHG
VAS LEFT GRAFT 1: NORMAL
VAS LEFT MID OUTFLOW PSV: 33 CM/S
VAS LEFT PERONEAL MID PSV: 27.4 CM/S
VAS LEFT PFA PROX PSV: 93 CM/S
VAS LEFT POP A DIST PSV: 113 CM/S
VAS LEFT POP A PROX PSV: 65.9 CM/S
VAS LEFT POP A PROX VEL RATIO: 0.66
VAS LEFT PROX OUTFLOW PSV: 171 CM/S
VAS LEFT PTA BP: 140 MMHG
VAS LEFT PTA MID PSV: 54.4 CM/S
VAS LEFT SFA DIST PSV: 99.5 CM/S
VAS LEFT SFA DIST VEL RATIO: 0.73
VAS LEFT SFA MID PSV: 137 CM/S
VAS LEFT SFA MID VEL RATIO: 1.04
VAS LEFT SFA PROX PSV: 132 CM/S
VAS LEFT SFA PROX VEL RATIO: 0.65
VAS LEFT TBI: 0.48
VAS LEFT TOE PRESSURE: 76 MMHG
VAS RIGHT ABI: 0.77
VAS RIGHT ARM BP: 158 MMHG
VAS RIGHT CFA PROX PSV: 149 CM/S
VAS RIGHT DIST OUTFLOW PSV 2: 14 CM/S
VAS RIGHT DIST OUTFLOW PSV: 132 CM/S
VAS RIGHT DORSALIS PEDIS BP: 72 MMHG
VAS RIGHT GRAFT 1: NORMAL
VAS RIGHT GRAFT 2: NORMAL
VAS RIGHT MID OUTFLOW EDV: 9 CM/S
VAS RIGHT MID OUTFLOW PSV 2: 14 CM/S
VAS RIGHT MID OUTFLOW PSV: 139 CM/S
VAS RIGHT PERONEAL MID PSV: 26.7 CM/S
VAS RIGHT PFA PROX PSV: 112 CM/S
VAS RIGHT POP A DIST PSV: 70.3 CM/S
VAS RIGHT PROX OUTFLOW PSV 2: 20 CM/S
VAS RIGHT PTA BP: 122 MMHG
VAS RIGHT PTA MID PSV: 32.1 CM/S
VAS RIGHT SFA DIST PSV: 435 CM/S
VAS RIGHT SFA DIST VEL RATIO: 5.78
VAS RIGHT SFA MID PSV: 75.3 CM/S
VAS RIGHT SFA MID VEL RATIO: 0.9
VAS RIGHT SFA PROX PSV: 81.6 CM/S
VAS RIGHT SFA PROX VEL RATIO: 0.5
VAS RIGHT TBI: 0
VAS RIGHT TOE PRESSURE: 0 MMHG

## 2024-09-25 RX ORDER — ASPIRIN 81 MG/1
81 TABLET ORAL ONCE
OUTPATIENT
Start: 2024-09-25 | End: 2024-09-25

## 2024-09-25 RX ORDER — SODIUM CHLORIDE 0.9 % (FLUSH) 0.9 %
5-40 SYRINGE (ML) INJECTION PRN
OUTPATIENT
Start: 2024-09-25

## 2024-09-25 RX ORDER — SODIUM CHLORIDE 9 MG/ML
INJECTION, SOLUTION INTRAVENOUS CONTINUOUS
OUTPATIENT
Start: 2024-09-25

## 2024-09-25 RX ORDER — SODIUM CHLORIDE 9 MG/ML
INJECTION, SOLUTION INTRAVENOUS PRN
OUTPATIENT
Start: 2024-09-25

## 2024-09-25 RX ORDER — SODIUM CHLORIDE 0.9 % (FLUSH) 0.9 %
5-40 SYRINGE (ML) INJECTION EVERY 12 HOURS SCHEDULED
OUTPATIENT
Start: 2024-09-25

## 2024-09-25 RX ORDER — CLONIDINE HYDROCHLORIDE 0.1 MG/1
0.1 TABLET ORAL PRN
OUTPATIENT
Start: 2024-09-25

## 2024-09-25 NOTE — H&P (VIEW-ONLY)
with the patient including but not limited to MI, death, CVA, bleed, nerve injury, infection, contrast nephropathy, possible need for dialysis, and need for further surgery.         ASSESSMENT/PLAN:  1. Atheroscler of native artery of both legs with intermit claudication (HCC)  -     Vascular ankle brachial index (KG); Future  -     Vascular duplex lower extremity arteries bilateral; Future    1. Atheroscler of native artery of both legs with intermit claudication (HCC)     chronic illness with exacerbation, progression, or side effects of treatment    Discussed management of ascan  which includes:  continue plavix, eliquis, and pravachol to maintain patency of stents, to reduce risk of arterial thrombosis, and to decrease rate of plaque buildup  Strongly encourage start/continue statin therapy -   Recommend no smoking - discussed the effect tobacco has on illness;   Proceed with angiogram with runoff and possible angioplasty/atherectomy/stent  Vaseline in nose daily  Referral to ent for nose bleeds          Patient instructed to walk as much as possible.  Call our office with any progressive pain in leg(s) or hip(s) with walking.  Take good care of your feet.  Let us know right away if you develop a wound on your foot.    An electronic signature was used to authenticate this note.    --DESMOND Gastelum

## 2024-09-26 ENCOUNTER — TELEPHONE (OUTPATIENT)
Dept: VASCULAR SURGERY | Age: 74
End: 2024-09-26

## 2024-09-30 ENCOUNTER — TELEPHONE (OUTPATIENT)
Dept: VASCULAR SURGERY | Age: 74
End: 2024-09-30

## 2024-10-04 ENCOUNTER — HOSPITAL ENCOUNTER (OUTPATIENT)
Dept: INTERVENTIONAL RADIOLOGY/VASCULAR | Age: 74
End: 2024-10-04
Payer: OTHER GOVERNMENT

## 2024-10-04 ENCOUNTER — HOSPITAL ENCOUNTER (OUTPATIENT)
Dept: VASCULAR LAB | Age: 74
Discharge: HOME | End: 2024-10-06
Attending: SURGERY
Payer: OTHER GOVERNMENT

## 2024-10-04 VITALS
DIASTOLIC BLOOD PRESSURE: 71 MMHG | WEIGHT: 143.3 LBS | OXYGEN SATURATION: 98 % | RESPIRATION RATE: 18 BRPM | TEMPERATURE: 97.3 F | SYSTOLIC BLOOD PRESSURE: 155 MMHG | HEART RATE: 60 BPM | HEIGHT: 69 IN | BODY MASS INDEX: 21.22 KG/M2

## 2024-10-04 DIAGNOSIS — I73.9 PVD (PERIPHERAL VASCULAR DISEASE) (HCC): Primary | ICD-10-CM

## 2024-10-04 DIAGNOSIS — I70.213 ATHEROSCLEROSIS OF NATIVE ARTERIES OF EXTREMITIES WITH INTERMITTENT CLAUDICATION, BILATERAL LEGS (HCC): ICD-10-CM

## 2024-10-04 DIAGNOSIS — I70.219 ATHEROSCLEROSIS WITH CLAUDICATION OF EXTREMITY (HCC): ICD-10-CM

## 2024-10-04 LAB
ANION GAP SERPL CALCULATED.3IONS-SCNC: 10 MMOL/L (ref 7–19)
BUN SERPL-MCNC: 15 MG/DL (ref 8–23)
CALCIUM SERPL-MCNC: 9 MG/DL (ref 8.8–10.2)
CHLORIDE SERPL-SCNC: 102 MMOL/L (ref 98–111)
CO2 SERPL-SCNC: 30 MMOL/L (ref 22–29)
CREAT SERPL-MCNC: 1 MG/DL (ref 0.7–1.2)
ECHO BSA: 1.78 M2
ERYTHROCYTE [DISTWIDTH] IN BLOOD BY AUTOMATED COUNT: 13.2 % (ref 11.5–14.5)
GLUCOSE SERPL-MCNC: 98 MG/DL (ref 70–99)
HCT VFR BLD AUTO: 41.4 % (ref 42–52)
HGB BLD-MCNC: 12.9 G/DL (ref 14–18)
MCH RBC QN AUTO: 28.7 PG (ref 27–31)
MCHC RBC AUTO-ENTMCNC: 31.2 G/DL (ref 33–37)
MCV RBC AUTO: 92.2 FL (ref 80–94)
PLATELET # BLD AUTO: 192 K/UL (ref 130–400)
PMV BLD AUTO: 10.6 FL (ref 9.4–12.4)
POTASSIUM SERPL-SCNC: 3.6 MMOL/L (ref 3.5–5)
RBC # BLD AUTO: 4.49 M/UL (ref 4.7–6.1)
SODIUM SERPL-SCNC: 142 MMOL/L (ref 136–145)
VAS LEFT ABI: 0.5
VAS LEFT DORSALIS PEDIS BP: 60 MMHG
VAS LEFT PTA BP: 63 MMHG
VAS LEFT TBI: 0.09
VAS LEFT TOE PRESSURE: 11 MMHG
VAS RIGHT ABI: 0.94
VAS RIGHT ARM BP: 126 MMHG
VAS RIGHT DORSALIS PEDIS BP: 102 MMHG
VAS RIGHT PTA BP: 118 MMHG
VAS RIGHT TBI: 0.18
VAS RIGHT TOE PRESSURE: 23 MMHG
WBC # BLD AUTO: 7 K/UL (ref 4.8–10.8)

## 2024-10-04 PROCEDURE — 37226 HC FEM POP TERR PLASTY AND STENT: CPT

## 2024-10-04 PROCEDURE — 6360000002 HC RX W HCPCS: Performed by: NURSE PRACTITIONER

## 2024-10-04 PROCEDURE — 85027 COMPLETE CBC AUTOMATED: CPT

## 2024-10-04 PROCEDURE — 6370000000 HC RX 637 (ALT 250 FOR IP): Performed by: NURSE PRACTITIONER

## 2024-10-04 PROCEDURE — C1876 STENT, NON-COA/NON-COV W/DEL: HCPCS

## 2024-10-04 PROCEDURE — 75625 CONTRAST EXAM ABDOMINL AORTA: CPT

## 2024-10-04 PROCEDURE — 80048 BASIC METABOLIC PNL TOTAL CA: CPT

## 2024-10-04 PROCEDURE — 6360000004 HC RX CONTRAST MEDICATION: Performed by: SURGERY

## 2024-10-04 PROCEDURE — 36415 COLL VENOUS BLD VENIPUNCTURE: CPT

## 2024-10-04 PROCEDURE — 6360000002 HC RX W HCPCS: Performed by: SURGERY

## 2024-10-04 PROCEDURE — 93922 UPR/L XTREMITY ART 2 LEVELS: CPT

## 2024-10-04 PROCEDURE — 2580000003 HC RX 258: Performed by: SURGERY

## 2024-10-04 PROCEDURE — 047T3Z1 DILATION OF RIGHT PERONEAL ARTERY USING DRUG-COATED BALLOON, PERCUTANEOUS APPROACH: ICD-10-PCS | Performed by: SURGERY

## 2024-10-04 PROCEDURE — 99152 MOD SED SAME PHYS/QHP 5/>YRS: CPT

## 2024-10-04 PROCEDURE — 93922 UPR/L XTREMITY ART 2 LEVELS: CPT | Performed by: SURGERY

## 2024-10-04 PROCEDURE — 37228 HC TIB PER TERRITORY PLASTY: CPT

## 2024-10-04 PROCEDURE — 047M34Z DILATION OF RIGHT POPLITEAL ARTERY WITH DRUG-ELUTING INTRALUMINAL DEVICE, PERCUTANEOUS APPROACH: ICD-10-PCS | Performed by: SURGERY

## 2024-10-04 PROCEDURE — 75716 ARTERY X-RAYS ARMS/LEGS: CPT

## 2024-10-04 PROCEDURE — 99153 MOD SED SAME PHYS/QHP EA: CPT

## 2024-10-04 PROCEDURE — 2500000003 HC RX 250 WO HCPCS: Performed by: SURGERY

## 2024-10-04 PROCEDURE — 2580000003 HC RX 258: Performed by: NURSE PRACTITIONER

## 2024-10-04 RX ORDER — IODIXANOL 320 MG/ML
INJECTION, SOLUTION INTRAVASCULAR PRN
Status: COMPLETED | OUTPATIENT
Start: 2024-10-04 | End: 2024-10-04

## 2024-10-04 RX ORDER — MIDAZOLAM HYDROCHLORIDE 1 MG/ML
INJECTION INTRAMUSCULAR; INTRAVENOUS PRN
Status: COMPLETED | OUTPATIENT
Start: 2024-10-04 | End: 2024-10-04

## 2024-10-04 RX ORDER — HEPARIN SODIUM 5000 [USP'U]/ML
INJECTION, SOLUTION INTRAVENOUS; SUBCUTANEOUS PRN
Status: COMPLETED | OUTPATIENT
Start: 2024-10-04 | End: 2024-10-04

## 2024-10-04 RX ORDER — ONDANSETRON 2 MG/ML
INJECTION INTRAMUSCULAR; INTRAVENOUS PRN
Status: COMPLETED | OUTPATIENT
Start: 2024-10-04 | End: 2024-10-04

## 2024-10-04 RX ORDER — FENTANYL CITRATE 50 UG/ML
INJECTION, SOLUTION INTRAMUSCULAR; INTRAVENOUS PRN
Status: COMPLETED | OUTPATIENT
Start: 2024-10-04 | End: 2024-10-04

## 2024-10-04 RX ORDER — CLONIDINE HYDROCHLORIDE 0.1 MG/1
0.1 TABLET ORAL PRN
Status: ACTIVE | OUTPATIENT
Start: 2024-10-04

## 2024-10-04 RX ORDER — SODIUM CHLORIDE 0.9 % (FLUSH) 0.9 %
5-40 SYRINGE (ML) INJECTION PRN
Status: ACTIVE | OUTPATIENT
Start: 2024-10-04

## 2024-10-04 RX ORDER — SODIUM CHLORIDE 0.9 % (FLUSH) 0.9 %
5-40 SYRINGE (ML) INJECTION EVERY 12 HOURS SCHEDULED
Status: ACTIVE | OUTPATIENT
Start: 2024-10-04

## 2024-10-04 RX ORDER — LIDOCAINE HYDROCHLORIDE 20 MG/ML
INJECTION, SOLUTION EPIDURAL; INFILTRATION; INTRACAUDAL; PERINEURAL PRN
Status: COMPLETED | OUTPATIENT
Start: 2024-10-04 | End: 2024-10-04

## 2024-10-04 RX ORDER — SODIUM CHLORIDE 9 MG/ML
INJECTION, SOLUTION INTRAVENOUS CONTINUOUS
Status: ACTIVE | OUTPATIENT
Start: 2024-10-04

## 2024-10-04 RX ORDER — ASPIRIN 81 MG/1
81 TABLET ORAL ONCE
Status: COMPLETED | OUTPATIENT
Start: 2024-10-04 | End: 2024-10-04

## 2024-10-04 RX ORDER — SODIUM CHLORIDE 9 MG/ML
INJECTION, SOLUTION INTRAVENOUS PRN
Status: ACTIVE | OUTPATIENT
Start: 2024-10-04

## 2024-10-04 RX ADMIN — LIDOCAINE HYDROCHLORIDE 5 ML: 20 INJECTION, SOLUTION EPIDURAL; INFILTRATION; INTRACAUDAL; PERINEURAL at 09:40

## 2024-10-04 RX ADMIN — HEPARIN SODIUM 5000 UNITS: 5000 INJECTION INTRAVENOUS; SUBCUTANEOUS at 09:55

## 2024-10-04 RX ADMIN — IODIXANOL 170 ML: 320 INJECTION, SOLUTION INTRAVASCULAR at 10:55

## 2024-10-04 RX ADMIN — VANCOMYCIN HYDROCHLORIDE 1000 MG: 1 INJECTION, POWDER, LYOPHILIZED, FOR SOLUTION INTRAVENOUS at 09:24

## 2024-10-04 RX ADMIN — MIDAZOLAM 1 MG: 1 INJECTION INTRAMUSCULAR; INTRAVENOUS at 10:43

## 2024-10-04 RX ADMIN — SODIUM CHLORIDE: 9 INJECTION, SOLUTION INTRAVENOUS at 11:30

## 2024-10-04 RX ADMIN — HEPARIN SODIUM 5000 UNITS: 5000 INJECTION INTRAVENOUS; SUBCUTANEOUS at 09:40

## 2024-10-04 RX ADMIN — ONDANSETRON 4 MG: 2 INJECTION INTRAMUSCULAR; INTRAVENOUS at 11:01

## 2024-10-04 RX ADMIN — ASPIRIN 81 MG: 81 TABLET, COATED ORAL at 08:45

## 2024-10-04 RX ADMIN — SODIUM CHLORIDE: 9 INJECTION, SOLUTION INTRAVENOUS at 08:41

## 2024-10-04 RX ADMIN — MIDAZOLAM 1 MG: 1 INJECTION INTRAMUSCULAR; INTRAVENOUS at 09:40

## 2024-10-04 RX ADMIN — FENTANYL CITRATE 25 MCG: 50 INJECTION, SOLUTION INTRAMUSCULAR; INTRAVENOUS at 09:40

## 2024-10-04 RX ADMIN — MIDAZOLAM 1 MG: 1 INJECTION INTRAMUSCULAR; INTRAVENOUS at 09:58

## 2024-10-04 RX ADMIN — FENTANYL CITRATE 25 MCG: 50 INJECTION, SOLUTION INTRAMUSCULAR; INTRAVENOUS at 10:18

## 2024-10-04 NOTE — INTERVAL H&P NOTE
Update History & Physical    The patient's History and Physical of September 25, 2024 was reviewed with the patient and I examined the patient. There was no change. The surgical site was confirmed by the patient and me.     Plan: The risks, benefits, expected outcome, and alternative to the recommended procedure have been discussed with the patient. Patient understands and wants to proceed with the procedure.     Moderate sedation  ASA III, Mallampati 3    Electronically signed by Karen Morrow DO on 10/4/2024 at 9:26 AM

## 2024-10-04 NOTE — DISCHARGE INSTRUCTIONS
POST ANGIOGRAM/STENTING INSTRUCTIONS    1.  You will be on bedrest the day of your procedure, up around the house and to the bathroom only on the day after your procedure.  2.  You must be transported home by a responsible person after your procedure, you cannot drive yourself home.  3.  Do not drive for 48 hours after discharge home.  4.  Drink extra fluids for 24 hours after the procedure to help flush the contrast used (you will make more urine) .  5.  Check the puncture site after each activity for bleeding or swelling.    6.  If bleeding occurs, apply pressure to site and call 911 or rush to the nearest emergency room (do NOT drive yourself!).  7.  Resume normal non-strenuous activity 48 hours after discharge home.  8.  Bruising and soreness at the puncture site may occur, this will heal and clear after several weeks.    9.  Keep your leg (with puncture site) mostly straight while sitting or lying for the first 24 hours after your procedure.    10. No heavy lifting or straining (more than 10 pounds) for 48 hours after discharge.       11. Keep the bandage over the puncture site for 24 hours after discharge home. You may remove the bandage and shower after 24 hours, no hot tub or tub  baths.  12.  Once a day for the next 3 days, look at the puncture site, call physician if you notice:  * red and/or hot at the puncture site  * bloody drainage, foul-smelling, yellowish or greenish drainage from the puncture site  *a very large bruise under/arond the puncture site (often firm to touch)  *numbness, tingling in foot/leg/or loss of motion/sensation in foot or leg  *itching/hives on any part of your body; or nausea/vomiting after receiving the dye

## 2024-10-04 NOTE — OP NOTE
Patient Name: Jong Loyd   YOB: 1950   MRN: [id number]     Procedure Date: 10/4/2024     Pre Op Diagnosis: PAD with claudication    Post Op Diagnosis: same    Procedure: Aortogram with bilateral lower extremity runoff.  Balloon angioplasty of right TPT trunk and proximal peroneal artery using 3 x 100 Sebastian balloon.  Balloon angioplasty of right popliteal artery and distal SFA using 4 x 150 Sebastian followed by 4 x 220 DCB, stenting of popliteal artery using 5 x 80 Innova stent.    Anesthesia: Local with Moderate Sedation  Anesthesia: local with moderate sedation  Moderate sedation ASA class III  Timing start: 9:40  End time:11:10  Given 3 Versed and 50 Fentanyl  Vital signs were observed by separate provider that had no other duties    Estimated Blood Loss: Less than 100 ml    Complications: None    Implants: Innova 5 x 80    Procedure Details: Patient was brought to the angiogram suite and placed in supine position..  He received preoperative antibiotics.  His bilateral groins were prepped and draped sterile fashion.  Timeout performed.  Left common femoral artery was accessed under continuous ultrasound guidance using standard micropuncture technique.  5 Syrian glide sheath was inserted.  J-wire was floated proximally followed by Omni Flush cath.  Using power injection aortogram with bilateral lower extremity runoff were performed with mentioned below findings.  Patient was given 5000 units heparin.  Using a glide advantage wire that was maneuvered to the right SFA.  The catheter and the sheath were removed and 6 x 45 destination Terumo sheath was placed.  Using Rodriguez cross catheter and the 18 wire those were placed distally into the peroneal artery which was the only runoff.  Balloon angioplasty of high-grade stenosis of TPT trunk was performed as well as proximal peroneal artery using 3 x 100 Sebastian balloon.  With good flow into the TPT trunk and peroneal artery.  Then we turned our

## 2024-10-05 ENCOUNTER — HOSPITAL ENCOUNTER (INPATIENT)
Age: 74
LOS: 10 days | Discharge: HOME HEALTH | DRG: 253 | End: 2024-10-15
Attending: EMERGENCY MEDICINE
Payer: OTHER GOVERNMENT

## 2024-10-05 ENCOUNTER — APPOINTMENT (OUTPATIENT)
Dept: CT IMAGING | Age: 74
DRG: 253 | End: 2024-10-05
Payer: OTHER GOVERNMENT

## 2024-10-05 DIAGNOSIS — I70.222 CRITICAL LIMB ISCHEMIA OF LEFT LOWER EXTREMITY (HCC): ICD-10-CM

## 2024-10-05 DIAGNOSIS — G89.18 POST-OPERATIVE PAIN: ICD-10-CM

## 2024-10-05 DIAGNOSIS — I70.222 CRITICAL LIMB ISCHEMIA OF LEFT LOWER EXTREMITY WITH REST PAIN (HCC): Primary | ICD-10-CM

## 2024-10-05 DIAGNOSIS — I70.222 ATHEROSCLEROSIS OF NATIVE ARTERIES OF EXTREMITIES WITH REST PAIN, LEFT LEG (HCC): ICD-10-CM

## 2024-10-05 PROBLEM — I73.9 PAD (PERIPHERAL ARTERY DISEASE) (HCC): Status: ACTIVE | Noted: 2024-10-05

## 2024-10-05 LAB
ALBUMIN SERPL-MCNC: 4.3 G/DL (ref 3.5–5.2)
ALP SERPL-CCNC: 82 U/L (ref 40–129)
ALT SERPL-CCNC: 7 U/L (ref 5–41)
ANION GAP SERPL CALCULATED.3IONS-SCNC: 10 MMOL/L (ref 7–19)
ANTI-XA UNFRAC HEPARIN: <0.1 IU/ML (ref 0.3–0.7)
APTT PPP: 28.8 SEC (ref 26–36.2)
AST SERPL-CCNC: 14 U/L (ref 5–40)
BASOPHILS # BLD: 0 K/UL (ref 0–0.2)
BASOPHILS NFR BLD: 0.4 % (ref 0–1)
BILIRUB SERPL-MCNC: 0.5 MG/DL (ref 0.2–1.2)
BUN SERPL-MCNC: 14 MG/DL (ref 8–23)
CALCIUM SERPL-MCNC: 9.2 MG/DL (ref 8.8–10.2)
CHLORIDE SERPL-SCNC: 102 MMOL/L (ref 98–111)
CO2 SERPL-SCNC: 31 MMOL/L (ref 22–29)
CREAT SERPL-MCNC: 0.9 MG/DL (ref 0.7–1.2)
EOSINOPHIL # BLD: 0.1 K/UL (ref 0–0.6)
EOSINOPHIL NFR BLD: 0.8 % (ref 0–5)
ERYTHROCYTE [DISTWIDTH] IN BLOOD BY AUTOMATED COUNT: 13.2 % (ref 11.5–14.5)
GLUCOSE SERPL-MCNC: 116 MG/DL (ref 70–99)
HCT VFR BLD AUTO: 41.9 % (ref 42–52)
HGB BLD-MCNC: 12.9 G/DL (ref 14–18)
IMM GRANULOCYTES # BLD: 0 K/UL
INR PPP: 1.03 (ref 0.88–1.18)
LYMPHOCYTES # BLD: 2.1 K/UL (ref 1.1–4.5)
LYMPHOCYTES NFR BLD: 22.9 % (ref 20–40)
MCH RBC QN AUTO: 28.9 PG (ref 27–31)
MCHC RBC AUTO-ENTMCNC: 30.8 G/DL (ref 33–37)
MCV RBC AUTO: 93.7 FL (ref 80–94)
MONOCYTES # BLD: 0.7 K/UL (ref 0–0.9)
MONOCYTES NFR BLD: 7.4 % (ref 0–10)
NEUTROPHILS # BLD: 6.4 K/UL (ref 1.5–7.5)
NEUTS SEG NFR BLD: 68.3 % (ref 50–65)
PLATELET # BLD AUTO: 191 K/UL (ref 130–400)
PMV BLD AUTO: 11 FL (ref 9.4–12.4)
POTASSIUM SERPL-SCNC: 4.1 MMOL/L (ref 3.5–5)
PROT SERPL-MCNC: 6.8 G/DL (ref 6.4–8.3)
PROTHROMBIN TIME: 13.2 SEC (ref 12–14.6)
RBC # BLD AUTO: 4.47 M/UL (ref 4.7–6.1)
SODIUM SERPL-SCNC: 143 MMOL/L (ref 136–145)
WBC # BLD AUTO: 9.3 K/UL (ref 4.8–10.8)

## 2024-10-05 PROCEDURE — 1200000000 HC SEMI PRIVATE

## 2024-10-05 PROCEDURE — 6360000002 HC RX W HCPCS: Performed by: EMERGENCY MEDICINE

## 2024-10-05 PROCEDURE — 96366 THER/PROPH/DIAG IV INF ADDON: CPT

## 2024-10-05 PROCEDURE — 85610 PROTHROMBIN TIME: CPT

## 2024-10-05 PROCEDURE — 85730 THROMBOPLASTIN TIME PARTIAL: CPT

## 2024-10-05 PROCEDURE — 85025 COMPLETE CBC W/AUTO DIFF WBC: CPT

## 2024-10-05 PROCEDURE — 96375 TX/PRO/DX INJ NEW DRUG ADDON: CPT

## 2024-10-05 PROCEDURE — 99285 EMERGENCY DEPT VISIT HI MDM: CPT

## 2024-10-05 PROCEDURE — 80053 COMPREHEN METABOLIC PANEL: CPT

## 2024-10-05 PROCEDURE — 85520 HEPARIN ASSAY: CPT

## 2024-10-05 PROCEDURE — 36415 COLL VENOUS BLD VENIPUNCTURE: CPT

## 2024-10-05 PROCEDURE — 75635 CT ANGIO ABDOMINAL ARTERIES: CPT

## 2024-10-05 PROCEDURE — 6360000004 HC RX CONTRAST MEDICATION: Performed by: EMERGENCY MEDICINE

## 2024-10-05 PROCEDURE — 96365 THER/PROPH/DIAG IV INF INIT: CPT

## 2024-10-05 RX ORDER — FENTANYL CITRATE 50 UG/ML
50 INJECTION, SOLUTION INTRAMUSCULAR; INTRAVENOUS ONCE
Status: COMPLETED | OUTPATIENT
Start: 2024-10-05 | End: 2024-10-05

## 2024-10-05 RX ORDER — HEPARIN SODIUM 10000 [USP'U]/100ML
5-30 INJECTION, SOLUTION INTRAVENOUS CONTINUOUS
Status: DISCONTINUED | OUTPATIENT
Start: 2024-10-05 | End: 2024-10-08

## 2024-10-05 RX ORDER — HEPARIN SODIUM 1000 [USP'U]/ML
80 INJECTION, SOLUTION INTRAVENOUS; SUBCUTANEOUS ONCE
Status: COMPLETED | OUTPATIENT
Start: 2024-10-05 | End: 2024-10-05

## 2024-10-05 RX ORDER — IOPAMIDOL 755 MG/ML
90 INJECTION, SOLUTION INTRAVASCULAR
Status: COMPLETED | OUTPATIENT
Start: 2024-10-05 | End: 2024-10-05

## 2024-10-05 RX ORDER — ONDANSETRON 2 MG/ML
4 INJECTION INTRAMUSCULAR; INTRAVENOUS ONCE
Status: COMPLETED | OUTPATIENT
Start: 2024-10-05 | End: 2024-10-05

## 2024-10-05 RX ORDER — HEPARIN SODIUM 1000 [USP'U]/ML
80 INJECTION, SOLUTION INTRAVENOUS; SUBCUTANEOUS PRN
Status: DISCONTINUED | OUTPATIENT
Start: 2024-10-05 | End: 2024-10-08

## 2024-10-05 RX ORDER — HEPARIN SODIUM 1000 [USP'U]/ML
40 INJECTION, SOLUTION INTRAVENOUS; SUBCUTANEOUS PRN
Status: DISCONTINUED | OUTPATIENT
Start: 2024-10-05 | End: 2024-10-08

## 2024-10-05 RX ADMIN — IOPAMIDOL 90 ML: 755 INJECTION, SOLUTION INTRAVENOUS at 21:59

## 2024-10-05 RX ADMIN — FENTANYL CITRATE 50 MCG: 50 INJECTION INTRAMUSCULAR; INTRAVENOUS at 21:41

## 2024-10-05 RX ADMIN — HEPARIN SODIUM 18 UNITS/KG/HR: 10000 INJECTION, SOLUTION INTRAVENOUS at 21:32

## 2024-10-05 RX ADMIN — ONDANSETRON 4 MG: 2 INJECTION INTRAMUSCULAR; INTRAVENOUS at 21:41

## 2024-10-05 RX ADMIN — HEPARIN SODIUM 5200 UNITS: 1000 INJECTION INTRAVENOUS; SUBCUTANEOUS at 21:29

## 2024-10-05 ASSESSMENT — PAIN - FUNCTIONAL ASSESSMENT: PAIN_FUNCTIONAL_ASSESSMENT: 0-10

## 2024-10-05 ASSESSMENT — PAIN DESCRIPTION - DESCRIPTORS: DESCRIPTORS: ACHING

## 2024-10-05 ASSESSMENT — PAIN SCALES - GENERAL: PAINLEVEL_OUTOF10: 10

## 2024-10-05 ASSESSMENT — PAIN DESCRIPTION - ORIENTATION: ORIENTATION: LEFT

## 2024-10-05 ASSESSMENT — PAIN DESCRIPTION - LOCATION: LOCATION: LEG

## 2024-10-06 ENCOUNTER — ANESTHESIA (OUTPATIENT)
Dept: OPERATING ROOM | Age: 74
End: 2024-10-06
Payer: OTHER GOVERNMENT

## 2024-10-06 ENCOUNTER — APPOINTMENT (OUTPATIENT)
Dept: INTERVENTIONAL RADIOLOGY/VASCULAR | Age: 74
DRG: 253 | End: 2024-10-06
Payer: OTHER GOVERNMENT

## 2024-10-06 ENCOUNTER — ANESTHESIA EVENT (OUTPATIENT)
Dept: OPERATING ROOM | Age: 74
End: 2024-10-06
Payer: OTHER GOVERNMENT

## 2024-10-06 LAB
ANION GAP SERPL CALCULATED.3IONS-SCNC: 7 MMOL/L (ref 7–19)
ANTI-XA UNFRAC HEPARIN: 0.52 IU/ML (ref 0.3–0.7)
ANTI-XA UNFRAC HEPARIN: 0.67 IU/ML (ref 0.3–0.7)
ANTI-XA UNFRAC HEPARIN: 0.84 IU/ML (ref 0.3–0.7)
BASOPHILS # BLD: 0.1 K/UL (ref 0–0.2)
BASOPHILS NFR BLD: 0.8 % (ref 0–1)
BUN SERPL-MCNC: 13 MG/DL (ref 8–23)
CALCIUM SERPL-MCNC: 8.6 MG/DL (ref 8.8–10.2)
CHLORIDE SERPL-SCNC: 104 MMOL/L (ref 98–111)
CO2 SERPL-SCNC: 28 MMOL/L (ref 22–29)
CREAT SERPL-MCNC: 0.7 MG/DL (ref 0.7–1.2)
EOSINOPHIL # BLD: 0.1 K/UL (ref 0–0.6)
EOSINOPHIL NFR BLD: 1.4 % (ref 0–5)
ERYTHROCYTE [DISTWIDTH] IN BLOOD BY AUTOMATED COUNT: 13.2 % (ref 11.5–14.5)
ERYTHROCYTE [DISTWIDTH] IN BLOOD BY AUTOMATED COUNT: 13.3 % (ref 11.5–14.5)
GLUCOSE SERPL-MCNC: 75 MG/DL (ref 70–99)
HCT VFR BLD AUTO: 36.6 % (ref 42–52)
HCT VFR BLD AUTO: 38.4 % (ref 42–52)
HGB BLD-MCNC: 11.5 G/DL (ref 14–18)
HGB BLD-MCNC: 11.9 G/DL (ref 14–18)
IMM GRANULOCYTES # BLD: 0 K/UL
LYMPHOCYTES # BLD: 3.1 K/UL (ref 1.1–4.5)
LYMPHOCYTES NFR BLD: 34.1 % (ref 20–40)
MCH RBC QN AUTO: 29 PG (ref 27–31)
MCH RBC QN AUTO: 29.5 PG (ref 27–31)
MCHC RBC AUTO-ENTMCNC: 31 G/DL (ref 33–37)
MCHC RBC AUTO-ENTMCNC: 31.4 G/DL (ref 33–37)
MCV RBC AUTO: 93.7 FL (ref 80–94)
MCV RBC AUTO: 93.8 FL (ref 80–94)
MONOCYTES # BLD: 0.8 K/UL (ref 0–0.9)
MONOCYTES NFR BLD: 8.2 % (ref 0–10)
NEUTROPHILS # BLD: 5.1 K/UL (ref 1.5–7.5)
NEUTS SEG NFR BLD: 55.2 % (ref 50–65)
PLATELET # BLD AUTO: 155 K/UL (ref 130–400)
PLATELET # BLD AUTO: 161 K/UL (ref 130–400)
PMV BLD AUTO: 11.1 FL (ref 9.4–12.4)
PMV BLD AUTO: 11.7 FL (ref 9.4–12.4)
POTASSIUM SERPL-SCNC: 3.9 MMOL/L (ref 3.5–5)
RBC # BLD AUTO: 3.9 M/UL (ref 4.7–6.1)
RBC # BLD AUTO: 4.1 M/UL (ref 4.7–6.1)
SODIUM SERPL-SCNC: 139 MMOL/L (ref 136–145)
WBC # BLD AUTO: 11.5 K/UL (ref 4.8–10.8)
WBC # BLD AUTO: 9.2 K/UL (ref 4.8–10.8)

## 2024-10-06 PROCEDURE — 80048 BASIC METABOLIC PNL TOTAL CA: CPT

## 2024-10-06 PROCEDURE — 3700000001 HC ADD 15 MINUTES (ANESTHESIA): Performed by: SURGERY

## 2024-10-06 PROCEDURE — 6360000002 HC RX W HCPCS: Performed by: INTERNAL MEDICINE

## 2024-10-06 PROCEDURE — 6360000002 HC RX W HCPCS

## 2024-10-06 PROCEDURE — 36415 COLL VENOUS BLD VENIPUNCTURE: CPT

## 2024-10-06 PROCEDURE — A4217 STERILE WATER/SALINE, 500 ML: HCPCS | Performed by: SURGERY

## 2024-10-06 PROCEDURE — 85025 COMPLETE CBC W/AUTO DIFF WBC: CPT

## 2024-10-06 PROCEDURE — 04UL0KZ SUPPLEMENT LEFT FEMORAL ARTERY WITH NONAUTOLOGOUS TISSUE SUBSTITUTE, OPEN APPROACH: ICD-10-PCS | Performed by: SURGERY

## 2024-10-06 PROCEDURE — 2580000003 HC RX 258: Performed by: SURGERY

## 2024-10-06 PROCEDURE — C1757 CATH, THROMBECTOMY/EMBOLECT: HCPCS | Performed by: SURGERY

## 2024-10-06 PROCEDURE — C1894 INTRO/SHEATH, NON-LASER: HCPCS

## 2024-10-06 PROCEDURE — 85027 COMPLETE CBC AUTOMATED: CPT

## 2024-10-06 PROCEDURE — 2720000010 HC SURG SUPPLY STERILE: Performed by: SURGERY

## 2024-10-06 PROCEDURE — 2500000003 HC RX 250 WO HCPCS

## 2024-10-06 PROCEDURE — 2580000003 HC RX 258

## 2024-10-06 PROCEDURE — 6370000000 HC RX 637 (ALT 250 FOR IP): Performed by: SURGERY

## 2024-10-06 PROCEDURE — 3600000007 HC SURGERY HYBRID BASE: Performed by: SURGERY

## 2024-10-06 PROCEDURE — C1760 CLOSURE DEV, VASC: HCPCS | Performed by: SURGERY

## 2024-10-06 PROCEDURE — 3700000000 HC ANESTHESIA ATTENDED CARE: Performed by: SURGERY

## 2024-10-06 PROCEDURE — 2700000000 HC OXYGEN THERAPY PER DAY

## 2024-10-06 PROCEDURE — C1893 INTRO/SHEATH, FIXED,NON-PEEL: HCPCS | Performed by: SURGERY

## 2024-10-06 PROCEDURE — 75710 ARTERY X-RAYS ARM/LEG: CPT

## 2024-10-06 PROCEDURE — 36620 INSERTION CATHETER ARTERY: CPT

## 2024-10-06 PROCEDURE — C1769 GUIDE WIRE: HCPCS | Performed by: SURGERY

## 2024-10-06 PROCEDURE — 85520 HEPARIN ASSAY: CPT

## 2024-10-06 PROCEDURE — 2580000003 HC RX 258: Performed by: INTERNAL MEDICINE

## 2024-10-06 PROCEDURE — 6360000004 HC RX CONTRAST MEDICATION: Performed by: SURGERY

## 2024-10-06 PROCEDURE — C1887 CATHETER, GUIDING: HCPCS | Performed by: SURGERY

## 2024-10-06 PROCEDURE — 6360000002 HC RX W HCPCS: Performed by: SURGERY

## 2024-10-06 PROCEDURE — 2000000000 HC ICU R&B

## 2024-10-06 PROCEDURE — 94150 VITAL CAPACITY TEST: CPT

## 2024-10-06 PROCEDURE — 04CL0ZZ EXTIRPATION OF MATTER FROM LEFT FEMORAL ARTERY, OPEN APPROACH: ICD-10-PCS | Performed by: SURGERY

## 2024-10-06 PROCEDURE — 2709999900 HC NON-CHARGEABLE SUPPLY: Performed by: SURGERY

## 2024-10-06 PROCEDURE — 3600000017 HC SURGERY HYBRID ADDL 15MIN: Performed by: SURGERY

## 2024-10-06 PROCEDURE — C1768 GRAFT, VASCULAR: HCPCS | Performed by: SURGERY

## 2024-10-06 RX ORDER — SODIUM CHLORIDE 9 MG/ML
INJECTION, SOLUTION INTRAVENOUS PRN
Status: DISCONTINUED | OUTPATIENT
Start: 2024-10-06 | End: 2024-10-07 | Stop reason: SDUPTHER

## 2024-10-06 RX ORDER — SODIUM CHLORIDE 9 MG/ML
INJECTION, SOLUTION INTRAVENOUS
Status: DISCONTINUED | OUTPATIENT
Start: 2024-10-06 | End: 2024-10-06 | Stop reason: SDUPTHER

## 2024-10-06 RX ORDER — SODIUM CHLORIDE 9 MG/ML
INJECTION, SOLUTION INTRAVENOUS CONTINUOUS
Status: DISCONTINUED | OUTPATIENT
Start: 2024-10-06 | End: 2024-10-09

## 2024-10-06 RX ORDER — CEFAZOLIN SODIUM 1 G/3ML
INJECTION, POWDER, FOR SOLUTION INTRAMUSCULAR; INTRAVENOUS
Status: DISCONTINUED | OUTPATIENT
Start: 2024-10-06 | End: 2024-10-06 | Stop reason: SDUPTHER

## 2024-10-06 RX ORDER — MAGNESIUM SULFATE IN WATER 40 MG/ML
2000 INJECTION, SOLUTION INTRAVENOUS PRN
Status: DISCONTINUED | OUTPATIENT
Start: 2024-10-06 | End: 2024-10-15 | Stop reason: HOSPADM

## 2024-10-06 RX ORDER — POLYETHYLENE GLYCOL 3350 17 G/17G
17 POWDER, FOR SOLUTION ORAL DAILY PRN
Status: DISCONTINUED | OUTPATIENT
Start: 2024-10-06 | End: 2024-10-10

## 2024-10-06 RX ORDER — ONDANSETRON 4 MG/1
4 TABLET, ORALLY DISINTEGRATING ORAL EVERY 8 HOURS PRN
Status: DISCONTINUED | OUTPATIENT
Start: 2024-10-06 | End: 2024-10-15 | Stop reason: HOSPADM

## 2024-10-06 RX ORDER — DEXAMETHASONE SODIUM PHOSPHATE 10 MG/ML
INJECTION, SOLUTION INTRAMUSCULAR; INTRAVENOUS
Status: DISCONTINUED | OUTPATIENT
Start: 2024-10-06 | End: 2024-10-06 | Stop reason: SDUPTHER

## 2024-10-06 RX ORDER — ACETAMINOPHEN 650 MG/1
650 SUPPOSITORY RECTAL EVERY 6 HOURS PRN
Status: DISCONTINUED | OUTPATIENT
Start: 2024-10-06 | End: 2024-10-15 | Stop reason: HOSPADM

## 2024-10-06 RX ORDER — ONDANSETRON 2 MG/ML
4 INJECTION INTRAMUSCULAR; INTRAVENOUS EVERY 6 HOURS PRN
Status: DISCONTINUED | OUTPATIENT
Start: 2024-10-06 | End: 2024-10-15 | Stop reason: HOSPADM

## 2024-10-06 RX ORDER — SODIUM CHLORIDE 0.9 % (FLUSH) 0.9 %
5-40 SYRINGE (ML) INJECTION PRN
Status: DISCONTINUED | OUTPATIENT
Start: 2024-10-06 | End: 2024-10-15 | Stop reason: HOSPADM

## 2024-10-06 RX ORDER — HYDROCODONE BITARTRATE AND ACETAMINOPHEN 5; 325 MG/1; MG/1
1 TABLET ORAL EVERY 4 HOURS PRN
Status: DISCONTINUED | OUTPATIENT
Start: 2024-10-06 | End: 2024-10-15 | Stop reason: HOSPADM

## 2024-10-06 RX ORDER — SODIUM CHLORIDE 0.9 % (FLUSH) 0.9 %
5-40 SYRINGE (ML) INJECTION PRN
Status: DISCONTINUED | OUTPATIENT
Start: 2024-10-06 | End: 2024-10-07 | Stop reason: SDUPTHER

## 2024-10-06 RX ORDER — HEPARIN SODIUM 1000 [USP'U]/ML
INJECTION, SOLUTION INTRAVENOUS; SUBCUTANEOUS
Status: DISCONTINUED | OUTPATIENT
Start: 2024-10-06 | End: 2024-10-06 | Stop reason: SDUPTHER

## 2024-10-06 RX ORDER — HYDROMORPHONE HYDROCHLORIDE 1 MG/ML
0.5 INJECTION, SOLUTION INTRAMUSCULAR; INTRAVENOUS; SUBCUTANEOUS EVERY 4 HOURS PRN
Status: DISCONTINUED | OUTPATIENT
Start: 2024-10-06 | End: 2024-10-07

## 2024-10-06 RX ORDER — SODIUM CHLORIDE 0.9 % (FLUSH) 0.9 %
5-40 SYRINGE (ML) INJECTION EVERY 12 HOURS SCHEDULED
Status: DISCONTINUED | OUTPATIENT
Start: 2024-10-06 | End: 2024-10-06

## 2024-10-06 RX ORDER — SODIUM CHLORIDE 0.9 % (FLUSH) 0.9 %
5-40 SYRINGE (ML) INJECTION EVERY 12 HOURS SCHEDULED
Status: DISCONTINUED | OUTPATIENT
Start: 2024-10-06 | End: 2024-10-08

## 2024-10-06 RX ORDER — LIDOCAINE HYDROCHLORIDE 10 MG/ML
INJECTION, SOLUTION INFILTRATION; PERINEURAL
Status: DISCONTINUED | OUTPATIENT
Start: 2024-10-06 | End: 2024-10-06 | Stop reason: SDUPTHER

## 2024-10-06 RX ORDER — POTASSIUM CHLORIDE 7.45 MG/ML
10 INJECTION INTRAVENOUS PRN
Status: DISCONTINUED | OUTPATIENT
Start: 2024-10-06 | End: 2024-10-15 | Stop reason: HOSPADM

## 2024-10-06 RX ORDER — ACETAMINOPHEN 325 MG/1
650 TABLET ORAL EVERY 6 HOURS PRN
Status: DISCONTINUED | OUTPATIENT
Start: 2024-10-06 | End: 2024-10-15 | Stop reason: HOSPADM

## 2024-10-06 RX ORDER — DIPHENHYDRAMINE HYDROCHLORIDE 50 MG/ML
25 INJECTION INTRAMUSCULAR; INTRAVENOUS NIGHTLY PRN
Status: DISCONTINUED | OUTPATIENT
Start: 2024-10-06 | End: 2024-10-15 | Stop reason: HOSPADM

## 2024-10-06 RX ORDER — SODIUM CHLORIDE 9 MG/ML
INJECTION, SOLUTION INTRAVENOUS PRN
Status: DISCONTINUED | OUTPATIENT
Start: 2024-10-06 | End: 2024-10-15 | Stop reason: HOSPADM

## 2024-10-06 RX ORDER — ROCURONIUM BROMIDE 10 MG/ML
INJECTION, SOLUTION INTRAVENOUS
Status: DISCONTINUED | OUTPATIENT
Start: 2024-10-06 | End: 2024-10-06 | Stop reason: SDUPTHER

## 2024-10-06 RX ORDER — POTASSIUM CHLORIDE 1500 MG/1
40 TABLET, EXTENDED RELEASE ORAL PRN
Status: DISCONTINUED | OUTPATIENT
Start: 2024-10-06 | End: 2024-10-15 | Stop reason: HOSPADM

## 2024-10-06 RX ORDER — IODIXANOL 320 MG/ML
INJECTION, SOLUTION INTRAVASCULAR PRN
Status: DISCONTINUED | OUTPATIENT
Start: 2024-10-06 | End: 2024-10-06 | Stop reason: ALTCHOICE

## 2024-10-06 RX ORDER — FENTANYL CITRATE 50 UG/ML
INJECTION, SOLUTION INTRAMUSCULAR; INTRAVENOUS
Status: DISCONTINUED | OUTPATIENT
Start: 2024-10-06 | End: 2024-10-06 | Stop reason: SDUPTHER

## 2024-10-06 RX ORDER — EPHEDRINE SULFATE/0.9% NACL/PF 25 MG/5 ML
SYRINGE (ML) INTRAVENOUS
Status: DISCONTINUED | OUTPATIENT
Start: 2024-10-06 | End: 2024-10-06 | Stop reason: SDUPTHER

## 2024-10-06 RX ORDER — FENTANYL CITRATE 50 UG/ML
50 INJECTION, SOLUTION INTRAMUSCULAR; INTRAVENOUS
Status: DISCONTINUED | OUTPATIENT
Start: 2024-10-06 | End: 2024-10-07

## 2024-10-06 RX ORDER — PROPOFOL 10 MG/ML
INJECTION, EMULSION INTRAVENOUS
Status: DISCONTINUED | OUTPATIENT
Start: 2024-10-06 | End: 2024-10-06 | Stop reason: SDUPTHER

## 2024-10-06 RX ORDER — GLYCOPYRROLATE 0.2 MG/ML
INJECTION INTRAMUSCULAR; INTRAVENOUS
Status: DISCONTINUED | OUTPATIENT
Start: 2024-10-06 | End: 2024-10-06 | Stop reason: SDUPTHER

## 2024-10-06 RX ORDER — VASOPRESSIN 20 [USP'U]/ML
INJECTION, SOLUTION INTRAMUSCULAR; SUBCUTANEOUS
Status: DISCONTINUED | OUTPATIENT
Start: 2024-10-06 | End: 2024-10-06 | Stop reason: SDUPTHER

## 2024-10-06 RX ORDER — ONDANSETRON 2 MG/ML
INJECTION INTRAMUSCULAR; INTRAVENOUS
Status: DISCONTINUED | OUTPATIENT
Start: 2024-10-06 | End: 2024-10-06 | Stop reason: SDUPTHER

## 2024-10-06 RX ADMIN — GLYCOPYRROLATE 0.1 MG: 0.2 INJECTION, SOLUTION INTRAMUSCULAR; INTRAVENOUS at 17:22

## 2024-10-06 RX ADMIN — HEPARIN SODIUM 16 UNITS/KG/HR: 10000 INJECTION, SOLUTION INTRAVENOUS at 20:12

## 2024-10-06 RX ADMIN — FENTANYL CITRATE 50 MCG: 50 INJECTION, SOLUTION INTRAMUSCULAR; INTRAVENOUS at 03:01

## 2024-10-06 RX ADMIN — GLYCOPYRROLATE 0.1 MG: 0.2 INJECTION, SOLUTION INTRAMUSCULAR; INTRAVENOUS at 18:47

## 2024-10-06 RX ADMIN — FENTANYL CITRATE 50 MCG: 50 INJECTION, SOLUTION INTRAMUSCULAR; INTRAVENOUS at 00:54

## 2024-10-06 RX ADMIN — HEPARIN SODIUM 4000 UNITS: 1000 INJECTION, SOLUTION INTRAVENOUS; SUBCUTANEOUS at 17:36

## 2024-10-06 RX ADMIN — SUGAMMADEX 120 MG: 100 INJECTION, SOLUTION INTRAVENOUS at 19:21

## 2024-10-06 RX ADMIN — PROPOFOL 150 MG: 10 INJECTION, EMULSION INTRAVENOUS at 15:49

## 2024-10-06 RX ADMIN — FENTANYL CITRATE 50 MCG: 50 INJECTION, SOLUTION INTRAMUSCULAR; INTRAVENOUS at 07:14

## 2024-10-06 RX ADMIN — ROCURONIUM BROMIDE 30 MG: 10 INJECTION, SOLUTION INTRAVENOUS at 17:07

## 2024-10-06 RX ADMIN — EPHEDRINE SULFATE 10 MG: 5 INJECTION INTRAVENOUS at 19:06

## 2024-10-06 RX ADMIN — VASOPRESSIN 1 UNITS: 20 INJECTION INTRAVENOUS at 18:50

## 2024-10-06 RX ADMIN — SODIUM CHLORIDE: 9 INJECTION, SOLUTION INTRAVENOUS at 20:10

## 2024-10-06 RX ADMIN — VASOPRESSIN 1 UNITS: 20 INJECTION INTRAVENOUS at 16:06

## 2024-10-06 RX ADMIN — VASOPRESSIN 2 UNITS: 20 INJECTION INTRAVENOUS at 16:20

## 2024-10-06 RX ADMIN — SODIUM CHLORIDE: 9 INJECTION, SOLUTION INTRAVENOUS at 15:42

## 2024-10-06 RX ADMIN — ROCURONIUM BROMIDE 50 MG: 10 INJECTION, SOLUTION INTRAVENOUS at 15:50

## 2024-10-06 RX ADMIN — ONDANSETRON 4 MG: 2 INJECTION INTRAMUSCULAR; INTRAVENOUS at 21:54

## 2024-10-06 RX ADMIN — VASOPRESSIN 1 UNITS: 20 INJECTION INTRAVENOUS at 16:39

## 2024-10-06 RX ADMIN — PHENYLEPHRINE HYDROCHLORIDE 100 MCG: 10 INJECTION INTRAVENOUS at 15:54

## 2024-10-06 RX ADMIN — VASOPRESSIN 1 UNITS: 20 INJECTION INTRAVENOUS at 15:55

## 2024-10-06 RX ADMIN — PROPOFOL 30 MG: 10 INJECTION, EMULSION INTRAVENOUS at 17:42

## 2024-10-06 RX ADMIN — HYDROCODONE BITARTRATE AND ACETAMINOPHEN 1 TABLET: 5; 325 TABLET ORAL at 21:22

## 2024-10-06 RX ADMIN — EPHEDRINE SULFATE 5 MG: 5 INJECTION INTRAVENOUS at 18:44

## 2024-10-06 RX ADMIN — EPHEDRINE SULFATE 5 MG: 5 INJECTION INTRAVENOUS at 16:58

## 2024-10-06 RX ADMIN — EPHEDRINE SULFATE 10 MG: 5 INJECTION INTRAVENOUS at 16:43

## 2024-10-06 RX ADMIN — FENTANYL CITRATE 50 MCG: 50 INJECTION, SOLUTION INTRAMUSCULAR; INTRAVENOUS at 22:20

## 2024-10-06 RX ADMIN — FENTANYL CITRATE 50 MCG: 50 INJECTION, SOLUTION INTRAMUSCULAR; INTRAVENOUS at 04:59

## 2024-10-06 RX ADMIN — FENTANYL CITRATE 25 MCG: 0.05 INJECTION, SOLUTION INTRAMUSCULAR; INTRAVENOUS at 19:15

## 2024-10-06 RX ADMIN — EPHEDRINE SULFATE 10 MG: 5 INJECTION INTRAVENOUS at 16:51

## 2024-10-06 RX ADMIN — FENTANYL CITRATE 50 MCG: 50 INJECTION, SOLUTION INTRAMUSCULAR; INTRAVENOUS at 14:49

## 2024-10-06 RX ADMIN — DIPHENHYDRAMINE HYDROCHLORIDE 25 MG: 50 INJECTION INTRAMUSCULAR; INTRAVENOUS at 02:42

## 2024-10-06 RX ADMIN — EPHEDRINE SULFATE 5 MG: 5 INJECTION INTRAVENOUS at 18:50

## 2024-10-06 RX ADMIN — FENTANYL CITRATE 50 MCG: 0.05 INJECTION, SOLUTION INTRAMUSCULAR; INTRAVENOUS at 15:49

## 2024-10-06 RX ADMIN — VASOPRESSIN 1 UNITS: 20 INJECTION INTRAVENOUS at 16:17

## 2024-10-06 RX ADMIN — CEFAZOLIN 2 G: 1 INJECTION, POWDER, FOR SOLUTION INTRAMUSCULAR; INTRAVENOUS at 16:03

## 2024-10-06 RX ADMIN — EPHEDRINE SULFATE 5 MG: 5 INJECTION INTRAVENOUS at 17:40

## 2024-10-06 RX ADMIN — FENTANYL CITRATE 50 MCG: 50 INJECTION, SOLUTION INTRAMUSCULAR; INTRAVENOUS at 20:15

## 2024-10-06 RX ADMIN — SODIUM CHLORIDE, PRESERVATIVE FREE 10 ML: 5 INJECTION INTRAVENOUS at 08:42

## 2024-10-06 RX ADMIN — LIDOCAINE HYDROCHLORIDE 50 MG: 10 INJECTION, SOLUTION INFILTRATION; PERINEURAL at 15:49

## 2024-10-06 RX ADMIN — DEXAMETHASONE SODIUM PHOSPHATE 10 MG: 10 INJECTION, SOLUTION INTRAMUSCULAR; INTRAVENOUS at 16:05

## 2024-10-06 RX ADMIN — ONDANSETRON 4 MG: 2 INJECTION INTRAMUSCULAR; INTRAVENOUS at 17:40

## 2024-10-06 RX ADMIN — HYDROMORPHONE HYDROCHLORIDE 0.5 MG: 1 INJECTION, SOLUTION INTRAMUSCULAR; INTRAVENOUS; SUBCUTANEOUS at 23:06

## 2024-10-06 RX ADMIN — FENTANYL CITRATE 25 MCG: 0.05 INJECTION, SOLUTION INTRAMUSCULAR; INTRAVENOUS at 17:25

## 2024-10-06 ASSESSMENT — PAIN DESCRIPTION - LOCATION
LOCATION: GROIN;FOOT
LOCATION: GROIN;FOOT
LOCATION: FOOT;LEG
LOCATION: FOOT
LOCATION: GROIN
LOCATION: FOOT
LOCATION: LEG;FOOT

## 2024-10-06 ASSESSMENT — PAIN DESCRIPTION - DESCRIPTORS
DESCRIPTORS: DULL
DESCRIPTORS: BURNING
DESCRIPTORS: POUNDING;THROBBING
DESCRIPTORS: THROBBING;DULL
DESCRIPTORS: THROBBING;DULL

## 2024-10-06 ASSESSMENT — PAIN SCALES - GENERAL
PAINLEVEL_OUTOF10: 9
PAINLEVEL_OUTOF10: 10
PAINLEVEL_OUTOF10: 9
PAINLEVEL_OUTOF10: 10
PAINLEVEL_OUTOF10: 8
PAINLEVEL_OUTOF10: 9
PAINLEVEL_OUTOF10: 8
PAINLEVEL_OUTOF10: 10
PAINLEVEL_OUTOF10: 10

## 2024-10-06 ASSESSMENT — PAIN DESCRIPTION - ORIENTATION
ORIENTATION: LEFT

## 2024-10-06 NOTE — H&P
[facility] Medicine H&P    Patient:  Jong Loyd  MRN: [id number]    Consulting Physician: Hernan Ferro MD  Reason for Consult: Medical Management  Primacy Care Physician: Giselle Frias APRN - [place]    HISTORY OF PRESENT ILLNESS:   The patient is a 74 y.o. male past medical history of COPD, PAD that presents to the emergency department for evaluation of left lower extremity paresthesias and pain that began yesterday evening got progressively worse throughout the day.  He denies any other fevers, chills, sweats, nausea, vomiting, chest pain or shortness of breath, palpitations, abdominal pain diarrhea constipation, dysuria, weakness, rash.  In the emergency department found to have left lower extremity cool to touch and underwent CTA with runoff that showed high-grade stenosis greater than 90% of left common femoral artery with suspected occlusion with no contrast family and below the trifurcation with a trickle flow in the popliteal artery.  Vascular surgery was called and patient was started on heparin drip and will be admitted for ongoing monitoring and management.    Of note, patient is status post balloon angioplasty and stenting of right lower extremity yesterday    Past Medical History:        Diagnosis Date    Arthritis     Benign prostate hyperplasia     Bladder cancer (HCC) 03/15/2022    Carotid stenosis     CLL (chronic lymphocytic leukemia) (HCC) 03/10/2021    Colon polyp     COPD (chronic obstructive pulmonary disease) (HCC)     Cubital tunnel syndrome     Flu     recent; tx with steroid pack    Hyperlipidemia     Hypertension     IBS (irritable bowel syndrome)     Peripheral vascular disease (HCC)     Thyroid disease     TIA (transient ischemic attack) 2014    Left side weakness       Past Surgical History:        Procedure Laterality Date    COLONOSCOPY  01/2020    ELBOW SURGERY Right     Removal of spot on elbow    MOUTH SURGERY      Removal of permanent teeth    OTHER SURGICAL HISTORY Left

## 2024-10-06 NOTE — PLAN OF CARE
Problem: Discharge Planning  Goal: Discharge to home or other facility with appropriate resources  Outcome: Progressing  Flowsheets (Taken 10/6/2024 0054 by Chloe Beal, RN)  Discharge to home or other facility with appropriate resources: Identify barriers to discharge with patient and caregiver     Problem: Pain  Goal: Verbalizes/displays adequate comfort level or baseline comfort level  10/6/2024 0730 by Mitra Alejandra RN  Outcome: Progressing  10/6/2024 0221 by Chloe Beal RN  Outcome: Progressing     Problem: Skin/Tissue Integrity  Goal: Absence of new skin breakdown  Description: 1.  Monitor for areas of redness and/or skin breakdown  2.  Assess vascular access sites hourly  3.  Every 4-6 hours minimum:  Change oxygen saturation probe site  4.  Every 4-6 hours:  If on nasal continuous positive airway pressure, respiratory therapy assess nares and determine need for appliance change or resting period.  10/6/2024 0730 by Mitra Alejandra RN  Outcome: Progressing  10/6/2024 0221 by Chloe Beal RN  Outcome: Progressing     Problem: Safety - Adult  Goal: Free from fall injury  Outcome: Progressing     Problem: ABCDS Injury Assessment  Goal: Absence of physical injury  Outcome: Progressing

## 2024-10-06 NOTE — ED PROVIDER NOTES
[facility] EMERGENCY DEPT  eMERGENCY dEPARTMENT eNCOUnter      Pt Name: Jong Loyd  MRN: [id number]  Birthdate 1950  Date of evaluation: 10/5/2024  Provider: JULIO SANCHEZ MD    CHIEF COMPLAINT       Chief Complaint   Patient presents with    Post-op Problem     Patient had vascular surgery here yesterday.  Patient states left leg woke him this morning throbbing and numb.  Right leg is the procedure side           HISTORY OF PRESENT ILLNESS   (Location/Symptom, Timing/Onset,Context/Setting, Quality, Duration, Modifying Factors, Severity)  Note limiting factors.   Jong Loyd is a 74 y.o. male who presents to the emergency department for left extremity pain and numbness.  Patient underwent vascular procedure by Dr. Morrow and underwent balloon angioplasty of multiple arteries of his right leg.  Patient reports he had stents in his left lower extremity and June of this year as well.  Puncture site is on the left lower extremity.  He denies any back pain.  No weakness in the extremity.  He is on Eliquis and Plavix.    HPI    NursingNotes were reviewed.    REVIEW OF SYSTEMS    (2-9 systems for level 4, 10 or more for level 5)     Review of Systems   Constitutional:  Negative for chills and fever.   HENT:  Negative for rhinorrhea and sore throat.    Respiratory:  Negative for cough and shortness of breath.    Cardiovascular:  Negative for chest pain and leg swelling.   Gastrointestinal:  Negative for abdominal pain, diarrhea, nausea and vomiting.   Genitourinary:  Negative for dysuria and frequency.   Musculoskeletal:  Negative for back pain and neck pain.   Skin:  Positive for color change.   Neurological:  Positive for numbness. Negative for dizziness and headaches.            PAST MEDICALHISTORY     Past Medical History:   Diagnosis Date    Arthritis     Benign prostate hyperplasia     Bladder cancer (HCC) 03/15/2022    Carotid stenosis     CLL (chronic lymphocytic leukemia) (HCC) 03/10/2021    Colon polyp

## 2024-10-06 NOTE — ED NOTES
ED TO INPATIENT SBAR HANDOFF    Patient Name: Jong Loyd   : 1950  74 y.o.   Family/Caregiver Present: Yes  Code Status Order: Prior    C-SSRS: Risk of Suicide: No Risk  Sitter Yes  Restraints:         Situation  Chief Complaint:   Chief Complaint   Patient presents with    Post-op Problem     Patient had vascular surgery here yesterday.  Patient states left leg woke him this morning throbbing and numb.  Right leg is the procedure side       Patient Diagnosis: PAD (peripheral artery disease) ([facility]) [I73.9]     Brief Description of Patient's Condition: PAD   Mental Status: oriented, alert, coherent, logical, and thought processes intact  Arrived from: home    Imaging:   CTA ABDOMINAL AORTA W BILAT RUNOFF W CONTRAST   Final Result       1. Runoff to the right lower extremity of the a peroneal artery and a small anterior tibial artery.  There is reconstitution of the posterior tibial artery near the ankle.   2. Questionable short segment dissection versus intraluminal thrombus within the distal superficial femoral artery.   3. High-grade stenosis greater than 90% left common femoral artery and near the bifurcation with a small superficial femoral artery which becomes occluded and non visualized above a left-sided stent.  There are muscular branches from the profunda femoral    which supply the leg above the knee with some questionable muscular branches of the proximal calf.  No contrast filling below the trifurcation with a trickle flow in the popliteal artery.   4. Internal iliac artery stenosis bilaterally.   5. Soft tissue stranding in the left inguinal region compatible with recent surgical procedure.           Findings called to emergency department 10:54 p.m. 10/05/2024.  Verbal report to George Pastor        All CT scans are performed using dose optimization techniques as appropriate to the performed exam and include    at least one of the following: Automated exposure control, adjustment of the mA

## 2024-10-06 NOTE — CONSULTS
Vascular Surgery Consultation    74-year-old male that underwent procedure on Friday for right leg.  He went home and felt good until Saturday morning where he felt severe pain in the left leg with rest pain.  He came to ER and underwent CTA that showed 90% stenosis of the left common femoral artery in the decreased flow in the left leg.  He was admitted to ICU and placed on heparin drip.  Patient is known to our practice.  He underwent multiple previous interventions for bilateral lower extremities for peripheral vascular disease.      I have personally reviewed the following: problem list, current meds, allergies, PMH, PSH, family hx, and social hx  Jong Loyd is a 74 y.o. male with the following history as recorded in [facility]:  Patient Active Problem List    Diagnosis Date Noted    PAD (peripheral artery disease) ([facility]) 10/05/2024    Atherosclerosis of native arteries of extremities with intermittent claudication, left leg ([facility]) 07/22/2024    Atherosclerosis of native artery of both lower extremities with intermittent claudication ([facility]) 07/03/2024    Femoral artery pseudo-aneurysm, left ([facility]) 04/02/2024    Atherosclerosis with claudication of extremity ([facility]) 05/17/2021    Benign essential HTN 05/17/2021    BPH (benign prostatic hyperplasia) 05/17/2021    Pulmonary emphysema ([facility]) 05/17/2021    Carotid stenosis, asymptomatic, bilateral 05/17/2021    CLL (chronic lymphocytic leukemia) ([facility]) 05/17/2021    PVD (peripheral vascular disease) ([facility])      Current Facility-Administered Medications   Medication Dose Route Frequency Provider Last Rate Last Admin    sodium chloride flush 0.9 % injection 5-40 mL  5-40 mL IntraVENous PRN Hernan Ferro MD        0.9 % sodium chloride infusion   IntraVENous PRN Hernan Ferro MD        potassium chloride (KLOR-CON M) extended release tablet 40 mEq  40 mEq Oral PRN Hernan Ferro MD        Or    potassium bicarb-citric acid (EFFER-K) effervescent tablet 40 mEq  40 mEq

## 2024-10-07 ENCOUNTER — APPOINTMENT (OUTPATIENT)
Dept: VASCULAR LAB | Age: 74
DRG: 253 | End: 2024-10-07
Attending: SURGERY
Payer: OTHER GOVERNMENT

## 2024-10-07 ENCOUNTER — ANESTHESIA EVENT (OUTPATIENT)
Dept: OPERATING ROOM | Age: 74
End: 2024-10-07
Payer: OTHER GOVERNMENT

## 2024-10-07 ENCOUNTER — ANESTHESIA (OUTPATIENT)
Dept: OPERATING ROOM | Age: 74
End: 2024-10-07
Payer: OTHER GOVERNMENT

## 2024-10-07 ENCOUNTER — APPOINTMENT (OUTPATIENT)
Dept: INTERVENTIONAL RADIOLOGY/VASCULAR | Age: 74
DRG: 253 | End: 2024-10-07
Payer: OTHER GOVERNMENT

## 2024-10-07 PROBLEM — I70.222 CRITICAL LIMB ISCHEMIA OF LEFT LOWER EXTREMITY (HCC): Status: ACTIVE | Noted: 2024-10-05

## 2024-10-07 PROBLEM — I70.222 ATHEROSCLEROSIS OF NATIVE ARTERIES OF EXTREMITIES WITH REST PAIN, LEFT LEG (HCC): Status: ACTIVE | Noted: 2024-10-05

## 2024-10-07 LAB
ANION GAP SERPL CALCULATED.3IONS-SCNC: 12 MMOL/L (ref 7–19)
ANTI-XA UNFRAC HEPARIN: 0.29 IU/ML (ref 0.3–0.7)
ANTI-XA UNFRAC HEPARIN: >1.1 IU/ML (ref 0.3–0.7)
BASOPHILS # BLD: 0 K/UL (ref 0–0.2)
BASOPHILS NFR BLD: 0.1 % (ref 0–1)
BUN SERPL-MCNC: 20 MG/DL (ref 8–23)
CALCIUM SERPL-MCNC: 8 MG/DL (ref 8.8–10.2)
CHLORIDE SERPL-SCNC: 104 MMOL/L (ref 98–111)
CO2 SERPL-SCNC: 25 MMOL/L (ref 22–29)
CREAT SERPL-MCNC: 1 MG/DL (ref 0.7–1.2)
EOSINOPHIL # BLD: 0 K/UL (ref 0–0.6)
EOSINOPHIL NFR BLD: 0 % (ref 0–5)
ERYTHROCYTE [DISTWIDTH] IN BLOOD BY AUTOMATED COUNT: 13.2 % (ref 11.5–14.5)
GLUCOSE SERPL-MCNC: 164 MG/DL (ref 70–99)
HCT VFR BLD AUTO: 36.6 % (ref 42–52)
HGB BLD-MCNC: 11.3 G/DL (ref 14–18)
IMM GRANULOCYTES # BLD: 0.1 K/UL
LYMPHOCYTES # BLD: 0.8 K/UL (ref 1.1–4.5)
LYMPHOCYTES NFR BLD: 5.9 % (ref 20–40)
MAGNESIUM SERPL-MCNC: 2 MG/DL (ref 1.6–2.4)
MCH RBC QN AUTO: 29.3 PG (ref 27–31)
MCHC RBC AUTO-ENTMCNC: 30.9 G/DL (ref 33–37)
MCV RBC AUTO: 94.8 FL (ref 80–94)
MONOCYTES # BLD: 0.8 K/UL (ref 0–0.9)
MONOCYTES NFR BLD: 6.4 % (ref 0–10)
NEUTROPHILS # BLD: 11.2 K/UL (ref 1.5–7.5)
NEUTS SEG NFR BLD: 87.1 % (ref 50–65)
PLATELET # BLD AUTO: 228 K/UL (ref 130–400)
PMV BLD AUTO: 11.5 FL (ref 9.4–12.4)
POTASSIUM SERPL-SCNC: 4.9 MMOL/L (ref 3.5–5)
RBC # BLD AUTO: 3.86 M/UL (ref 4.7–6.1)
SODIUM SERPL-SCNC: 141 MMOL/L (ref 136–145)
WBC # BLD AUTO: 12.8 K/UL (ref 4.8–10.8)

## 2024-10-07 PROCEDURE — 2580000003 HC RX 258: Performed by: SURGERY

## 2024-10-07 PROCEDURE — C1887 CATHETER, GUIDING: HCPCS | Performed by: SURGERY

## 2024-10-07 PROCEDURE — 85025 COMPLETE CBC W/AUTO DIFF WBC: CPT

## 2024-10-07 PROCEDURE — 88311 DECALCIFY TISSUE: CPT

## 2024-10-07 PROCEDURE — 2000000000 HC ICU R&B

## 2024-10-07 PROCEDURE — 93971 EXTREMITY STUDY: CPT

## 2024-10-07 PROCEDURE — 6360000004 HC RX CONTRAST MEDICATION: Performed by: SURGERY

## 2024-10-07 PROCEDURE — C1894 INTRO/SHEATH, NON-LASER: HCPCS | Performed by: SURGERY

## 2024-10-07 PROCEDURE — 88304 TISSUE EXAM BY PATHOLOGIST: CPT

## 2024-10-07 PROCEDURE — 6360000002 HC RX W HCPCS: Performed by: SURGERY

## 2024-10-07 PROCEDURE — 36415 COLL VENOUS BLD VENIPUNCTURE: CPT

## 2024-10-07 PROCEDURE — C9290 INJ, BUPIVACAINE LIPOSOME: HCPCS | Performed by: SURGERY

## 2024-10-07 PROCEDURE — C1769 GUIDE WIRE: HCPCS | Performed by: SURGERY

## 2024-10-07 PROCEDURE — 3700000000 HC ANESTHESIA ATTENDED CARE: Performed by: SURGERY

## 2024-10-07 PROCEDURE — 2580000003 HC RX 258: Performed by: NURSE ANESTHETIST, CERTIFIED REGISTERED

## 2024-10-07 PROCEDURE — P9045 ALBUMIN (HUMAN), 5%, 250 ML: HCPCS | Performed by: NURSE ANESTHETIST, CERTIFIED REGISTERED

## 2024-10-07 PROCEDURE — 6360000002 HC RX W HCPCS: Performed by: NURSE ANESTHETIST, CERTIFIED REGISTERED

## 2024-10-07 PROCEDURE — A4217 STERILE WATER/SALINE, 500 ML: HCPCS | Performed by: SURGERY

## 2024-10-07 PROCEDURE — 83735 ASSAY OF MAGNESIUM: CPT

## 2024-10-07 PROCEDURE — C1757 CATH, THROMBECTOMY/EMBOLECT: HCPCS | Performed by: SURGERY

## 2024-10-07 PROCEDURE — 93926 LOWER EXTREMITY STUDY: CPT

## 2024-10-07 PROCEDURE — 2709999900 HC NON-CHARGEABLE SUPPLY: Performed by: SURGERY

## 2024-10-07 PROCEDURE — 36620 INSERTION CATHETER ARTERY: CPT

## 2024-10-07 PROCEDURE — 6360000002 HC RX W HCPCS: Performed by: INTERNAL MEDICINE

## 2024-10-07 PROCEDURE — 80048 BASIC METABOLIC PNL TOTAL CA: CPT

## 2024-10-07 PROCEDURE — 3600000015 HC SURGERY LEVEL 5 ADDTL 15MIN: Performed by: SURGERY

## 2024-10-07 PROCEDURE — C1768 GRAFT, VASCULAR: HCPCS | Performed by: SURGERY

## 2024-10-07 PROCEDURE — 94760 N-INVAS EAR/PLS OXIMETRY 1: CPT

## 2024-10-07 PROCEDURE — C1725 CATH, TRANSLUMIN NON-LASER: HCPCS | Performed by: SURGERY

## 2024-10-07 PROCEDURE — C1876 STENT, NON-COA/NON-COV W/DEL: HCPCS | Performed by: SURGERY

## 2024-10-07 PROCEDURE — 3700000001 HC ADD 15 MINUTES (ANESTHESIA): Performed by: SURGERY

## 2024-10-07 PROCEDURE — 3600000005 HC SURGERY LEVEL 5 BASE: Performed by: SURGERY

## 2024-10-07 PROCEDURE — 85520 HEPARIN ASSAY: CPT

## 2024-10-07 PROCEDURE — 75710 ARTERY X-RAYS ARM/LEG: CPT

## 2024-10-07 PROCEDURE — 2500000003 HC RX 250 WO HCPCS: Performed by: NURSE ANESTHETIST, CERTIFIED REGISTERED

## 2024-10-07 PROCEDURE — C1893 INTRO/SHEATH, FIXED,NON-PEEL: HCPCS | Performed by: SURGERY

## 2024-10-07 DEVICE — IMPLANTABLE DEVICE: Type: IMPLANTABLE DEVICE | Site: LEG | Status: FUNCTIONAL

## 2024-10-07 DEVICE — 6MM X 45 CM
Type: IMPLANTABLE DEVICE | Site: LEG | Status: FUNCTIONAL
Brand: ARTEGRAFT VASCULAR GRAFT

## 2024-10-07 DEVICE — XENOSURE BIOLOGIC PATCH, 0.8CM X 8CM, EIFU
Type: IMPLANTABLE DEVICE | Site: LEG | Status: FUNCTIONAL
Brand: XENOSURE BIOLOGIC PATCH

## 2024-10-07 DEVICE — SELF-EXPANDING STENT SYSTEM
Type: IMPLANTABLE DEVICE | Site: POPLITEAL | Status: FUNCTIONAL
Brand: INNOVA™ VASCULAR

## 2024-10-07 RX ORDER — IODIXANOL 320 MG/ML
INJECTION, SOLUTION INTRAVASCULAR PRN
Status: DISCONTINUED | OUTPATIENT
Start: 2024-10-07 | End: 2024-10-07 | Stop reason: ALTCHOICE

## 2024-10-07 RX ORDER — SODIUM CHLORIDE, SODIUM LACTATE, POTASSIUM CHLORIDE, CALCIUM CHLORIDE 600; 310; 30; 20 MG/100ML; MG/100ML; MG/100ML; MG/100ML
INJECTION, SOLUTION INTRAVENOUS
Status: DISCONTINUED | OUTPATIENT
Start: 2024-10-07 | End: 2024-10-07 | Stop reason: SDUPTHER

## 2024-10-07 RX ORDER — DEXAMETHASONE SODIUM PHOSPHATE 10 MG/ML
INJECTION, SOLUTION INTRAMUSCULAR; INTRAVENOUS
Status: DISCONTINUED | OUTPATIENT
Start: 2024-10-07 | End: 2024-10-07 | Stop reason: SDUPTHER

## 2024-10-07 RX ORDER — SODIUM CHLORIDE 9 MG/ML
INJECTION, SOLUTION INTRAVENOUS PRN
Status: DISCONTINUED | OUTPATIENT
Start: 2024-10-07 | End: 2024-10-07 | Stop reason: HOSPADM

## 2024-10-07 RX ORDER — SODIUM CHLORIDE 0.9 % (FLUSH) 0.9 %
5-40 SYRINGE (ML) INJECTION PRN
Status: DISCONTINUED | OUTPATIENT
Start: 2024-10-07 | End: 2024-10-07 | Stop reason: HOSPADM

## 2024-10-07 RX ORDER — ALBUMIN, HUMAN INJ 5% 5 %
SOLUTION INTRAVENOUS
Status: DISCONTINUED | OUTPATIENT
Start: 2024-10-07 | End: 2024-10-07 | Stop reason: SDUPTHER

## 2024-10-07 RX ORDER — PROPOFOL 10 MG/ML
INJECTION, EMULSION INTRAVENOUS
Status: DISCONTINUED | OUTPATIENT
Start: 2024-10-07 | End: 2024-10-07 | Stop reason: SDUPTHER

## 2024-10-07 RX ORDER — SODIUM CHLORIDE 0.9 % (FLUSH) 0.9 %
5-40 SYRINGE (ML) INJECTION EVERY 12 HOURS SCHEDULED
Status: DISCONTINUED | OUTPATIENT
Start: 2024-10-08 | End: 2024-10-07 | Stop reason: HOSPADM

## 2024-10-07 RX ORDER — FENTANYL CITRATE 50 UG/ML
INJECTION, SOLUTION INTRAMUSCULAR; INTRAVENOUS
Status: DISCONTINUED | OUTPATIENT
Start: 2024-10-07 | End: 2024-10-07 | Stop reason: SDUPTHER

## 2024-10-07 RX ORDER — HYDROMORPHONE HYDROCHLORIDE 1 MG/ML
0.5 INJECTION, SOLUTION INTRAMUSCULAR; INTRAVENOUS; SUBCUTANEOUS EVERY 5 MIN PRN
Status: DISCONTINUED | OUTPATIENT
Start: 2024-10-07 | End: 2024-10-07 | Stop reason: HOSPADM

## 2024-10-07 RX ORDER — SODIUM CHLORIDE, SODIUM LACTATE, POTASSIUM CHLORIDE, CALCIUM CHLORIDE 600; 310; 30; 20 MG/100ML; MG/100ML; MG/100ML; MG/100ML
INJECTION, SOLUTION INTRAVENOUS CONTINUOUS
Status: DISCONTINUED | OUTPATIENT
Start: 2024-10-08 | End: 2024-10-07 | Stop reason: HOSPADM

## 2024-10-07 RX ORDER — CALCIUM CHLORIDE 100 MG/ML
INJECTION INTRAVENOUS; INTRAVENTRICULAR
Status: DISCONTINUED | OUTPATIENT
Start: 2024-10-07 | End: 2024-10-07 | Stop reason: SDUPTHER

## 2024-10-07 RX ORDER — ONDANSETRON 2 MG/ML
4 INJECTION INTRAMUSCULAR; INTRAVENOUS
Status: DISCONTINUED | OUTPATIENT
Start: 2024-10-07 | End: 2024-10-07 | Stop reason: HOSPADM

## 2024-10-07 RX ORDER — HEPARIN SODIUM 1000 [USP'U]/ML
INJECTION, SOLUTION INTRAVENOUS; SUBCUTANEOUS
Status: DISCONTINUED | OUTPATIENT
Start: 2024-10-07 | End: 2024-10-07 | Stop reason: SDUPTHER

## 2024-10-07 RX ORDER — EPINEPHRINE 1 MG/ML
INJECTION, SOLUTION, CONCENTRATE INTRAVENOUS PRN
Status: DISCONTINUED | OUTPATIENT
Start: 2024-10-07 | End: 2024-10-07 | Stop reason: ALTCHOICE

## 2024-10-07 RX ORDER — SODIUM CHLORIDE 9 MG/ML
INJECTION, SOLUTION INTRAVENOUS
Status: DISCONTINUED | OUTPATIENT
Start: 2024-10-07 | End: 2024-10-07 | Stop reason: SDUPTHER

## 2024-10-07 RX ORDER — HYDROMORPHONE HYDROCHLORIDE 1 MG/ML
0.5 INJECTION, SOLUTION INTRAMUSCULAR; INTRAVENOUS; SUBCUTANEOUS
Status: DISCONTINUED | OUTPATIENT
Start: 2024-10-07 | End: 2024-10-15 | Stop reason: HOSPADM

## 2024-10-07 RX ORDER — RIFAMPIN 600 MG/1
INJECTION, POWDER, LYOPHILIZED, FOR SOLUTION INTRAVENOUS PRN
Status: DISCONTINUED | OUTPATIENT
Start: 2024-10-07 | End: 2024-10-07 | Stop reason: ALTCHOICE

## 2024-10-07 RX ORDER — NALOXONE HYDROCHLORIDE 0.4 MG/ML
INJECTION, SOLUTION INTRAMUSCULAR; INTRAVENOUS; SUBCUTANEOUS PRN
Status: DISCONTINUED | OUTPATIENT
Start: 2024-10-07 | End: 2024-10-07 | Stop reason: HOSPADM

## 2024-10-07 RX ORDER — CEFAZOLIN SODIUM 1 G/3ML
INJECTION, POWDER, FOR SOLUTION INTRAMUSCULAR; INTRAVENOUS
Status: DISCONTINUED | OUTPATIENT
Start: 2024-10-07 | End: 2024-10-07 | Stop reason: SDUPTHER

## 2024-10-07 RX ORDER — ONDANSETRON 2 MG/ML
INJECTION INTRAMUSCULAR; INTRAVENOUS
Status: DISCONTINUED | OUTPATIENT
Start: 2024-10-07 | End: 2024-10-07 | Stop reason: SDUPTHER

## 2024-10-07 RX ORDER — ROCURONIUM BROMIDE 10 MG/ML
INJECTION, SOLUTION INTRAVENOUS
Status: DISCONTINUED | OUTPATIENT
Start: 2024-10-07 | End: 2024-10-07 | Stop reason: SDUPTHER

## 2024-10-07 RX ORDER — LIDOCAINE HYDROCHLORIDE 10 MG/ML
INJECTION, SOLUTION INFILTRATION; PERINEURAL
Status: DISCONTINUED | OUTPATIENT
Start: 2024-10-07 | End: 2024-10-07 | Stop reason: SDUPTHER

## 2024-10-07 RX ORDER — HYDROMORPHONE HYDROCHLORIDE 1 MG/ML
0.25 INJECTION, SOLUTION INTRAMUSCULAR; INTRAVENOUS; SUBCUTANEOUS EVERY 5 MIN PRN
Status: DISCONTINUED | OUTPATIENT
Start: 2024-10-07 | End: 2024-10-07 | Stop reason: HOSPADM

## 2024-10-07 RX ORDER — BUPIVACAINE HYDROCHLORIDE 5 MG/ML
INJECTION, SOLUTION PERINEURAL PRN
Status: DISCONTINUED | OUTPATIENT
Start: 2024-10-07 | End: 2024-10-07 | Stop reason: ALTCHOICE

## 2024-10-07 RX ADMIN — CALCIUM CHLORIDE 1 G: 100 INJECTION, SOLUTION INTRAVENOUS at 16:18

## 2024-10-07 RX ADMIN — HEPARIN SODIUM 2600 UNITS: 1000 INJECTION INTRAVENOUS; SUBCUTANEOUS at 10:16

## 2024-10-07 RX ADMIN — FENTANYL CITRATE 50 MCG: 0.05 INJECTION, SOLUTION INTRAMUSCULAR; INTRAVENOUS at 17:16

## 2024-10-07 RX ADMIN — CEFAZOLIN 1 G: 1 INJECTION, POWDER, FOR SOLUTION INTRAMUSCULAR; INTRAVENOUS at 21:27

## 2024-10-07 RX ADMIN — FENTANYL CITRATE 50 MCG: 0.05 INJECTION, SOLUTION INTRAMUSCULAR; INTRAVENOUS at 15:57

## 2024-10-07 RX ADMIN — HYDROMORPHONE HYDROCHLORIDE 0.5 MG: 1 INJECTION, SOLUTION INTRAMUSCULAR; INTRAVENOUS; SUBCUTANEOUS at 12:56

## 2024-10-07 RX ADMIN — HYDROMORPHONE HYDROCHLORIDE 0.5 MG: 1 INJECTION, SOLUTION INTRAMUSCULAR; INTRAVENOUS; SUBCUTANEOUS at 04:04

## 2024-10-07 RX ADMIN — HEPARIN SODIUM 1000 UNITS: 1000 INJECTION, SOLUTION INTRAVENOUS; SUBCUTANEOUS at 19:49

## 2024-10-07 RX ADMIN — HYDROMORPHONE HYDROCHLORIDE 0.5 MG: 1 INJECTION, SOLUTION INTRAMUSCULAR; INTRAVENOUS; SUBCUTANEOUS at 10:32

## 2024-10-07 RX ADMIN — ROCURONIUM BROMIDE 10 MG: 10 INJECTION, SOLUTION INTRAVENOUS at 21:09

## 2024-10-07 RX ADMIN — ROCURONIUM BROMIDE 50 MG: 10 INJECTION, SOLUTION INTRAVENOUS at 15:18

## 2024-10-07 RX ADMIN — ROCURONIUM BROMIDE 10 MG: 10 INJECTION, SOLUTION INTRAVENOUS at 19:19

## 2024-10-07 RX ADMIN — ROCURONIUM BROMIDE 25 MG: 10 INJECTION, SOLUTION INTRAVENOUS at 16:32

## 2024-10-07 RX ADMIN — DEXAMETHASONE SODIUM PHOSPHATE 4 MG: 10 INJECTION, SOLUTION INTRAMUSCULAR; INTRAVENOUS at 15:37

## 2024-10-07 RX ADMIN — PROPOFOL 100 MG: 10 INJECTION, EMULSION INTRAVENOUS at 15:17

## 2024-10-07 RX ADMIN — PHENYLEPHRINE HYDROCHLORIDE 60 MCG/MIN: 10 INJECTION INTRAVENOUS at 15:26

## 2024-10-07 RX ADMIN — FENTANYL CITRATE 25 MCG: 0.05 INJECTION, SOLUTION INTRAMUSCULAR; INTRAVENOUS at 19:28

## 2024-10-07 RX ADMIN — SODIUM CHLORIDE: 9 INJECTION, SOLUTION INTRAVENOUS at 22:04

## 2024-10-07 RX ADMIN — ROCURONIUM BROMIDE 10 MG: 10 INJECTION, SOLUTION INTRAVENOUS at 21:46

## 2024-10-07 RX ADMIN — HYDROMORPHONE HYDROCHLORIDE 0.5 MG: 1 INJECTION, SOLUTION INTRAMUSCULAR; INTRAVENOUS; SUBCUTANEOUS at 08:28

## 2024-10-07 RX ADMIN — ROCURONIUM BROMIDE 25 MG: 10 INJECTION, SOLUTION INTRAVENOUS at 15:52

## 2024-10-07 RX ADMIN — ALBUMIN (HUMAN) 12.5 G: 12.5 INJECTION, SOLUTION INTRAVENOUS at 18:10

## 2024-10-07 RX ADMIN — SODIUM CHLORIDE, SODIUM LACTATE, POTASSIUM CHLORIDE, AND CALCIUM CHLORIDE: 600; 310; 30; 20 INJECTION, SOLUTION INTRAVENOUS at 20:53

## 2024-10-07 RX ADMIN — HEPARIN SODIUM 1000 UNITS: 1000 INJECTION, SOLUTION INTRAVENOUS; SUBCUTANEOUS at 21:22

## 2024-10-07 RX ADMIN — ONDANSETRON 4 MG: 2 INJECTION INTRAMUSCULAR; INTRAVENOUS at 06:24

## 2024-10-07 RX ADMIN — FENTANYL CITRATE 25 MCG: 0.05 INJECTION, SOLUTION INTRAMUSCULAR; INTRAVENOUS at 19:42

## 2024-10-07 RX ADMIN — ONDANSETRON 4 MG: 2 INJECTION INTRAMUSCULAR; INTRAVENOUS at 22:18

## 2024-10-07 RX ADMIN — ROCURONIUM BROMIDE 10 MG: 10 INJECTION, SOLUTION INTRAVENOUS at 19:50

## 2024-10-07 RX ADMIN — HYDROMORPHONE HYDROCHLORIDE 0.5 MG: 1 INJECTION, SOLUTION INTRAMUSCULAR; INTRAVENOUS; SUBCUTANEOUS at 06:31

## 2024-10-07 RX ADMIN — ROCURONIUM BROMIDE 10 MG: 10 INJECTION, SOLUTION INTRAVENOUS at 20:07

## 2024-10-07 RX ADMIN — FENTANYL CITRATE 100 MCG: 0.05 INJECTION, SOLUTION INTRAMUSCULAR; INTRAVENOUS at 15:16

## 2024-10-07 RX ADMIN — ROCURONIUM BROMIDE 10 MG: 10 INJECTION, SOLUTION INTRAVENOUS at 20:41

## 2024-10-07 RX ADMIN — SUGAMMADEX 200 MG: 100 INJECTION, SOLUTION INTRAVENOUS at 22:37

## 2024-10-07 RX ADMIN — SODIUM CHLORIDE, SODIUM LACTATE, POTASSIUM CHLORIDE, AND CALCIUM CHLORIDE: 600; 310; 30; 20 INJECTION, SOLUTION INTRAVENOUS at 17:54

## 2024-10-07 RX ADMIN — ROCURONIUM BROMIDE 10 MG: 10 INJECTION, SOLUTION INTRAVENOUS at 22:05

## 2024-10-07 RX ADMIN — ROCURONIUM BROMIDE 20 MG: 10 INJECTION, SOLUTION INTRAVENOUS at 18:32

## 2024-10-07 RX ADMIN — FENTANYL CITRATE 50 MCG: 0.05 INJECTION, SOLUTION INTRAMUSCULAR; INTRAVENOUS at 16:47

## 2024-10-07 RX ADMIN — CEFAZOLIN 2 G: 1 INJECTION, POWDER, FOR SOLUTION INTRAMUSCULAR; INTRAVENOUS at 15:42

## 2024-10-07 RX ADMIN — SODIUM CHLORIDE: 9 INJECTION, SOLUTION INTRAVENOUS at 15:03

## 2024-10-07 RX ADMIN — SODIUM CHLORIDE, SODIUM LACTATE, POTASSIUM CHLORIDE, AND CALCIUM CHLORIDE: 600; 310; 30; 20 INJECTION, SOLUTION INTRAVENOUS at 15:03

## 2024-10-07 RX ADMIN — ROCURONIUM BROMIDE 20 MG: 10 INJECTION, SOLUTION INTRAVENOUS at 17:36

## 2024-10-07 RX ADMIN — HEPARIN SODIUM 2000 UNITS: 1000 INJECTION, SOLUTION INTRAVENOUS; SUBCUTANEOUS at 18:23

## 2024-10-07 RX ADMIN — ROCURONIUM BROMIDE 10 MG: 10 INJECTION, SOLUTION INTRAVENOUS at 18:02

## 2024-10-07 RX ADMIN — HEPARIN SODIUM 1000 UNITS: 1000 INJECTION, SOLUTION INTRAVENOUS; SUBCUTANEOUS at 18:52

## 2024-10-07 RX ADMIN — LIDOCAINE HYDROCHLORIDE 50 MG: 10 INJECTION, SOLUTION INFILTRATION; PERINEURAL at 15:16

## 2024-10-07 RX ADMIN — FENTANYL CITRATE 25 MCG: 0.05 INJECTION, SOLUTION INTRAMUSCULAR; INTRAVENOUS at 17:55

## 2024-10-07 RX ADMIN — ROCURONIUM BROMIDE 20 MG: 10 INJECTION, SOLUTION INTRAVENOUS at 17:16

## 2024-10-07 RX ADMIN — ALBUMIN (HUMAN) 12.5 G: 12.5 INJECTION, SOLUTION INTRAVENOUS at 22:09

## 2024-10-07 RX ADMIN — FENTANYL CITRATE 25 MCG: 0.05 INJECTION, SOLUTION INTRAMUSCULAR; INTRAVENOUS at 20:45

## 2024-10-07 RX ADMIN — ONDANSETRON 4 MG: 2 INJECTION INTRAMUSCULAR; INTRAVENOUS at 12:56

## 2024-10-07 RX ADMIN — HYDROMORPHONE HYDROCHLORIDE 0.5 MG: 1 INJECTION, SOLUTION INTRAMUSCULAR; INTRAVENOUS; SUBCUTANEOUS at 01:52

## 2024-10-07 RX ADMIN — SODIUM CHLORIDE, PRESERVATIVE FREE 10 ML: 5 INJECTION INTRAVENOUS at 08:27

## 2024-10-07 ASSESSMENT — PAIN DESCRIPTION - DESCRIPTORS
DESCRIPTORS: THROBBING;STABBING
DESCRIPTORS: DULL
DESCRIPTORS: ACHING;DULL
DESCRIPTORS: THROBBING;DULL
DESCRIPTORS: STABBING;SHOOTING;THROBBING

## 2024-10-07 ASSESSMENT — COPD QUESTIONNAIRES: CAT_SEVERITY: NO INTERVAL CHANGE

## 2024-10-07 ASSESSMENT — PAIN DESCRIPTION - LOCATION
LOCATION: FOOT;GROIN;LEG
LOCATION: FOOT;GROIN
LOCATION: FOOT;GROIN
LOCATION: GROIN;LEG;FOOT
LOCATION: FOOT;GROIN
LOCATION: FOOT;GROIN
LOCATION: GROIN;FOOT

## 2024-10-07 ASSESSMENT — PAIN SCALES - GENERAL
PAINLEVEL_OUTOF10: 9
PAINLEVEL_OUTOF10: 9
PAINLEVEL_OUTOF10: 10
PAINLEVEL_OUTOF10: 9
PAINLEVEL_OUTOF10: 9
PAINLEVEL_OUTOF10: 10

## 2024-10-07 ASSESSMENT — PAIN DESCRIPTION - ORIENTATION
ORIENTATION: LEFT

## 2024-10-07 ASSESSMENT — LIFESTYLE VARIABLES: SMOKING_STATUS: 0

## 2024-10-07 NOTE — DISCHARGE INSTRUCTIONS
LOWER EXTREMITY ARTERIAL SURGERY HOME INSTRUCTIONS    1.  You may shower after surgery, NO TUB BATH, NO HOT TUB, NO SWIMMING FOR 2 WEEKS!.  The incision lines should be gently washed with dial antibacterial soap and water once a day.   Then cover with a dry dressing.  Do not use Hibiclens, salve, ointment, or cream on incision lines after surgery.    2.  Unless your incision is open, there are no special wound care instructions.  Bruising and discoloration of the area is normal and will disappear in time.  You may also develop a hard \"healing ridge\" under the incisions.  This is a normal part of the healing process.    3.  You may expect some swelling in your legs after surgery, especially when you go home.  This is due to increased blood flow to your leg as well as the trauma from surgery and the intravenous fluids you received during and after surgery.  The swelling can be relieved by elevating your legs.  If you are not walking, eating, bathing, or on commode, elevate your legs (recliner, sofa, bed).    4.  You should not drive for 2 weeks.  Riding in the car is OK.  Going up and down stairs is OK, take one step at a time, slowly.  No yard work for 2 weeks.  Avoid prolonged standing and sitting with legs down.    5.  Daily walking is encouraged.  We suggest you walk several times each day.  Start walking short distances to avoid fatigue and swelling and increase the distance and length of your walks as your strength increases.      6.  Avoid clothing that may constrict the circulation in your leg, such as tight socks, pantyhose, garters, or girdles.  Wear shoes that fit well and do not rub on your toes, heels, and ankles.       7.  You should inspect your feet, legs, and incision on a daily basis.  Look for small cuts, cracks, and blisters especially on the heel and on an between the toes.  Should dry, flaky skin develop on your feet and legs, such as Triple Lanolin, Eucerin, Lubriderm,or Keena.  Do not rub

## 2024-10-07 NOTE — ANESTHESIA PRE PROCEDURE
Department of Anesthesiology  Preprocedure Note       Name:  Jong Loyd   Age:  74 y.o.  :  1950                                          MRN:  [id number]         Date:  10/7/2024      Surgeon: Surgeon(s):  Kailee Alaniz MD    Procedure: Procedure(s):  FEMORAL POPLITEAL BYPASS    Medications prior to admission:   Prior to Admission medications    Medication Sig Start Date End Date Taking? Authorizing Provider   Zanubrutinib 80 MG CAPS Take 4 capsules by mouth daily 24  Yes Ileana Strange APRN   ferrous sulfate (IRON 325) 325 (65 Fe) MG tablet Take 1 tablet by mouth daily (with breakfast) 24  Yes Ileana Strange APRN   ascorbic acid (VITAMIN C) 500 MG tablet Take 1 tablet by mouth daily   Yes Provider, MD Wilmer   apixaban (ELIQUIS) 5 MG TABS tablet Take 1 tablet by mouth 2 times daily  Patient taking differently: Take 1 tablet by mouth 2 times daily Last taken Wednesday because of surgery on 10/4 7/8/24  Yes Dorothy Poole APRN   levothyroxine (SYNTHROID) 150 MCG tablet Take 1 tablet by mouth Daily   Yes Provider, MD Wilmer   clopidogrel (PLAVIX) 75 MG tablet Take 1 tablet by mouth daily   Yes Provider, Historical, MD   finasteride (PROSCAR) 5 MG tablet Take 1 tablet by mouth daily   Yes Provider, Historical, MD   losartan (COZAAR) 100 MG tablet Take 1 tablet by mouth nightly   Yes Provider, MD Wilmer   pravastatin (PRAVACHOL) 40 MG tablet Take 1 tablet by mouth daily   Yes Provider, MD Wilmer   montelukast (SINGULAIR) 10 MG tablet Take 1 tablet by mouth nightly  Patient not taking: Reported on 10/6/2024 8/5/24 7/31/25  Ileana Strange APRN   meclizine (ANTIVERT) 25 MG tablet TAKE 1 TABLET BY MOUTH EVERY 8 HOURS AS NEEDED FOR DIZZINESS  Patient not taking: Reported on 10/6/2024    Wilmer Luu MD   sildenafil (VIAGRA) 100 MG tablet Take 1 tablet by mouth daily as needed  Patient not taking: Reported on 10/6/2024    Wilmer Luu MD

## 2024-10-07 NOTE — PLAN OF CARE
Problem: Discharge Planning  Goal: Discharge to home or other facility with appropriate resources  10/7/2024 0750 by Mitra Alejandra RN  Outcome: Progressing  10/7/2024 0105 by Chloe Beal RN  Outcome: Progressing     Problem: Pain  Goal: Verbalizes/displays adequate comfort level or baseline comfort level  10/7/2024 0750 by Mitra Alejandra RN  Outcome: Progressing  10/7/2024 0105 by Chloe Beal RN  Outcome: Progressing     Problem: Skin/Tissue Integrity  Goal: Absence of new skin breakdown  Description: 1.  Monitor for areas of redness and/or skin breakdown  2.  Assess vascular access sites hourly  3.  Every 4-6 hours minimum:  Change oxygen saturation probe site  4.  Every 4-6 hours:  If on nasal continuous positive airway pressure, respiratory therapy assess nares and determine need for appliance change or resting period.  10/7/2024 0750 by Mitra Alejandra RN  Outcome: Progressing  10/7/2024 0105 by Chloe Beal RN  Outcome: Progressing     Problem: Safety - Adult  Goal: Free from fall injury  10/7/2024 0750 by Mitra Alejandra RN  Outcome: Progressing  10/7/2024 0105 by Chloe Beal RN  Outcome: Progressing     Problem: ABCDS Injury Assessment  Goal: Absence of physical injury  10/7/2024 0750 by Mitra Alejandra RN  Outcome: Progressing  10/7/2024 0105 by Chloe Beal RN  Outcome: Progressing

## 2024-10-07 NOTE — H&P
Patient name: Jong Loyd  MRN: [id number]  YOB: 1950    Date of service: 10/7/2024    Update history and physical preoperative    Patient seen and examined this morning.  I have reviewed the consult note from 10/6/2024 by Dr Morrow.  The patient has significant changes following the original consult note.  His left foot is cold and mottled and blue.  He still has a minimal amount of motor or sensory function thankfully.  Therefore, I think it is worthwhile to try to salvage to the limb.  Per the history, the patient underwent a right lower extremity arteriogram last week with Dr Morrow, with a left groin access.  Subsequently, the patient developed ischemic changes in the left lower extremity post procedure, and was taken this weekend for a left femoral endarterectomy and arteriogram.  At that time, it was noted that the patient had minimal flow in the left lower extremity, but she felt that this was likely chronic.  Today, the patient has critical ischemia and needs urgent revascularization.  Risk benefits alternatives were explained to the patient and his family for a left lower extremity intervention.  He has had bleeding intermittently overnight, so we will evacuate the hematoma in the left groin and try to establish hemostasis.  If need be, we will revise the endarterectomy.  We do not have suitable vein per the vein mapping, so if we need a bypass, it will be with Artegraft.  Currently the patient does not have a good target as everything is occluded, but we will try to reestablish flow with a Omer balloon, possible percutaneous thrombectomy.  Additionally, I discussed the possibility of above-knee amputation.  The patient is not willing to discuss it yet.  I told him that if there is nothing more we can do to salvage limb, this is what will be required but that we will not perform that today without his consent.  I will be happy to wake him up from anesthesia and discuss it with him

## 2024-10-08 LAB
ABO/RH: NORMAL
ALBUMIN SERPL-MCNC: 3 G/DL (ref 3.5–5.2)
ALP SERPL-CCNC: 37 U/L (ref 40–129)
ALT SERPL-CCNC: <5 U/L (ref 5–41)
ANION GAP SERPL CALCULATED.3IONS-SCNC: 11 MMOL/L (ref 7–19)
ANION GAP SERPL CALCULATED.3IONS-SCNC: 9 MMOL/L (ref 7–19)
ANTI-XA UNFRAC HEPARIN: 0.73 IU/ML (ref 0.3–0.7)
ANTI-XA UNFRAC HEPARIN: <0.1 IU/ML (ref 0.3–0.7)
ANTIBODY SCREEN: NORMAL
AST SERPL-CCNC: 13 U/L (ref 5–40)
BASOPHILS # BLD: 0 K/UL (ref 0–0.2)
BASOPHILS NFR BLD: 0.1 % (ref 0–1)
BILIRUB SERPL-MCNC: 1.1 MG/DL (ref 0.2–1.2)
BLOOD BANK DISPENSE STATUS: NORMAL
BLOOD BANK DISPENSE STATUS: NORMAL
BLOOD BANK PRODUCT CODE: NORMAL
BLOOD BANK PRODUCT CODE: NORMAL
BPU ID: NORMAL
BPU ID: NORMAL
BUN SERPL-MCNC: 14 MG/DL (ref 8–23)
BUN SERPL-MCNC: 15 MG/DL (ref 8–23)
CALCIUM SERPL-MCNC: 7.5 MG/DL (ref 8.8–10.2)
CALCIUM SERPL-MCNC: 7.6 MG/DL (ref 8.8–10.2)
CHLORIDE SERPL-SCNC: 108 MMOL/L (ref 98–111)
CHLORIDE SERPL-SCNC: 109 MMOL/L (ref 98–111)
CO2 SERPL-SCNC: 20 MMOL/L (ref 22–29)
CO2 SERPL-SCNC: 25 MMOL/L (ref 22–29)
CREAT SERPL-MCNC: 0.7 MG/DL (ref 0.7–1.2)
CREAT SERPL-MCNC: 0.8 MG/DL (ref 0.7–1.2)
DESCRIPTION BLOOD BANK: NORMAL
DESCRIPTION BLOOD BANK: NORMAL
EOSINOPHIL # BLD: 0 K/UL (ref 0–0.6)
EOSINOPHIL NFR BLD: 0 % (ref 0–5)
ERYTHROCYTE [DISTWIDTH] IN BLOOD BY AUTOMATED COUNT: 13.3 % (ref 11.5–14.5)
GLUCOSE SERPL-MCNC: 140 MG/DL (ref 70–99)
GLUCOSE SERPL-MCNC: 95 MG/DL (ref 70–99)
HCT VFR BLD AUTO: 20.3 % (ref 42–52)
HCT VFR BLD AUTO: 22 % (ref 42–52)
HCT VFR BLD AUTO: 25.5 % (ref 42–52)
HGB BLD-MCNC: 6.1 G/DL (ref 14–18)
HGB BLD-MCNC: 6.7 G/DL (ref 14–18)
HGB BLD-MCNC: 7.8 G/DL (ref 14–18)
IMM GRANULOCYTES # BLD: 0.1 K/UL
LACTATE BLDV-SCNC: 1.6 MMOL/L (ref 0.5–1.9)
LYMPHOCYTES # BLD: 1.2 K/UL (ref 1.1–4.5)
LYMPHOCYTES NFR BLD: 11.4 % (ref 20–40)
MAGNESIUM SERPL-MCNC: 1.6 MG/DL (ref 1.6–2.4)
MCH RBC QN AUTO: 29.3 PG (ref 27–31)
MCHC RBC AUTO-ENTMCNC: 30 G/DL (ref 33–37)
MCV RBC AUTO: 97.6 FL (ref 80–94)
MONOCYTES # BLD: 0.7 K/UL (ref 0–0.9)
MONOCYTES NFR BLD: 7.3 % (ref 0–10)
NEUTROPHILS # BLD: 8.2 K/UL (ref 1.5–7.5)
NEUTS SEG NFR BLD: 80.6 % (ref 50–65)
PLATELET # BLD AUTO: 147 K/UL (ref 130–400)
PMV BLD AUTO: 11.5 FL (ref 9.4–12.4)
POTASSIUM SERPL-SCNC: 4 MMOL/L (ref 3.5–5)
POTASSIUM SERPL-SCNC: 4.6 MMOL/L (ref 3.5–5)
PROT SERPL-MCNC: 4.6 G/DL (ref 6.4–8.3)
RBC # BLD AUTO: 2.08 M/UL (ref 4.7–6.1)
SODIUM SERPL-SCNC: 139 MMOL/L (ref 136–145)
SODIUM SERPL-SCNC: 143 MMOL/L (ref 136–145)
WBC # BLD AUTO: 10.2 K/UL (ref 4.8–10.8)

## 2024-10-08 PROCEDURE — 6360000002 HC RX W HCPCS: Performed by: SURGERY

## 2024-10-08 PROCEDURE — 87077 CULTURE AEROBIC IDENTIFY: CPT

## 2024-10-08 PROCEDURE — 86901 BLOOD TYPING SEROLOGIC RH(D): CPT

## 2024-10-08 PROCEDURE — 6360000002 HC RX W HCPCS: Performed by: INTERNAL MEDICINE

## 2024-10-08 PROCEDURE — 2000000000 HC ICU R&B

## 2024-10-08 PROCEDURE — B41GYZZ FLUOROSCOPY OF LEFT LOWER EXTREMITY ARTERIES USING OTHER CONTRAST: ICD-10-PCS | Performed by: SURGERY

## 2024-10-08 PROCEDURE — 36430 TRANSFUSION BLD/BLD COMPNT: CPT

## 2024-10-08 PROCEDURE — 047S3ZZ DILATION OF LEFT POSTERIOR TIBIAL ARTERY, PERCUTANEOUS APPROACH: ICD-10-PCS | Performed by: SURGERY

## 2024-10-08 PROCEDURE — P9016 RBC LEUKOCYTES REDUCED: HCPCS

## 2024-10-08 PROCEDURE — 6370000000 HC RX 637 (ALT 250 FOR IP): Performed by: SURGERY

## 2024-10-08 PROCEDURE — 85520 HEPARIN ASSAY: CPT

## 2024-10-08 PROCEDURE — 86923 COMPATIBILITY TEST ELECTRIC: CPT

## 2024-10-08 PROCEDURE — 85018 HEMOGLOBIN: CPT

## 2024-10-08 PROCEDURE — 83605 ASSAY OF LACTIC ACID: CPT

## 2024-10-08 PROCEDURE — 80053 COMPREHEN METABOLIC PANEL: CPT

## 2024-10-08 PROCEDURE — 041L0KL BYPASS LEFT FEMORAL ARTERY TO POPLITEAL ARTERY WITH NONAUTOLOGOUS TISSUE SUBSTITUTE, OPEN APPROACH: ICD-10-PCS | Performed by: SURGERY

## 2024-10-08 PROCEDURE — 86850 RBC ANTIBODY SCREEN: CPT

## 2024-10-08 PROCEDURE — 86900 BLOOD TYPING SEROLOGIC ABO: CPT

## 2024-10-08 PROCEDURE — 87150 DNA/RNA AMPLIFIED PROBE: CPT

## 2024-10-08 PROCEDURE — 0KNT0ZZ RELEASE LEFT LOWER LEG MUSCLE, OPEN APPROACH: ICD-10-PCS | Performed by: SURGERY

## 2024-10-08 PROCEDURE — 047N3ZZ DILATION OF LEFT POPLITEAL ARTERY, PERCUTANEOUS APPROACH: ICD-10-PCS | Performed by: SURGERY

## 2024-10-08 PROCEDURE — 83735 ASSAY OF MAGNESIUM: CPT

## 2024-10-08 PROCEDURE — 87040 BLOOD CULTURE FOR BACTERIA: CPT

## 2024-10-08 PROCEDURE — 6370000000 HC RX 637 (ALT 250 FOR IP): Performed by: INTERNAL MEDICINE

## 2024-10-08 PROCEDURE — 2580000003 HC RX 258: Performed by: INTERNAL MEDICINE

## 2024-10-08 PROCEDURE — 36415 COLL VENOUS BLD VENIPUNCTURE: CPT

## 2024-10-08 PROCEDURE — 04CL0ZZ EXTIRPATION OF MATTER FROM LEFT FEMORAL ARTERY, OPEN APPROACH: ICD-10-PCS | Performed by: SURGERY

## 2024-10-08 PROCEDURE — 04CN0ZZ EXTIRPATION OF MATTER FROM LEFT POPLITEAL ARTERY, OPEN APPROACH: ICD-10-PCS | Performed by: SURGERY

## 2024-10-08 PROCEDURE — 85014 HEMATOCRIT: CPT

## 2024-10-08 PROCEDURE — 2580000003 HC RX 258: Performed by: SURGERY

## 2024-10-08 PROCEDURE — 85025 COMPLETE CBC W/AUTO DIFF WBC: CPT

## 2024-10-08 RX ORDER — SODIUM CHLORIDE 9 MG/ML
INJECTION, SOLUTION INTRAVENOUS PRN
Status: DISCONTINUED | OUTPATIENT
Start: 2024-10-08 | End: 2024-10-08

## 2024-10-08 RX ORDER — TRAZODONE HYDROCHLORIDE 50 MG/1
50 TABLET, FILM COATED ORAL NIGHTLY
COMMUNITY

## 2024-10-08 RX ORDER — SODIUM CHLORIDE 9 MG/ML
INJECTION, SOLUTION INTRAVENOUS PRN
Status: DISCONTINUED | OUTPATIENT
Start: 2024-10-08 | End: 2024-10-15 | Stop reason: HOSPADM

## 2024-10-08 RX ORDER — LACTOBACILLUS RHAMNOSUS GG 10B CELL
1 CAPSULE ORAL
Status: DISCONTINUED | OUTPATIENT
Start: 2024-10-09 | End: 2024-10-15 | Stop reason: HOSPADM

## 2024-10-08 RX ORDER — PRAVASTATIN SODIUM 20 MG
40 TABLET ORAL NIGHTLY
Status: DISCONTINUED | OUTPATIENT
Start: 2024-10-08 | End: 2024-10-10 | Stop reason: SDUPTHER

## 2024-10-08 RX ORDER — TRAZODONE HYDROCHLORIDE 50 MG/1
50 TABLET, FILM COATED ORAL NIGHTLY
Status: DISCONTINUED | OUTPATIENT
Start: 2024-10-08 | End: 2024-10-10 | Stop reason: SDUPTHER

## 2024-10-08 RX ORDER — CLOPIDOGREL BISULFATE 75 MG/1
75 TABLET ORAL DAILY
Status: DISCONTINUED | OUTPATIENT
Start: 2024-10-08 | End: 2024-10-15 | Stop reason: HOSPADM

## 2024-10-08 RX ADMIN — HEPARIN SODIUM 8 UNITS/KG/HR: 10000 INJECTION, SOLUTION INTRAVENOUS at 05:40

## 2024-10-08 RX ADMIN — HYDROMORPHONE HYDROCHLORIDE 0.5 MG: 1 INJECTION, SOLUTION INTRAMUSCULAR; INTRAVENOUS; SUBCUTANEOUS at 10:00

## 2024-10-08 RX ADMIN — HYDROMORPHONE HYDROCHLORIDE 0.5 MG: 1 INJECTION, SOLUTION INTRAMUSCULAR; INTRAVENOUS; SUBCUTANEOUS at 20:27

## 2024-10-08 RX ADMIN — HYDROMORPHONE HYDROCHLORIDE 0.5 MG: 1 INJECTION, SOLUTION INTRAMUSCULAR; INTRAVENOUS; SUBCUTANEOUS at 23:00

## 2024-10-08 RX ADMIN — ACETAMINOPHEN 650 MG: 325 TABLET ORAL at 03:29

## 2024-10-08 RX ADMIN — MAGNESIUM SULFATE HEPTAHYDRATE 2000 MG: 40 INJECTION, SOLUTION INTRAVENOUS at 01:48

## 2024-10-08 RX ADMIN — HYDROMORPHONE HYDROCHLORIDE 0.5 MG: 1 INJECTION, SOLUTION INTRAMUSCULAR; INTRAVENOUS; SUBCUTANEOUS at 14:19

## 2024-10-08 RX ADMIN — CEFEPIME 2000 MG: 2 INJECTION, POWDER, FOR SOLUTION INTRAVENOUS at 09:15

## 2024-10-08 RX ADMIN — HYDROMORPHONE HYDROCHLORIDE 0.5 MG: 1 INJECTION, SOLUTION INTRAMUSCULAR; INTRAVENOUS; SUBCUTANEOUS at 05:48

## 2024-10-08 RX ADMIN — ACETAMINOPHEN 650 MG: 325 TABLET ORAL at 12:11

## 2024-10-08 RX ADMIN — DIPHENHYDRAMINE HYDROCHLORIDE 25 MG: 50 INJECTION INTRAMUSCULAR; INTRAVENOUS at 03:29

## 2024-10-08 RX ADMIN — HYDROCODONE BITARTRATE AND ACETAMINOPHEN 1 TABLET: 5; 325 TABLET ORAL at 17:23

## 2024-10-08 RX ADMIN — HYDROMORPHONE HYDROCHLORIDE 0.5 MG: 1 INJECTION, SOLUTION INTRAMUSCULAR; INTRAVENOUS; SUBCUTANEOUS at 18:27

## 2024-10-08 RX ADMIN — SODIUM CHLORIDE: 9 INJECTION, SOLUTION INTRAVENOUS at 00:48

## 2024-10-08 RX ADMIN — APIXABAN 5 MG: 5 TABLET, FILM COATED ORAL at 20:31

## 2024-10-08 RX ADMIN — HYDROMORPHONE HYDROCHLORIDE 0.5 MG: 1 INJECTION, SOLUTION INTRAMUSCULAR; INTRAVENOUS; SUBCUTANEOUS at 12:03

## 2024-10-08 RX ADMIN — CEFEPIME 2000 MG: 2 INJECTION, POWDER, FOR SOLUTION INTRAVENOUS at 16:41

## 2024-10-08 RX ADMIN — HYDROMORPHONE HYDROCHLORIDE 0.5 MG: 1 INJECTION, SOLUTION INTRAMUSCULAR; INTRAVENOUS; SUBCUTANEOUS at 03:29

## 2024-10-08 RX ADMIN — SODIUM CHLORIDE, PRESERVATIVE FREE 10 ML: 5 INJECTION INTRAVENOUS at 08:02

## 2024-10-08 RX ADMIN — VANCOMYCIN HYDROCHLORIDE 1750 MG: 10 INJECTION, POWDER, LYOPHILIZED, FOR SOLUTION INTRAVENOUS at 10:20

## 2024-10-08 RX ADMIN — TRAZODONE HYDROCHLORIDE 50 MG: 50 TABLET ORAL at 20:31

## 2024-10-08 RX ADMIN — APIXABAN 5 MG: 5 TABLET, FILM COATED ORAL at 10:18

## 2024-10-08 RX ADMIN — VANCOMYCIN HYDROCHLORIDE 750 MG: 750 INJECTION, POWDER, LYOPHILIZED, FOR SOLUTION INTRAVENOUS at 22:01

## 2024-10-08 RX ADMIN — PRAVASTATIN SODIUM 40 MG: 20 TABLET ORAL at 20:31

## 2024-10-08 RX ADMIN — HYDROCODONE BITARTRATE AND ACETAMINOPHEN 1 TABLET: 5; 325 TABLET ORAL at 08:01

## 2024-10-08 ASSESSMENT — PAIN - FUNCTIONAL ASSESSMENT
PAIN_FUNCTIONAL_ASSESSMENT: PREVENTS OR INTERFERES SOME ACTIVE ACTIVITIES AND ADLS

## 2024-10-08 ASSESSMENT — PAIN DESCRIPTION - ORIENTATION
ORIENTATION: LEFT;LOWER
ORIENTATION: RIGHT;LEFT
ORIENTATION: LEFT
ORIENTATION: LEFT;LOWER

## 2024-10-08 ASSESSMENT — PAIN SCALES - GENERAL
PAINLEVEL_OUTOF10: 10
PAINLEVEL_OUTOF10: 0
PAINLEVEL_OUTOF10: 8
PAINLEVEL_OUTOF10: 10
PAINLEVEL_OUTOF10: 10
PAINLEVEL_OUTOF10: 4
PAINLEVEL_OUTOF10: 2
PAINLEVEL_OUTOF10: 0
PAINLEVEL_OUTOF10: 2
PAINLEVEL_OUTOF10: 0
PAINLEVEL_OUTOF10: 10
PAINLEVEL_OUTOF10: 7
PAINLEVEL_OUTOF10: 8
PAINLEVEL_OUTOF10: 0
PAINLEVEL_OUTOF10: 9
PAINLEVEL_OUTOF10: 6
PAINLEVEL_OUTOF10: 9

## 2024-10-08 ASSESSMENT — PAIN DESCRIPTION - LOCATION
LOCATION: GROIN
LOCATION: LEG
LOCATION: GROIN
LOCATION: INCISION;LEG
LOCATION: INCISION;LEG
LOCATION: LEG
LOCATION: LEG
LOCATION: BACK;LEG
LOCATION: LEG
LOCATION: GROIN
LOCATION: GROIN

## 2024-10-08 ASSESSMENT — PAIN DESCRIPTION - DESCRIPTORS
DESCRIPTORS: DULL;ACHING
DESCRIPTORS: ACHING;DISCOMFORT
DESCRIPTORS: ACHING
DESCRIPTORS: DISCOMFORT
DESCRIPTORS: ACHING

## 2024-10-08 ASSESSMENT — PAIN DESCRIPTION - PAIN TYPE
TYPE: ACUTE PAIN;SURGICAL PAIN
TYPE: SURGICAL PAIN

## 2024-10-08 NOTE — CONSENT
Informed Consent for Blood Component Transfusion Note    I have discussed with the patient the rationale for blood component transfusion; its benefits in treating or preventing fatigue, organ damage, or death; and its risk which includes mild transfusion reactions, rare risk of blood borne infection, or more serious but rare reactions. I have discussed the alternatives to transfusion, including the risk and consequences of not receiving transfusion. The patient had an opportunity to ask questions and had agreed to proceed with transfusion of blood components.    Electronically signed by Jarred Baldwin MD on 10/8/24 at 11:36 AM CDT

## 2024-10-08 NOTE — OP NOTE
Operative Note      Patient: Jong Loyd  YOB: 1950  MRN: [id number]    Date of Procedure: 10/6/2024    Preop diagnosis: Left leg ischemia    Post-Op Diagnosis: Same       Procedure(s):  ANGIOGRAM  FEMORAL ENDARTERECTOMY    Surgeon(s):  Karen Morrow DO    Assistant:   * No surgical staff found *    Anesthesia: General    Estimated Blood Loss (mL): 150    Complications: None    Specimens:   * No specimens in log *    Implants:  Implant Name Type Inv. Item Serial No.  Lot No. LRB No. Used Action   GRAFT VASC W0.8XL8CM THK0.35-0.75MM CAR PERICARD PROC BOV - [id number] Vascular grafts GRAFT VASC [id number] THK0.35-0.75MM CAR PERICARD PROC BOV  [employer]WD [id number] Left 1 Implanted         Drains:   Urinary Catheter 10/06/24 Wooten (Active)       Findings:  No flow beyond left common femoral artery  Severe looking plaque in the left common femoral artery.    Detailed Description of Procedure:   Patient was brought to the operating room and placed in supine position.  Preoperative antibiotics were given.  Timeout was performed.  Right common femoral artery was accessed under continuous direct guidance using standard micropuncture technique.  Glide sheath 5 Spanish was inserted.  The J-wire was advanced proximally followed by Omni Flush catheter.  Using J-wire and Omni Flush catheter that was maneuvered to the left external iliac artery.  Wire was removed and using power injection left leg runoff was performed with mentioned above findings.  Then using glide advantage wire I attempted few times to cross the common femoral artery was unsuccessful.  Decision was made to perform left femoral exploration.  Incision was made in a vertical fashion in left groin using 15 blade.  It was carried down through subcutaneous tissues using electrocautery.  There was extensive scar tissue from previous closure devices required both and our of the tedious dissection using Metzenbaum scissors.  
posterior tibial artery.  Nitroglycerin was administered directly to the vessels, with a strong improvement in flow on the next arteriogram.  We then upsized the sheath to a 6 Faroese sheath and deployed in Innova stent, 6 x 40, at the proximal popliteal artery the area of heavy stenosis.  We postdilated this with a 4 x 100 Sebastian balloon and noted a marked improvement in flow.  Although there is disease throughout the arterial system distal to the popliteal bypass, there is a palpable pulse at the posterior tibial artery, a strong signal at the posterior tibial artery at the ankle, and rapid flow to the foot.  There is also a little bit of runoff through the peroneal artery to about the mid calf.  Although we would have like to have gotten three-vessel runoff, we certainly are grateful to have good flow to the foot, with a warm pink foot.  To make sure we would not get any improvement in her anterior tibial artery flow with fasciotomy, we did perform a lateral and anterior compartment fasciotomy in addition to the previously completed posterior and deep compartment fasciotomy done with the exposure of the tibial vessels proximally.  No improvement was noted.  6-0 Prolene suture was used to close the arteriotomy at the sheath site in the left groin after removing the sheath.  Good hemostasis was noted.  This concluded our procedure.  All the incisions were copiously irrigated with saline.  The lateral calf incision was closed with skin staples and a nylon mattress suture to take tension off the closure at the midportion.  The skin closed easily with skin staples.  The medial incision was closed in 2 layers of running Vicryl suture and the skin was closed with skin staples.  The medial thigh incision above the knee was closed in 2 layers of running Vicryl suture and 2 nylon mattress sutures and skin staples at the skin.  The groin incision was copiously irrigated.  Good hemostasis was noted.  Due to the scar tissue

## 2024-10-08 NOTE — ANESTHESIA POSTPROCEDURE EVALUATION
Department of Anesthesiology  Postprocedure Note    Patient: Jong Loyd  MRN: [id number]  YOB: 1950  Date of evaluation: 10/7/2024    Procedure Summary       Date: 10/07/24 Room / Location: [facility] OR [address]    Anesthesia Start: 1503 Anesthesia Stop: 2352    Procedure: LEFT FEMORAL POPLITEAL BYPASS, REVISION OF LEFT FEMORAL ENDARTERECTOMY, LEFT POPLITEAL ENDARTERECTOMY, ARTERIOGRAM LEFT LOWER EXTREMITY WITH ANGIOPLASTY AND STENT PLACEMENT, FASCIOTOMY (Left) Diagnosis:       Atherosclerosis of native arteries of extremities with rest pain, left leg (HCC)      Critical limb ischemia of left lower extremity (HCC)      (Atherosclerosis of native arteries of extremities with rest pain, left leg (HCC) [I70.222])      (Critical limb ischemia of left lower extremity (HCC) [I70.222])    Surgeons: Kailee Alaniz MD Responsible Provider: Santosh Alonzo APRN - CRNA    Anesthesia Type: general ASA Status: 3            Anesthesia Type: No value filed.    Malaika Phase I: Malaika Score: 9    Malaika Phase II:      Anesthesia Post Evaluation    Patient location during evaluation: ICU  Patient participation: complete - patient participated  Level of consciousness: sleepy but conscious  Pain score: 0  Airway patency: patent  Nausea & Vomiting: no nausea and no vomiting  Cardiovascular status: hemodynamically stable, blood pressure returned to baseline and tachycardic  Respiratory status: acceptable, spontaneous ventilation, nonlabored ventilation and room air  Hydration status: stable  Comments: BP (!) 132/54   Pulse (!) 105   Temp 99.4 °F (37.4 °C) (Skin)   Resp 18   Ht 1.753 m (5' 9\")   Wt 63.6 kg (140 lb 3.2 oz)   SpO2 95%   BMI 20.70 kg/m²     Pain management: adequate    No notable events documented.

## 2024-10-09 PROBLEM — Z51.5 PALLIATIVE CARE PATIENT: Status: ACTIVE | Noted: 2024-10-09

## 2024-10-09 LAB
A BAUMANNII DNA BLD POS QL NAA+NON-PROBE: NOT DETECTED
ALBUMIN SERPL-MCNC: 2.8 G/DL (ref 3.5–5.2)
ALP SERPL-CCNC: 48 U/L (ref 40–129)
ALT SERPL-CCNC: 6 U/L (ref 5–41)
ANION GAP SERPL CALCULATED.3IONS-SCNC: 8 MMOL/L (ref 7–19)
AST SERPL-CCNC: 24 U/L (ref 5–40)
BACTERIA URNS QL MICRO: NEGATIVE /HPF
BASOPHILS # BLD: 0 K/UL (ref 0–0.2)
BASOPHILS NFR BLD: 0.4 % (ref 0–1)
BILIRUB SERPL-MCNC: 0.9 MG/DL (ref 0.2–1.2)
BILIRUB UR QL STRIP: NEGATIVE
BUN SERPL-MCNC: 11 MG/DL (ref 8–23)
C ALBICANS DNA BLD POS QL NAA+NON-PROBE: NOT DETECTED
C AURIS DNA BLD POS QL NAA+PROBE: NOT DETECTED
C GLABRATA DNA BLD POS QL NAA+NON-PROBE: NOT DETECTED
C KRUSEI DNA BLD POS QL NAA+NON-PROBE: NOT DETECTED
C PARAP DNA BLD POS QL NAA+NON-PROBE: NOT DETECTED
C TROPICLS DNA BLD POS QL NAA+NON-PROBE: NOT DETECTED
CALCIUM SERPL-MCNC: 7.4 MG/DL (ref 8.8–10.2)
CHLORIDE SERPL-SCNC: 108 MMOL/L (ref 98–111)
CLARITY UR: CLEAR
CO2 SERPL-SCNC: 25 MMOL/L (ref 22–29)
COLOR UR: YELLOW
CREAT SERPL-MCNC: 0.7 MG/DL (ref 0.7–1.2)
CRYPTOCOCCUS NEOFORMANS/GATTII BY PCR: NOT DETECTED
CRYSTALS URNS MICRO: NORMAL /HPF
E CLOAC COMP DNA BLD POS NAA+NON-PROBE: NOT DETECTED
E COLI DNA BLD POS QL NAA+NON-PROBE: NOT DETECTED
E FAECALIS DNA BLD POS QL NAA+PROBE: NOT DETECTED
E FAECIUM DNA BLD POS QL NAA+PROBE: NOT DETECTED
ECHO BSA: 1.76 M2
ECHO BSA: 1.76 M2
ENTEROBACT DNA BLD POS QL NAA+NON-PROBE: NOT DETECTED
ENTEROCOC DNA BLD POS QL NAA+NON-PROBE: NOT DETECTED
EOSINOPHIL # BLD: 0.1 K/UL (ref 0–0.6)
EOSINOPHIL NFR BLD: 0.8 % (ref 0–5)
EPI CELLS #/AREA URNS AUTO: 1 /HPF (ref 0–5)
ERYTHROCYTE [DISTWIDTH] IN BLOOD BY AUTOMATED COUNT: 15.2 % (ref 11.5–14.5)
GLUCOSE SERPL-MCNC: 99 MG/DL (ref 70–99)
GLUCOSE UR STRIP.AUTO-MCNC: NEGATIVE MG/DL
GN BLD CULTURE PNL BLD POS NAA+PROBE: NOT DETECTED
GP B STREP DNA BLD POS QL NAA+NON-PROBE: NOT DETECTED
HCT VFR BLD AUTO: 24.5 % (ref 42–52)
HGB BLD-MCNC: 7.5 G/DL (ref 14–18)
HGB UR STRIP.AUTO-MCNC: ABNORMAL MG/L
HYALINE CASTS #/AREA URNS AUTO: 0 /HPF (ref 0–8)
IMM GRANULOCYTES # BLD: 0 K/UL
K OXYTOCA DNA BLD POS QL NAA+NON-PROBE: NOT DETECTED
K PNEUMON DNA SPEC QL NAA+PROBE: NOT DETECTED
K. AEROGENES DNA SPEC QL NAA+PROBE: NOT DETECTED
KETONES UR STRIP.AUTO-MCNC: NEGATIVE MG/DL
L MONOCYTOG DNA BLD POS QL NAA+NON-PROBE: NOT DETECTED
LEUKOCYTE ESTERASE UR QL STRIP.AUTO: ABNORMAL
LYMPHOCYTES # BLD: 1.2 K/UL (ref 1.1–4.5)
LYMPHOCYTES NFR BLD: 16 % (ref 20–40)
MAGNESIUM SERPL-MCNC: 2 MG/DL (ref 1.6–2.4)
MCH RBC QN AUTO: 28.7 PG (ref 27–31)
MCHC RBC AUTO-ENTMCNC: 30.6 G/DL (ref 33–37)
MCV RBC AUTO: 93.9 FL (ref 80–94)
MECA BLD POS QL NAA+NON-PROBE: DETECTED
MONOCYTES # BLD: 0.8 K/UL (ref 0–0.9)
MONOCYTES NFR BLD: 10.6 % (ref 0–10)
N MEN DNA BLD POS QL NAA+NON-PROBE: NOT DETECTED
NEUTROPHILS # BLD: 5.2 K/UL (ref 1.5–7.5)
NEUTS SEG NFR BLD: 71.9 % (ref 50–65)
NITRITE UR QL STRIP.AUTO: NEGATIVE
P AERUGINOSA DNA BLD POS NAA+NON-PROBE: NOT DETECTED
PH UR STRIP.AUTO: 6 [PH] (ref 5–8)
PLATELET # BLD AUTO: 106 K/UL (ref 130–400)
PMV BLD AUTO: 11.6 FL (ref 9.4–12.4)
POTASSIUM SERPL-SCNC: 3.6 MMOL/L (ref 3.5–5)
PREALB SERPL-MCNC: 11 MG/DL (ref 20–40)
PROT SERPL-MCNC: 4.5 G/DL (ref 6.4–8.3)
PROT UR STRIP.AUTO-MCNC: NEGATIVE MG/DL
PROTEUS SP DNA BLD POS QL NAA+NON-PROBE: NOT DETECTED
RBC # BLD AUTO: 2.61 M/UL (ref 4.7–6.1)
RBC #/AREA URNS AUTO: 4 /HPF (ref 0–4)
S AUREUS DNA BLD POS QL NAA+NON-PROBE: NOT DETECTED
S AUREUS+CONS DNA BLD POS NAA+NON-PROBE: DETECTED
S EPIDERMIDIS DNA BLD POS QL NAA+PROBE: DETECTED
S LUGDUNENSIS DNA BLD POS QL NAA+PROBE: NOT DETECTED
S MALTOPH DNA BLD POS QL NAA+PROBE: NOT DETECTED
S MARCESCENS DNA BLD POS NAA+NON-PROBE: NOT DETECTED
S PNEUM DNA BLD POS QL NAA+NON-PROBE: NOT DETECTED
S PYO DNA BLD POS QL NAA+NON-PROBE: NOT DETECTED
SALMONELLA DNA BLD POS QL NAA+PROBE: NOT DETECTED
SODIUM SERPL-SCNC: 141 MMOL/L (ref 136–145)
SP GR UR STRIP.AUTO: 1.02 (ref 1–1.03)
STREPTOCOCCUS DNA BLD POS NAA+NON-PROBE: NOT DETECTED
UROBILINOGEN UR STRIP.AUTO-MCNC: 1 E.U./DL
VANCOMYCIN TROUGH SERPL-MCNC: 7.6 UG/ML (ref 10–20)
VAS LEFT ATA MID PSV: 0 CM/S
VAS LEFT DIST OUTFLOW EDV: 0 CM/S
VAS LEFT DIST OUTFLOW PSV: 0 CM/S
VAS LEFT GRAFT 1: NORMAL
VAS LEFT GSV AT KNEE DIAM: 1.8 MM
VAS LEFT GSV BK DIST DIAM: 2 MM
VAS LEFT GSV BK MID DIAM: 1.4 MM
VAS LEFT GSV BK PROX DIAM: 1.4 MM
VAS LEFT GSV JUNC DIAM: 3.3 MM
VAS LEFT GSV THIGH DIST DIAM: 2.3 MM
VAS LEFT GSV THIGH MID DIAM: 2.5 MM
VAS LEFT GSV THIGH PROX DIAM: 1.9 MM
VAS LEFT MID OUTFLOW EDV: 0 CM/S
VAS LEFT MID OUTFLOW PSV: 0 CM/S
VAS LEFT PERONEAL MID PSV: 0 CM/S
VAS LEFT PFA PROX PSV: 71.9 CM/S
VAS LEFT POP A DIST PSV: 0 CM/S
VAS LEFT POP A PROX PSV: 0 CM/S
VAS LEFT PROX OUTFLOW EDV: 0 CM/S
VAS LEFT PROX OUTFLOW PSV: 0 CM/S
VAS LEFT PTA MID PSV: 0 CM/S
VAS LEFT SFA DIST PSV: 0 CM/S
VAS LEFT SFA MID PSV: 0 CM/S
VAS LEFT SFA MID VEL RATIO: 0
VAS LEFT SFA PROX PSV: 155 CM/S
VAS LEFT SSV DIST DIAM: 1 MM
VAS LEFT SSV MID DIAM: 1.1 MM
VAS LEFT SSV PROX DIAM: 1.3 MM
WBC # BLD AUTO: 7.2 K/UL (ref 4.8–10.8)
WBC #/AREA URNS AUTO: 2 /HPF (ref 0–5)

## 2024-10-09 PROCEDURE — 1200000000 HC SEMI PRIVATE

## 2024-10-09 PROCEDURE — 83735 ASSAY OF MAGNESIUM: CPT

## 2024-10-09 PROCEDURE — 6360000002 HC RX W HCPCS: Performed by: INTERNAL MEDICINE

## 2024-10-09 PROCEDURE — 6370000000 HC RX 637 (ALT 250 FOR IP): Performed by: INTERNAL MEDICINE

## 2024-10-09 PROCEDURE — 6360000002 HC RX W HCPCS: Performed by: SURGERY

## 2024-10-09 PROCEDURE — 80053 COMPREHEN METABOLIC PANEL: CPT

## 2024-10-09 PROCEDURE — 85025 COMPLETE CBC W/AUTO DIFF WBC: CPT

## 2024-10-09 PROCEDURE — 87040 BLOOD CULTURE FOR BACTERIA: CPT

## 2024-10-09 PROCEDURE — 81001 URINALYSIS AUTO W/SCOPE: CPT

## 2024-10-09 PROCEDURE — 2580000003 HC RX 258: Performed by: INTERNAL MEDICINE

## 2024-10-09 PROCEDURE — 80202 ASSAY OF VANCOMYCIN: CPT

## 2024-10-09 PROCEDURE — 94760 N-INVAS EAR/PLS OXIMETRY 1: CPT

## 2024-10-09 PROCEDURE — 84134 ASSAY OF PREALBUMIN: CPT

## 2024-10-09 PROCEDURE — 2580000003 HC RX 258: Performed by: SURGERY

## 2024-10-09 PROCEDURE — 6370000000 HC RX 637 (ALT 250 FOR IP): Performed by: SURGERY

## 2024-10-09 PROCEDURE — 36415 COLL VENOUS BLD VENIPUNCTURE: CPT

## 2024-10-09 RX ORDER — VANCOMYCIN 1.75 G/350ML
1250 INJECTION, SOLUTION INTRAVENOUS EVERY 12 HOURS
Status: DISCONTINUED | OUTPATIENT
Start: 2024-10-09 | End: 2024-10-09 | Stop reason: SDUPTHER

## 2024-10-09 RX ADMIN — TRAZODONE HYDROCHLORIDE 50 MG: 50 TABLET ORAL at 19:51

## 2024-10-09 RX ADMIN — PRAVASTATIN SODIUM 40 MG: 20 TABLET ORAL at 19:52

## 2024-10-09 RX ADMIN — HYDROMORPHONE HYDROCHLORIDE 0.5 MG: 1 INJECTION, SOLUTION INTRAMUSCULAR; INTRAVENOUS; SUBCUTANEOUS at 10:07

## 2024-10-09 RX ADMIN — VANCOMYCIN HYDROCHLORIDE 1250 MG: 10 INJECTION, POWDER, LYOPHILIZED, FOR SOLUTION INTRAVENOUS at 23:46

## 2024-10-09 RX ADMIN — SODIUM CHLORIDE: 9 INJECTION, SOLUTION INTRAVENOUS at 01:35

## 2024-10-09 RX ADMIN — APIXABAN 5 MG: 5 TABLET, FILM COATED ORAL at 08:18

## 2024-10-09 RX ADMIN — CEFEPIME 2000 MG: 2 INJECTION, POWDER, FOR SOLUTION INTRAVENOUS at 17:27

## 2024-10-09 RX ADMIN — CEFEPIME 2000 MG: 2 INJECTION, POWDER, FOR SOLUTION INTRAVENOUS at 01:37

## 2024-10-09 RX ADMIN — HYDROMORPHONE HYDROCHLORIDE 0.5 MG: 1 INJECTION, SOLUTION INTRAMUSCULAR; INTRAVENOUS; SUBCUTANEOUS at 17:27

## 2024-10-09 RX ADMIN — HYDROMORPHONE HYDROCHLORIDE 0.5 MG: 1 INJECTION, SOLUTION INTRAMUSCULAR; INTRAVENOUS; SUBCUTANEOUS at 05:53

## 2024-10-09 RX ADMIN — SODIUM CHLORIDE 250 MG: 9 INJECTION, SOLUTION INTRAVENOUS at 17:27

## 2024-10-09 RX ADMIN — EPOETIN ALFA-EPBX 10000 UNITS: 10000 INJECTION, SOLUTION INTRAVENOUS; SUBCUTANEOUS at 17:27

## 2024-10-09 RX ADMIN — VANCOMYCIN HYDROCHLORIDE 750 MG: 750 INJECTION, POWDER, LYOPHILIZED, FOR SOLUTION INTRAVENOUS at 10:00

## 2024-10-09 RX ADMIN — HYDROMORPHONE HYDROCHLORIDE 0.5 MG: 1 INJECTION, SOLUTION INTRAMUSCULAR; INTRAVENOUS; SUBCUTANEOUS at 22:06

## 2024-10-09 RX ADMIN — Medication 1 CAPSULE: at 08:18

## 2024-10-09 RX ADMIN — HYDROCODONE BITARTRATE AND ACETAMINOPHEN 1 TABLET: 5; 325 TABLET ORAL at 18:40

## 2024-10-09 RX ADMIN — APIXABAN 5 MG: 5 TABLET, FILM COATED ORAL at 19:51

## 2024-10-09 RX ADMIN — HYDROMORPHONE HYDROCHLORIDE 0.5 MG: 1 INJECTION, SOLUTION INTRAMUSCULAR; INTRAVENOUS; SUBCUTANEOUS at 13:35

## 2024-10-09 RX ADMIN — CEFEPIME 2000 MG: 2 INJECTION, POWDER, FOR SOLUTION INTRAVENOUS at 08:17

## 2024-10-09 RX ADMIN — HYDROCODONE BITARTRATE AND ACETAMINOPHEN 1 TABLET: 5; 325 TABLET ORAL at 11:20

## 2024-10-09 ASSESSMENT — PAIN SCALES - GENERAL
PAINLEVEL_OUTOF10: 2
PAINLEVEL_OUTOF10: 4
PAINLEVEL_OUTOF10: 3
PAINLEVEL_OUTOF10: 8
PAINLEVEL_OUTOF10: 7
PAINLEVEL_OUTOF10: 2
PAINLEVEL_OUTOF10: 5
PAINLEVEL_OUTOF10: 9
PAINLEVEL_OUTOF10: 8
PAINLEVEL_OUTOF10: 0
PAINLEVEL_OUTOF10: 0
PAINLEVEL_OUTOF10: 7
PAINLEVEL_OUTOF10: 8

## 2024-10-09 ASSESSMENT — PAIN DESCRIPTION - ONSET
ONSET: ON-GOING

## 2024-10-09 ASSESSMENT — PAIN DESCRIPTION - LOCATION
LOCATION: INCISION
LOCATION: INCISION
LOCATION: GROIN
LOCATION: GROIN
LOCATION: INCISION

## 2024-10-09 ASSESSMENT — PAIN DESCRIPTION - PAIN TYPE
TYPE: SURGICAL PAIN

## 2024-10-09 ASSESSMENT — PAIN DESCRIPTION - ORIENTATION
ORIENTATION: LEFT
ORIENTATION: RIGHT;LEFT
ORIENTATION: LEFT

## 2024-10-09 ASSESSMENT — PAIN - FUNCTIONAL ASSESSMENT
PAIN_FUNCTIONAL_ASSESSMENT: ACTIVITIES ARE NOT PREVENTED

## 2024-10-09 ASSESSMENT — PAIN DESCRIPTION - DESCRIPTORS
DESCRIPTORS: DISCOMFORT
DESCRIPTORS: DISCOMFORT
DESCRIPTORS: DULL
DESCRIPTORS: BURNING
DESCRIPTORS: ACHING

## 2024-10-09 ASSESSMENT — PAIN DESCRIPTION - FREQUENCY
FREQUENCY: INTERMITTENT
FREQUENCY: OTHER (COMMENT)

## 2024-10-09 ASSESSMENT — PAIN SCALES - WONG BAKER
WONGBAKER_NUMERICALRESPONSE: HURTS A LITTLE BIT
WONGBAKER_NUMERICALRESPONSE: HURTS A LITTLE BIT

## 2024-10-09 NOTE — PLAN OF CARE
Problem: Discharge Planning  Goal: Discharge to home or other facility with appropriate resources  10/9/2024 0234 by Chloe Beal RN  Outcome: Progressing  10/8/2024 1344 by Alisa Rodriges RN  Outcome: Progressing     Problem: Pain  Goal: Verbalizes/displays adequate comfort level or baseline comfort level  10/9/2024 0234 by Chloe Beal RN  Outcome: Progressing  10/8/2024 1344 by Alisa Rodriges RN  Outcome: Progressing     Problem: Skin/Tissue Integrity  Goal: Absence of new skin breakdown  Description: 1.  Monitor for areas of redness and/or skin breakdown  2.  Assess vascular access sites hourly  3.  Every 4-6 hours minimum:  Change oxygen saturation probe site  4.  Every 4-6 hours:  If on nasal continuous positive airway pressure, respiratory therapy assess nares and determine need for appliance change or resting period.  10/9/2024 0234 by Chloe Beal RN  Outcome: Progressing  10/8/2024 1344 by Alisa Rodriges RN  Outcome: Progressing     Problem: Safety - Adult  Goal: Free from fall injury  10/9/2024 0234 by Chloe Beal RN  Outcome: Progressing  10/8/2024 1344 by Alisa Rodriges RN  Outcome: Progressing     Problem: ABCDS Injury Assessment  Goal: Absence of physical injury  10/9/2024 0234 by Chloe Beal RN  Outcome: Progressing  10/8/2024 1344 by Alisa Rodriges RN  Outcome: Progressing

## 2024-10-09 NOTE — CONSULTS
Palliative Care:   Initiated for support, goals, ACP.  ACP completed by Bushra Love, [facility]  pror to palliative care visit.  Mr. Mac was in ICU this morning then moved to [facility].  He is sitting up in bed, awake, alert and oriented x 4. Very pleasant man. He talks with me about events leading to ED visit.              Past Medical History:        Past Medical History:   Diagnosis Date    Arthritis     Benign prostate hyperplasia     Bladder cancer (HCC) 03/15/2022    Carotid stenosis     CLL (chronic lymphocytic leukemia) (HCC) 03/10/2021    Colon polyp     COPD (chronic obstructive pulmonary disease) (HCC)     Cubital tunnel syndrome     Flu     recent; tx with steroid pack    Hyperlipidemia     Hypertension     IBS (irritable bowel syndrome)     Palliative care patient 10/09/2024    Peripheral vascular disease (HCC)     Thyroid disease     TIA (transient ischemic attack) 2014    Left side weakness       Advance Directives:   Full Code ACP completed by Bushra Love.    Pt tells this nurse he would want his dtr Mikey Murillo to be primary HCS, ok for other 2 dtrs to be secondary. He did not want to complete an AD.          Pain/Other Symptoms:  Left leg pain post surgical \"10\"  RN in to administer medication.             How are symptoms affecting QOL   Leg pain has interrupted my [employer] business.  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  Grandson is assisting while he is hospitalized.        Psychological/Spiritual: Attends [employer].  has just seen him.  He has 3 dtrs all involved with his care/support as needed.                      Plan:   Medical management         Patient/family discussion r/t goals:           (what does living well look like to you)?  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    He states he has no difficulties with manageing daily life.              Palliative Performance Scale:        90    Palliative Care  PMD range of motion is not limited, no muscle or joint tenderness

## 2024-10-09 NOTE — PLAN OF CARE
Problem: Discharge Planning  Goal: Discharge to home or other facility with appropriate resources  10/9/2024 0920 by Giselle Fuentes RN  Outcome: Progressing  10/9/2024 0234 by Chloe Beal RN  Outcome: Progressing     Problem: Pain  Goal: Verbalizes/displays adequate comfort level or baseline comfort level  10/9/2024 0920 by Giselle Fuentes RN  Outcome: Progressing  10/9/2024 0234 by Chloe Beal RN  Outcome: Progressing     Problem: Skin/Tissue Integrity  Goal: Absence of new skin breakdown  Description: 1.  Monitor for areas of redness and/or skin breakdown  2.  Assess vascular access sites hourly  3.  Every 4-6 hours minimum:  Change oxygen saturation probe site  4.  Every 4-6 hours:  If on nasal continuous positive airway pressure, respiratory therapy assess nares and determine need for appliance change or resting period.  10/9/2024 0920 by Giselle Fuentes RN  Outcome: Progressing  10/9/2024 0234 by Chloe Beal RN  Outcome: Progressing     Problem: Safety - Adult  Goal: Free from fall injury  10/9/2024 0920 by Giselle Fuentes RN  Outcome: Progressing  10/9/2024 0234 by Chloe Beal RN  Outcome: Progressing     Problem: ABCDS Injury Assessment  Goal: Absence of physical injury  10/9/2024 0920 by Giselle Fuentes RN  Outcome: Progressing  10/9/2024 0234 by Chloe Beal RN  Outcome: Progressing

## 2024-10-10 ENCOUNTER — APPOINTMENT (OUTPATIENT)
Dept: VASCULAR LAB | Age: 74
DRG: 253 | End: 2024-10-10
Attending: SURGERY
Payer: OTHER GOVERNMENT

## 2024-10-10 ENCOUNTER — APPOINTMENT (OUTPATIENT)
Dept: GENERAL RADIOLOGY | Age: 74
DRG: 253 | End: 2024-10-10
Attending: STUDENT IN AN ORGANIZED HEALTH CARE EDUCATION/TRAINING PROGRAM
Payer: OTHER GOVERNMENT

## 2024-10-10 PROBLEM — D62 ACUTE BLOOD LOSS AS CAUSE OF POSTOPERATIVE ANEMIA: Status: ACTIVE | Noted: 2024-10-10

## 2024-10-10 PROBLEM — T14.8XXA HEMATOMA: Status: ACTIVE | Noted: 2024-10-10

## 2024-10-10 LAB
ALBUMIN SERPL-MCNC: 2.6 G/DL (ref 3.5–5.2)
ALP SERPL-CCNC: 68 U/L (ref 40–129)
ALT SERPL-CCNC: 8 U/L (ref 5–41)
ANION GAP SERPL CALCULATED.3IONS-SCNC: 6 MMOL/L (ref 7–19)
AST SERPL-CCNC: 23 U/L (ref 5–40)
BACTERIA BLD CULT: ABNORMAL
BACTERIA BLD CULT: ABNORMAL
BASOPHILS # BLD: 0 K/UL (ref 0–0.2)
BASOPHILS NFR BLD: 0.3 % (ref 0–1)
BILIRUB SERPL-MCNC: 0.5 MG/DL (ref 0.2–1.2)
BUN SERPL-MCNC: 8 MG/DL (ref 8–23)
CALCIUM SERPL-MCNC: 7.4 MG/DL (ref 8.8–10.2)
CHLORIDE SERPL-SCNC: 106 MMOL/L (ref 98–111)
CO2 SERPL-SCNC: 26 MMOL/L (ref 22–29)
CREAT SERPL-MCNC: 0.6 MG/DL (ref 0.7–1.2)
EOSINOPHIL # BLD: 0.1 K/UL (ref 0–0.6)
EOSINOPHIL NFR BLD: 2 % (ref 0–5)
ERYTHROCYTE [DISTWIDTH] IN BLOOD BY AUTOMATED COUNT: 14.6 % (ref 11.5–14.5)
GLUCOSE SERPL-MCNC: 125 MG/DL (ref 70–99)
HCT VFR BLD AUTO: 22.5 % (ref 42–52)
HGB BLD-MCNC: 7.2 G/DL (ref 14–18)
IMM GRANULOCYTES # BLD: 0 K/UL
LYMPHOCYTES # BLD: 1.2 K/UL (ref 1.1–4.5)
LYMPHOCYTES NFR BLD: 19.1 % (ref 20–40)
MAGNESIUM SERPL-MCNC: 1.9 MG/DL (ref 1.6–2.4)
MCH RBC QN AUTO: 29.4 PG (ref 27–31)
MCHC RBC AUTO-ENTMCNC: 32 G/DL (ref 33–37)
MCV RBC AUTO: 91.8 FL (ref 80–94)
MONOCYTES # BLD: 0.5 K/UL (ref 0–0.9)
MONOCYTES NFR BLD: 8.1 % (ref 0–10)
NEUTROPHILS # BLD: 4.5 K/UL (ref 1.5–7.5)
NEUTS SEG NFR BLD: 69.9 % (ref 50–65)
ORGANISM: ABNORMAL
PHOSPHATE SERPL-MCNC: 1.4 MG/DL (ref 2.5–4.5)
PLATELET # BLD AUTO: 107 K/UL (ref 130–400)
PMV BLD AUTO: 11 FL (ref 9.4–12.4)
POTASSIUM SERPL-SCNC: 3.2 MMOL/L (ref 3.5–5)
PROT SERPL-MCNC: 4.3 G/DL (ref 6.4–8.3)
RBC # BLD AUTO: 2.45 M/UL (ref 4.7–6.1)
SODIUM SERPL-SCNC: 138 MMOL/L (ref 136–145)
WBC # BLD AUTO: 6.4 K/UL (ref 4.8–10.8)

## 2024-10-10 PROCEDURE — 94760 N-INVAS EAR/PLS OXIMETRY 1: CPT

## 2024-10-10 PROCEDURE — 6370000000 HC RX 637 (ALT 250 FOR IP): Performed by: INTERNAL MEDICINE

## 2024-10-10 PROCEDURE — 97116 GAIT TRAINING THERAPY: CPT

## 2024-10-10 PROCEDURE — 6360000002 HC RX W HCPCS: Performed by: INTERNAL MEDICINE

## 2024-10-10 PROCEDURE — 2580000003 HC RX 258: Performed by: INTERNAL MEDICINE

## 2024-10-10 PROCEDURE — 6360000002 HC RX W HCPCS: Performed by: STUDENT IN AN ORGANIZED HEALTH CARE EDUCATION/TRAINING PROGRAM

## 2024-10-10 PROCEDURE — 97161 PT EVAL LOW COMPLEX 20 MIN: CPT

## 2024-10-10 PROCEDURE — 6370000000 HC RX 637 (ALT 250 FOR IP): Performed by: STUDENT IN AN ORGANIZED HEALTH CARE EDUCATION/TRAINING PROGRAM

## 2024-10-10 PROCEDURE — 93922 UPR/L XTREMITY ART 2 LEVELS: CPT

## 2024-10-10 PROCEDURE — 83735 ASSAY OF MAGNESIUM: CPT

## 2024-10-10 PROCEDURE — 85025 COMPLETE CBC W/AUTO DIFF WBC: CPT

## 2024-10-10 PROCEDURE — 2580000003 HC RX 258: Performed by: SURGERY

## 2024-10-10 PROCEDURE — 71045 X-RAY EXAM CHEST 1 VIEW: CPT

## 2024-10-10 PROCEDURE — 84100 ASSAY OF PHOSPHORUS: CPT

## 2024-10-10 PROCEDURE — 80053 COMPREHEN METABOLIC PANEL: CPT

## 2024-10-10 PROCEDURE — 2580000003 HC RX 258: Performed by: STUDENT IN AN ORGANIZED HEALTH CARE EDUCATION/TRAINING PROGRAM

## 2024-10-10 PROCEDURE — 1200000000 HC SEMI PRIVATE

## 2024-10-10 PROCEDURE — 6370000000 HC RX 637 (ALT 250 FOR IP): Performed by: SURGERY

## 2024-10-10 PROCEDURE — 36415 COLL VENOUS BLD VENIPUNCTURE: CPT

## 2024-10-10 RX ORDER — TAMSULOSIN HYDROCHLORIDE 0.4 MG/1
0.4 CAPSULE ORAL DAILY
Status: DISCONTINUED | OUTPATIENT
Start: 2024-10-10 | End: 2024-10-15 | Stop reason: HOSPADM

## 2024-10-10 RX ORDER — POLYETHYLENE GLYCOL 3350 17 G/17G
17 POWDER, FOR SOLUTION ORAL DAILY
Status: DISCONTINUED | OUTPATIENT
Start: 2024-10-10 | End: 2024-10-15 | Stop reason: HOSPADM

## 2024-10-10 RX ORDER — LOSARTAN POTASSIUM 100 MG/1
100 TABLET ORAL NIGHTLY
Status: DISCONTINUED | OUTPATIENT
Start: 2024-10-10 | End: 2024-10-15 | Stop reason: HOSPADM

## 2024-10-10 RX ORDER — LEVOTHYROXINE SODIUM 75 UG/1
150 TABLET ORAL DAILY
Status: DISCONTINUED | OUTPATIENT
Start: 2024-10-10 | End: 2024-10-15 | Stop reason: HOSPADM

## 2024-10-10 RX ORDER — PRAVASTATIN SODIUM 20 MG
40 TABLET ORAL NIGHTLY
Status: DISCONTINUED | OUTPATIENT
Start: 2024-10-10 | End: 2024-10-15 | Stop reason: HOSPADM

## 2024-10-10 RX ORDER — TRAZODONE HYDROCHLORIDE 50 MG/1
50 TABLET, FILM COATED ORAL NIGHTLY
Status: DISCONTINUED | OUTPATIENT
Start: 2024-10-10 | End: 2024-10-15 | Stop reason: HOSPADM

## 2024-10-10 RX ORDER — ASCORBIC ACID 500 MG
500 TABLET ORAL DAILY
Status: DISCONTINUED | OUTPATIENT
Start: 2024-10-10 | End: 2024-10-15 | Stop reason: HOSPADM

## 2024-10-10 RX ORDER — FINASTERIDE 5 MG/1
5 TABLET, FILM COATED ORAL DAILY
Status: DISCONTINUED | OUTPATIENT
Start: 2024-10-10 | End: 2024-10-15 | Stop reason: HOSPADM

## 2024-10-10 RX ADMIN — OXYCODONE HYDROCHLORIDE AND ACETAMINOPHEN 500 MG: 500 TABLET ORAL at 12:40

## 2024-10-10 RX ADMIN — LEVOTHYROXINE SODIUM 150 MCG: 75 TABLET ORAL at 12:40

## 2024-10-10 RX ADMIN — HYDROCODONE BITARTRATE AND ACETAMINOPHEN 1 TABLET: 5; 325 TABLET ORAL at 12:34

## 2024-10-10 RX ADMIN — Medication 1 CAPSULE: at 07:40

## 2024-10-10 RX ADMIN — CEFEPIME 2000 MG: 2 INJECTION, POWDER, FOR SOLUTION INTRAVENOUS at 01:51

## 2024-10-10 RX ADMIN — CLOPIDOGREL BISULFATE 75 MG: 75 TABLET ORAL at 08:35

## 2024-10-10 RX ADMIN — POLYETHYLENE GLYCOL 3350 17 G: 17 POWDER, FOR SOLUTION ORAL at 16:45

## 2024-10-10 RX ADMIN — SODIUM CHLORIDE, PRESERVATIVE FREE 10 ML: 5 INJECTION INTRAVENOUS at 20:00

## 2024-10-10 RX ADMIN — SODIUM CHLORIDE 250 MG: 9 INJECTION, SOLUTION INTRAVENOUS at 16:38

## 2024-10-10 RX ADMIN — FINASTERIDE 5 MG: 5 TABLET, FILM COATED ORAL at 12:40

## 2024-10-10 RX ADMIN — Medication 1000 MG: at 10:00

## 2024-10-10 RX ADMIN — Medication 1000 MG: at 16:41

## 2024-10-10 RX ADMIN — PRAVASTATIN SODIUM 40 MG: 20 TABLET ORAL at 20:01

## 2024-10-10 RX ADMIN — CEFEPIME 2000 MG: 2 INJECTION, POWDER, FOR SOLUTION INTRAVENOUS at 08:54

## 2024-10-10 RX ADMIN — CEFEPIME 2000 MG: 2 INJECTION, POWDER, FOR SOLUTION INTRAVENOUS at 16:40

## 2024-10-10 RX ADMIN — Medication 1000 MG: at 12:34

## 2024-10-10 RX ADMIN — APIXABAN 5 MG: 5 TABLET, FILM COATED ORAL at 20:01

## 2024-10-10 RX ADMIN — VANCOMYCIN HYDROCHLORIDE 1250 MG: 10 INJECTION, POWDER, LYOPHILIZED, FOR SOLUTION INTRAVENOUS at 11:29

## 2024-10-10 RX ADMIN — LOSARTAN POTASSIUM 100 MG: 100 TABLET, FILM COATED ORAL at 20:01

## 2024-10-10 RX ADMIN — HYDROCODONE BITARTRATE AND ACETAMINOPHEN 1 TABLET: 5; 325 TABLET ORAL at 16:41

## 2024-10-10 RX ADMIN — TRAZODONE HYDROCHLORIDE 50 MG: 50 TABLET ORAL at 20:01

## 2024-10-10 RX ADMIN — Medication 1000 MG: at 20:00

## 2024-10-10 RX ADMIN — APIXABAN 5 MG: 5 TABLET, FILM COATED ORAL at 08:35

## 2024-10-10 RX ADMIN — HYDROCODONE BITARTRATE AND ACETAMINOPHEN 1 TABLET: 5; 325 TABLET ORAL at 07:40

## 2024-10-10 RX ADMIN — TAMSULOSIN HYDROCHLORIDE 0.4 MG: 0.4 CAPSULE ORAL at 12:40

## 2024-10-10 ASSESSMENT — PAIN DESCRIPTION - ORIENTATION
ORIENTATION: LEFT

## 2024-10-10 ASSESSMENT — PAIN SCALES - GENERAL
PAINLEVEL_OUTOF10: 10
PAINLEVEL_OUTOF10: 10
PAINLEVEL_OUTOF10: 8
PAINLEVEL_OUTOF10: 9

## 2024-10-10 ASSESSMENT — PAIN DESCRIPTION - LOCATION
LOCATION: LEG

## 2024-10-10 ASSESSMENT — PAIN DESCRIPTION - DESCRIPTORS
DESCRIPTORS: THROBBING;DULL
DESCRIPTORS: THROBBING
DESCRIPTORS: ACHING
DESCRIPTORS: THROBBING;DULL

## 2024-10-10 ASSESSMENT — PAIN - FUNCTIONAL ASSESSMENT
PAIN_FUNCTIONAL_ASSESSMENT: PREVENTS OR INTERFERES SOME ACTIVE ACTIVITIES AND ADLS
PAIN_FUNCTIONAL_ASSESSMENT: PREVENTS OR INTERFERES SOME ACTIVE ACTIVITIES AND ADLS

## 2024-10-10 NOTE — PLAN OF CARE
Problem: Discharge Planning  Goal: Discharge to home or other facility with appropriate resources  10/10/2024 0241 by Laisha Crandall LPN  Outcome: Progressing  10/9/2024 1710 by Mitra Fields RN  Outcome: Not Progressing     Problem: Pain  Goal: Verbalizes/displays adequate comfort level or baseline comfort level  10/10/2024 0241 by Laisha Crandall LPN  Outcome: Progressing  10/9/2024 1710 by Mitra Fields RN  Outcome: Not Progressing     Problem: Skin/Tissue Integrity  Goal: Absence of new skin breakdown  Description: 1.  Monitor for areas of redness and/or skin breakdown  2.  Assess vascular access sites hourly  3.  Every 4-6 hours minimum:  Change oxygen saturation probe site  4.  Every 4-6 hours:  If on nasal continuous positive airway pressure, respiratory therapy assess nares and determine need for appliance change or resting period.  10/10/2024 0241 by Laisha Crandall LPN  Outcome: Progressing  10/9/2024 1710 by Mitra Fields RN  Outcome: Not Progressing     Problem: Safety - Adult  Goal: Free from fall injury  10/10/2024 0241 by Laisha Crandall LPN  Outcome: Progressing  Flowsheets (Taken 10/10/2024 0155)  Free From Fall Injury: Instruct family/caregiver on patient safety  10/9/2024 1710 by Mitra Fields RN  Outcome: Not Progressing     Problem: ABCDS Injury Assessment  Goal: Absence of physical injury  10/10/2024 0241 by Laisha Crandall LPN  Outcome: Progressing  Flowsheets (Taken 10/10/2024 0155)  Absence of Physical Injury: Implement safety measures based on patient assessment  10/9/2024 1710 by Mitra Fields RN  Outcome: Not Progressing     Problem: Discharge Planning  Goal: Discharge to home or other facility with appropriate resources  10/10/2024 0241 by Laisha Crandall LPN  Outcome: Progressing  10/9/2024 1710 by Mitra Fields RN  Outcome: Not Progressing     Problem: Pain  Goal: Verbalizes/displays adequate comfort level or baseline

## 2024-10-10 NOTE — PLAN OF CARE
Problem: Discharge Planning  Goal: Discharge to home or other facility with appropriate resources  10/10/2024 1456 by Monique Drew RN  Outcome: Progressing  10/10/2024 0241 by Laisha Crandall LPN  Outcome: Progressing     Problem: Pain  Goal: Verbalizes/displays adequate comfort level or baseline comfort level  10/10/2024 1456 by Monique Drew RN  Outcome: Progressing  10/10/2024 0241 by Laisha Crandall LPN  Outcome: Progressing     Problem: Skin/Tissue Integrity  Goal: Absence of new skin breakdown  Description: 1.  Monitor for areas of redness and/or skin breakdown  2.  Assess vascular access sites hourly  3.  Every 4-6 hours minimum:  Change oxygen saturation probe site  4.  Every 4-6 hours:  If on nasal continuous positive airway pressure, respiratory therapy assess nares and determine need for appliance change or resting period.  10/10/2024 1456 by Monique Drew RN  Outcome: Progressing  10/10/2024 0241 by Laisha Crandall LPN  Outcome: Progressing     Problem: Safety - Adult  Goal: Free from fall injury  10/10/2024 1456 by Monique Drew RN  Outcome: Progressing  10/10/2024 0241 by Laisha Crandall LPN  Outcome: Progressing  Flowsheets (Taken 10/10/2024 0155)  Free From Fall Injury: Instruct family/caregiver on patient safety     Problem: ABCDS Injury Assessment  Goal: Absence of physical injury  10/10/2024 1456 by Monique Drew RN  Outcome: Progressing  10/10/2024 0241 by Laisha Crandall LPN  Outcome: Progressing  Flowsheets (Taken 10/10/2024 0155)  Absence of Physical Injury: Implement safety measures based on patient assessment

## 2024-10-11 PROBLEM — E44.0 MODERATE MALNUTRITION (HCC): Status: ACTIVE | Noted: 2024-10-11

## 2024-10-11 LAB
ALBUMIN SERPL-MCNC: 2.9 G/DL (ref 3.5–5.2)
ALP SERPL-CCNC: 78 U/L (ref 40–129)
ALT SERPL-CCNC: 10 U/L (ref 5–41)
ANION GAP SERPL CALCULATED.3IONS-SCNC: 12 MMOL/L (ref 7–19)
AST SERPL-CCNC: 21 U/L (ref 5–40)
BILIRUB SERPL-MCNC: 1.1 MG/DL (ref 0.2–1.2)
BUN SERPL-MCNC: 13 MG/DL (ref 8–23)
CALCIUM SERPL-MCNC: 7.9 MG/DL (ref 8.8–10.2)
CHLORIDE SERPL-SCNC: 103 MMOL/L (ref 98–111)
CO2 SERPL-SCNC: 23 MMOL/L (ref 22–29)
CREAT SERPL-MCNC: 0.7 MG/DL (ref 0.7–1.2)
ECHO BSA: 1.76 M2
ERYTHROCYTE [DISTWIDTH] IN BLOOD BY AUTOMATED COUNT: 14.7 % (ref 11.5–14.5)
GLUCOSE SERPL-MCNC: 95 MG/DL (ref 70–99)
HCT VFR BLD AUTO: 24.9 % (ref 42–52)
HGB BLD-MCNC: 7.9 G/DL (ref 14–18)
MAGNESIUM SERPL-MCNC: 1.9 MG/DL (ref 1.6–2.4)
MCH RBC QN AUTO: 29.7 PG (ref 27–31)
MCHC RBC AUTO-ENTMCNC: 31.7 G/DL (ref 33–37)
MCV RBC AUTO: 93.6 FL (ref 80–94)
PHOSPHATE SERPL-MCNC: 2.3 MG/DL (ref 2.5–4.5)
PLATELET # BLD AUTO: 127 K/UL (ref 130–400)
PMV BLD AUTO: 11.5 FL (ref 9.4–12.4)
POTASSIUM SERPL-SCNC: 3.6 MMOL/L (ref 3.5–5)
PROT SERPL-MCNC: 4.9 G/DL (ref 6.4–8.3)
RBC # BLD AUTO: 2.66 M/UL (ref 4.7–6.1)
SODIUM SERPL-SCNC: 138 MMOL/L (ref 136–145)
VAS LEFT ABI: 0.8
VAS LEFT ARM BP: 148 MMHG
VAS LEFT DORSALIS PEDIS BP: 117 MMHG
VAS LEFT PTA BP: 119 MMHG
VAS LEFT TBI: 0.58
VAS LEFT TOE PRESSURE: 86 MMHG
VAS RIGHT ABI: 0.99
VAS RIGHT ARM BP: 148 MMHG
VAS RIGHT DORSALIS PEDIS BP: 146 MMHG
VAS RIGHT PTA BP: 139 MMHG
VAS RIGHT TBI: 0.53
VAS RIGHT TOE PRESSURE: 79 MMHG
WBC # BLD AUTO: 7.8 K/UL (ref 4.8–10.8)

## 2024-10-11 PROCEDURE — 84100 ASSAY OF PHOSPHORUS: CPT

## 2024-10-11 PROCEDURE — 80053 COMPREHEN METABOLIC PANEL: CPT

## 2024-10-11 PROCEDURE — 2580000003 HC RX 258: Performed by: INTERNAL MEDICINE

## 2024-10-11 PROCEDURE — 6370000000 HC RX 637 (ALT 250 FOR IP): Performed by: STUDENT IN AN ORGANIZED HEALTH CARE EDUCATION/TRAINING PROGRAM

## 2024-10-11 PROCEDURE — 6360000002 HC RX W HCPCS: Performed by: INTERNAL MEDICINE

## 2024-10-11 PROCEDURE — 36415 COLL VENOUS BLD VENIPUNCTURE: CPT

## 2024-10-11 PROCEDURE — 85027 COMPLETE CBC AUTOMATED: CPT

## 2024-10-11 PROCEDURE — 94760 N-INVAS EAR/PLS OXIMETRY 1: CPT

## 2024-10-11 PROCEDURE — 83735 ASSAY OF MAGNESIUM: CPT

## 2024-10-11 PROCEDURE — 6370000000 HC RX 637 (ALT 250 FOR IP): Performed by: INTERNAL MEDICINE

## 2024-10-11 PROCEDURE — 6370000000 HC RX 637 (ALT 250 FOR IP): Performed by: SURGERY

## 2024-10-11 PROCEDURE — 1200000000 HC SEMI PRIVATE

## 2024-10-11 RX ORDER — HYDROCODONE BITARTRATE AND ACETAMINOPHEN 5; 325 MG/1; MG/1
1 TABLET ORAL EVERY 4 HOURS PRN
Qty: 18 TABLET | Refills: 0 | Status: SHIPPED | OUTPATIENT
Start: 2024-10-11 | End: 2024-10-14

## 2024-10-11 RX ORDER — SENNA AND DOCUSATE SODIUM 50; 8.6 MG/1; MG/1
2 TABLET, FILM COATED ORAL DAILY PRN
Qty: 60 TABLET | Refills: 0 | Status: SHIPPED | OUTPATIENT
Start: 2024-10-11

## 2024-10-11 RX ORDER — POLYETHYLENE GLYCOL 3350 17 G/17G
17 POWDER, FOR SOLUTION ORAL DAILY PRN
Qty: 578 G | Refills: 0 | Status: SHIPPED | OUTPATIENT
Start: 2024-10-11

## 2024-10-11 RX ADMIN — Medication 1 CAPSULE: at 08:46

## 2024-10-11 RX ADMIN — HYDROCODONE BITARTRATE AND ACETAMINOPHEN 1 TABLET: 5; 325 TABLET ORAL at 05:38

## 2024-10-11 RX ADMIN — POLYETHYLENE GLYCOL 3350 17 G: 17 POWDER, FOR SOLUTION ORAL at 08:46

## 2024-10-11 RX ADMIN — OXYCODONE HYDROCHLORIDE AND ACETAMINOPHEN 500 MG: 500 TABLET ORAL at 20:49

## 2024-10-11 RX ADMIN — APIXABAN 5 MG: 5 TABLET, FILM COATED ORAL at 20:49

## 2024-10-11 RX ADMIN — FINASTERIDE 5 MG: 5 TABLET, FILM COATED ORAL at 20:50

## 2024-10-11 RX ADMIN — HYDROCODONE BITARTRATE AND ACETAMINOPHEN 1 TABLET: 5; 325 TABLET ORAL at 20:55

## 2024-10-11 RX ADMIN — LOSARTAN POTASSIUM 100 MG: 100 TABLET, FILM COATED ORAL at 20:49

## 2024-10-11 RX ADMIN — HYDROCODONE BITARTRATE AND ACETAMINOPHEN 1 TABLET: 5; 325 TABLET ORAL at 11:58

## 2024-10-11 RX ADMIN — SODIUM CHLORIDE 250 MG: 9 INJECTION, SOLUTION INTRAVENOUS at 17:52

## 2024-10-11 RX ADMIN — PRAVASTATIN SODIUM 40 MG: 20 TABLET ORAL at 20:50

## 2024-10-11 RX ADMIN — TRAZODONE HYDROCHLORIDE 50 MG: 50 TABLET ORAL at 20:50

## 2024-10-11 RX ADMIN — LEVOTHYROXINE SODIUM 150 MCG: 75 TABLET ORAL at 05:38

## 2024-10-11 RX ADMIN — APIXABAN 5 MG: 5 TABLET, FILM COATED ORAL at 08:46

## 2024-10-11 RX ADMIN — CLOPIDOGREL BISULFATE 75 MG: 75 TABLET ORAL at 08:46

## 2024-10-11 RX ADMIN — TAMSULOSIN HYDROCHLORIDE 0.4 MG: 0.4 CAPSULE ORAL at 20:50

## 2024-10-11 ASSESSMENT — PAIN - FUNCTIONAL ASSESSMENT
PAIN_FUNCTIONAL_ASSESSMENT: PREVENTS OR INTERFERES SOME ACTIVE ACTIVITIES AND ADLS
PAIN_FUNCTIONAL_ASSESSMENT: ACTIVITIES ARE NOT PREVENTED

## 2024-10-11 ASSESSMENT — PAIN SCALES - GENERAL
PAINLEVEL_OUTOF10: 8
PAINLEVEL_OUTOF10: 5
PAINLEVEL_OUTOF10: 7
PAINLEVEL_OUTOF10: 8

## 2024-10-11 ASSESSMENT — PAIN DESCRIPTION - ORIENTATION
ORIENTATION: RIGHT;LEFT
ORIENTATION: LEFT
ORIENTATION: LEFT

## 2024-10-11 ASSESSMENT — PAIN DESCRIPTION - DESCRIPTORS
DESCRIPTORS: ACHING
DESCRIPTORS: ACHING
DESCRIPTORS: TENDER

## 2024-10-11 ASSESSMENT — PAIN DESCRIPTION - LOCATION
LOCATION: LEG;GROIN
LOCATION: GROIN
LOCATION: GROIN

## 2024-10-11 NOTE — PLAN OF CARE
Problem: Discharge Planning  Goal: Discharge to home or other facility with appropriate resources  10/11/2024 0153 by Laisha Crandall LPN  Outcome: Progressing  Flowsheets (Taken 10/10/2024 2046)  Discharge to home or other facility with appropriate resources: Identify barriers to discharge with patient and caregiver  10/10/2024 1456 by Monique Drew RN  Outcome: Progressing     Problem: Pain  Goal: Verbalizes/displays adequate comfort level or baseline comfort level  10/11/2024 0153 by Laisha Crandall LPN  Outcome: Progressing  10/10/2024 1456 by Monique Drew RN  Outcome: Progressing     Problem: Skin/Tissue Integrity  Goal: Absence of new skin breakdown  Description: 1.  Monitor for areas of redness and/or skin breakdown  2.  Assess vascular access sites hourly  3.  Every 4-6 hours minimum:  Change oxygen saturation probe site  4.  Every 4-6 hours:  If on nasal continuous positive airway pressure, respiratory therapy assess nares and determine need for appliance change or resting period.  10/11/2024 0153 by Laisha Crandall LPN  Outcome: Progressing  10/10/2024 1456 by Monique Drew RN  Outcome: Progressing     Problem: Safety - Adult  Goal: Free from fall injury  10/11/2024 0153 by Laisha Crandall LPN  Outcome: Progressing  Flowsheets (Taken 10/11/2024 0027)  Free From Fall Injury: Instruct family/caregiver on patient safety  10/10/2024 1456 by Monique Drew RN  Outcome: Progressing     Problem: ABCDS Injury Assessment  Goal: Absence of physical injury  10/11/2024 0153 by Laisha Crandall LPN  Outcome: Progressing  Flowsheets (Taken 10/11/2024 0027)  Absence of Physical Injury: Implement safety measures based on patient assessment  10/10/2024 1456 by Monique Drew RN  Outcome: Progressing

## 2024-10-11 NOTE — DISCHARGE SUMMARY
Max Daily Amount: 6 tablets     polyethylene glycol 17 GM/SCOOP powder  Commonly known as: GLYCOLAX  Take 17 g by mouth daily as needed (constipation)     sennosides-docusate sodium 8.6-50 MG tablet  Commonly known as: SENOKOT-S  Take 2 tablets by mouth daily as needed for Constipation            CONTINUE taking these medications      apixaban 5 MG Tabs tablet  Commonly known as: Eliquis  Take 1 tablet by mouth 2 times daily     ascorbic acid 500 MG tablet  Commonly known as: VITAMIN C     clopidogrel 75 MG tablet  Commonly known as: PLAVIX     ferrous sulfate 325 (65 Fe) MG tablet  Commonly known as: IRON 325  Take 1 tablet by mouth daily (with breakfast)     finasteride 5 MG tablet  Commonly known as: PROSCAR     levothyroxine 150 MCG tablet  Commonly known as: SYNTHROID     losartan 100 MG tablet  Commonly known as: COZAAR     pravastatin 40 MG tablet  Commonly known as: PRAVACHOL     traZODone 50 MG tablet  Commonly known as: DESYREL     Zanubrutinib 80 MG Caps  Take 4 capsules by mouth daily            STOP taking these medications      meclizine 25 MG tablet  Commonly known as: ANTIVERT     montelukast 10 MG tablet  Commonly known as: SINGULAIR     sildenafil 100 MG tablet  Commonly known as: VIAGRA               Where to Get Your Medications        These medications were sent to [id number] - [address] [facility] [place][note 2] -  [phone] - F [phone]  [address] [place] [address] [address] [place] [id number]      Phone: [phone]   HYDROcodone-acetaminophen 5-325 MG per tablet  polyethylene glycol 17 GM/SCOOP powder  sennosides-docusate sodium 8.6-50 MG tablet         Discharge Instructions:   Follow up with Giselle Frias APRN - NP within a week.  Follow-up with vascular surgery clinic.  Take medications as directed.  Resume activity as tolerated.    Diet: ADULT DIET; Regular  ADULT ORAL NUTRITION SUPPLEMENT; Breakfast, Dinner; Standard High Calorie/High Protein

## 2024-10-12 LAB
ERYTHROCYTE [DISTWIDTH] IN BLOOD BY AUTOMATED COUNT: 14.8 % (ref 11.5–14.5)
HCT VFR BLD AUTO: 24.4 % (ref 42–52)
HGB BLD-MCNC: 7.8 G/DL (ref 14–18)
MCH RBC QN AUTO: 29.9 PG (ref 27–31)
MCHC RBC AUTO-ENTMCNC: 32 G/DL (ref 33–37)
MCV RBC AUTO: 93.5 FL (ref 80–94)
PLATELET # BLD AUTO: 152 K/UL (ref 130–400)
PMV BLD AUTO: 11.5 FL (ref 9.4–12.4)
RBC # BLD AUTO: 2.61 M/UL (ref 4.7–6.1)
WBC # BLD AUTO: 6.9 K/UL (ref 4.8–10.8)

## 2024-10-12 PROCEDURE — 2580000003 HC RX 258: Performed by: INTERNAL MEDICINE

## 2024-10-12 PROCEDURE — 6370000000 HC RX 637 (ALT 250 FOR IP): Performed by: INTERNAL MEDICINE

## 2024-10-12 PROCEDURE — 6360000002 HC RX W HCPCS: Performed by: INTERNAL MEDICINE

## 2024-10-12 PROCEDURE — 94760 N-INVAS EAR/PLS OXIMETRY 1: CPT

## 2024-10-12 PROCEDURE — 2580000003 HC RX 258: Performed by: SURGERY

## 2024-10-12 PROCEDURE — 6370000000 HC RX 637 (ALT 250 FOR IP): Performed by: SURGERY

## 2024-10-12 PROCEDURE — 85027 COMPLETE CBC AUTOMATED: CPT

## 2024-10-12 PROCEDURE — 36415 COLL VENOUS BLD VENIPUNCTURE: CPT

## 2024-10-12 PROCEDURE — 1200000000 HC SEMI PRIVATE

## 2024-10-12 PROCEDURE — 6370000000 HC RX 637 (ALT 250 FOR IP): Performed by: STUDENT IN AN ORGANIZED HEALTH CARE EDUCATION/TRAINING PROGRAM

## 2024-10-12 RX ADMIN — CLOPIDOGREL BISULFATE 75 MG: 75 TABLET ORAL at 08:15

## 2024-10-12 RX ADMIN — FINASTERIDE 5 MG: 5 TABLET, FILM COATED ORAL at 08:15

## 2024-10-12 RX ADMIN — LEVOTHYROXINE SODIUM 150 MCG: 75 TABLET ORAL at 05:02

## 2024-10-12 RX ADMIN — PRAVASTATIN SODIUM 40 MG: 20 TABLET ORAL at 19:25

## 2024-10-12 RX ADMIN — OXYCODONE HYDROCHLORIDE AND ACETAMINOPHEN 500 MG: 500 TABLET ORAL at 08:15

## 2024-10-12 RX ADMIN — POLYETHYLENE GLYCOL 3350 17 G: 17 POWDER, FOR SOLUTION ORAL at 08:15

## 2024-10-12 RX ADMIN — Medication 1 CAPSULE: at 08:15

## 2024-10-12 RX ADMIN — APIXABAN 5 MG: 5 TABLET, FILM COATED ORAL at 19:25

## 2024-10-12 RX ADMIN — SODIUM CHLORIDE, PRESERVATIVE FREE 10 ML: 5 INJECTION INTRAVENOUS at 19:26

## 2024-10-12 RX ADMIN — APIXABAN 5 MG: 5 TABLET, FILM COATED ORAL at 08:15

## 2024-10-12 RX ADMIN — TAMSULOSIN HYDROCHLORIDE 0.4 MG: 0.4 CAPSULE ORAL at 08:15

## 2024-10-12 RX ADMIN — ACETAMINOPHEN 650 MG: 325 TABLET ORAL at 13:25

## 2024-10-12 RX ADMIN — TRAZODONE HYDROCHLORIDE 50 MG: 50 TABLET ORAL at 19:25

## 2024-10-12 RX ADMIN — LOSARTAN POTASSIUM 100 MG: 100 TABLET, FILM COATED ORAL at 19:25

## 2024-10-12 RX ADMIN — SODIUM CHLORIDE 250 MG: 9 INJECTION, SOLUTION INTRAVENOUS at 17:47

## 2024-10-12 ASSESSMENT — PAIN DESCRIPTION - LOCATION: LOCATION: HEAD

## 2024-10-12 ASSESSMENT — PAIN SCALES - GENERAL
PAINLEVEL_OUTOF10: 0
PAINLEVEL_OUTOF10: 2

## 2024-10-12 ASSESSMENT — PAIN DESCRIPTION - DESCRIPTORS: DESCRIPTORS: ACHING

## 2024-10-12 NOTE — PLAN OF CARE
Problem: Discharge Planning  Goal: Discharge to home or other facility with appropriate resources  10/12/2024 0005 by China Brown RN  Outcome: Progressing  Flowsheets (Taken 10/11/2024 9641)  Discharge to home or other facility with appropriate resources:   Identify barriers to discharge with patient and caregiver   Arrange for needed discharge resources and transportation as appropriate   Identify discharge learning needs (meds, wound care, etc)   Arrange for interpreters to assist at discharge as needed   Refer to discharge planning if patient needs post-hospital services based on physician order or complex needs related to functional status, cognitive ability or social support system  10/11/2024 1800 by Monique Drew RN  Outcome: Progressing     Problem: Pain  Goal: Verbalizes/displays adequate comfort level or baseline comfort level  10/12/2024 0005 by China Brown RN  Outcome: Progressing  10/11/2024 1800 by Monique Drew RN  Outcome: Progressing     Problem: Skin/Tissue Integrity  Goal: Absence of new skin breakdown  Description: 1.  Monitor for areas of redness and/or skin breakdown  2.  Assess vascular access sites hourly  3.  Every 4-6 hours minimum:  Change oxygen saturation probe site  4.  Every 4-6 hours:  If on nasal continuous positive airway pressure, respiratory therapy assess nares and determine need for appliance change or resting period.  10/12/2024 0005 by China Brown RN  Outcome: Progressing  10/11/2024 1800 by Monique Drew RN  Outcome: Progressing     Problem: Safety - Adult  Goal: Free from fall injury  10/12/2024 0005 by China Brown RN  Outcome: Progressing  10/11/2024 1800 by Monique Drew RN  Outcome: Progressing     Problem: ABCDS Injury Assessment  Goal: Absence of physical injury  10/12/2024 0005 by China Brown RN  Outcome: Progressing  10/11/2024 1800 by Monique Drew RN  Outcome: Progressing     Problem: Nutrition Deficit:  Goal: Optimize nutritional

## 2024-10-13 LAB
BACTERIA BLD CULT ORG #2: NORMAL
ERYTHROCYTE [DISTWIDTH] IN BLOOD BY AUTOMATED COUNT: 15.7 % (ref 11.5–14.5)
HCT VFR BLD AUTO: 27.6 % (ref 42–52)
HGB BLD-MCNC: 8.7 G/DL (ref 14–18)
MCH RBC QN AUTO: 29.7 PG (ref 27–31)
MCHC RBC AUTO-ENTMCNC: 31.5 G/DL (ref 33–37)
MCV RBC AUTO: 94.2 FL (ref 80–94)
PLATELET # BLD AUTO: 187 K/UL (ref 130–400)
PMV BLD AUTO: 10.5 FL (ref 9.4–12.4)
RBC # BLD AUTO: 2.93 M/UL (ref 4.7–6.1)
WBC # BLD AUTO: 7.8 K/UL (ref 4.8–10.8)

## 2024-10-13 PROCEDURE — 6370000000 HC RX 637 (ALT 250 FOR IP): Performed by: INTERNAL MEDICINE

## 2024-10-13 PROCEDURE — 36415 COLL VENOUS BLD VENIPUNCTURE: CPT

## 2024-10-13 PROCEDURE — 6370000000 HC RX 637 (ALT 250 FOR IP): Performed by: SURGERY

## 2024-10-13 PROCEDURE — 6370000000 HC RX 637 (ALT 250 FOR IP): Performed by: STUDENT IN AN ORGANIZED HEALTH CARE EDUCATION/TRAINING PROGRAM

## 2024-10-13 PROCEDURE — 2580000003 HC RX 258: Performed by: SURGERY

## 2024-10-13 PROCEDURE — 1200000000 HC SEMI PRIVATE

## 2024-10-13 PROCEDURE — 85027 COMPLETE CBC AUTOMATED: CPT

## 2024-10-13 PROCEDURE — 94760 N-INVAS EAR/PLS OXIMETRY 1: CPT

## 2024-10-13 RX ORDER — OXYMETAZOLINE HYDROCHLORIDE 0.05 G/100ML
3 SPRAY NASAL ONCE
Status: COMPLETED | OUTPATIENT
Start: 2024-10-13 | End: 2024-10-13

## 2024-10-13 RX ADMIN — OXYCODONE HYDROCHLORIDE AND ACETAMINOPHEN 500 MG: 500 TABLET ORAL at 07:45

## 2024-10-13 RX ADMIN — PRAVASTATIN SODIUM 40 MG: 20 TABLET ORAL at 20:29

## 2024-10-13 RX ADMIN — Medication 1 CAPSULE: at 07:43

## 2024-10-13 RX ADMIN — APIXABAN 5 MG: 5 TABLET, FILM COATED ORAL at 20:29

## 2024-10-13 RX ADMIN — APIXABAN 5 MG: 5 TABLET, FILM COATED ORAL at 07:43

## 2024-10-13 RX ADMIN — POLYETHYLENE GLYCOL 3350 17 G: 17 POWDER, FOR SOLUTION ORAL at 07:43

## 2024-10-13 RX ADMIN — HYDROCODONE BITARTRATE AND ACETAMINOPHEN 1 TABLET: 5; 325 TABLET ORAL at 20:29

## 2024-10-13 RX ADMIN — LEVOTHYROXINE SODIUM 150 MCG: 75 TABLET ORAL at 05:50

## 2024-10-13 RX ADMIN — OXYMETAZOLINE HYDROCHLORIDE 3 SPRAY: 0.5 SPRAY NASAL at 14:46

## 2024-10-13 RX ADMIN — LOSARTAN POTASSIUM 100 MG: 100 TABLET, FILM COATED ORAL at 20:29

## 2024-10-13 RX ADMIN — TRAZODONE HYDROCHLORIDE 50 MG: 50 TABLET ORAL at 20:29

## 2024-10-13 RX ADMIN — FINASTERIDE 5 MG: 5 TABLET, FILM COATED ORAL at 07:43

## 2024-10-13 RX ADMIN — HYDROCODONE BITARTRATE AND ACETAMINOPHEN 1 TABLET: 5; 325 TABLET ORAL at 10:47

## 2024-10-13 RX ADMIN — CLOPIDOGREL BISULFATE 75 MG: 75 TABLET ORAL at 07:43

## 2024-10-13 RX ADMIN — SODIUM CHLORIDE, PRESERVATIVE FREE 10 ML: 5 INJECTION INTRAVENOUS at 07:45

## 2024-10-13 RX ADMIN — TAMSULOSIN HYDROCHLORIDE 0.4 MG: 0.4 CAPSULE ORAL at 07:43

## 2024-10-13 ASSESSMENT — PAIN SCALES - GENERAL
PAINLEVEL_OUTOF10: 1
PAINLEVEL_OUTOF10: 6
PAINLEVEL_OUTOF10: 6
PAINLEVEL_OUTOF10: 4

## 2024-10-13 ASSESSMENT — PAIN DESCRIPTION - ORIENTATION
ORIENTATION: LEFT
ORIENTATION: LEFT

## 2024-10-13 ASSESSMENT — PAIN DESCRIPTION - DESCRIPTORS
DESCRIPTORS: ACHING
DESCRIPTORS: ACHING

## 2024-10-13 ASSESSMENT — PAIN DESCRIPTION - LOCATION
LOCATION: FOOT
LOCATION: LEG

## 2024-10-13 ASSESSMENT — PAIN - FUNCTIONAL ASSESSMENT: PAIN_FUNCTIONAL_ASSESSMENT: PREVENTS OR INTERFERES SOME ACTIVE ACTIVITIES AND ADLS

## 2024-10-13 NOTE — PLAN OF CARE
Problem: Discharge Planning  Goal: Discharge to home or other facility with appropriate resources  10/12/2024 2214 by China Brown RN  Outcome: Progressing  Flowsheets (Taken 10/12/2024 2156)  Discharge to home or other facility with appropriate resources:   Identify barriers to discharge with patient and caregiver   Arrange for needed discharge resources and transportation as appropriate   Identify discharge learning needs (meds, wound care, etc)   Arrange for interpreters to assist at discharge as needed   Refer to discharge planning if patient needs post-hospital services based on physician order or complex needs related to functional status, cognitive ability or social support system  10/12/2024 1024 by Leida Winn RN  Outcome: Progressing  Flowsheets (Taken 10/12/2024 0730)  Discharge to home or other facility with appropriate resources: Identify barriers to discharge with patient and caregiver     Problem: Pain  Goal: Verbalizes/displays adequate comfort level or baseline comfort level  10/12/2024 2214 by China Brown RN  Outcome: Progressing  10/12/2024 1024 by Leida Winn RN  Outcome: Progressing     Problem: Skin/Tissue Integrity  Goal: Absence of new skin breakdown  Description: 1.  Monitor for areas of redness and/or skin breakdown  2.  Assess vascular access sites hourly  3.  Every 4-6 hours minimum:  Change oxygen saturation probe site  4.  Every 4-6 hours:  If on nasal continuous positive airway pressure, respiratory therapy assess nares and determine need for appliance change or resting period.  10/12/2024 2214 by China Brown RN  Outcome: Progressing  10/12/2024 1024 by Leida Winn RN  Outcome: Progressing     Problem: Safety - Adult  Goal: Free from fall injury  10/12/2024 2214 by China Brown RN  Outcome: Progressing  10/12/2024 1024 by Leida Winn RN  Outcome: Progressing     Problem: ABCDS Injury Assessment  Goal: Absence of physical injury  10/12/2024  Refill Request:   Medication: furosemide  Last seen:   Last refilled: 8/22/18 #90 x 1  Next appointment:   Recent labs:   Refill refused.

## 2024-10-14 LAB
BACTERIA BLD CULT ORG #2: NORMAL
BACTERIA BLD CULT: NORMAL
ERYTHROCYTE [DISTWIDTH] IN BLOOD BY AUTOMATED COUNT: 17.1 % (ref 11.5–14.5)
HCT VFR BLD AUTO: 31 % (ref 42–52)
HGB BLD-MCNC: 9.4 G/DL (ref 14–18)
MCH RBC QN AUTO: 29.7 PG (ref 27–31)
MCHC RBC AUTO-ENTMCNC: 30.3 G/DL (ref 33–37)
MCV RBC AUTO: 98.1 FL (ref 80–94)
PLATELET # BLD AUTO: 230 K/UL (ref 130–400)
PMV BLD AUTO: 11 FL (ref 9.4–12.4)
RBC # BLD AUTO: 3.16 M/UL (ref 4.7–6.1)
WBC # BLD AUTO: 9.7 K/UL (ref 4.8–10.8)

## 2024-10-14 PROCEDURE — 1200000000 HC SEMI PRIVATE

## 2024-10-14 PROCEDURE — 97530 THERAPEUTIC ACTIVITIES: CPT

## 2024-10-14 PROCEDURE — 85027 COMPLETE CBC AUTOMATED: CPT

## 2024-10-14 PROCEDURE — 97110 THERAPEUTIC EXERCISES: CPT

## 2024-10-14 PROCEDURE — 36415 COLL VENOUS BLD VENIPUNCTURE: CPT

## 2024-10-14 PROCEDURE — 6370000000 HC RX 637 (ALT 250 FOR IP): Performed by: INTERNAL MEDICINE

## 2024-10-14 PROCEDURE — 6370000000 HC RX 637 (ALT 250 FOR IP): Performed by: STUDENT IN AN ORGANIZED HEALTH CARE EDUCATION/TRAINING PROGRAM

## 2024-10-14 PROCEDURE — 2580000003 HC RX 258: Performed by: SURGERY

## 2024-10-14 PROCEDURE — 94760 N-INVAS EAR/PLS OXIMETRY 1: CPT

## 2024-10-14 PROCEDURE — 6370000000 HC RX 637 (ALT 250 FOR IP): Performed by: SURGERY

## 2024-10-14 RX ORDER — OXYMETAZOLINE HYDROCHLORIDE 0.05 G/100ML
2 SPRAY NASAL 2 TIMES DAILY
Status: DISCONTINUED | OUTPATIENT
Start: 2024-10-14 | End: 2024-10-15 | Stop reason: HOSPADM

## 2024-10-14 RX ADMIN — APIXABAN 5 MG: 5 TABLET, FILM COATED ORAL at 09:31

## 2024-10-14 RX ADMIN — POLYETHYLENE GLYCOL 3350 17 G: 17 POWDER, FOR SOLUTION ORAL at 09:30

## 2024-10-14 RX ADMIN — OXYCODONE HYDROCHLORIDE AND ACETAMINOPHEN 500 MG: 500 TABLET ORAL at 09:30

## 2024-10-14 RX ADMIN — TRAZODONE HYDROCHLORIDE 50 MG: 50 TABLET ORAL at 20:13

## 2024-10-14 RX ADMIN — OXYMETAZOLINE HYDROCHLORIDE 2 SPRAY: 0.5 SPRAY NASAL at 20:13

## 2024-10-14 RX ADMIN — HYDROCODONE BITARTRATE AND ACETAMINOPHEN 1 TABLET: 5; 325 TABLET ORAL at 20:15

## 2024-10-14 RX ADMIN — HYDROCODONE BITARTRATE AND ACETAMINOPHEN 1 TABLET: 5; 325 TABLET ORAL at 10:09

## 2024-10-14 RX ADMIN — LOSARTAN POTASSIUM 100 MG: 100 TABLET, FILM COATED ORAL at 20:13

## 2024-10-14 RX ADMIN — TAMSULOSIN HYDROCHLORIDE 0.4 MG: 0.4 CAPSULE ORAL at 09:31

## 2024-10-14 RX ADMIN — PRAVASTATIN SODIUM 40 MG: 20 TABLET ORAL at 20:13

## 2024-10-14 RX ADMIN — SODIUM CHLORIDE, PRESERVATIVE FREE 10 ML: 5 INJECTION INTRAVENOUS at 09:34

## 2024-10-14 RX ADMIN — APIXABAN 5 MG: 5 TABLET, FILM COATED ORAL at 20:13

## 2024-10-14 RX ADMIN — FINASTERIDE 5 MG: 5 TABLET, FILM COATED ORAL at 09:30

## 2024-10-14 RX ADMIN — LEVOTHYROXINE SODIUM 150 MCG: 75 TABLET ORAL at 05:33

## 2024-10-14 RX ADMIN — Medication 1 CAPSULE: at 07:25

## 2024-10-14 RX ADMIN — CLOPIDOGREL BISULFATE 75 MG: 75 TABLET ORAL at 09:31

## 2024-10-14 ASSESSMENT — PAIN SCALES - GENERAL
PAINLEVEL_OUTOF10: 8
PAINLEVEL_OUTOF10: 3
PAINLEVEL_OUTOF10: 0
PAINLEVEL_OUTOF10: 3
PAINLEVEL_OUTOF10: 3
PAINLEVEL_OUTOF10: 0
PAINLEVEL_OUTOF10: 7

## 2024-10-14 ASSESSMENT — PAIN DESCRIPTION - ORIENTATION
ORIENTATION: LEFT

## 2024-10-14 ASSESSMENT — PAIN DESCRIPTION - LOCATION
LOCATION: LEG

## 2024-10-14 ASSESSMENT — PAIN - FUNCTIONAL ASSESSMENT: PAIN_FUNCTIONAL_ASSESSMENT: PREVENTS OR INTERFERES SOME ACTIVE ACTIVITIES AND ADLS

## 2024-10-14 ASSESSMENT — PAIN DESCRIPTION - DESCRIPTORS
DESCRIPTORS: ACHING
DESCRIPTORS: DULL
DESCRIPTORS: DULL

## 2024-10-14 NOTE — PLAN OF CARE
Problem: Pain  Goal: Verbalizes/displays adequate comfort level or baseline comfort level  10/14/2024 0958 by Briana Murillo LPN  Outcome: Progressing  10/13/2024 2244 by China Brown, RN  Outcome: Progressing     Problem: Skin/Tissue Integrity  Goal: Absence of new skin breakdown  Description: 1.  Monitor for areas of redness and/or skin breakdown  2.  Assess vascular access sites hourly  3.  Every 4-6 hours minimum:  Change oxygen saturation probe site  4.  Every 4-6 hours:  If on nasal continuous positive airway pressure, respiratory therapy assess nares and determine need for appliance change or resting period.  10/14/2024 0958 by Briana Murillo LPN  Outcome: Progressing  10/13/2024 2244 by China Brown, RN  Outcome: Progressing

## 2024-10-14 NOTE — PLAN OF CARE
Problem: Discharge Planning  Goal: Discharge to home or other facility with appropriate resources  Outcome: Progressing  Flowsheets (Taken 10/13/2024 2223)  Discharge to home or other facility with appropriate resources:   Identify barriers to discharge with patient and caregiver   Arrange for needed discharge resources and transportation as appropriate   Identify discharge learning needs (meds, wound care, etc)   Arrange for interpreters to assist at discharge as needed   Refer to discharge planning if patient needs post-hospital services based on physician order or complex needs related to functional status, cognitive ability or social support system     Problem: Pain  Goal: Verbalizes/displays adequate comfort level or baseline comfort level  Outcome: Progressing     Problem: Skin/Tissue Integrity  Goal: Absence of new skin breakdown  Description: 1.  Monitor for areas of redness and/or skin breakdown  2.  Assess vascular access sites hourly  3.  Every 4-6 hours minimum:  Change oxygen saturation probe site  4.  Every 4-6 hours:  If on nasal continuous positive airway pressure, respiratory therapy assess nares and determine need for appliance change or resting period.  Outcome: Progressing     Problem: Safety - Adult  Goal: Free from fall injury  Outcome: Progressing     Problem: ABCDS Injury Assessment  Goal: Absence of physical injury  Outcome: Progressing     Problem: Nutrition Deficit:  Goal: Optimize nutritional status  Outcome: Progressing

## 2024-10-15 ENCOUNTER — TRANSCRIBE ORDERS (OUTPATIENT)
Dept: HOME HEALTH SERVICES | Facility: HOME HEALTHCARE | Age: 74
End: 2024-10-15
Payer: OTHER GOVERNMENT

## 2024-10-15 VITALS
TEMPERATURE: 97 F | HEIGHT: 69 IN | DIASTOLIC BLOOD PRESSURE: 59 MMHG | OXYGEN SATURATION: 97 % | SYSTOLIC BLOOD PRESSURE: 107 MMHG | BODY MASS INDEX: 20.76 KG/M2 | WEIGHT: 140.2 LBS | HEART RATE: 72 BPM | RESPIRATION RATE: 20 BRPM

## 2024-10-15 DIAGNOSIS — Z48.812 AFTERCARE FOLLOWING SURGERY OF THE CIRCULATORY SYSTEM, NEC: ICD-10-CM

## 2024-10-15 DIAGNOSIS — I73.9 PERIPHERAL VASCULAR DISEASE, UNSPECIFIED: Primary | ICD-10-CM

## 2024-10-15 PROCEDURE — 2580000003 HC RX 258: Performed by: SURGERY

## 2024-10-15 PROCEDURE — 6370000000 HC RX 637 (ALT 250 FOR IP): Performed by: INTERNAL MEDICINE

## 2024-10-15 PROCEDURE — 6370000000 HC RX 637 (ALT 250 FOR IP): Performed by: SURGERY

## 2024-10-15 PROCEDURE — 6370000000 HC RX 637 (ALT 250 FOR IP): Performed by: STUDENT IN AN ORGANIZED HEALTH CARE EDUCATION/TRAINING PROGRAM

## 2024-10-15 RX ADMIN — CLOPIDOGREL BISULFATE 75 MG: 75 TABLET ORAL at 07:51

## 2024-10-15 RX ADMIN — OXYMETAZOLINE HYDROCHLORIDE 2 SPRAY: 0.5 SPRAY NASAL at 07:51

## 2024-10-15 RX ADMIN — FINASTERIDE 5 MG: 5 TABLET, FILM COATED ORAL at 07:51

## 2024-10-15 RX ADMIN — APIXABAN 5 MG: 5 TABLET, FILM COATED ORAL at 07:51

## 2024-10-15 RX ADMIN — HYDROCODONE BITARTRATE AND ACETAMINOPHEN 1 TABLET: 5; 325 TABLET ORAL at 09:33

## 2024-10-15 RX ADMIN — TAMSULOSIN HYDROCHLORIDE 0.4 MG: 0.4 CAPSULE ORAL at 07:51

## 2024-10-15 RX ADMIN — Medication 1 CAPSULE: at 07:51

## 2024-10-15 RX ADMIN — OXYCODONE HYDROCHLORIDE AND ACETAMINOPHEN 500 MG: 500 TABLET ORAL at 07:51

## 2024-10-15 RX ADMIN — LEVOTHYROXINE SODIUM 150 MCG: 75 TABLET ORAL at 05:11

## 2024-10-15 RX ADMIN — POLYETHYLENE GLYCOL 3350 17 G: 17 POWDER, FOR SOLUTION ORAL at 07:51

## 2024-10-15 RX ADMIN — SODIUM CHLORIDE, PRESERVATIVE FREE 10 ML: 5 INJECTION INTRAVENOUS at 07:51

## 2024-10-15 ASSESSMENT — PAIN DESCRIPTION - ORIENTATION: ORIENTATION: LEFT

## 2024-10-15 ASSESSMENT — PAIN SCALES - GENERAL
PAINLEVEL_OUTOF10: 1
PAINLEVEL_OUTOF10: 8

## 2024-10-15 ASSESSMENT — PAIN DESCRIPTION - DESCRIPTORS: DESCRIPTORS: SHOOTING;PINS AND NEEDLES

## 2024-10-15 ASSESSMENT — PAIN - FUNCTIONAL ASSESSMENT: PAIN_FUNCTIONAL_ASSESSMENT: PREVENTS OR INTERFERES WITH ALL ACTIVE AND SOME PASSIVE ACTIVITIES

## 2024-10-15 ASSESSMENT — PAIN DESCRIPTION - LOCATION: LOCATION: LEG

## 2024-10-15 NOTE — PLAN OF CARE
Problem: Discharge Planning  Goal: Discharge to home or other facility with appropriate resources  Outcome: Progressing  Flowsheets (Taken 10/14/2024 2237)  Discharge to home or other facility with appropriate resources:   Identify barriers to discharge with patient and caregiver   Arrange for needed discharge resources and transportation as appropriate   Identify discharge learning needs (meds, wound care, etc)   Arrange for interpreters to assist at discharge as needed   Refer to discharge planning if patient needs post-hospital services based on physician order or complex needs related to functional status, cognitive ability or social support system     Problem: Pain  Goal: Verbalizes/displays adequate comfort level or baseline comfort level  10/14/2024 2258 by China Brown RN  Outcome: Progressing  10/14/2024 0958 by Briana Murillo LPN  Outcome: Progressing     Problem: Skin/Tissue Integrity  Goal: Absence of new skin breakdown  Description: 1.  Monitor for areas of redness and/or skin breakdown  2.  Assess vascular access sites hourly  3.  Every 4-6 hours minimum:  Change oxygen saturation probe site  4.  Every 4-6 hours:  If on nasal continuous positive airway pressure, respiratory therapy assess nares and determine need for appliance change or resting period.  10/14/2024 2258 by China Brown, RN  Outcome: Progressing  10/14/2024 0958 by Briana Murillo LPN  Outcome: Progressing     Problem: Safety - Adult  Goal: Free from fall injury  Outcome: Progressing     Problem: ABCDS Injury Assessment  Goal: Absence of physical injury  Outcome: Progressing     Problem: Nutrition Deficit:  Goal: Optimize nutritional status  Outcome: Progressing

## 2024-10-15 NOTE — PROGRESS NOTES
10/07/24 1601   Encounter Summary   Encounter Overview/Reason Initial Encounter   Service Provided For Patient and family together  (with daughters at bedside in ICU)   Referral/Consult From [facility]   Support System Children   Complexity of Encounter Low   Begin Time 1400   End Time  1430   Total Time Calculated 30 min   Spiritual/Emotional needs   Type Emotional Distress   Advance Care Planning   Type ACP conversation   Assessment/Intervention/Outcome   Assessment Concerns with suffering;Hopeful   Intervention Active listening;Discussed belief system/Religion practices/norma;Prayer (assurance of)/[place]   Outcome Expressed feelings, needs, and concerns;Encouraged   Plan and Referrals   Plan/Referrals Continue to visit, (comment)   Does the patient have a [facility] PCP? Yes    to follow up after discharge? No     Advance Care Planning     Advance Care Planning Inpatient Note  Spiritual Care Department    Today's Date: 10/7/2024  Unit: [facility] OR    Received request from [facility].  Upon review of chart and communication with care team, patient's decision making abilities are not in question.. Patient and Healthcare Decision Maker was/were present in the room during visit.    Goals of ACP Conversation:  Facilitate a discussion related to patient's goals of care as they align with the patient's values and beliefs.    Health Care Decision Makers:       Primary Decision Maker: KRISTEN FRYE - [phone]    Primary Decision Maker: Sanaz Choi - [phone]    Primary Decision Maker: Dorothea Roger - [phone]  Summary:  Verified Healthcare Decision Maker    Advance Care Planning Documents (Patient Wishes):  None     Assessment:  The patient will have surgery. 3 daughters are listed as primary decision maker.    Interventions:  Encouraged ongoing ACP conversation with future decision makers and loved ones    Care Preferences Communicated:   No    Outcomes/Plan:  ACP Discussion: 
   10/14/24 1503   Encounter Summary   Encounter Overview/Reason Spiritual/Emotional Needs   Encounter Code  Assessment by  services   Service Provided For Patient   Referral/Consult From Palliative Care   Support System Family members   Complexity of Encounter Moderate   Begin Time 1415   End Time  1430   Total Time Calculated 15 min   Spiritual/Emotional needs   Type Spiritual Support   Palliative Care   Type Palliative Care, Follow-up   Assessment/Intervention/Outcome   Assessment Coping;Hopeful   Intervention Active listening;Nurtured Hope;Prayer (assurance of)/[place];Sustaining Presence/Ministry of presence   Outcome Engaged in conversation;Expressed Gratitude;Receptive   Plan and Referrals   Plan/Referrals Continue to visit, (comment);Continue Support (comment)   Does the patient have a [facility] PCP? No         This  visited with pt to follow up for palliative care and provide spiritual care. Pt says he had three surgeries in the last ten days. He shared some of the story and says now I can move my leg and get up on my own. Pt says he is Yarsani and has a diana community. He says he loves  his  who has visited him faithfully. This  provided spiritual care with sustaining presence, nurtured hope, and prayer. Pt expressed gratitude for spiritual care.       Spiritual Health History and Assessment/Progress Note  [facility]    (P) Spiritual/Emotional Needs,  ,  ,      Name: Jong Loyd MRN: [id number]    Age: 74 y.o.     Sex: male   Language: English   Jain: Yarsani   Critical limb ischemia of left lower extremity (HCC)     Date: 10/14/2024            Total Time Calculated: (P) 15 min              Spiritual Assessment continued in [facility] 3 JOANNA/VAS/MED        Referral/Consult From: (P) Palliative Care   Encounter Overview/Reason: (P) Spiritual/Emotional Needs  Service Provided For: (P) Patient    Diana, Belief, Meaning:   Patient identifies as spiritual and is 
   10/15/24 1300   Subjective   Subjective Checked on patient this AM.  Patient stated he had been up walking without assist in room & hoped to DC home today.   PT Plan of Care   Tuesday X       Electronically signed by Ankush Guardado PTA on 10/15/2024 at 1:49 PM   
  Pharmacy Adjustment per [facility] protocol    Jong Loyd is a 74 y.o. male. Pharmacy has adjusted medications per [facility] protocol.    Recent Labs     10/07/24  0551 10/07/24  2355   BUN 20 15       Recent Labs     10/07/24  0551 10/07/24  2355   CREATININE 1.0 0.8       Estimated Creatinine Clearance: 73 mL/min (based on SCr of 0.8 mg/dL).    Height:   Ht Readings from Last 1 Encounters:   10/06/24 1.753 m (5' 9\")     Weight:  Wt Readings from Last 1 Encounters:   10/06/24 63.6 kg (140 lb 3.2 oz)           Plan: Adjust the following medications based on [facility] protocol:           Cefepime 2 gm IV every 12 hours standard infusion adjusted to Cefepime 2 gm IV x 1 standard infusion followed by Cefepime 2 gm IV every 8 hours extended infusion.     Electronically signed by Delmi Justin RPH on 10/8/2024 at 8:22 AM    
  Physician Progress Note      PATIENT:               HANNAH ROSEN  CSN #:                  [id number]  :                       1950  ADMIT DATE:       10/5/2024 8:46 PM  DISCH DATE:  RESPONDING  PROVIDER #:        Kailee Alaniz MD          QUERY TEXT:    Pt admitted on 10/5 with left lower extremity ischemia and underwent femoral   endarterectomy on 10/6. Noted documentation of critical left lower limb   ischemia on 10/7 with additional femoral endarterectomy with hematoma   evacuation on 10/8.? If possible, please document in progress notes and   discharge summary the relationship if any between the left leg   ischemia/hematoma and the surgery on 10/6:  ?  The medical record reflects the following:  Risk Factors: PAD, recent vascular surgery  Clinical Indicators: 10/7 Vascular H&P- Patient underwent a right lower   extremity arteriogram last week with Dr Morrow, with a left groin access.    Subsequently, the patient developed ischemic changes in the left lower   extremity post procedure, and was taken this weekend for a left femoral   endarterectomy and arteriogram. At that time, it was noted that the patient   had minimal flow in the left lower extremity, but she felt that this was   likely chronic.  Today, the patient has critical ischemia and needs urgent   revascularization.  He has had bleeding intermittently overnight  10/8 op note-  There was a forceful release of a large amount of blood from   the left groin upon removal of the deeper fascial closure.  We then noted   extensive hematoma which we spent a bit of time evacuating.  There was a large   amount of hemostatic agent which was also evacuated.  There was bleeding from   the endarterectomy patch diffusely. We held a little direct pressure over the   patch while I dissected out more distally on the superficial femoral artery.    It seemed that there was some palpable plaque that could be contributing to   the ischemia. A large amount 
 Daily Progress Note    Date:10/12/2024  Patient: Jong Loyd  : 1950  MRN:[id number]  CODE:Full Code No additional code details  PCP:Giselle Frias APRN - NP    Admit Date: 10/5/2024  8:46 PM   LOS: 7 days       Subjective:    Seen and examined this a.m.  has some pain with exertion but otherwise no pain at rest.  No acute issues overnight.        Summary: 74-year-old male with PAD, emphysema, hypertension, BPH, CLL, bladder cancer followed by [facility] on intravesical BCG treatment, recent worsening claudication undergoing RLE angioplasty and stenting, admitted with left lower extremity critical limb ischemia, managed with IV heparin drip, initially underwent femoral endarterectomy 10/6, then required additional vascular intervention on 10/7 with evacuation of hematoma of left groin with washout, open thrombectomy, redo left femoral endarterectomy with angioplasty/stenting.  During hospital course she developed some fevers, had infectious workup with no clear infectious source, empirically treated with IV antibiotics.  He did have significant postoperative blood loss anemia requiring transfusional support.  Felt likely to have fevers from thrombosis.  Subsequently transition back to [facility].  [facility]      Objective:      Vital signs in last 24 hours:  Patient Vitals for the past 24 hrs:   BP Temp Temp src Pulse Resp SpO2 Height   10/12/24 0915 -- -- -- -- -- 96 % --   10/12/24 0544 (!) 98/57 99 °F (37.2 °C) Temporal 76 16 94 % --   10/11/24 2125 -- -- -- -- 16 -- --   10/11/24 2055 -- -- -- -- 16 -- --   10/11/24 1930 (!) 149/52 98.6 °F (37 °C) Oral 83 16 95 % --   10/11/24 1141 -- -- -- -- -- -- 1.753 m (5' 9.02\")     Physical exam    General: No acute distress  Cardiac: Normal rate, S1-S2 present  Respiratory: No wheezes rales or rhonchi  Abdomen: Nondistended  Extremities: Warm and dry, purpura around left groin    Lab Review 
 Daily Progress Note    Date:10/12/2024  Patient: Jong Loyd  : 1950  MRN:[id number]  CODE:Full Code No additional code details  PCP:Giselle Frias APRN - NP    Admit Date: 10/5/2024  8:46 PM   LOS: 7 days       Subjective:    Seen and examined this a.m.  Denies any pain while at rest.  No fevers or chills.  No complaints.        Summary: 74-year-old male with PAD, emphysema, hypertension, BPH, CLL, bladder cancer followed by [facility] on intravesical BCG treatment, recent worsening claudication undergoing RLE angioplasty and stenting, admitted with left lower extremity critical limb ischemia, managed with IV heparin drip, initially underwent femoral endarterectomy 10/6, then required additional vascular intervention on 10/7 with evacuation of hematoma of left groin with washout, open thrombectomy, redo left femoral endarterectomy with angioplasty/stenting.  During hospital course she developed some fevers, had infectious workup with no clear infectious source, empirically treated with IV antibiotics.  He did have significant postoperative blood loss anemia requiring transfusional support.  Felt likely to have fevers from thrombosis.  Subsequently transition back to [facility].      Objective:      Vital signs in last 24 hours:  Patient Vitals for the past 24 hrs:   BP Temp Temp src Pulse Resp SpO2 Height   10/12/24 0544 (!) 98/57 99 °F (37.2 °C) Temporal 76 16 94 % --   10/11/24 2125 -- -- -- -- 16 -- --   10/11/24 2055 -- -- -- -- 16 -- --   10/11/24 1930 (!) 149/52 98.6 °F (37 °C) Oral 83 16 95 % --   10/11/24 1141 -- -- -- -- -- -- 1.753 m (5' 9.02\")   10/11/24 0910 -- -- -- 78 18 93 % --     Physical exam    General: No acute distress  Cardiac: Normal rate, S1-S2 present  Respiratory: No wheezes or rhonchi, no use of accessory muscles  Abdomen: Nondistended  Extremities: Warm and dry, purpura around left groin    Lab Review   Recent Results (from the past 24 hour(s))   CBC    Collection 
 Daily Progress Note    Date:10/14/2024  Patient: Jong Loyd  : 1950  MRN:[id number]  CODE:Full Code No additional code details  PCP:Giselle Frias APRN - NP    Admit Date: 10/5/2024  8:46 PM   LOS: 9 days       Subjective:   No acute events overnight.  Has a couple recent nosebleeds but without significant blood loss.  Pain fairly well controlled.        Summary: 74-year-old male with PAD, emphysema, hypertension, BPH, CLL, bladder cancer followed by [facility] on intravesical BCG treatment, recent worsening claudication undergoing RLE angioplasty and stenting, admitted with left lower extremity critical limb ischemia, managed with IV heparin drip, initially underwent femoral endarterectomy 10/6, then required additional vascular intervention on 10/7 with evacuation of hematoma of left groin with washout, open thrombectomy, redo left femoral endarterectomy with angioplasty/stenting.  During hospital course she developed some fevers, had infectious workup with no clear infectious source, empirically treated with IV antibiotics.  He did have significant postoperative blood loss anemia requiring transfusional support.  Felt likely to have fevers from thrombosis.  Subsequently transition back to [facility].  [facility]      Objective:      Vital signs in last 24 hours:  Patient Vitals for the past 24 hrs:   BP Temp Temp src Pulse Resp SpO2   10/14/24 1039 -- -- -- -- 18 --   10/14/24 0715 (!) 126/54 97.4 °F (36.3 °C) Oral 69 18 98 %   10/14/24 0531 (!) 106/54 -- -- 72 -- 97 %   10/13/24 2059 -- -- -- -- 16 --   10/13/24 1924 108/70 98.1 °F (36.7 °C) -- 74 18 99 %   10/13/24 1616 (!) 127/50 97.9 °F (36.6 °C) Oral 64 18 98 %     Physical exam    General: No acute distress  Cardiac: Normal rate, S1-S2 present  Respiratory: No wheezes rales or rhonchi  Abdomen: Nondistended  Extremities: Warm and dry,  left groin with bandage in place    Lab 
4 Eyes Skin Assessment     NAME:  Jong Loyd  YOB: 1950  MEDICAL RECORD NUMBER:  [id number]    The patient is being assessed for  Transfer to New Unit    I agree that at least one RN has performed a thorough Head to Toe Skin Assessment on the patient. ALL assessment sites listed below have been assessed.      Areas assessed by both nurses:    Head, Face, Ears, Shoulders, Back, Chest, Arms, Elbows, Hands, Sacrum. Buttock, Coccyx, Ischium, and Legs. Feet and Heels        Does the Patient have a Wound? Yes wound(s) were present on assessment. LDA wound assessment was Initiated and completed by RN       [place] Prevention initiated by RN: Yes  Wound Care Orders initiated by RN: No  Surgical incision to LLE  Pressure Injury (Stage 3,4, Unstageable, DTI, NWPT, and Complex wounds) if present, place Wound referral order by RN under : No    New Ostomies, if present place, Ostomy referral order under : No     Nurse 1 eSignature: Electronically signed by Mitra Fields RN on 10/9/24 at 11:57 AM CDT    **SHARE this note so that the co-signing nurse can place an eSignature**    Nurse 2 eSignature: {Esignature:[id number]}   
4 Eyes Skin Assessment     NAME:  Jong Loyd  YOB: 1950  MEDICAL RECORD NUMBER:  [id number]    The patient is being assessed for  Admission    I agree that at least one RN has performed a thorough Head to Toe Skin Assessment on the patient. ALL assessment sites listed below have been assessed.      Areas assessed by both nurses:    Sacrum. Buttock, Coccyx, Ischium        Does the Patient have a Wound? No noted wound(s)       [place] Prevention initiated by RN: Yes  Wound Care Orders initiated by RN: No    Pressure Injury (Stage 3,4, Unstageable, DTI, NWPT, and Complex wounds) if present, place Wound referral order by RN under : No    New Ostomies, if present place, Ostomy referral order under : No     Nurse 1 eSignature: Electronically signed by Chloe Beal RN on 10/6/24 at 1:28 AM CDT    **SHARE this note so that the co-signing nurse can place an eSignature**    Nurse 2 eSignature: Electronically signed by Mitra Alejandra RN on 10/6/24 at 7:55 AM CDT   
CLINICAL PHARMACY NOTE: MEDS TO BEDS    Total # of Prescriptions Filled: 1   The following medications were delivered to the patient:  Current Discharge Medication List        START taking these medications    Details   HYDROcodone-acetaminophen (NORCO) 5-325 MG per tablet Take 1 tablet by mouth every 4 hours as needed for Pain for up to 3 days. Max Daily Amount: 6 tablets  Qty: 18 tablet, Refills: 0    Comments: Reduce doses taken as pain becomes manageable  Associated Diagnoses: Post-operative pain      sennosides-docusate sodium (SENOKOT-S) 8.6-50 MG tablet Take 2 tablets by mouth daily as needed for Constipation  Qty: 60 tablet, Refills: 0      polyethylene glycol (GLYCOLAX) 17 GM/SCOOP powder Take 17 g by mouth daily as needed (constipation)  Qty: 578 g, Refills: 0               Additional Documentation:    We did not fill the Senokot-s or Miralax since it was over the counter.   Handed script to patients family at bedside. Paid with cash.   
Comprehensive Nutrition Assessment    Type and Reason for Visit:  Initial, RD Nutrition Re-Screen/LOS    Nutrition Recommendations/Plan:   Continue current nutritional interventions.  Follow for po intake, labs     Malnutrition Assessment:  Malnutrition Status:  Moderate malnutrition (10/11/24 1147)    Context:  Acute Illness     Findings of the 6 clinical characteristics of malnutrition:  Energy Intake:  50% or less of estimated energy requirements for 5 or more days  Weight Loss:  No significant weight loss     Body Fat Loss:  Mild body fat loss Orbital, Buccal region   Muscle Mass Loss:  Mild muscle mass loss Temples (temporalis)  Fluid Accumulation:  Mild Extremities, Generalized   Strength:  Not Performed    Nutrition Assessment:    Following patient for LOS x 6 days.  PO intake remains decreased--intake ranging 0-25%.  Intake remains similar to this when family brings in food.  Will drink the Ensure.  Glucose .  BM 10/4    Nutrition Related Findings:    nonpitting generalized, BUE & RLE edema.,  +1 nonpitting LLE edema Wound Type: Multiple, Surgical Incision       Current Nutrition Intake & Therapies:    Average Meal Intake: 1-25%  Average Supplements Intake: 51-75%, %  ADULT DIET; Regular  ADULT ORAL NUTRITION SUPPLEMENT; Breakfast, Dinner; Standard High Calorie/High Protein Oral Supplement    Anthropometric Measures:  Height: 175.3 cm (5' 9.02\")  Ideal Body Weight (IBW): 160 lbs (73 kg)    Admission Body Weight: 63.6 kg (140 lb 3.4 oz)  Current Body Weight: 63.6 kg (140 lb 3.4 oz), 87.6 % IBW.    Current BMI (kg/m2): 20.7  Usual Body Weight: 55.9 kg (123 lb 3.8 oz) (7/2024)  % Weight Change (Calculated): 13.8  Weight Adjustment For: No Adjustment  BMI Categories: Underweight (BMI less than 22) age over 65    Estimated Daily Nutrient Needs:  Energy Requirements Based On: Kcal/kg  Weight Used for Energy Requirements: Current  Energy (kcal/day): 0274-3775 kcals (25-30 kcls/kg)  Weight Used for 
Jong Loyd received from ICU to room # 306   .  Mental Status: Patient is oriented, alert, and coherent.   Vitals:    10/09/24 1109   BP: (!) 119/43   Pulse: 76   Resp: 20   Temp: 98.9 °F (37.2 °C)   SpO2: 95%     Placed on cardiac monitor: No.  Belongings:  clothing, shoes, cell phone,   with patient at bedside .  Family at bedside Yes.  Oriented Patient to room.  Call light within reach. Yes.  Transfer was: Well tolerated by patient..    Electronically signed by Mitra Fields RN on 10/9/2024 at 11:57 AM     
[facility]   Pharmacy Pharmacokinetic Monitoring Service - Vancomycin    Consulting Provider: Jarred Baldwin MD   Indication: Sepsis of Unknown Etiology  Target Concentration: Goal AUC/PINKY 400-600 mg*hr/L  Day of Therapy: 2  Additional Antimicrobials: cefepime    Pertinent Laboratory Values:   Wt Readings from Last 1 Encounters:   10/06/24 63.6 kg (140 lb 3.2 oz)     Temp Readings from Last 1 Encounters:   10/09/24 97.9 °F (36.6 °C) (Temporal)     Estimated Creatinine Clearance: 83 mL/min (based on SCr of 0.7 mg/dL).  Recent Labs     10/07/24  2355 10/08/24  0909 10/09/24  0132   CREATININE 0.8 0.7 0.7   BUN 15 14 11   WBC 10.2  --  7.2       Pertinent Cultures:  Culture Date Source Results   10/8/24 Blood x2 G(+) cocci in clusters resembling staph (1 of 2)   10/8/24 Blood, molecular Staph epi,  Staph species,  Methicillin resistance mecA/C   10/9/24 Blood x2 Sent    MRSA Nasal Swab: N/A. Non-respiratory infection.    Recent vancomycin administrations                     vancomycin (VANCOCIN) 750 mg in sodium chloride 0.9 % 250 mL IVPB ([id number]) (mg) 750 mg New Bag 10/09/24 1000     750 mg New Bag 10/08/24 2201    vancomycin (VANCOCIN) 1,750 mg in sodium chloride 0.9 % 500 mL IVPB (mg) 1,750 mg New Bag 10/08/24 1020                    Assessment:  Date/Time Current Dose Concentration Timing of Concentration (h) AUC   10/9/24 2200 750mg IV q12h  7.6 11 h 6 min 339   Note: Serum concentrations collected for AUC dosing may appear elevated if collected in close proximity to the dose administered, this is not necessarily an indication of toxicity    Plan:  Current dosing regimen is sub-therapeutic  Increase dose to 1250mg IV q12 h  Repeat vancomycin concentration ordered for 10/11 @ 1000   Pharmacy will continue to monitor patient and adjust therapy as indicated    Thank you for the consult,  Giulia Mcintosh RPH  10/9/2024 9:58 PM   
[facility]   Pharmacy Pharmacokinetic Monitoring Service - Vancomycin     Jong Loyd is a 74 y.o. male starting on vancomycin therapy for Sepsis of Unknown Etiology. Pharmacy consulted by Jarred Baldwin MD for monitoring and adjustment.    Target Concentration: Goal AUC/PINKY 400-600 mg*hr/L    Additional Antimicrobials: Cefepime    Pertinent Laboratory Values:   Wt Readings from Last 1 Encounters:   10/06/24 63.6 kg (140 lb 3.2 oz)     Temp Readings from Last 1 Encounters:   10/08/24 (!) 101.5 °F (38.6 °C)     Estimated Creatinine Clearance: 73 mL/min (based on SCr of 0.8 mg/dL).  Recent Labs     10/07/24  0551 10/07/24  2355   CREATININE 1.0 0.8   BUN 20 15   WBC 12.8* 10.2     Procalcitonin: None ordered at this time    Pertinent Cultures:  Culture Date Source Results   10/08/24 Blood x 2 Sent   MRSA Nasal Swab: N/A. Non-respiratory infection.    Plan:  Dosing recommendations based on Bayesian software  Start vancomycin loading dose of 1750 mg IV x 1 followed by Vancomycin 750 mg IV every 12 hours  Anticipated AUC of 418 / 448 and trough concentration of 12.5 at steady state  Renal labs as indicated   Vancomycin concentration ordered for 10/09/24 @ 2130   Pharmacy will continue to monitor patient and adjust therapy as indicated    Loading dose: 1750 mg at 10:00 10/08/2024.  Regimen: 750 mg IV every 12 hours.  Start time: 22:00 on 10/08/2024  Exposure target: AUC24 (range)400-600 mg/L.hr   [id number]: 418 mg/L.hr  AUC24,ss: 448 mg/L.hr  Probability of AUC24 > 400: 67 %  Ctrough,ss: 12.5 mg/L  Probability of Ctrough,ss > 20: 7 %    Thank you for the consult,  Delmi Justin [facility]  10/8/2024 9:23 AM    
Nutrition Assessment     Type and Reason for Visit: Reassess    Nutrition Recommendations/Plan:   Continue current nutritional interventions     Malnutrition Assessment:  Malnutrition Status: Moderate malnutrition    Nutrition Assessment:  Pt continues to meet criteria for moderate malnutrition.  PO intake remains variable ranging from %.  Is taking ONS well.  New wt NA.  BM 10/12.  No new labs available    Estimated Daily Nutrient Needs:  Energy (kcal):  6215-9341 kcals (25-30 kcls/kg) Weight Used for Energy Requirements: Current     Protein (g):  64-128g Weight Used for Protein Requirements: Current        Fluid (ml/day):  7219-8604 ml Method Used for Fluid Requirements: 1 ml/kcal    Nutrition Related Findings:   nonpitting generalized, BUE & RLE edema.,  +1 nonpitting LLE edema Wound Type: None, Surgical Incision    Current Nutrition Therapies:    ADULT DIET; Regular  ADULT ORAL NUTRITION SUPPLEMENT; Breakfast, Dinner; Standard High Calorie/High Protein Oral Supplement    Anthropometric Measures:  Height: 175.3 cm (5' 9.02\")  Current Body Wt: 63.6 kg (140 lb 3.4 oz)   BMI: 20.7    Nutrition Diagnosis:   Inadequate oral intake related to acute injury/trauma, increase demand for energy/nutrients as evidenced by wounds    Nutrition Interventions:   Food and/or Nutrient Delivery: Continue Current Diet, Continue Oral Nutrition Supplement  Nutrition Education/Counseling: No recommendation at this time  Coordination of Nutrition Care: Continue to monitor while inpatient  Plan of Care discussed with: pt    Goals:  Previous Goal Met: Progressing toward Goal(s)  Goals: Meet at least 75% of estimated needs, PO intake 50% or greater       Nutrition Monitoring and Evaluation:   Behavioral-Environmental Outcomes: None Identified  Food/Nutrient Intake Outcomes: Food and Nutrient Intake, Supplement Intake  Physical Signs/Symptoms Outcomes: Biochemical Data, Fluid Status or Edema, Weight, Skin    Discharge Planning:  
Patient being transferred to: 306 unit via  in a wheelchair  Report called to: DIAN Manriquez    Patient condition: Alert and Oriented   Telemetry monitoring in place: No  LDA's continued upon transfer:  IV     Family notified: Yes. Patient just texted family.         All personal belongings sent with patient.   
Patient complaining of increased pain at incision site. Heparin stopped due to bleeding through bandage. Text Dr. Morrow. She ordered Norco 5 q4hr. Also advised to change dressing if saturated. Restart heparin in 30 min. Electronically signed by Chloe Beal RN on 10/6/2024 at 9:17 PM   
Patient name: Jong Loyd  MRN: [id number]  YOB: 1950    Vascular surgery progress note 10/13/2024     Patient seen and examined and family is at the bedside. He is doing great. [facility]. His dressings were just done, but I peeked at his incisions- all are stable. Still concerned about the dusky skin mid incision on the groin, but with the tense hematoma following his Sunday case with Dr Morrow, I'm still pretty happy with the way the groin is healing. Hopefully, it will heal from the inside and any small aspect of skin can then be locally debrided if needed.  No urgent debridement needed and I explained this to the patients family at bedside. He has a great signal over the bypass, and at the foot. Continue OOB and ambulation.  He would like to shower again today, and I think this is a good idea.  The nurses say they will get him in the shower this afternoon.  I will be leaving town tomorrow and Dr Morrow will take over care.  The patient is aware of this.  Hopefully, he can be discharged soon once home health is arranged.    We appreciate the opportunity to participate in the care of this patient.  
Patient name: Jong Loyd  MRN: [id number]  YOB: 1950    Patient seen and examined and family is at the bedside. He is doing great. Waiting on home health to go home. His dressings were just done, but I peeked at his incisions- all are stable. Still a bit worried about the dusky skin mid incision on the groin, but with the tense hematoma following his Sunday case with Dr Morrow, I'm still pretty happy with the way the groin is healing. Hopefully, it will heal from the inside and any small aspect of skin can then be locally debrided prn. No urgent debridement needed and I explained this to the patients family at bedside after showing them the area. He has a great signal over the bypass, and at the foot. Continue OOB and ambulation. We will follow along.    We appreciate the opportunity to participate in the care of this patient.  
Patient name: Jong Loyd  MRN: [id number]  YOB: 1950    Patient seen and examined.  He is doing really well today.  He wants to take a shower, and I am happy for him to do this.  I did remove his dressings at the bedside.  The left medial thigh, lower calf incisions are all clean dry and intact and healing well.  The right groin had a lot of hematoma after Dr Morrow's procedure, so when I reopen the groin for my procedure on Monday, the skin and tissue is very tenuous.  I was pretty concerned about reclosing this, but so far things are okay.  There is some dusky dark skin in the midportion of the incision laterally under the staples, but the rest of the skin is starting to clear up and appears viable.  I am hopeful that the tissue will heal, especially if the patient is able to tolerate high-protein diet for high-protein supplements, and keep the area very clean, but I am still worried that this will be very challenging to heal in the midportion.  So far, everything is okay.  I did put a few mattress sutures and to take tension off the skin and help things stay together.  This seems to be helpful so far.  We will continue to follow that closely.  I did have the nurse take a picture for the chart.  Otherwise, the patient is doing great and we are hopeful he will be ready for discharge soon.  He had a postop vascular study and his right KG is 0.99, left KG 0.80.  We are pleased with his progress.  Eliquis twice daily.  Continue PT OT.    We appreciate the opportunity to participate in the care of this patient.  
Patient up to chair with minimal assist. Tolerated well.   
Physical Therapy  Name: Jong Loyd  MRN:  [id number]  Date of service:  10/11/2024    Attempt this am.  Patient in bathroom and states that he is about to take a shower.  He had no questions or concerns about returning home at this time.  He declined to attempt ambulation later.    Electronically signed by Makayla Kapadia PTA on 10/11/2024 at 1:11 PM    
Physical Therapy  Name: Jong Loyd  MRN:  [id number]  Date of service:  10/14/2024       10/14/24 0926   Restrictions/Precautions   Restrictions/Precautions Fall Risk   General   Family / Caregiver Present No   Subjective   Subjective States he has been up in room on his own. Pt. willing to walk in hallway. Hoping to leave soon.   General Comment   Comments LPN, Briana, okayed PT and notified pt wanting pain meds. Nsg student and instructor aware pt wanting razor.   Pain Assessment   Pain Assessment None - Denies Pain   Pain Level 3   Non-Pharmaceutical Pain Intervention(s) Ambulation/Increased Activity;Rest   Response to Pain Intervention Patient satisfied   Bed Mobility   Comment up in chair and back to chair   Transfers   Sit to Stand Supervision   Stand to Sit Stand by assistance   Ambulation   Surface Level tile   Device Rolling Walker   Assistance Stand by assistance;Contact guard assistance   Quality of Gait ANTALGIC, FLEXED POSTURE, R ANKLE APPEARS TO \"ROLL\"  WITH WB. Pt REPORTS THIS IS BASELINE FOR HIM DUE TO BEING BORN WITH \"CLUB FEET\"   Gait Deviations Slow Vita;Decreased step length;Decreased step height   Distance 150'   Comments NO LOB; improved balance with increased distance   Short Term Goals   Time Frame for Short Term Goals 2 WKS   Short Term Goal 1  FT WITH RW, SBA   Conditions Requiring Skilled Therapeutic Intervention   Body Structures, Functions, Activity Limitations Requiring Skilled Therapeutic Intervention Decreased functional mobility ;Decreased strength;Decreased endurance;Increased pain;Decreased posture;Decreased ROM   Assessment Pt. would be safe to d/c home with A. Encouraged pt to use RW   Therapy Prognosis Good   Decision Making Low Complexity   Treatment Initiated  GT TRAINING, BALANCE, TF'S, ROM, POSITIONING   Discharge Recommendations Patient would benefit from continued therapy after discharge   Activity Tolerance   Activity Tolerance Patient tolerated treatment well 
Spoke with Angie ESPARZA, regarding home health. States she called every home health agency established in the [facility], and none of them had the patient listed. [facility] never [facility] her back with confirmation on what agency would be coming to see patient. Hospitalist notified.   
Spoke with Sofiya at [facility], discharge summary, and AVS faxed to [facility]   
Vascular Lab Preliminary.  Left lower extremity arterial scan completed.  LT CFA not visualized due to bandage in Groin area.  Starting at the LT SFA prox to mid area - ankle is occluded. LT SFA stent is occluded. Could not visualize any collateral flow in the LT calf.  Report called to Dr. Alaniz.  Final report pending.  
Vascular Preliminary Report  Left LE vein mapping for pre-op is completed.  Left GSV and LSV marked and measured.  Final Report Pending.  
Vascular Surgery   Daily Progress Note  Dr.Sara Alaniz      Pt Name: Jong Loyd  Medical Record Number: [id number]  Date of Birth 1950   Today's Date: 10/10/2024    SUBJECTIVE:     Patient was seen and examined, stating left leg feels much better today, able to lift and move it easily. Reports left groin   Remains concerned about his  scrotal and penile bruising and some swelling  Reports no nausea today.    OBJECTIVE:     Patient Vitals for the past 24 hrs:   BP Temp Temp src Pulse Resp SpO2   10/10/24 1113 (!) 109/46 98.8 °F (37.1 °C) Oral 74 18 95 %   10/10/24 0715 (!) 117/48 98.5 °F (36.9 °C) Oral 84 18 93 %   10/10/24 0426 (!) 105/50 99.3 °F (37.4 °C) Oral 79 18 95 %   10/09/24 2347 (!) 97/48 -- -- 74 18 93 %   10/09/24 2236 -- -- -- -- 18 --   10/09/24 2206 -- -- -- -- 16 --   10/09/24 1940 (!) 119/46 97.9 °F (36.6 °C) Temporal 79 18 95 %   10/09/24 1910 -- -- -- -- 16 --   10/09/24 1612 (!) 107/51 98.6 °F (37 °C) -- 77 18 96 %       Intake/Output Summary (Last 24 hours) at 10/10/2024 1232  Last data filed at 10/10/2024 1018  Gross per 24 hour   Intake 7310.35 ml   Output 1300 ml   Net 6010.35 ml     In: 7510.4 [P.O.:240; I.V.:6517.7]  Out: 1700 [Urine:1700]    I/O last 3 completed shifts:  In: 8756 [P.O.:240; I.V.:7354.7; IV Piggyback:1161.3]  Out: 2125 [Urine:2125]     Date 10/10/24 0000 - 10/10/24 2359   Shift 8820-6105 9931-6181 2163-6305 24 Hour Total   INTAKE   P.O.(mL/kg/hr)  240  240   I.V.(mL/kg) 62.7(1)   62.7(1)   IV Piggyback(mL/kg) 752.7(11.8)   752.7(11.8)   Shift Total(mL/kg) 815.4(12.8) 240(3.8)  1055.4(16.6)   OUTPUT   Urine(mL/kg/hr)  600  600   Emesis/NG output(mL/kg)  0(0)  0(0)   Other(mL/kg)  0(0)  0(0)   Stool(mL/kg)  0(0)  0(0)   Blood(mL/kg)  0(0)  0(0)   Shift Total(mL/kg)  600(9.4)  600(9.4)   Weight (kg) 63.6 63.6 63.6 63.6     Wt Readings from Last 3 Encounters:   10/06/24 63.6 kg (140 lb 3.2 oz)   10/04/24 65 kg (143 lb 4.8 oz)   08/05/24 58.3 kg (128 lb 9.3 
Vascular Surgery   Daily Progress Note  Dr.Sara Alaniz      Pt Name: Jong Loyd  Medical Record Number: [id number]  Date of Birth 1950   Today's Date: 10/9/2024    SUBJECTIVE:     Patient was seen and examined, stating his left leg feels so heavy and sore today  Concerned because he has scrotal and penile bruising and some swelling  Stating he was having some nausea this am, wasn't really hungry    OBJECTIVE:     Patient Vitals for the past 24 hrs:   BP Temp Temp src Pulse Resp SpO2   10/09/24 1109 (!) 119/43 98.9 °F (37.2 °C) Oral 76 20 95 %   10/09/24 1007 -- -- -- -- 17 --   10/09/24 1000 (!) 128/46 -- -- 77 20 96 %   10/09/24 0900 (!) 97/44 -- -- 72 20 96 %   10/09/24 0800 95/70 99.5 °F (37.5 °C) Temporal 84 16 91 %   10/09/24 0700 (!) 88/26 -- -- 85 12 97 %   10/09/24 0618 -- -- -- 79 20 94 %   10/09/24 0600 (!) 120/49 100 °F (37.8 °C) Temporal 84 23 92 %   10/09/24 0500 (!) 142/70 -- -- 84 21 95 %   10/09/24 0400 (!) 125/45 100.1 °F (37.8 °C) Temporal 73 19 95 %   10/09/24 0300 (!) 116/44 -- -- 79 21 94 %   10/09/24 0200 (!) 120/47 -- -- 79 20 94 %   10/09/24 0100 (!) 108/48 100 °F (37.8 °C) Temporal 82 18 94 %   10/09/24 0000 -- 99.5 °F (37.5 °C) Temporal 73 14 95 %   10/08/24 2300 (!) 109/46 -- -- 76 20 96 %   10/08/24 2200 (!) 112/40 -- -- 73 19 97 %   10/08/24 2100 (!) 118/30 -- -- 75 19 97 %   10/08/24 2000 (!) 117/45 100 °F (37.8 °C) Temporal 74 21 96 %   10/08/24 1900 (!) 117/42 -- -- 84 20 94 %   10/08/24 1857 -- -- -- -- 22 --   10/08/24 1831 -- -- -- 86 -- --   10/08/24 1827 -- -- -- 81 22 97 %   10/08/24 1800 (!) 141/45 -- -- 80 21 95 %   10/08/24 1753 -- -- -- 81 20 95 %   10/08/24 1723 -- -- -- 87 23 94 %   10/08/24 1700 (!) 122/51 -- -- 78 21 95 %   10/08/24 1600 (!) 111/46 99.1 °F (37.3 °C) Temporal 78 17 97 %   10/08/24 1500 (!) 104/42 -- -- 80 17 97 %   10/08/24 1449 -- -- -- -- 18 --   10/08/24 1440 (!) 113/44 99.6 °F (37.6 °C) Temporal 85 17 100 %   10/08/24 1435 (!) 113/44 99.6 
Vascular Surgery   Daily Progress Note  Dr.Sara Alaniz      Pt Name: Jong Loyd  Medical Record Number: [id number]  Date of Birth 1950   Today's Date: 10/7/2024        SUBJECTIVE:     Patient was seen and examined.  Left lower leg and left groin  Has had nausea this am    OBJECTIVE:     Patient Vitals for the past 24 hrs:   BP Temp Temp src Pulse Resp SpO2   10/07/24 1102 -- -- -- -- 20 --   10/07/24 1100 (!) 125/53 -- -- 80 20 95 %   10/07/24 1032 -- -- -- -- 17 --   10/07/24 1000 (!) 135/56 -- -- 74 13 96 %   10/07/24 0900 (!) 136/57 -- -- 83 18 98 %   10/07/24 0858 -- -- -- -- 18 --   10/07/24 0800 (!) 131/58 -- -- 80 15 96 %   10/07/24 0701 -- -- -- -- 17 --   10/07/24 0700 (!) 121/55 -- -- 82 17 99 %   10/07/24 0628 -- -- -- 99 22 93 %   10/07/24 0600 (!) 124/56 -- -- 80 22 98 %   10/07/24 0500 135/63 -- -- 83 16 98 %   10/07/24 0400 139/68 -- -- 82 18 98 %   10/07/24 0300 129/70 -- -- 93 19 96 %   10/07/24 0200 (!) 131/59 -- -- 84 17 99 %   10/07/24 0100 134/61 -- -- 84 16 99 %   10/07/24 0000 135/64 -- -- 93 24 99 %   10/06/24 2300 (!) 155/66 98.2 °F (36.8 °C) Temporal 85 16 98 %   10/06/24 2220 137/64 -- -- 88 20 99 %   10/06/24 2200 -- -- -- 88 -- --   10/06/24 2100 (!) 122/56 -- -- 98 24 94 %   10/06/24 2013 -- -- -- (!) 107 20 95 %   10/06/24 2012 -- -- -- (!) 107 13 96 %   10/06/24 2000 117/78 -- -- (!) 108 24 92 %   10/06/24 1943 (!) 122/56 98.8 °F (37.1 °C) -- (!) 110 20 100 %   10/06/24 1500 (!) 126/59 -- -- 59 (!) 34 92 %   10/06/24 1449 -- -- -- -- 18 --   10/06/24 1400 120/62 -- -- 62 26 95 %   10/06/24 1300 (!) 126/59 -- -- 65 23 95 %   10/06/24 1200 (!) 128/54 97.1 °F (36.2 °C) Temporal 61 21 96 %         Intake/Output Summary (Last 24 hours) at 10/7/2024 1155  Last data filed at 10/7/2024 1000  Gross per 24 hour   Intake 2186.73 ml   Output 1020 ml   Net 1166.73 ml       In: 2228.3 [P.O.:600; I.V.:1628.3]  Out: 1545 [Urine:1515]    I/O last 3 completed shifts:  In: 1772.5 [P.O.:480; 
Vascular lab preliminary.  Bilateral KG's completed.  Right KG:  0.99  Left KG:  0.80  Final report pending.  
DO Karen at [facility] OR    VASCULAR SURGERY N/A 3/18/2024    INTRAOPERATIVE ANGIOGRAM WITH RIGHT LEG RUNOFF, ANGIOPLASTY AND STENTING OF RIGHT POPLITEAL ARTERY, ANGIOPLASTY OF POSTERIOR TIBIAL ARTERY, RIGHT LEG PEDAL ACCESS. performed by Karen Morrow DO at [facility] OR    VASCULAR SURGERY Left 2024    INTRAOPERATIVE ANGIOGRAM WITH RUNOFF POSSIBLE ANGIOPLASTY, ATHERECTOMY, STENT performed by Karen Morrow DO at [facility] OR    VASCULAR SURGERY Left 2024    LEFT LOWER EXTREMITY ANGIOGRAM, ANGIOPLASTY, STENT PLACEMENT, REMOVAL LYSIS CATHETER performed by Karen Morrow DO at [facility] OR    VASCULAR SURGERY N/A 10/6/2024    ANGIOGRAM performed by Karen Morrow DO at [facility] OR    VASCULAR SURGERY Left 10/6/2024    FEMORAL ENDARTERECTOMY performed by Karen Morrow DO at [facility] OR    VASECTOMY         Family History   Problem Relation Age of Onset    Breast Cancer Mother 53        breast cancer    Heart Disease Sister        Social History     Socioeconomic History    Marital status:      Spouse name: Not on file    Number of children: Not on file    Years of education: Not on file    Highest education level: Not on file   Occupational History    Not on file   Tobacco Use    Smoking status: Former     Types: Cigars     Start date: 1963     Quit date: 2024     Years since quittin.2     Passive exposure: Current    Smokeless tobacco: Never    Tobacco comments:     patient smokes \"3-4 mini cigars\" daily; but has history of 1ppd smoker (quit in )   Vaping Use    Vaping status: Never Used   Substance and Sexual Activity    Alcohol use: Not Currently    Drug use: Never    Sexual activity: Defer   Other Topics Concern    Not on file   Social History Narrative    Not on file     Social Determinants of Health     Financial Resource Strain: Not on file   Food Insecurity: No Food Insecurity (10/6/2024)    Hunger Vital Sign     Worried About Running Out of Food in the Last Year: Never true 
Oral BID    lactobacillus  1 capsule Oral Daily with breakfast    clopidogrel  75 mg Oral Daily     Continuous Infusions:   sodium chloride      sodium chloride       PRN Meds:sodium chloride, , PRN  HYDROmorphone, 0.5 mg, Q2H PRN  potassium chloride, 40 mEq, PRN   Or  potassium alternative oral replacement, 40 mEq, PRN   Or  potassium chloride, 10 mEq, PRN  magnesium sulfate, 2,000 mg, PRN  ondansetron, 4 mg, Q8H PRN   Or  ondansetron, 4 mg, Q6H PRN  acetaminophen, 650 mg, Q6H PRN   Or  acetaminophen, 650 mg, Q6H PRN  diphenhydrAMINE, 25 mg, Nightly PRN  sodium chloride flush, 5-40 mL, PRN  sodium chloride, , PRN  HYDROcodone 5 mg - acetaminophen, 1 tablet, Q4H PRN      PHYSICAL EXAM:     CONSTITUTIONAL: Awake alert and oriented times 3, cooperative to examination.  LUNGS: clear bilaterally anteriorly, normal effort  CARDIOVASCULAR: regular rate and rhythm, no murmur noted  ABDOMEN: soft, nontender, nondistended, bowel sounds noted  NEUROLOGIC: Awake, alert, oriented to name, place and time.   INCISION: LLE dressings changed earlier per his nurse.   EXTREMITY: feet warm to touch, pink color. LLE with moderate edema with patient up in chair    Document Information    Wound Care Image: Wound   Left groin incision photo   10/14/2024 10:38   Attached To:   Hospital Encounter on 10/5/24   Source Information    Carrie Gama RN  [facility]       LABS:     CBC:   Recent Labs     10/13/24  0340 10/14/24  0757   WBC 7.8 9.7   RBC 2.93* 3.16*   HGB 8.7* 9.4*   HCT 27.6* 31.0*   MCV 94.2* 98.1*   MCH 29.7 29.7   MCHC 31.5* 30.3*   RDW 15.7* 17.1*    230   MPV 10.5 11.0        Calcium:   Lab Results   Component Value Date/Time    CALCIUM 7.9 10/11/2024 02:44 AM    CALCIUM 7.4 10/10/2024 01:44 AM    CALCIUM 7.4 10/09/2024 01:32 AM         DVT prophylaxis:            [x] Already on Anticoagulation       ASSESSMENT:     Procedure 10/4/24: Aortogram with bilateral lower extremity runoff.  Balloon angioplasty of 
ROM  Assessment: pt WOULD BENEFIT FROM SKILLED PT IN THIS SETTING TO PROGRESS HIS MOBILITY AND ASSIST IN DC PLANNING  Therapy Prognosis: Good  Decision Making: Low Complexity  Requires PT Follow-Up: Yes  Activity Tolerance  Activity Tolerance: Patient tolerated treatment well    Plan  Physical Therapy Plan  General Plan: 5-7 times per week  Therapy Duration: 2 Weeks  Current Treatment Recommendations: Strengthening, Balance training, Functional mobility training, Transfer training, Pain management, Gait training, Safety education & training, Patient/Caregiver education & training, Therapeutic activities, Positioning, ROM  Safety Devices  Type of Devices: Call light within reach, Gait belt, Left in chair, Chair alarm in place, Nurse notified    Restrictions        Subjective  General  Diagnosis: VASCULAR SX L LE  Follows Commands: Within Functional Limits  Subjective  Subjective: Pt STATES HIS PAIN IS IMPROVING AND HE IS READY TO AMBULATE IN THE HALLWAY         Social/Functional History  Social/Functional History  Lives With: Alone  Type of Home: House  Home Layout: One level  Home Access: Stairs to enter with rails  Entrance Stairs - Number of Steps: 3  Entrance Stairs - Rails: Both  Bathroom Shower/Tub: Tub/Shower unit  Bathroom Toilet: Standard  Bathroom Equipment: None  Bathroom Accessibility: Accessible  Home Equipment: None  Receives Help From: Family  ADL Assistance: Independent  Homemaking Assistance: Independent  Homemaking Responsibilities: Yes  Ambulation Assistance: Independent  Transfer Assistance: Independent  Active : Yes  Patient's  Info: n/a  Mode of Transportation: Car  Education: n/a  Occupation: Retired  Type of Occupation: n/a  Vision/Hearing  Vision  Vision: Within Functional Limits  Hearing  Hearing: Within functional limits    Cognition   Orientation  Overall Orientation Status: Within Functional Limits  Cognition  Overall Cognitive Status: WFL    Objective  Temp: 98.8 °F (37.1 
  Hospital Encounter on 10/5/24   Source Information    Monique Drew RN  [facility]     Document Information    Wound Care Image: Wound   Left medial thigh incision   10/11/2024 11:55   Attached To:   Hospital Encounter on 10/5/24   Source Information    Monique Drew RN  [facility]     Document Information    Wound Care Image: Wound   Left medial calf   10/11/2024 11:55   Attached To:   Hospital Encounter on 10/5/24   Source Information    Monique Drew RN  [facility]     Document Information    Wound Care Image: Wound   Left lateral calf   10/11/2024 11:56   Attached To:   Hospital Encounter on 10/5/24   Source Information    Monique Drew RN  [facility]       LABS:     CBC:   Recent Labs     10/09/24  0132 10/10/24  0144 10/11/24  0244   WBC 7.2 6.4 7.8   RBC 2.61* 2.45* 2.66*   HGB 7.5* 7.2* 7.9*   HCT 24.5* 22.5* 24.9*   MCV 93.9 91.8 93.6   MCH 28.7 29.4 29.7   MCHC 30.6* 32.0* 31.7*   RDW 15.2* 14.6* 14.7*   * 107* 127*   MPV 11.6 11.0 11.5      Last 3 CMP:   Recent Labs     10/09/24  0132 10/10/24  0144 10/11/24  0244    138 138   K 3.6 3.2* 3.6    106 103   CO2 25 26 23   BUN 11 8 13   CREATININE 0.7 0.6* 0.7   GLUCOSE 99 125* 95   CALCIUM 7.4* 7.4* 7.9*   BILITOT 0.9 0.5 1.1   ALKPHOS 48 68 78   AST 24 23 21   ALT 6 8 10        Calcium:   Lab Results   Component Value Date/Time    CALCIUM 7.9 10/11/2024 02:44 AM    CALCIUM 7.4 10/10/2024 01:44 AM    CALCIUM 7.4 10/09/2024 01:32 AM      Tiara Schultz  Technician  Vascular   Progress Notes     Signed     Date of Service: 10/10/2024  3:55 PM     Signed         Vascular lab preliminary.  Bilateral KG's completed.  Right KG:  0.99  Left KG:  0.80  Final report pending.               DVT prophylaxis:            [x] Already on Anticoagulation (heparin drip)      ASSESSMENT:     Procedure 10/4/24: Aortogram with bilateral lower extremity runoff.  Balloon angioplasty of right TPT trunk and proximal 
(HCC)    PVD (peripheral vascular disease) (HCC)    Femoral artery pseudo-aneurysm, left (HCC)    Atherosclerosis of native artery of both lower extremities with intermittent claudication (HCC)    Atherosclerosis of native arteries of extremities with intermittent claudication, left leg (HCC)    PAD (peripheral artery disease) (HCC)    Atherosclerosis of native arteries of extremities with rest pain, left leg (HCC)    Critical limb ischemia of left lower extremity (HCC)    Palliative care patient    Acute blood loss as cause of postoperative anemia    Hematoma    Moderate malnutrition (HCC)       Chief Complaint:  Chief Complaint   Patient presents with    Post-op Problem     Patient had vascular surgery here yesterday.  Patient states left leg woke him this morning throbbing and numb.  Right leg is the procedure side         PLAN:     Diet slowly improving, ONS per Dietary, patient stating he does not like them but will try to drink  Eliquis po BID  3.   Daily dressing changes to incision lines, okay for shower (with new dressing changes after shower)  4.   Waiting for patient insurance to arrange his  and [facility] resources.                 
resolved hospital problems. *      Critical limb ischemia of left lower extremity  - S/p left femoral endarterectomy 10/6  -- S/p repeat intervention with redo femoral endarterectomy, thrombectomy, angioplasty/stenting 10/7  --Postoperative ABIs completed and reviewed  -- Continue Eliquis and Plavix  -- Continue to monitor postoperative surgical site, wound dressing changes    Acute postoperative blood loss anemia----improved  - Status post transfusional support  - Hemoglobin today improved increased to 8.7  - S/p IV iron infusions    Fevers resolved, suspect fevers were likely from noninfectious etiology possibly from thrombus/hematoma  - Infectious workup has been unremarkable  - observe off antibiotics     Hypokalemia  Hypophosphatemia  - Electrolytes repleted    Emphysema  - Monitor SpO2 supplemental O2 as needed  - Continue nebulized bronchodilators    Hypothyroidism  - Resume levothyroxine    BPH  -  finasteride, add Flomax, monitor for any retention    Hypertension  - Continue losartan, monitor with further antihypertensive management as warranted    Dyslipidemia  - statin    CLL  - Continue Zanubrutinib therapy, outpatient follow-up with oncology    Bladder cancer  - Outpatient follow-up with [place] [facility] with scheduled outpatient treatments    Postoperative pain  - Continue pain control as needed    Epistaxis, appears mild at this time  - Add Afrin nasal spray and monitor, continue anticoagulation/antiplatelet as above unless he develops severe epistaxis     PT/OT    Home health ordered, hopefully discharge soon pending confirmation of [facility] health [facility], discussed with social work      Jacek Gil MD 10/13/2024 12:45 PM    
(TYLENOL) suppository 650 mg, 650 mg, Rectal, Q6H PRN, Kailee Alaniz MD    diphenhydrAMINE (BENADRYL) injection 25 mg, 25 mg, IntraVENous, Nightly PRN, Kailee Alaniz MD, 25 mg at 10/08/24 0329    sodium chloride flush 0.9 % injection 5-40 mL, 5-40 mL, IntraVENous, PRN, Kailee Alaniz MD    0.9 % sodium chloride infusion, , IntraVENous, PRN, Kailee Alaniz MD    HYDROcodone-acetaminophen (NORCO) 5-325 MG per tablet 1 tablet, 1 tablet, Oral, Q4H PRN, Kailee Alaniz MD, 1 tablet at 10/10/24 0740      Assessment/plan  Principal Problem:    Critical limb ischemia of left lower extremity (HCC)  Active Problems:    Benign essential HTN    BPH (benign prostatic hyperplasia)    Pulmonary emphysema (HCC)    PAD (peripheral artery disease) ([facility])    Atherosclerosis of native arteries of extremities with rest pain, left leg ([facility])    Acute blood loss as cause of postoperative anemia    Hematoma  Resolved Problems:    * No resolved hospital problems. *      Critical limb ischemia of left lower extremity  - S/p left femoral endarterectomy 10/6  -- S/p repeat intervention with redo femoral endarterectomy, thrombectomy, angioplasty/stenting 10/7  -- Continue Eliquis and Plavix  -- Monitor postoperative surgical site    Acute postoperative blood loss anemia  - Status post transfusional support  - Repeat hemoglobin 7.2 today, reviewed CBC  - Given KIM  - Continue IV iron infusions  -- Follow H&H and transfuse for drop in hemoglobin less than 7    Fevers, likely from thrombus or hematoma, less likely infectious  - Infectious workup reviewed, no evidence of UTI or pneumonia, chest x-ray obtained this a.m., reviewed  - No other clear localizing signs symptoms of infectious process  - Empiric antibiotics with IV vancomycin and cefepime, monitor today, will consider discontinuing antibiotics after today pending clinical course    Hypokalemia  Hypophosphatemia  - Electrolyte repletion with K-Phos supplements, 
here yesterday.  Patient states left leg woke him this morning throbbing and numb.  Right leg is the procedure side         PLAN:     Okay out of bed  ANGIE cortez when out of bed  Okay to transition back to his Eliquis  4.   Daily dressing changes to incision lines  5.   Transfuse as indicated.               
show atherosclerotic plaque with some mild stenosis. Stenosis of the proximal external iliac which does not appear to be hemodynamically significant at this time.  Inferior mesenteric artery origin stenosis which may be hemodynamically significant.  There is a stent in the external iliac artery on the right which is patent.  The internal iliac artery shows evidence of stenosis and there appears to be segmental occlusion with distal reconstitution.  Imaged lower thorax: Emphysematous changes.  Liver: No acute findings.  Gallbladder/Bile Ducts: No biliary dilation.  The gallbladder is normal  Spleen: No acute findings.  Pancreas: No acute findings.  Adrenals: No discrete lesions.  Kidneys/Ureters: Unremarkable.  Bowel/mesentery/peritoneum: Moderate fecal retention without obstruction. Normal appendix. No free air of ascites.  Retroperitoneum: No adenopathy.  Pelvis: Normal bladder wall contour.  In the left inguinal region there is soft tissue stranding in the subcutaneous soft tissues compatible with recent angiographic procedure.  Bones/body wall: The degenerative changes of the spine hips and pelvis.  Runoff:  Right:Less than 50% stenosis superficial femoral artery origin.  Scattered atherosclerotic plaque.  Profunda is patent.  There is no dissection versus intraluminal thrombus of the distal superficial femoral artery on the right with intraluminal filling defect.  Popliteal artery stent is present.  Some intraluminal contrast appears to be visible.  There is contrast within the peroneal artery and anterior tibial artery.  Posterior tibial artery is not visualized proximally.  There is reconstitution at the ankle from collateral circulation.  Runoff is predominately via a patent peroneal artery and a small anterior tibial artery  Left:There is a high-grade stenosis of the common femoral artery near the bifurcation.  Flow is seen within the profunda femoral .  Distal to the area of severe stenosis (series 5 image 
artery.  Posterior tibial artery is not visualized proximally.  There is reconstitution at the ankle from collateral circulation.  Runoff is predominately via a patent peroneal artery and a small anterior tibial artery  Left:There is a high-grade stenosis of the common femoral artery near the bifurcation.  Flow is seen within the profunda femoral .  Distal to the area of severe stenosis (series 5 image 66) there is some contrast seen filling a small superficial femoral artery with scattered atherosclerotic plaque.  There is an endovascular stent near the abductor hiatus with no intraluminal contrast evident.  There is a trickles of flow in the popliteal artery with no significant contrast opacification of the anterior tibial, posterior tibial or peroneal arteries.       1. Runoff to the right lower extremity of the a peroneal artery and a small anterior tibial artery.  There is reconstitution of the posterior tibial artery near the ankle. 2. Questionable short segment dissection versus intraluminal thrombus within the distal superficial femoral artery. 3. High-grade stenosis greater than 90% left common femoral artery and near the bifurcation with a small superficial femoral artery which becomes occluded and non visualized above a left-sided stent.  There are muscular branches from the profunda femoral  which supply the leg above the knee with some questionable muscular branches of the proximal calf.  No contrast filling below the trifurcation with a trickle flow in the popliteal artery. 4. Internal iliac artery stenosis bilaterally. 5. Soft tissue stranding in the left inguinal region compatible with recent surgical procedure.   Findings called to emergency department 10:54 p.m. 10/05/2024.  Verbal report to George Pastor  All CT scans are performed using dose optimization techniques as appropriate to the performed exam and include at least one of the following: Automated exposure control, adjustment of the mA and/or 
ongoing medical management.    Critical limb ischemia of left lower extremity.  Status post left femoral endarterectomy 10/6.  Status post redo femoral endarterectomy with additional endovascular intervention on 10/8.    Blood loss anemia.  Provide erythropoietin and intravenous iron.    Transfer out of ICU.    Please document 40 minutes of critical care time; excludes procedural time and excludes separately billable services' time.      Conrado Gray, DO

## 2024-10-15 NOTE — CARE COORDINATION
10/15/24 1347   IMM Letter   IMM Letter given to Patient/Family/Significant other/Guardian/POA/by: Brandi Trevino   IMM Letter date given: 10/15/24   IMM Letter time given: 1330     Important Message from Medicare letter given to  Jong Loyd  by Brandi Trevino. All questions answered,  Jong Loyd  voiced understanding. Signed copy of IMM placed in patient's soft chart. Jong Loyd given a copy of the IMM.     
Per Gennaro Barraza at [facility], they have received RFS for HH and rolling walker and it has been processed and approved; SW asked if we needed to contact an agency or if VA would; she said Yes, the VA's home health referral team will contact an agency within VA network and coordinate the Home Health.  Everything has been ordered.   Electronically signed by CHELY Erickson on 10/11/2024 at 2:14 PM              10/10/24 1400  Inpatient consult to Home Care Needs  ONE TIME     Complete  Discontinue     Comments: Certification and medical necessity: I certify that, based on my findings, the following services are medically necessary home health services for Jong Loyd for the following reasons: - Vital signs monitoring: Nurse to perform BP, HR, RR, Temp, and Weight with each visit and teach patient and caregivers on taking daily.  Nurse to call physician if pulse less than 50 or greater than 120, respiratory rate less than 12 or greater than 25, oral temperature greater than or equal to 101 oF, systolic BP less than 90 or greater than 170, diastolic BP less than 50 or greater than 100.  - Medication compliance and education for  new medications safety concerns  - Consult Physical Therapy for  Evaluate and Treat  - Consult Occupational Therapy for  Evaluate and Treat   Provider: (Not yet assigned)   Question Answer Comment   Reason for Consult? Home Care Orders    I certify that I, or a nurse practitioner or physician assistant working with me, had an in-person encounter with the patient and the reason for the home care services is documented in the clinical note on: 10/10/2024    Will the referring provider be the attending provider for home health? No    Name of attending provider for home health PCP    The patient is confined to home: Due to illness or injury that necessitates supportive device aid, use of special transportation, or assistance of another person to leave home, AND there is a normal 
RFS form faxed and emailed to [facility]. Awaiting acceptance.  Electronically signed by Laura Santos on 10/10/2024 at 2:24 PM   
SW also verified today with Pt that he nor his family have received any calls from VA or a HH provider .   Electronically signed by CHELY Erickson on 10/14/2024 at 5:13 PM         [facility] is closed today for the holiday; cannot f/u to see what agency they sent referral/approval to for HH; VILMA also tried on Friday and called all HH agencies in [facility] to see if they received a referral from [facility] and they had not.   Electronically signed by CHELY Erickson on 10/14/2024 at 3:05 PM   
Sw attempt to call pt phong Jensen to discuss discharge plans and goals. Sw left voicemail for returned call. Electronically signed by JACKSON MERAZ on 10/7/2024 at 11:36 AM    
VA has approved  and sent authorization to [facility] to start care tomorrow; per Lori at [facility] [place] they can do this. Notified MD  Electronically signed by CHELY Erickson on 10/15/2024 at 1:23 PM  
VILMA faxed updated RFS and orders for JAK and rolling walker with accompanying clinicals to [facility]     Awaiting receipt and approval/denial.    [facility]   [phone] or [phone]-Abbi Barraza  [phone]  [email or web address]      Electronically signed by CHELY Erickson on 10/10/2024 at 3:37 PM    
present  Other Identified Issues/Barriers to RETURNING to current housing: medical conditions  Potential Assistance needed at discharge: (P) N/A            Potential DME:    Patient expects to discharge to: (P) House  Plan for transportation at discharge: (P) Family    Financial    Payor: [employer] / Plan: [employer] / Product Type: *No Product type* /     Does insurance require precert for SNF: Yes    Potential assistance Purchasing Medications: (P) No  Meds-to-Beds request:        [facility] - [place], [place] - [address] - P [phone] - F [phone]  [address] [facility] [facility] [address]  Phone: [phone] Fax: [phone]    [id number] - [place], [place] - [address] - P [phone] - F [phone]  [address] [place] [address] Drive [address]  [facility] [address]  Phone: [phone] Fax: [phone]      Notes:    Factors facilitating achievement of predicted outcomes: Family support    Barriers to discharge: Limited family support    Additional Case Management Notes: sw spoke to pt about d/c plans and goals. At bedside pt family and phong Jensen. Pt reports prior to admission he was independent of all ADLs. Pt reports no dme in use. Pt is a  and uses [facility] pharmacy. Pt may be interested in skilled rehab pending his procedure if needed.sw called community [facility] who report pt is not services connected to use va benefits. Pt has medicare A which could help with the skilled rehab after d/c if needed. Sw will \follow for d/c needs.    The Plan for Transition of Care is related to the following treatment goals of PAD (peripheral artery disease) (HCC) [I73.9]  Critical limb ischemia of left lower extremity with rest pain (HCC) [I70.222]    IF APPLICABLE: The Patient and/or patient representative Jong and his family were provided with a choice of provider and agrees with the discharge plan. Freedom of choice list with basic dialogue that supports the patient's

## 2024-10-16 ENCOUNTER — HOME CARE VISIT (OUTPATIENT)
Dept: HOME HEALTH SERVICES | Facility: CLINIC | Age: 74
End: 2024-10-16
Payer: OTHER GOVERNMENT

## 2024-10-16 VITALS
SYSTOLIC BLOOD PRESSURE: 120 MMHG | DIASTOLIC BLOOD PRESSURE: 70 MMHG | HEART RATE: 76 BPM | RESPIRATION RATE: 18 BRPM | OXYGEN SATURATION: 98 % | TEMPERATURE: 97.8 F

## 2024-10-16 PROCEDURE — G0299 HHS/HOSPICE OF RN EA 15 MIN: HCPCS

## 2024-10-16 NOTE — HOME HEALTH
"SOC Note: 74 year old alert and oriented male admitted to  for after care of fem/pop and thrombectomy of left leg    Home Health ordered for: disciplines SN for incision site care, Patient refused PT and OT    Reason for Hosp/Primary Dx/Co-morbidities: Fem/pop graft and thrombectomy of left leg, atherosclerosis, PVD    Focus of Care:aftercare of Fem/Pop bypass graft with thrombectomy, incision site care    Patient's goal(s):\"Heal up fast\"    Current Functional status/mobility/DME: Ambulatory in own home with walker    HB status/Living Arrangements: Lives alone    Skin Integrity/wound status: Skin cler, incisions are clean dry with no drainage and staples intact    Code Status: Full Code    Fall Risk/Safety concerns: No    Educated on Emergency Plan, steps to take prior to going to the ER and when to Call Home Health First:  Yes     Medication issues/Concerns:No    Additional Problems/Concerns: Risk for post-o infection    SDOH Barriers (i.e. caregiver concerns, social isolation, transportation, food insecurity, environment, income etc.)/Need for MSW: no"

## 2024-10-16 NOTE — Clinical Note
"attention to Angelia Frausto Sam        SOC Note: 74 year old alert and oriented male admitted to  for after care of fem/pop and thrombectomy of left leg    Home Health ordered for: disciplines SN for incision site care, Patient refused PT and OT    Reason for Hosp/Primary Dx/Co-morbidities: Fem/pop graft and thrombectomy of left leg, atherosclerosis, PVD    Focus of Care:aftercare of Fem/Pop bypass graft with thrombectomy, incision site care    Patient's goal(s):\"Heal up fast\"    Current Functional status/mobility/DME: Ambulatory in own home with walker    HB status/Living Arrangements: Lives alone    Skin Integrity/wound status: Skin cler, incisions are clean dry with no drainage and staples intact    Code Status: Full Code    Fall Risk/Safety concerns: No    Educated on Emergency Plan, steps to take prior to going to the ER and when to Call Home Health First:  Yes "

## 2024-10-18 ENCOUNTER — HOME CARE VISIT (OUTPATIENT)
Dept: HOME HEALTH SERVICES | Facility: CLINIC | Age: 74
End: 2024-10-18
Payer: OTHER GOVERNMENT

## 2024-10-18 PROCEDURE — G0299 HHS/HOSPICE OF RN EA 15 MIN: HCPCS

## 2024-10-20 VITALS
HEART RATE: 78 BPM | TEMPERATURE: 98.2 F | RESPIRATION RATE: 18 BRPM | DIASTOLIC BLOOD PRESSURE: 72 MMHG | SYSTOLIC BLOOD PRESSURE: 120 MMHG | OXYGEN SATURATION: 96 %

## 2024-10-21 ENCOUNTER — HOME CARE VISIT (OUTPATIENT)
Dept: HOME HEALTH SERVICES | Facility: CLINIC | Age: 74
End: 2024-10-21
Payer: OTHER GOVERNMENT

## 2024-10-21 VITALS
SYSTOLIC BLOOD PRESSURE: 110 MMHG | RESPIRATION RATE: 18 BRPM | DIASTOLIC BLOOD PRESSURE: 70 MMHG | HEART RATE: 62 BPM | TEMPERATURE: 97.8 F | OXYGEN SATURATION: 99 %

## 2024-10-21 PROCEDURE — G0299 HHS/HOSPICE OF RN EA 15 MIN: HCPCS

## 2024-10-22 ENCOUNTER — HOME CARE VISIT (OUTPATIENT)
Dept: HOME HEALTH SERVICES | Facility: CLINIC | Age: 74
End: 2024-10-22
Payer: OTHER GOVERNMENT

## 2024-10-22 VITALS
TEMPERATURE: 98 F | HEART RATE: 64 BPM | OXYGEN SATURATION: 99 % | RESPIRATION RATE: 16 BRPM | DIASTOLIC BLOOD PRESSURE: 60 MMHG | SYSTOLIC BLOOD PRESSURE: 120 MMHG

## 2024-10-22 PROCEDURE — G0152 HHCP-SERV OF OT,EA 15 MIN: HCPCS

## 2024-10-22 NOTE — HOME HEALTH
SUBJECTIVE: patient reports he sits on side of tub then lowers hmself into tub for bathing. Verbalized understanding of safety recommendation for using shower chair for bathing.  DX: Peripheral vascular disease, unspecified  PRECAUTIONS: universal  OXYGEN USE: no  EQUIPMENT:  none  PRIOR LEVEL OF FUNCTION: lives alone, was independent with ADLS  MEDICAL NECESSITY: skilled OT for ADL training/education on safety with ADLs  PATIENT GOALS: to live independently  COMMUNICATION / CARE COORDINATION:  with admitting RN  CURRENT INSURANCE: [facility]  DATE OF NEXT APPOINTMENT WITH DOCTOR: 29th to remove stitches  ANTICIPATED DISCHARGE PLAN: to self care  PATIENT/CAREGIVER AGREE WITH DISCHARGE PLAN: yes  SPECIFIC INTERVENTIONS AND GOALS TO ADDRESS ON NEXT VISIT: N/A  FREQUENCY AND DURATION: eval only       PATIENT HAS RECEIVED EDUCATION ON COVID-19, PREVENTION, HANDWASHING, SOCIAL DISTANCING PER CDC

## 2024-10-25 ENCOUNTER — HOME CARE VISIT (OUTPATIENT)
Dept: HOME HEALTH SERVICES | Facility: CLINIC | Age: 74
End: 2024-10-25
Payer: OTHER GOVERNMENT

## 2024-10-25 PROCEDURE — G0299 HHS/HOSPICE OF RN EA 15 MIN: HCPCS

## 2024-10-26 VITALS
RESPIRATION RATE: 18 BRPM | OXYGEN SATURATION: 99 % | SYSTOLIC BLOOD PRESSURE: 120 MMHG | TEMPERATURE: 98.1 F | DIASTOLIC BLOOD PRESSURE: 76 MMHG | HEART RATE: 76 BPM

## 2024-10-29 ENCOUNTER — OFFICE VISIT (OUTPATIENT)
Dept: VASCULAR SURGERY | Age: 74
End: 2024-10-29

## 2024-10-29 VITALS
HEART RATE: 72 BPM | TEMPERATURE: 98.3 F | DIASTOLIC BLOOD PRESSURE: 50 MMHG | OXYGEN SATURATION: 99 % | SYSTOLIC BLOOD PRESSURE: 138 MMHG

## 2024-10-29 DIAGNOSIS — I70.213 ATHEROSCLER OF NATIVE ARTERY OF BOTH LEGS WITH INTERMIT CLAUDICATION (HCC): Primary | ICD-10-CM

## 2024-10-29 PROCEDURE — 99024 POSTOP FOLLOW-UP VISIT: CPT | Performed by: NURSE PRACTITIONER

## 2024-10-29 NOTE — PROGRESS NOTES
Jong Loyd is a 74 y.o. male who presents for post op evaluation.  Patient had a  Aortogram with bilateral lower extremity runoff.  Balloon angioplasty of right TPT trunk and proximal peroneal artery using 3 x 100 Sebastian balloon.  Balloon angioplasty of right popliteal artery and distal SFA using 4 x 150 Sebastian followed by 4 x 220 DCB, stenting of popliteal artery using 5 x 80 Innova stent. Went home.   Came back to the ER the next day with foot pain.    Had left CFA endarderectomy followed by  Evacuation hematoma left groin and washout  2.  Redo left femoral endarterectomy with Christie sure patch angioplasty, extension down to proximal SFA  3.  Left lower extremity open thrombectomy from femoral and popliteal artery exposures  4.  Left popliteal artery endarterectomy, partial removal pre-existing stent, Christie sure patch angioplasty  5.  Left femoral to above-knee popliteal artery bypass with 6 mm Artegraft  6.  Left lower extremity arteriogram  7.  Left popliteal angioplasty with 4 x 100 Sebastian balloon  8.  Left popliteal and posterior tibial artery angioplasty with 3 x 220 Sebastian balloon  9.  Left proximal popliteal artery stent with 5 x 60 Innova stent  10.  Completion arteriograms and direct closure arteriotomy  11.  Left lower extremity 4 compartment fasciotomy   Post op symptoms reported none.   Post op pain is minimal.      On evaluation today, incision is clean, dry, intact.  No signs of infection are present.  Today we removed all staples and sutures.  femoral pulses are present 2+ . Post op KG was 0.8    Today we have recommended he Wash incision daily with soap and water and cover with dry gauze until healed.  We have recommended continued clopidogrel 75 mg po qd, eliquis  daily.  We will follow up with him in 4 weeks.  This should bring you up to date on Jong Loyd  As always we want to thank you for allowing us to participate in the care of your patients.    Sincerely,    Dorothy

## 2024-10-30 ENCOUNTER — TELEPHONE (OUTPATIENT)
Dept: VASCULAR SURGERY | Age: 74
End: 2024-10-30

## 2024-10-30 ENCOUNTER — HOME CARE VISIT (OUTPATIENT)
Dept: HOME HEALTH SERVICES | Facility: CLINIC | Age: 74
End: 2024-10-30
Payer: OTHER GOVERNMENT

## 2024-10-30 VITALS
RESPIRATION RATE: 18 BRPM | HEART RATE: 90 BPM | SYSTOLIC BLOOD PRESSURE: 110 MMHG | OXYGEN SATURATION: 99 % | TEMPERATURE: 97.9 F | DIASTOLIC BLOOD PRESSURE: 60 MMHG

## 2024-10-30 PROCEDURE — G0299 HHS/HOSPICE OF RN EA 15 MIN: HCPCS

## 2024-10-30 NOTE — TELEPHONE ENCOUNTER
The patient was seen yesterday and had follow up vascular testing scheduled for 11/20/2024.  Per Dorothy this can be cancelled as he doesn't need the studies that soon.  The patient has a follow up with Dorothy and Dr. Torres on 11/23/2024 @ 2:15 PM.  The patient is aware of the appointment and voiced understanding.

## 2024-11-01 ENCOUNTER — HOME CARE VISIT (OUTPATIENT)
Dept: HOME HEALTH SERVICES | Facility: CLINIC | Age: 74
End: 2024-11-01
Payer: OTHER GOVERNMENT

## 2024-11-04 ENCOUNTER — TELEPHONE (OUTPATIENT)
Dept: HEMATOLOGY | Age: 74
End: 2024-11-04

## 2024-11-04 NOTE — TELEPHONE ENCOUNTER
I called and left patient a detailed voicemail with their appointment date and time for 11/06/24 and to come at the follow up appointment time and not the lab appointment time. I also made them aware of what that time was.  I made patient aware that we are in the [facility] and where it is located and gave the address in case, they use GPS. Left message for patient to call our office if they could not keep this appointment or if they did not know where our new building is located.

## 2024-11-05 ENCOUNTER — HOME CARE VISIT (OUTPATIENT)
Dept: HOME HEALTH SERVICES | Facility: CLINIC | Age: 74
End: 2024-11-05
Payer: OTHER GOVERNMENT

## 2024-11-05 VITALS
SYSTOLIC BLOOD PRESSURE: 120 MMHG | RESPIRATION RATE: 18 BRPM | HEART RATE: 72 BPM | DIASTOLIC BLOOD PRESSURE: 70 MMHG | TEMPERATURE: 97.9 F | OXYGEN SATURATION: 99 %

## 2024-11-05 PROCEDURE — G0299 HHS/HOSPICE OF RN EA 15 MIN: HCPCS

## 2024-11-05 NOTE — PROGRESS NOTES
Progress Note      Pt Name: Jong Loyd  YOB: 1950  MRN: [id number]    Date of evaluation: 11/6/2024  History Obtained From:  Patient, EMR    Portions of this note have been copied forward, however, changed to reflect the most current clinical status of this patient.    Chief Complaint   Patient presents with    Follow-up     CLL (chronic lymphocytic leukemia)      INTERVAL HISTORY/HISTORY OF PRESENT ILLNESS:    Jong Montiel has the following hematologic/oncologic issues:  CLL, diagnosed 4/2021; Zanubrutinib initiated 10/2/2023 due to persistent, unrelieved rash  High-grade urothelial carcinoma dating to 3/2022, status post TURBT and receiving BCG every 3 months at [facility]  Chronic Tobacco Use, (quit 7/2024), Monitored with annual LD screening Chest CT  7.22 x 8.55 mm cavitating lesion in the left midlung field (negative on PET scan dated 5/11/2023). Plan repeat CT chest CT 1/2025    Jong has a history of Mclaughlin stage 0 CLL.  Chemotherapy with zanubrutinib 80 mg; 4 tablets daily for a total of 320 mg daily was initiated 10/2/2023 for symptomatic improvement of rash felt possibly CLL related.  Rash has been improved on treatment, now occurring very intermittently and lasting 2-3 days at a time mainly to the anterior chest and waistline.  Jong states this is tolerable, rash was previously pruritic interfering with sleep and quality of life.  Attempt was made to decrease zanubrutinib to 160 mg daily in January, 2024, however Jong states rash returned within a couple of weeks.  He has been maintained on 320 mg daily since that time.    Jong is also monitored for a 7.22 x 8.55 mm cavitating lesion in the left midlung field (negative on PET scan dated 5/11/2023).  Chest CT next due 1/2025. He quit smoking 7/2024.    Patient underwent Left lower extremity angiogram, angioplasty, stent placement, removal lysis catheter 7/23/2024 by Dr. Morrow (patient held Imbruvica 1 week prior and 1 week after procedure

## 2024-11-06 ENCOUNTER — OFFICE VISIT (OUTPATIENT)
Dept: HEMATOLOGY | Age: 74
End: 2024-11-06
Payer: OTHER GOVERNMENT

## 2024-11-06 ENCOUNTER — HOSPITAL ENCOUNTER (OUTPATIENT)
Dept: INFUSION THERAPY | Age: 74
Discharge: HOME | End: 2024-11-06
Payer: OTHER GOVERNMENT

## 2024-11-06 VITALS
HEART RATE: 66 BPM | HEIGHT: 69 IN | BODY MASS INDEX: 20.68 KG/M2 | SYSTOLIC BLOOD PRESSURE: 122 MMHG | WEIGHT: 139.6 LBS | DIASTOLIC BLOOD PRESSURE: 68 MMHG | TEMPERATURE: 98.4 F | OXYGEN SATURATION: 98 %

## 2024-11-06 DIAGNOSIS — D64.9 NORMOCYTIC ANEMIA: ICD-10-CM

## 2024-11-06 DIAGNOSIS — Z85.51 HISTORY OF BLADDER CANCER: ICD-10-CM

## 2024-11-06 DIAGNOSIS — Z71.89 CARE PLAN DISCUSSED WITH PATIENT: ICD-10-CM

## 2024-11-06 DIAGNOSIS — R21 RASH, SKIN: ICD-10-CM

## 2024-11-06 DIAGNOSIS — C91.10 CLL (CHRONIC LYMPHOCYTIC LEUKEMIA) (HCC): Primary | ICD-10-CM

## 2024-11-06 DIAGNOSIS — R91.1 NODULE OF LEFT LUNG: ICD-10-CM

## 2024-11-06 DIAGNOSIS — Z51.11 CHEMOTHERAPY MANAGEMENT, ENCOUNTER FOR: ICD-10-CM

## 2024-11-06 DIAGNOSIS — C91.10 CLL (CHRONIC LYMPHOCYTIC LEUKEMIA) (HCC): ICD-10-CM

## 2024-11-06 LAB
ALBUMIN SERPL-MCNC: 4.2 G/DL (ref 3.5–5.2)
ALP SERPL-CCNC: 73 U/L (ref 40–129)
ALT SERPL-CCNC: 8 U/L (ref 5–41)
ANION GAP SERPL CALCULATED.3IONS-SCNC: 7 MMOL/L (ref 7–19)
AST SERPL-CCNC: 19 U/L (ref 5–40)
BASOPHILS # BLD: 0.04 K/UL (ref 0.01–0.08)
BASOPHILS NFR BLD: 0.8 % (ref 0.1–1.2)
BILIRUB SERPL-MCNC: 0.4 MG/DL (ref 0–1.2)
BUN SERPL-MCNC: 20 MG/DL (ref 8–23)
CALCIUM SERPL-MCNC: 8.8 MG/DL (ref 8.8–10.2)
CHLORIDE SERPL-SCNC: 108 MMOL/L (ref 98–107)
CO2 SERPL-SCNC: 28 MMOL/L (ref 22–29)
CREAT SERPL-MCNC: 1 MG/DL (ref 0.7–1.2)
EOSINOPHIL # BLD: 0.15 K/UL (ref 0.04–0.54)
EOSINOPHIL NFR BLD: 2.9 % (ref 0.7–7)
ERYTHROCYTE [DISTWIDTH] IN BLOOD BY AUTOMATED COUNT: 15.2 % (ref 11.6–14.4)
FERRITIN SERPL-MCNC: 279.1 NG/ML (ref 30–400)
GLUCOSE SERPL-MCNC: 132 MG/DL (ref 70–99)
HCT VFR BLD AUTO: 37.6 % (ref 40.1–51)
HGB BLD-MCNC: 11.9 G/DL (ref 13.7–17.5)
IRON SATN MFR SERPL: 26 % (ref 14–50)
IRON SERPL-MCNC: 68 UG/DL (ref 59–158)
LYMPHOCYTES # BLD: 1.34 K/UL (ref 1.18–3.74)
LYMPHOCYTES NFR BLD: 26.2 % (ref 19.3–53.1)
MCH RBC QN AUTO: 30.9 PG (ref 25.7–32.2)
MCHC RBC AUTO-ENTMCNC: 31.6 G/DL (ref 32.3–36.5)
MCV RBC AUTO: 97.7 FL (ref 79–92.2)
MONOCYTES # BLD: 0.41 K/UL (ref 0.24–0.82)
MONOCYTES NFR BLD: 8 % (ref 4.7–12.5)
NEUTROPHILS # BLD: 3.17 K/UL (ref 1.56–6.13)
NEUTS SEG NFR BLD: 61.9 % (ref 34–71.1)
PLATELET # BLD AUTO: 147 K/UL (ref 163–337)
PMV BLD AUTO: 10.5 FL (ref 7.4–10.4)
POTASSIUM SERPL-SCNC: 4.1 MMOL/L (ref 3.5–5.1)
PROT SERPL-MCNC: 6.3 G/DL (ref 6.4–8.3)
RBC # BLD AUTO: 3.85 M/UL (ref 4.63–6.08)
SODIUM SERPL-SCNC: 143 MMOL/L (ref 136–145)
TIBC SERPL-MCNC: 264 UG/DL (ref 250–400)
WBC # BLD AUTO: 5.12 K/UL (ref 4.23–9.07)

## 2024-11-06 PROCEDURE — 36415 COLL VENOUS BLD VENIPUNCTURE: CPT

## 2024-11-06 PROCEDURE — 85025 COMPLETE CBC W/AUTO DIFF WBC: CPT

## 2024-11-06 PROCEDURE — 99214 OFFICE O/P EST MOD 30 MIN: CPT | Performed by: NURSE PRACTITIONER

## 2024-11-06 PROCEDURE — 3078F DIAST BP <80 MM HG: CPT | Performed by: NURSE PRACTITIONER

## 2024-11-06 PROCEDURE — 80053 COMPREHEN METABOLIC PANEL: CPT

## 2024-11-06 PROCEDURE — 1123F ACP DISCUSS/DSCN MKR DOCD: CPT | Performed by: NURSE PRACTITIONER

## 2024-11-06 PROCEDURE — 3074F SYST BP LT 130 MM HG: CPT | Performed by: NURSE PRACTITIONER

## 2024-11-06 PROCEDURE — G2211 COMPLEX E/M VISIT ADD ON: HCPCS | Performed by: NURSE PRACTITIONER

## 2024-11-06 PROCEDURE — 99213 OFFICE O/P EST LOW 20 MIN: CPT

## 2024-11-06 ASSESSMENT — ENCOUNTER SYMPTOMS
EYE ITCHING: 0
WHEEZING: 0
EYE DISCHARGE: 0
COUGH: 0
ABDOMINAL PAIN: 0
DIARRHEA: 0
SHORTNESS OF BREATH: 0
SORE THROAT: 0
CONSTIPATION: 0
TROUBLE SWALLOWING: 0
NAUSEA: 0
VOMITING: 0

## 2024-11-07 ENCOUNTER — HOME CARE VISIT (OUTPATIENT)
Dept: HOME HEALTH SERVICES | Facility: CLINIC | Age: 74
End: 2024-11-07
Payer: OTHER GOVERNMENT

## 2024-11-07 VITALS
TEMPERATURE: 97.9 F | HEART RATE: 64 BPM | SYSTOLIC BLOOD PRESSURE: 120 MMHG | OXYGEN SATURATION: 96 % | RESPIRATION RATE: 20 BRPM | DIASTOLIC BLOOD PRESSURE: 78 MMHG

## 2024-11-07 PROCEDURE — G0299 HHS/HOSPICE OF RN EA 15 MIN: HCPCS

## 2024-11-11 ENCOUNTER — HOME CARE VISIT (OUTPATIENT)
Dept: HOME HEALTH SERVICES | Facility: CLINIC | Age: 74
End: 2024-11-11
Payer: OTHER GOVERNMENT

## 2024-11-11 VITALS
SYSTOLIC BLOOD PRESSURE: 120 MMHG | HEART RATE: 64 BPM | DIASTOLIC BLOOD PRESSURE: 70 MMHG | TEMPERATURE: 97.9 F | OXYGEN SATURATION: 96 %

## 2024-11-11 PROCEDURE — G0299 HHS/HOSPICE OF RN EA 15 MIN: HCPCS

## 2024-11-14 ENCOUNTER — HOME CARE VISIT (OUTPATIENT)
Dept: HOME HEALTH SERVICES | Facility: CLINIC | Age: 74
End: 2024-11-14
Payer: OTHER GOVERNMENT

## 2024-11-14 VITALS
HEART RATE: 70 BPM | SYSTOLIC BLOOD PRESSURE: 120 MMHG | TEMPERATURE: 97.9 F | DIASTOLIC BLOOD PRESSURE: 70 MMHG | RESPIRATION RATE: 16 BRPM | OXYGEN SATURATION: 98 %

## 2024-11-14 PROCEDURE — G0299 HHS/HOSPICE OF RN EA 15 MIN: HCPCS

## 2024-11-18 ENCOUNTER — HOME CARE VISIT (OUTPATIENT)
Dept: HOME HEALTH SERVICES | Facility: CLINIC | Age: 74
End: 2024-11-18
Payer: OTHER GOVERNMENT

## 2024-11-18 VITALS
SYSTOLIC BLOOD PRESSURE: 120 MMHG | HEART RATE: 78 BPM | OXYGEN SATURATION: 99 % | DIASTOLIC BLOOD PRESSURE: 80 MMHG | RESPIRATION RATE: 16 BRPM | TEMPERATURE: 97.6 F

## 2024-11-18 PROCEDURE — G0299 HHS/HOSPICE OF RN EA 15 MIN: HCPCS

## 2024-11-18 NOTE — Clinical Note
attention to Angelia Jacobson    Discharge Summary/Summary of Care Provided: SN provided incisional lime dressing changes and infection prevention post srgical care and skin tear dressing changes post-fall as ordered by [facility] physician    Patient received home health for diagnosis: post fem-pop bypass graft to L leg    Current level of functional ability: Ambulatory per self, no longer homebound    Living arrangements:Lives alone    Progress towards goals and/or Were all goals met? Yes, all goals met, wounds are healed or scabbed    If not all goals met, barriers that prevented patient from meeting goals: None    SDOH concerns (i.e. Caregiver availability, social isolation, environment, income, transportation access, food insecurity etc.)No    Follow-up appointment plans and community resources provided: yes  Other imporant information. Patient to keep all MD appointments. bypass graft to be done soon on right leg

## 2024-11-19 NOTE — HOME HEALTH
Discharge Summary/Summary of Care Provided: SN provided incisional lime dressing changes and infection prevention post srgical care and skin tear dressing changes post-fall as ordered by [facility] physician    Patient received home health for diagnosis: post fem-pop bypass graft to L leg    Current level of functional ability: Ambulatory per self, no longer homebound    Living arrangements:Lives alone    Progress towards goals and/or Were all goals met? Yes, all goals met, wounds are healed or scabbed    If not all goals met, barriers that prevented patient from meeting goals: None    SDOH concerns (i.e. Caregiver availability, social isolation, environment, income, transportation access, food insecurity etc.)No    Follow-up appointment plans and community resources provided: yes  Other imporant information. Patient to keep all MD appointments. bypass graft to be done soon on right leg

## 2024-11-20 ENCOUNTER — TELEPHONE (OUTPATIENT)
Dept: VASCULAR SURGERY | Age: 74
End: 2024-11-20

## 2024-11-20 NOTE — TELEPHONE ENCOUNTER
The patient called into the office today and stated that his left leg was very swollen and felt tingly like it was asleep.  I spoke with Dorothy about this and we added him on for an appointment tomorrow morning @ 8:00 AM.  The patient is aware of the date and time of the appointment and voiced understanding.

## 2024-11-21 ENCOUNTER — OFFICE VISIT (OUTPATIENT)
Dept: VASCULAR SURGERY | Age: 74
End: 2024-11-21

## 2024-11-21 VITALS — HEART RATE: 76 BPM | OXYGEN SATURATION: 100 % | DIASTOLIC BLOOD PRESSURE: 70 MMHG | SYSTOLIC BLOOD PRESSURE: 142 MMHG

## 2024-11-21 DIAGNOSIS — I70.213 ATHEROSCLER OF NATIVE ARTERY OF BOTH LEGS WITH INTERMIT CLAUDICATION (HCC): Primary | ICD-10-CM

## 2024-11-21 PROCEDURE — 99024 POSTOP FOLLOW-UP VISIT: CPT | Performed by: NURSE PRACTITIONER

## 2024-11-21 NOTE — PROGRESS NOTES
Jong Loyd is a 74 y.o. male who presents for post op evaluation.  Patient had   Evacuation hematoma left groin and washout  2.  Redo left femoral endarterectomy with Christie sure patch angioplasty, extension down to proximal SFA  3.  Left lower extremity open thrombectomy from femoral and popliteal artery exposures  4.  Left popliteal artery endarterectomy, partial removal pre-existing stent, Christie sure patch angioplasty  5.  Left femoral to above-knee popliteal artery bypass with 6 mm Artegraft  6.  Left lower extremity arteriogram  7.  Left popliteal angioplasty with 4 x 100 Sebastian balloon  8.  Left popliteal and posterior tibial artery angioplasty with 3 x 220 Sebastian balloon  9.  Left proximal popliteal artery stent with 5 x 60 Innova stent  10.  Completion arteriograms and direct closure arteriotomy  11.  Left lower extremity 4 compartment fasciotomy  2 weeks ago.  This was performed by Dr. Vasquez.  Post op symptoms reported pain and swelling.   Post op pain is moderate.      On evaluation today, incision is  superficially at femoral site with dry eschar.  He has a very skinny leg with no place to displace swelling.  He has not been elevating and has small amount of edema around ankle (3/4 inch difference in ankles).  Good graft signal and pt.  No signs of infection are present.  Today we removed all staples and sutures.  femoral pulses are present 2+ .    Today we have recommended he tubi  and elevation above heart.  We have recommended continued  eliquis  daily.  We will follow up with him in 1 weeks.  This should bring you up to date on Jong Loyd  As always we want to thank you for allowing us to participate in the care of your patients.    Sincerely,    DESMOND Gastelum

## 2024-11-26 ENCOUNTER — OFFICE VISIT (OUTPATIENT)
Dept: VASCULAR SURGERY | Age: 74
End: 2024-11-26

## 2024-11-26 ENCOUNTER — HOSPITAL ENCOUNTER (OUTPATIENT)
Dept: GENERAL RADIOLOGY | Age: 74
Discharge: HOME | End: 2024-11-26
Payer: OTHER GOVERNMENT

## 2024-11-26 VITALS — DIASTOLIC BLOOD PRESSURE: 74 MMHG | OXYGEN SATURATION: 98 % | SYSTOLIC BLOOD PRESSURE: 117 MMHG | HEART RATE: 71 BPM

## 2024-11-26 DIAGNOSIS — I70.213 ATHEROSCLER OF NATIVE ARTERY OF BOTH LEGS WITH INTERMIT CLAUDICATION (HCC): ICD-10-CM

## 2024-11-26 DIAGNOSIS — M25.572 ACUTE LEFT ANKLE PAIN: ICD-10-CM

## 2024-11-26 DIAGNOSIS — M25.572 ACUTE LEFT ANKLE PAIN: Primary | ICD-10-CM

## 2024-11-26 PROCEDURE — 73610 X-RAY EXAM OF ANKLE: CPT

## 2024-11-26 PROCEDURE — 99024 POSTOP FOLLOW-UP VISIT: CPT | Performed by: NURSE PRACTITIONER

## 2024-11-26 RX ORDER — DOXYCYCLINE HYCLATE 100 MG
100 TABLET ORAL 2 TIMES DAILY
Qty: 20 TABLET | Refills: 0 | Status: SHIPPED | OUTPATIENT
Start: 2024-11-26

## 2024-11-26 NOTE — PROGRESS NOTES
Jong Loyd is a 74 y.o. male who presents for post op evaluation.  Patient had   Evacuation hematoma left groin and washout  2.  Redo left femoral endarterectomy with Christie sure patch angioplasty, extension down to proximal SFA  3.  Left lower extremity open thrombectomy from femoral and popliteal artery exposures  4.  Left popliteal artery endarterectomy, partial removal pre-existing stent, Christie sure patch angioplasty  5.  Left femoral to above-knee popliteal artery bypass with 6 mm Artegraft  6.  Left lower extremity arteriogram  7.  Left popliteal angioplasty with 4 x 100 Sebastian balloon  8.  Left popliteal and posterior tibial artery angioplasty with 3 x 220 Sebastian balloon  9.  Left proximal popliteal artery stent with 5 x 60 Innova stent  10.  Completion arteriograms and direct closure arteriotomy  11.  Left lower extremity 4 compartment fasciotomy  2 weeks ago.  This was performed by Dr. Vasquez.  Post op symptoms reported pain and swelling.   Post op pain is moderate.      On evaluation today, incision is  superficially at femoral site with dry eschar.  He has a very skinny leg with no place to displace swelling.  He has not been elevating and has small amount of edema around ankle (3/4 inch difference in ankles).  He has some redness around old scar from childhood from clubfoot repair.  Good graft signal and pt.    femoral pulses are present 2+ .    Today we have recommended he tubi  and elevation above heart.  We have recommended continued  eliquis  daily.  We will start doxycycline and get xray of ankle.  We will follow up with him in 1 week if not better  This should bring you up to date on Jong Loyd  As always we want to thank you for allowing us to participate in the care of your patients.    Sincerely,    DESMOND Gastelum

## 2024-11-27 ENCOUNTER — TELEPHONE (OUTPATIENT)
Dept: VASCULAR SURGERY | Age: 74
End: 2024-11-27

## 2024-11-27 NOTE — TELEPHONE ENCOUNTER
Called the patient and spoke with him about the following. The patient voiced understanding.           ----- Message from DESMOND Gastelum sent at 11/27/2024  6:54 AM CST -----  Please let him know his xray did not show anything acute.  Take abx.  If not better next week, call

## 2024-12-13 DIAGNOSIS — I70.213 ATHEROSCLER OF NATIVE ARTERY OF BOTH LEGS WITH INTERMIT CLAUDICATION (HCC): Primary | ICD-10-CM

## 2024-12-16 DIAGNOSIS — I70.213 ATHEROSCLER OF NATIVE ARTERY OF BOTH LEGS WITH INTERMIT CLAUDICATION (HCC): ICD-10-CM

## 2024-12-18 ENCOUNTER — HOSPITAL ENCOUNTER (OUTPATIENT)
Dept: INFUSION THERAPY | Age: 74
Discharge: HOME | End: 2024-12-18
Payer: OTHER GOVERNMENT

## 2024-12-18 DIAGNOSIS — C91.10 CLL (CHRONIC LYMPHOCYTIC LEUKEMIA) (HCC): ICD-10-CM

## 2024-12-18 DIAGNOSIS — C91.10 CLL (CHRONIC LYMPHOCYTIC LEUKEMIA) (HCC): Primary | ICD-10-CM

## 2024-12-18 LAB
BASOPHILS # BLD: 0.04 K/UL (ref 0.01–0.08)
BASOPHILS NFR BLD: 0.8 % (ref 0.1–1.2)
EOSINOPHIL # BLD: 0.08 K/UL (ref 0.04–0.54)
EOSINOPHIL NFR BLD: 1.6 % (ref 0.7–7)
ERYTHROCYTE [DISTWIDTH] IN BLOOD BY AUTOMATED COUNT: 13.1 % (ref 11.6–14.4)
HCT VFR BLD AUTO: 41.4 % (ref 40.1–51)
HGB BLD-MCNC: 13.4 G/DL (ref 13.7–17.5)
LYMPHOCYTES # BLD: 1.32 K/UL (ref 1.18–3.74)
LYMPHOCYTES NFR BLD: 25.8 % (ref 19.3–53.1)
MCH RBC QN AUTO: 30.1 PG (ref 25.7–32.2)
MCHC RBC AUTO-ENTMCNC: 32.4 G/DL (ref 32.3–36.5)
MCV RBC AUTO: 93 FL (ref 79–92.2)
MONOCYTES # BLD: 0.31 K/UL (ref 0.24–0.82)
MONOCYTES NFR BLD: 6.1 % (ref 4.7–12.5)
NEUTROPHILS # BLD: 3.35 K/UL (ref 1.56–6.13)
NEUTS SEG NFR BLD: 65.5 % (ref 34–71.1)
PLATELET # BLD AUTO: 163 K/UL (ref 163–337)
PMV BLD AUTO: 11 FL (ref 7.4–10.4)
RBC # BLD AUTO: 4.45 M/UL (ref 4.63–6.08)
WBC # BLD AUTO: 5.11 K/UL (ref 4.23–9.07)

## 2024-12-18 PROCEDURE — 36415 COLL VENOUS BLD VENIPUNCTURE: CPT

## 2024-12-18 PROCEDURE — 85025 COMPLETE CBC W/AUTO DIFF WBC: CPT

## 2025-01-15 ENCOUNTER — HOSPITAL ENCOUNTER (OUTPATIENT)
Dept: CT IMAGING | Age: 75
Discharge: HOME | End: 2025-01-15
Payer: OTHER GOVERNMENT

## 2025-01-15 DIAGNOSIS — R91.1 NODULE OF LEFT LUNG: ICD-10-CM

## 2025-01-15 PROCEDURE — 71260 CT THORAX DX C+: CPT

## 2025-01-15 PROCEDURE — 6360000004 HC RX CONTRAST MEDICATION: Performed by: NURSE PRACTITIONER

## 2025-01-15 RX ORDER — IOPAMIDOL 755 MG/ML
60 INJECTION, SOLUTION INTRAVASCULAR
Status: COMPLETED | OUTPATIENT
Start: 2025-01-15 | End: 2025-01-15

## 2025-01-15 RX ADMIN — IOPAMIDOL 60 ML: 755 INJECTION, SOLUTION INTRAVENOUS at 09:07

## 2025-02-04 ENCOUNTER — TELEPHONE (OUTPATIENT)
Dept: HEMATOLOGY | Age: 75
End: 2025-02-04

## 2025-02-04 NOTE — TELEPHONE ENCOUNTER
Called Patient and reminded patient of their appointment on 02/07/2025 and patient confirmed they would be here. Reminded patient to just come at appointment time, and to not come at the lab appointment time. Reminded patient that we will not check them in any more than 30 minutes before appointment time.  We have now moved to the [facility] that is located between our old office and the ER at the [facility]. Letting the Pt know that our front entrance faces the  [employer] [place]. Reminded pt to eat well and be well hydrated for their labs.

## 2025-02-05 ENCOUNTER — HOSPITAL ENCOUNTER (OUTPATIENT)
Dept: VASCULAR LAB | Age: 75
Discharge: HOME | End: 2025-02-07
Payer: OTHER GOVERNMENT

## 2025-02-05 ENCOUNTER — OFFICE VISIT (OUTPATIENT)
Dept: VASCULAR SURGERY | Age: 75
End: 2025-02-05
Payer: OTHER GOVERNMENT

## 2025-02-05 ENCOUNTER — PREP FOR PROCEDURE (OUTPATIENT)
Dept: VASCULAR SURGERY | Age: 75
End: 2025-02-05

## 2025-02-05 ENCOUNTER — HOSPITAL ENCOUNTER (OUTPATIENT)
Dept: PREADMISSION TESTING | Age: 75
Discharge: HOME | End: 2025-02-09
Payer: OTHER GOVERNMENT

## 2025-02-05 ENCOUNTER — HOSPITAL ENCOUNTER (OUTPATIENT)
Dept: GENERAL RADIOLOGY | Age: 75
Discharge: HOME | End: 2025-02-05
Payer: OTHER GOVERNMENT

## 2025-02-05 VITALS — BODY MASS INDEX: 21.03 KG/M2 | WEIGHT: 142 LBS | HEIGHT: 69 IN

## 2025-02-05 VITALS
DIASTOLIC BLOOD PRESSURE: 80 MMHG | HEART RATE: 74 BPM | OXYGEN SATURATION: 97 % | TEMPERATURE: 97.9 F | SYSTOLIC BLOOD PRESSURE: 148 MMHG

## 2025-02-05 DIAGNOSIS — I70.213 ATHEROSCLER OF NATIVE ARTERY OF BOTH LEGS WITH INTERMIT CLAUDICATION (HCC): ICD-10-CM

## 2025-02-05 DIAGNOSIS — Z01.818 PRE-OP TESTING: Primary | ICD-10-CM

## 2025-02-05 DIAGNOSIS — Z01.818 PRE-OP TESTING: ICD-10-CM

## 2025-02-05 DIAGNOSIS — I70.213 ATHEROSCLER OF NATIVE ARTERY OF BOTH LEGS WITH INTERMIT CLAUDICATION (HCC): Primary | ICD-10-CM

## 2025-02-05 LAB
ABO/RH: NORMAL
ANION GAP SERPL CALCULATED.3IONS-SCNC: 13 MMOL/L (ref 8–16)
ANTIBODY SCREEN: NORMAL
APTT PPP: 36.6 SEC (ref 26–36.2)
BUN SERPL-MCNC: 14 MG/DL (ref 8–23)
CALCIUM SERPL-MCNC: 9.2 MG/DL (ref 8.8–10.2)
CHLORIDE SERPL-SCNC: 104 MMOL/L (ref 98–107)
CO2 SERPL-SCNC: 26 MMOL/L (ref 22–29)
CREAT SERPL-MCNC: 0.9 MG/DL (ref 0.7–1.2)
ERYTHROCYTE [DISTWIDTH] IN BLOOD BY AUTOMATED COUNT: 12.8 % (ref 11.5–14.5)
GLUCOSE SERPL-MCNC: 96 MG/DL (ref 70–99)
HCT VFR BLD AUTO: 44.2 % (ref 42–52)
HGB BLD-MCNC: 14.2 G/DL (ref 14–18)
INR PPP: 1.48 (ref 0.88–1.18)
Lab: 15 CM/S
Lab: 6 CM/S
MCH RBC QN AUTO: 29.2 PG (ref 27–31)
MCHC RBC AUTO-ENTMCNC: 32.1 G/DL (ref 33–37)
MCV RBC AUTO: 90.9 FL (ref 80–94)
MRSA DNA SPEC QL NAA+PROBE: NOT DETECTED
PLATELET # BLD AUTO: 174 K/UL (ref 130–400)
PMV BLD AUTO: 11.4 FL (ref 9.4–12.4)
POTASSIUM SERPL-SCNC: 4.4 MMOL/L (ref 3.5–5.1)
PROTHROMBIN TIME: 17.5 SEC (ref 12–14.6)
RBC # BLD AUTO: 4.86 M/UL (ref 4.7–6.1)
SODIUM SERPL-SCNC: 143 MMOL/L (ref 136–145)
VAS LEFT ABI: 0.49
VAS LEFT ARM BP: 154 MMHG
VAS LEFT ATA PROX PSV: 16.3 CM/S
VAS LEFT CFA PROX PSV: 189 CM/S
VAS LEFT DIST OUTFLOW PSV 2: 28 CM/S
VAS LEFT DIST OUTFLOW PSV: 0 CM/S
VAS LEFT DORSALIS PEDIS BP: 72 MMHG
VAS LEFT GRAFT 1: NORMAL
VAS LEFT GRAFT 2: NORMAL
VAS LEFT MID OUTFLOW PSV 2: 57 CM/S
VAS LEFT MID OUTFLOW PSV: 0 CM/S
VAS LEFT PERONEAL MID PSV: 11 CM/S
VAS LEFT PFA PROX PSV: 210 CM/S
VAS LEFT POP A DIST PSV: 25.4 CM/S
VAS LEFT POP A DIST VEL RATIO: 0.45
VAS LEFT POP A MID PSV: 57 CM/S
VAS LEFT POP A PROX PSV: 0 CM/S
VAS LEFT PROX OUTFLOW PSV 2: 0 CM/S
VAS LEFT PROX OUTFLOW PSV: 0 CM/S
VAS LEFT PTA BP: 76 MMHG
VAS LEFT PTA PROX PSV: 10.7 CM/S
VAS LEFT SFA PROX PSV: 0 CM/S
VAS LEFT SFA PROX VEL RATIO: 0
VAS LEFT TBI: 0
VAS LEFT TOE PRESSURE: 0 MMHG
VAS LEFT VENOUS DIST ANASTOMOSIS PSV: 0 CM/S
VAS RIGHT ABI: 0
VAS RIGHT ARM BP: 145 MMHG
VAS RIGHT ATA PROX PSV: 0 CM/S
VAS RIGHT CFA PROX PSV: 157 CM/S
VAS RIGHT DIST OUTFLOW PSV: 0 CM/S
VAS RIGHT DORSALIS PEDIS BP: 0 MMHG
VAS RIGHT GRAFT 1: NORMAL
VAS RIGHT MID OUTFLOW PSV: 0 CM/S
VAS RIGHT PERONEAL PROX PSV: 7.6 CM/S
VAS RIGHT PFA PROX PSV: 135 CM/S
VAS RIGHT POP A DIST PSV: 0 CM/S
VAS RIGHT POP A PROX PSV: 0 CM/S
VAS RIGHT POP A PROX VEL RATIO: 0
VAS RIGHT PROX OUTFLOW PSV: 0 CM/S
VAS RIGHT PTA BP: 0 MMHG
VAS RIGHT PTA PROX PSV: 0 CM/S
VAS RIGHT SFA DIST PSV: 117 CM/S
VAS RIGHT SFA DIST VEL RATIO: 2.03
VAS RIGHT SFA MID PSV: 57.5 CM/S
VAS RIGHT SFA MID VEL RATIO: 0.6
VAS RIGHT SFA PROX PSV: 94.8 CM/S
VAS RIGHT SFA PROX VEL RATIO: 0.6
VAS RIGHT TBI: 0
VAS RIGHT TOE PRESSURE: 0 MMHG
WBC # BLD AUTO: 5.7 K/UL (ref 4.8–10.8)

## 2025-02-05 PROCEDURE — 71046 X-RAY EXAM CHEST 2 VIEWS: CPT

## 2025-02-05 PROCEDURE — 93922 UPR/L XTREMITY ART 2 LEVELS: CPT

## 2025-02-05 PROCEDURE — 87641 MR-STAPH DNA AMP PROBE: CPT

## 2025-02-05 PROCEDURE — 85027 COMPLETE CBC AUTOMATED: CPT

## 2025-02-05 PROCEDURE — 80048 BASIC METABOLIC PNL TOTAL CA: CPT

## 2025-02-05 PROCEDURE — 99214 OFFICE O/P EST MOD 30 MIN: CPT | Performed by: NURSE PRACTITIONER

## 2025-02-05 PROCEDURE — 93005 ELECTROCARDIOGRAM TRACING: CPT

## 2025-02-05 PROCEDURE — 93922 UPR/L XTREMITY ART 2 LEVELS: CPT | Performed by: SURGERY

## 2025-02-05 PROCEDURE — 3077F SYST BP >= 140 MM HG: CPT | Performed by: NURSE PRACTITIONER

## 2025-02-05 PROCEDURE — 3078F DIAST BP <80 MM HG: CPT | Performed by: NURSE PRACTITIONER

## 2025-02-05 PROCEDURE — 93925 LOWER EXTREMITY STUDY: CPT | Performed by: SURGERY

## 2025-02-05 PROCEDURE — 86900 BLOOD TYPING SEROLOGIC ABO: CPT

## 2025-02-05 PROCEDURE — 85610 PROTHROMBIN TIME: CPT

## 2025-02-05 PROCEDURE — 86901 BLOOD TYPING SEROLOGIC RH(D): CPT

## 2025-02-05 PROCEDURE — 93925 LOWER EXTREMITY STUDY: CPT

## 2025-02-05 PROCEDURE — 85730 THROMBOPLASTIN TIME PARTIAL: CPT

## 2025-02-05 PROCEDURE — 1123F ACP DISCUSS/DSCN MKR DOCD: CPT | Performed by: NURSE PRACTITIONER

## 2025-02-05 PROCEDURE — 86850 RBC ANTIBODY SCREEN: CPT

## 2025-02-05 PROCEDURE — 1159F MED LIST DOCD IN RCRD: CPT | Performed by: NURSE PRACTITIONER

## 2025-02-05 RX ORDER — SODIUM CHLORIDE 0.9 % (FLUSH) 0.9 %
5-40 SYRINGE (ML) INJECTION EVERY 12 HOURS SCHEDULED
Status: CANCELLED | OUTPATIENT
Start: 2025-02-05

## 2025-02-05 RX ORDER — HYDROCHLOROTHIAZIDE 25 MG/1
12.5 TABLET ORAL DAILY
COMMUNITY
End: 2025-02-05

## 2025-02-05 RX ORDER — SODIUM CHLORIDE 0.9 % (FLUSH) 0.9 %
5-40 SYRINGE (ML) INJECTION PRN
Status: CANCELLED | OUTPATIENT
Start: 2025-02-05

## 2025-02-05 RX ORDER — MONTELUKAST SODIUM 10 MG/1
10 TABLET ORAL NIGHTLY
COMMUNITY

## 2025-02-05 RX ORDER — ASCORBIC ACID 500 MG
500 TABLET ORAL DAILY
Status: ON HOLD | COMMUNITY
End: 2025-02-06 | Stop reason: ALTCHOICE

## 2025-02-05 RX ORDER — SODIUM CHLORIDE 9 MG/ML
INJECTION, SOLUTION INTRAVENOUS PRN
Status: CANCELLED | OUTPATIENT
Start: 2025-02-05

## 2025-02-05 RX ORDER — ASPIRIN 81 MG/1
81 TABLET ORAL ONCE
Status: CANCELLED | OUTPATIENT
Start: 2025-02-05 | End: 2025-02-05

## 2025-02-05 NOTE — PROGRESS NOTES
Jong Loyd (:  1950) is a 75 y.o. male,Established patient, here for evaluation of the following chief complaint(s):  Other (Patient c/o pain in right leg. )            SUBJECTIVE/OBJECTIVE:  Jong has a history of peripheral vascular disease of the lower extremities.  He has had this for 1 - 5 years. Current treatment includes plavix and eliquis.  Jong has not had new wounds. Recently, he reports claudication at a distance of  a few feet and pain waking him up at night in right foot.  This started 2 weeks ago and has gotten worse.  He has no new wounds.  He has been taking his meds faithfully.      I have personally reviewed the following: problem list, current meds, allergies, PMH, PSH, family hx, and social hx  Jong Loyd is a 75 y.o. male with the following history as recorded in [facility]:  Patient Active Problem List    Diagnosis Date Noted    Moderate malnutrition ([facility]) 10/11/2024    Acute blood loss as cause of postoperative anemia 10/10/2024    Hematoma 10/10/2024    Palliative care patient 10/09/2024    PAD (peripheral artery disease) ([facility]) 10/05/2024    Atherosclerosis of native arteries of extremities with rest pain, left leg ([facility]) 10/05/2024    Critical limb ischemia of left lower extremity ([facility]) 10/05/2024    Atherosclerosis of native arteries of extremities with intermittent claudication, left leg ([facility]) 2024    Atherosclerosis of native artery of both lower extremities with intermittent claudication ([facility]) 2024    Femoral artery pseudo-aneurysm, left ([facility]) 2024    Atherosclerosis with claudication of extremity ([facility]) 2021    Benign essential HTN 2021    BPH (benign prostatic hyperplasia) 2021    Pulmonary emphysema ([facility]) 2021    Carotid stenosis, asymptomatic, bilateral 2021    CLL (chronic lymphocytic leukemia) ([facility]) 2021    PVD (peripheral vascular disease) ([facility])      Current Outpatient Medications   Medication Sig Dispense

## 2025-02-05 NOTE — DISCHARGE INSTRUCTIONS
The day before surgery you will receive a phone call from the surgery nurse to let you know what time to arrive on the day of surgery. This call will usually be between 2-4 PM. If you do not receive a phone call by 4 PM the day before your surgery please call [phone] and let them know you have not received an arrival time. If your surgery is on Monday, your call will be on the Friday before your Monday surgery. Please check your voicemail as they may leave a message with that information.  If you are running late or wake up sick the day of surgery please call the above number for further instructions.           The morning of surgery, you may take all your prescribed medications with a sip of water unless instructed not to take.  Any exceptions to this would be listed below:     Always follow your surgeon or providers instructions on taking blood thinners.          PREOPERATIVE GUIDELINES WHEN RECEIVING ANESTHESIA    Do not eat anything after midnight the night before your surgery. You may have water up to 2 hours before your arrival time. No gum or candy the morning of surgery.  This is extremely important for your safety.    Take a bath (or shower) the night before your surgery and you may brush your teeth the morning of your surgery.    You will be scheduled to arrive at the hospital 2 hours before your surgery, or follow your surgeon's instructions.    Dress comfortably.  Wear loose clothing that will be easy to remove and comfortable for your trip home.    You may wear eyeglasses but bring your cases with you as they must be remove before your surgery. If you wear contacts they will have to be removed before your surgery.    Hearing aids and dentures will need to be removed before your surgery. If you wear dentures, do not glue them in the morning of surgery.     Do not wear any jewelry, including body jewelry.  All jewelry will need to be removed prior to your surgery. This includes wedding rings. Any  metal touching your body can cause a burn or may have to be cut off due to swelling or injury.    Do not wear fingernail polish or make-up.    It is best not to bring any valuables with you.    If you are to stay in the hospital overnight, bring your robe, slippers and personal toiletries that you may need.    POSTOPERATIVE GUIDELINES AFTER RECEIVING ANESTHESIA    If you are to go home after your surgery, you will need a responsible adult to drive you home.     You will not be able to take public transportation after your discharge from the Operative Care Unit unless you are accompanied by a        responsible adult.    On returning home, be sure to follow your physician's orders regarding diet, activity and medications.    Remember, surgery with general anesthesia or sedation may leave you sleepy, very tired and with a decreased appetite for 12 to 24 hours.    If you develop any post-surgical complications or problems, call your surgeon or [facility] Emergency Department ([phone]).           [facility] Visitor Policy for Surgery Patients-Revised 6-    Visitors for surgery patients are essential for the patient's emotional well-being and care       post operatively.    2.   Visitor Expectations and Limitations    3.  One visitor allowed with patients in the preop/postop rooms.    4.  A second visitor may sit in the waiting area.    5.  No children under 13 allowed in the pre-post op areas unless they are the patient.    6.  Two people may be with an underage surgical/procedural patient in preop/postop        room.      7.  If you are admitted to the hospital post operatively, there are NO RESTRICTIONS on       the floor at this time.      8.  If you are admitted to ICU postoperatively, you may have one visitor in the room from        7A-7P.  A second visitor may sit in the ICU waiting room.  No overnight visitors in         ICU waiting room.

## 2025-02-05 NOTE — H&P (VIEW-ONLY)
Jong Loyd (:  1950) is a 75 y.o. male,Established patient, here for evaluation of the following chief complaint(s):  Other (Patient c/o pain in right leg. )            SUBJECTIVE/OBJECTIVE:  Jong has a history of peripheral vascular disease of the lower extremities.  He has had this for 1 - 5 years. Current treatment includes plavix and eliquis.  Jong has not had new wounds. Recently, he reports claudication at a distance of  a few feet and pain waking him up at night in right foot.  This started 2 weeks ago and has gotten worse.  He has no new wounds.  He has been taking his meds faithfully.      I have personally reviewed the following: problem list, current meds, allergies, PMH, PSH, family hx, and social hx  Jong Loyd is a 75 y.o. male with the following history as recorded in [facility]:  Patient Active Problem List    Diagnosis Date Noted    Moderate malnutrition ([facility]) 10/11/2024    Acute blood loss as cause of postoperative anemia 10/10/2024    Hematoma 10/10/2024    Palliative care patient 10/09/2024    PAD (peripheral artery disease) ([facility]) 10/05/2024    Atherosclerosis of native arteries of extremities with rest pain, left leg ([facility]) 10/05/2024    Critical limb ischemia of left lower extremity ([facility]) 10/05/2024    Atherosclerosis of native arteries of extremities with intermittent claudication, left leg ([facility]) 2024    Atherosclerosis of native artery of both lower extremities with intermittent claudication ([facility]) 2024    Femoral artery pseudo-aneurysm, left ([facility]) 2024    Atherosclerosis with claudication of extremity ([facility]) 2021    Benign essential HTN 2021    BPH (benign prostatic hyperplasia) 2021    Pulmonary emphysema ([facility]) 2021    Carotid stenosis, asymptomatic, bilateral 2021    CLL (chronic lymphocytic leukemia) ([facility]) 2021    PVD (peripheral vascular disease) ([facility])      Current Outpatient Medications   Medication Sig Dispense     Lungs - Breath sounds normal. No wheezes or rales.    Extremities -  Radial and brachial pulses are 2+ to palpation bilaterally.  Right femoral pulse: absent; Right popliteal pulse: absent Right DP: absent; Right PT absent; Left femoral pulse: present 2+; Left popliteal pulse: absent; Left DP: absent; Left PT: absent No cyanosis, clubbing, or significant edema.  No signs atheroembolic event.  Neurologic - alert and oriented X 3.  Physiologic.   Face symmetric.  Skin - warm, dry, and intact.  no  wound  Psychiatric - mood, affect, and behavior appear normal.  Judgment and thought processes appear normal.    Risk factors for atherosclerosis of all vascular beds have been reviewed with the patient including:  Family history, tobacco abuse in all forms, elevated cholesterol, hyperlipidemia, and diabetes.      Reviewed previous studies including: ascan   Individual images were reviewed.  I agree with the findings  Results were discussed with the patient.      ASSESSMENT/PLAN:  1. Atheroscler of native artery of both legs with intermit claudication (HCC)  -     Vascular ankle brachial index (KG); Future  -     Vascular duplex lower extremity arteries bilateral; Future        Discussed management of ascan  which includes:  continue plavix and eliquis to reduce risk of arterial thrombosis and to decrease rate of plaque buildup  Strongly encourage start/continue statin therapy - pravachol  Recommend no smoking - discussed the effect tobacco has on illness;   Proceed with stat ascan and kg - shows occlusion of both the right leg stents and the left leg bypass  Options have been discussed with the patient including continued medical management vs. proceeding with intraoperative angiogram with runoff and possible angioplasty/atherectomy/stent  Possible lysis.  Patient has opted to proceed with this.  Risks have been discussed with the patient including but not limited to MI, death, CVA, bleed, nerve injury, and infection.

## 2025-02-05 NOTE — H&P
Lungs - Breath sounds normal. No wheezes or rales.    Extremities -  Radial and brachial pulses are 2+ to palpation bilaterally.  Right femoral pulse: absent; Right popliteal pulse: absent Right DP: absent; Right PT absent; Left femoral pulse: present 2+; Left popliteal pulse: absent; Left DP: absent; Left PT: absent No cyanosis, clubbing, or significant edema.  No signs atheroembolic event.  Neurologic - alert and oriented X 3.  Physiologic.   Face symmetric.  Skin - warm, dry, and intact.  no  wound  Psychiatric - mood, affect, and behavior appear normal.  Judgment and thought processes appear normal.    Risk factors for atherosclerosis of all vascular beds have been reviewed with the patient including:  Family history, tobacco abuse in all forms, elevated cholesterol, hyperlipidemia, and diabetes.      Reviewed previous studies including: ascan   Individual images were reviewed.  I agree with the findings  Results were discussed with the patient.      ASSESSMENT/PLAN:  1. Atheroscler of native artery of both legs with intermit claudication (HCC)  -     Vascular ankle brachial index (KG); Future  -     Vascular duplex lower extremity arteries bilateral; Future        Discussed management of ascan  which includes:  continue plavix and eliquis to reduce risk of arterial thrombosis and to decrease rate of plaque buildup  Strongly encourage start/continue statin therapy - pravachol  Recommend no smoking - discussed the effect tobacco has on illness;   Proceed with stat ascan and kg - shows occlusion of both the right leg stents and the left leg bypass  Options have been discussed with the patient including continued medical management vs. proceeding with intraoperative angiogram with runoff and possible angioplasty/atherectomy/stent  Possible lysis.  Patient has opted to proceed with this.  Risks have been discussed with the patient including but not limited to MI, death, CVA, bleed, nerve injury, and infection.

## 2025-02-06 ENCOUNTER — ANESTHESIA (OUTPATIENT)
Dept: OPERATING ROOM | Age: 75
End: 2025-02-06
Payer: OTHER GOVERNMENT

## 2025-02-06 ENCOUNTER — APPOINTMENT (OUTPATIENT)
Dept: INTERVENTIONAL RADIOLOGY/VASCULAR | Age: 75
End: 2025-02-06
Attending: SURGERY
Payer: OTHER GOVERNMENT

## 2025-02-06 ENCOUNTER — HOSPITAL ENCOUNTER (INPATIENT)
Age: 75
LOS: 2 days | Discharge: HOME | End: 2025-02-08
Attending: SURGERY | Admitting: SURGERY
Payer: OTHER GOVERNMENT

## 2025-02-06 ENCOUNTER — ANESTHESIA EVENT (OUTPATIENT)
Dept: OPERATING ROOM | Age: 75
End: 2025-02-06
Payer: OTHER GOVERNMENT

## 2025-02-06 LAB
ABO/RH: NORMAL
ANION GAP SERPL CALCULATED.3IONS-SCNC: 12 MMOL/L (ref 8–16)
ANTIBODY SCREEN: NORMAL
APTT PPP: 34.4 SEC (ref 26–36.2)
BUN SERPL-MCNC: 13 MG/DL (ref 8–23)
CALCIUM SERPL-MCNC: 8.5 MG/DL (ref 8.8–10.2)
CHLORIDE SERPL-SCNC: 105 MMOL/L (ref 98–107)
CO2 SERPL-SCNC: 24 MMOL/L (ref 22–29)
CREAT SERPL-MCNC: 0.9 MG/DL (ref 0.7–1.2)
EKG P AXIS: 59 DEGREES
EKG P-R INTERVAL: 128 MS
EKG Q-T INTERVAL: 458 MS
EKG QRS DURATION: 98 MS
EKG QTC CALCULATION (BAZETT): 472 MS
EKG T AXIS: 56 DEGREES
ERYTHROCYTE [DISTWIDTH] IN BLOOD BY AUTOMATED COUNT: 12.9 % (ref 11.5–14.5)
FIBRINOGEN PPP-MCNC: 268 MG/DL (ref 238–505)
GLUCOSE SERPL-MCNC: 116 MG/DL (ref 70–99)
HCT VFR BLD AUTO: 38 % (ref 42–52)
HGB BLD-MCNC: 11.9 G/DL (ref 14–18)
MCH RBC QN AUTO: 28.4 PG (ref 27–31)
MCHC RBC AUTO-ENTMCNC: 31.3 G/DL (ref 33–37)
MCV RBC AUTO: 90.7 FL (ref 80–94)
PLATELET # BLD AUTO: 134 K/UL (ref 130–400)
PMV BLD AUTO: 10.8 FL (ref 9.4–12.4)
POTASSIUM SERPL-SCNC: 3.8 MMOL/L (ref 3.5–5.1)
RBC # BLD AUTO: 4.19 M/UL (ref 4.7–6.1)
SODIUM SERPL-SCNC: 141 MMOL/L (ref 136–145)
WBC # BLD AUTO: 4.6 K/UL (ref 4.8–10.8)

## 2025-02-06 PROCEDURE — 76937 US GUIDE VASCULAR ACCESS: CPT

## 2025-02-06 PROCEDURE — 2500000003 HC RX 250 WO HCPCS: Performed by: SURGERY

## 2025-02-06 PROCEDURE — 37252 INTRVASC US NONCORONARY 1ST: CPT | Performed by: SURGERY

## 2025-02-06 PROCEDURE — C1893 INTRO/SHEATH, FIXED,NON-PEEL: HCPCS | Performed by: SURGERY

## 2025-02-06 PROCEDURE — 2500000003 HC RX 250 WO HCPCS: Performed by: NURSE ANESTHETIST, CERTIFIED REGISTERED

## 2025-02-06 PROCEDURE — 6360000002 HC RX W HCPCS: Performed by: SURGERY

## 2025-02-06 PROCEDURE — 3600000007 HC SURGERY HYBRID BASE: Performed by: SURGERY

## 2025-02-06 PROCEDURE — C1894 INTRO/SHEATH, NON-LASER: HCPCS | Performed by: SURGERY

## 2025-02-06 PROCEDURE — 6370000000 HC RX 637 (ALT 250 FOR IP): Performed by: SURGERY

## 2025-02-06 PROCEDURE — 2580000003 HC RX 258: Performed by: SURGERY

## 2025-02-06 PROCEDURE — 86901 BLOOD TYPING SEROLOGIC RH(D): CPT

## 2025-02-06 PROCEDURE — 04FM3Z0 FRAGMENTATION OF RIGHT POPLITEAL ARTERY, PERCUTANEOUS APPROACH, ULTRASONIC: ICD-10-PCS | Performed by: SURGERY

## 2025-02-06 PROCEDURE — 3700000001 HC ADD 15 MINUTES (ANESTHESIA): Performed by: SURGERY

## 2025-02-06 PROCEDURE — 86850 RBC ANTIBODY SCREEN: CPT

## 2025-02-06 PROCEDURE — C1887 CATHETER, GUIDING: HCPCS | Performed by: SURGERY

## 2025-02-06 PROCEDURE — 3700000000 HC ANESTHESIA ATTENDED CARE: Performed by: SURGERY

## 2025-02-06 PROCEDURE — 75630 X-RAY AORTA LEG ARTERIES: CPT | Performed by: SURGERY

## 2025-02-06 PROCEDURE — 04FT3Z0 FRAGMENTATION OF RIGHT PERONEAL ARTERY, PERCUTANEOUS APPROACH, ULTRASONIC: ICD-10-PCS | Performed by: SURGERY

## 2025-02-06 PROCEDURE — 2709999900 HC NON-CHARGEABLE SUPPLY: Performed by: SURGERY

## 2025-02-06 PROCEDURE — C1769 GUIDE WIRE: HCPCS | Performed by: SURGERY

## 2025-02-06 PROCEDURE — 6360000004 HC RX CONTRAST MEDICATION: Performed by: SURGERY

## 2025-02-06 PROCEDURE — B44LZZ3 ULTRASONOGRAPHY OF FEMORAL ARTERY, INTRAVASCULAR: ICD-10-PCS | Performed by: SURGERY

## 2025-02-06 PROCEDURE — 36247 INS CATH ABD/L-EXT ART 3RD: CPT | Performed by: SURGERY

## 2025-02-06 PROCEDURE — 2000000000 HC ICU R&B

## 2025-02-06 PROCEDURE — 6360000002 HC RX W HCPCS: Performed by: NURSE PRACTITIONER

## 2025-02-06 PROCEDURE — 93010 ELECTROCARDIOGRAM REPORT: CPT | Performed by: INTERNAL MEDICINE

## 2025-02-06 PROCEDURE — 2580000003 HC RX 258: Performed by: ANESTHESIOLOGY

## 2025-02-06 PROCEDURE — B41D1ZZ FLUOROSCOPY OF AORTA AND BILATERAL LOWER EXTREMITY ARTERIES USING LOW OSMOLAR CONTRAST: ICD-10-PCS | Performed by: SURGERY

## 2025-02-06 PROCEDURE — 75716 ARTERY X-RAYS ARMS/LEGS: CPT

## 2025-02-06 PROCEDURE — 2580000003 HC RX 258: Performed by: NURSE ANESTHETIST, CERTIFIED REGISTERED

## 2025-02-06 PROCEDURE — 2709999900 IR AORTAGRAM

## 2025-02-06 PROCEDURE — 6370000000 HC RX 637 (ALT 250 FOR IP): Performed by: NURSE PRACTITIONER

## 2025-02-06 PROCEDURE — C1753 CATH, INTRAVAS ULTRASOUND: HCPCS | Performed by: SURGERY

## 2025-02-06 PROCEDURE — 75625 CONTRAST EXAM ABDOMINL AORTA: CPT

## 2025-02-06 PROCEDURE — 36415 COLL VENOUS BLD VENIPUNCTURE: CPT

## 2025-02-06 PROCEDURE — 3600000017 HC SURGERY HYBRID ADDL 15MIN: Performed by: SURGERY

## 2025-02-06 PROCEDURE — 6360000002 HC RX W HCPCS: Performed by: NURSE ANESTHETIST, CERTIFIED REGISTERED

## 2025-02-06 PROCEDURE — 86900 BLOOD TYPING SEROLOGIC ABO: CPT

## 2025-02-06 PROCEDURE — 80048 BASIC METABOLIC PNL TOTAL CA: CPT

## 2025-02-06 PROCEDURE — 94150 VITAL CAPACITY TEST: CPT

## 2025-02-06 PROCEDURE — 37253 INTRVASC US NONCORONARY ADDL: CPT | Performed by: SURGERY

## 2025-02-06 PROCEDURE — 37211 THROMBOLYTIC ART THERAPY: CPT | Performed by: SURGERY

## 2025-02-06 PROCEDURE — 85384 FIBRINOGEN ACTIVITY: CPT

## 2025-02-06 PROCEDURE — 85027 COMPLETE CBC AUTOMATED: CPT

## 2025-02-06 PROCEDURE — 04FK3Z0 FRAGMENTATION OF RIGHT FEMORAL ARTERY, PERCUTANEOUS APPROACH, ULTRASONIC: ICD-10-PCS | Performed by: SURGERY

## 2025-02-06 PROCEDURE — 85730 THROMBOPLASTIN TIME PARTIAL: CPT

## 2025-02-06 RX ORDER — HYDROCODONE BITARTRATE AND ACETAMINOPHEN 5; 325 MG/1; MG/1
1 TABLET ORAL EVERY 6 HOURS PRN
Status: DISCONTINUED | OUTPATIENT
Start: 2025-02-06 | End: 2025-02-07

## 2025-02-06 RX ORDER — GLYCOPYRROLATE 0.2 MG/ML
INJECTION INTRAMUSCULAR; INTRAVENOUS
Status: DISCONTINUED | OUTPATIENT
Start: 2025-02-06 | End: 2025-02-06 | Stop reason: SDUPTHER

## 2025-02-06 RX ORDER — ONDANSETRON 4 MG/1
4 TABLET, ORALLY DISINTEGRATING ORAL EVERY 8 HOURS PRN
Status: DISCONTINUED | OUTPATIENT
Start: 2025-02-06 | End: 2025-02-08 | Stop reason: HOSPADM

## 2025-02-06 RX ORDER — SODIUM CHLORIDE 0.9 % (FLUSH) 0.9 %
5-40 SYRINGE (ML) INJECTION EVERY 12 HOURS SCHEDULED
Status: DISCONTINUED | OUTPATIENT
Start: 2025-02-06 | End: 2025-02-06 | Stop reason: HOSPADM

## 2025-02-06 RX ORDER — TRAZODONE HYDROCHLORIDE 50 MG/1
50 TABLET, FILM COATED ORAL NIGHTLY
Status: DISCONTINUED | OUTPATIENT
Start: 2025-02-06 | End: 2025-02-08 | Stop reason: HOSPADM

## 2025-02-06 RX ORDER — CLOPIDOGREL BISULFATE 75 MG/1
75 TABLET ORAL DAILY
Status: DISCONTINUED | OUTPATIENT
Start: 2025-02-06 | End: 2025-02-08 | Stop reason: HOSPADM

## 2025-02-06 RX ORDER — LEVOTHYROXINE SODIUM 75 UG/1
150 TABLET ORAL DAILY
Status: DISCONTINUED | OUTPATIENT
Start: 2025-02-07 | End: 2025-02-08 | Stop reason: HOSPADM

## 2025-02-06 RX ORDER — SODIUM CHLORIDE 0.9 % (FLUSH) 0.9 %
5-40 SYRINGE (ML) INJECTION PRN
Status: DISCONTINUED | OUTPATIENT
Start: 2025-02-06 | End: 2025-02-08 | Stop reason: HOSPADM

## 2025-02-06 RX ORDER — HYDROMORPHONE HYDROCHLORIDE 1 MG/ML
0.5 INJECTION, SOLUTION INTRAMUSCULAR; INTRAVENOUS; SUBCUTANEOUS EVERY 5 MIN PRN
Status: DISCONTINUED | OUTPATIENT
Start: 2025-02-06 | End: 2025-02-06 | Stop reason: HOSPADM

## 2025-02-06 RX ORDER — NOREPINEPHRINE BITARTRATE 0.06 MG/ML
1-100 INJECTION, SOLUTION INTRAVENOUS CONTINUOUS
Status: DISCONTINUED | OUTPATIENT
Start: 2025-02-06 | End: 2025-02-08 | Stop reason: HOSPADM

## 2025-02-06 RX ORDER — SODIUM CHLORIDE 0.9 % (FLUSH) 0.9 %
5-40 SYRINGE (ML) INJECTION EVERY 12 HOURS SCHEDULED
Status: DISCONTINUED | OUTPATIENT
Start: 2025-02-06 | End: 2025-02-08 | Stop reason: HOSPADM

## 2025-02-06 RX ORDER — IODIXANOL 320 MG/ML
INJECTION, SOLUTION INTRAVASCULAR PRN
Status: DISCONTINUED | OUTPATIENT
Start: 2025-02-06 | End: 2025-02-06 | Stop reason: ALTCHOICE

## 2025-02-06 RX ORDER — LIDOCAINE HYDROCHLORIDE 10 MG/ML
INJECTION, SOLUTION INFILTRATION; PERINEURAL
Status: DISCONTINUED | OUTPATIENT
Start: 2025-02-06 | End: 2025-02-06 | Stop reason: SDUPTHER

## 2025-02-06 RX ORDER — SODIUM CHLORIDE 9 MG/ML
INJECTION, SOLUTION INTRAVENOUS CONTINUOUS
Status: CANCELLED | OUTPATIENT
Start: 2025-02-06

## 2025-02-06 RX ORDER — VANCOMYCIN 1 G/200ML
1000 INJECTION, SOLUTION INTRAVENOUS
Status: COMPLETED | OUTPATIENT
Start: 2025-02-06 | End: 2025-02-06

## 2025-02-06 RX ORDER — MONTELUKAST SODIUM 10 MG/1
10 TABLET ORAL NIGHTLY
Status: DISCONTINUED | OUTPATIENT
Start: 2025-02-06 | End: 2025-02-08 | Stop reason: HOSPADM

## 2025-02-06 RX ORDER — MEPERIDINE HYDROCHLORIDE 25 MG/ML
12.5 INJECTION INTRAMUSCULAR; INTRAVENOUS; SUBCUTANEOUS EVERY 5 MIN PRN
Status: DISCONTINUED | OUTPATIENT
Start: 2025-02-06 | End: 2025-02-06 | Stop reason: HOSPADM

## 2025-02-06 RX ORDER — NALOXONE HYDROCHLORIDE 0.4 MG/ML
INJECTION, SOLUTION INTRAMUSCULAR; INTRAVENOUS; SUBCUTANEOUS PRN
Status: DISCONTINUED | OUTPATIENT
Start: 2025-02-06 | End: 2025-02-06 | Stop reason: HOSPADM

## 2025-02-06 RX ORDER — METOCLOPRAMIDE HYDROCHLORIDE 5 MG/ML
10 INJECTION INTRAMUSCULAR; INTRAVENOUS
Status: DISCONTINUED | OUTPATIENT
Start: 2025-02-06 | End: 2025-02-06 | Stop reason: HOSPADM

## 2025-02-06 RX ORDER — FENTANYL CITRATE 50 UG/ML
INJECTION, SOLUTION INTRAMUSCULAR; INTRAVENOUS
Status: DISCONTINUED | OUTPATIENT
Start: 2025-02-06 | End: 2025-02-06 | Stop reason: SDUPTHER

## 2025-02-06 RX ORDER — PROPOFOL 10 MG/ML
INJECTION, EMULSION INTRAVENOUS
Status: DISCONTINUED | OUTPATIENT
Start: 2025-02-06 | End: 2025-02-06 | Stop reason: SDUPTHER

## 2025-02-06 RX ORDER — ONDANSETRON 2 MG/ML
4 INJECTION INTRAMUSCULAR; INTRAVENOUS EVERY 6 HOURS PRN
Status: DISCONTINUED | OUTPATIENT
Start: 2025-02-06 | End: 2025-02-08 | Stop reason: HOSPADM

## 2025-02-06 RX ORDER — HEPARIN SODIUM 1000 [USP'U]/ML
INJECTION, SOLUTION INTRAVENOUS; SUBCUTANEOUS
Status: DISCONTINUED | OUTPATIENT
Start: 2025-02-06 | End: 2025-02-06 | Stop reason: SDUPTHER

## 2025-02-06 RX ORDER — PRAVASTATIN SODIUM 20 MG
40 TABLET ORAL DAILY
Status: DISCONTINUED | OUTPATIENT
Start: 2025-02-06 | End: 2025-02-08 | Stop reason: HOSPADM

## 2025-02-06 RX ORDER — FERROUS SULFATE 325(65) MG
325 TABLET ORAL
Status: DISCONTINUED | OUTPATIENT
Start: 2025-02-07 | End: 2025-02-08 | Stop reason: HOSPADM

## 2025-02-06 RX ORDER — HYDROMORPHONE HYDROCHLORIDE 1 MG/ML
0.25 INJECTION, SOLUTION INTRAMUSCULAR; INTRAVENOUS; SUBCUTANEOUS EVERY 5 MIN PRN
Status: DISCONTINUED | OUTPATIENT
Start: 2025-02-06 | End: 2025-02-06 | Stop reason: HOSPADM

## 2025-02-06 RX ORDER — SODIUM CHLORIDE, SODIUM LACTATE, POTASSIUM CHLORIDE, CALCIUM CHLORIDE 600; 310; 30; 20 MG/100ML; MG/100ML; MG/100ML; MG/100ML
INJECTION, SOLUTION INTRAVENOUS CONTINUOUS
Status: DISCONTINUED | OUTPATIENT
Start: 2025-02-06 | End: 2025-02-06 | Stop reason: HOSPADM

## 2025-02-06 RX ORDER — ROCURONIUM BROMIDE 10 MG/ML
INJECTION, SOLUTION INTRAVENOUS
Status: DISCONTINUED | OUTPATIENT
Start: 2025-02-06 | End: 2025-02-06 | Stop reason: SDUPTHER

## 2025-02-06 RX ORDER — FINASTERIDE 5 MG/1
5 TABLET, FILM COATED ORAL DAILY
Status: DISCONTINUED | OUTPATIENT
Start: 2025-02-06 | End: 2025-02-08 | Stop reason: HOSPADM

## 2025-02-06 RX ORDER — SODIUM CHLORIDE 9 MG/ML
INJECTION, SOLUTION INTRAVENOUS PRN
Status: DISCONTINUED | OUTPATIENT
Start: 2025-02-06 | End: 2025-02-06 | Stop reason: HOSPADM

## 2025-02-06 RX ORDER — SODIUM CHLORIDE, SODIUM LACTATE, POTASSIUM CHLORIDE, CALCIUM CHLORIDE 600; 310; 30; 20 MG/100ML; MG/100ML; MG/100ML; MG/100ML
INJECTION, SOLUTION INTRAVENOUS
Status: DISCONTINUED | OUTPATIENT
Start: 2025-02-06 | End: 2025-02-06 | Stop reason: SDUPTHER

## 2025-02-06 RX ORDER — SODIUM CHLORIDE 9 MG/ML
25 INJECTION, SOLUTION INTRAVENOUS PRN
Status: DISCONTINUED | OUTPATIENT
Start: 2025-02-06 | End: 2025-02-08 | Stop reason: HOSPADM

## 2025-02-06 RX ORDER — SODIUM CHLORIDE 0.9 % (FLUSH) 0.9 %
5-40 SYRINGE (ML) INJECTION PRN
Status: DISCONTINUED | OUTPATIENT
Start: 2025-02-06 | End: 2025-02-06 | Stop reason: HOSPADM

## 2025-02-06 RX ORDER — DIPHENHYDRAMINE HYDROCHLORIDE 50 MG/ML
12.5 INJECTION INTRAMUSCULAR; INTRAVENOUS
Status: COMPLETED | OUTPATIENT
Start: 2025-02-06 | End: 2025-02-06

## 2025-02-06 RX ORDER — ASPIRIN 81 MG/1
81 TABLET ORAL ONCE
Status: COMPLETED | OUTPATIENT
Start: 2025-02-06 | End: 2025-02-06

## 2025-02-06 RX ORDER — HEPARIN SODIUM AND DEXTROSE 10000; 5 [USP'U]/100ML; G/100ML
400 INJECTION INTRAVENOUS CONTINUOUS
Status: DISCONTINUED | OUTPATIENT
Start: 2025-02-06 | End: 2025-02-08 | Stop reason: HOSPADM

## 2025-02-06 RX ORDER — SODIUM CHLORIDE 9 MG/ML
INJECTION, SOLUTION INTRAVENOUS CONTINUOUS PRN
Status: ACTIVE | OUTPATIENT
Start: 2025-02-06 | End: 2025-02-07

## 2025-02-06 RX ADMIN — GLYCOPYRROLATE 0.2 MG: 0.2 INJECTION, SOLUTION INTRAMUSCULAR; INTRAVENOUS at 12:02

## 2025-02-06 RX ADMIN — ASPIRIN 81 MG: 81 TABLET, COATED ORAL at 09:47

## 2025-02-06 RX ADMIN — PRAVASTATIN SODIUM 40 MG: 20 TABLET ORAL at 20:19

## 2025-02-06 RX ADMIN — SODIUM CHLORIDE, SODIUM LACTATE, POTASSIUM CHLORIDE, AND CALCIUM CHLORIDE: 600; 310; 30; 20 INJECTION, SOLUTION INTRAVENOUS at 11:24

## 2025-02-06 RX ADMIN — ROCURONIUM BROMIDE 50 MG: 10 INJECTION, SOLUTION INTRAVENOUS at 11:32

## 2025-02-06 RX ADMIN — FENTANYL CITRATE 50 MCG: 0.05 INJECTION, SOLUTION INTRAMUSCULAR; INTRAVENOUS at 11:30

## 2025-02-06 RX ADMIN — FINASTERIDE 5 MG: 5 TABLET, FILM COATED ORAL at 20:19

## 2025-02-06 RX ADMIN — LIDOCAINE HYDROCHLORIDE 50 MG: 10 INJECTION, SOLUTION INFILTRATION; PERINEURAL at 11:30

## 2025-02-06 RX ADMIN — CLOPIDOGREL BISULFATE 75 MG: 75 TABLET ORAL at 20:19

## 2025-02-06 RX ADMIN — MONTELUKAST 10 MG: 10 TABLET, FILM COATED ORAL at 20:19

## 2025-02-06 RX ADMIN — ROCURONIUM BROMIDE 10 MG: 10 INJECTION, SOLUTION INTRAVENOUS at 12:24

## 2025-02-06 RX ADMIN — ALTEPLASE 1 MG/HR: 2.2 INJECTION, POWDER, LYOPHILIZED, FOR SOLUTION INTRAVENOUS at 21:05

## 2025-02-06 RX ADMIN — SUGAMMADEX 300 MG: 100 INJECTION, SOLUTION INTRAVENOUS at 13:00

## 2025-02-06 RX ADMIN — SODIUM CHLORIDE, POTASSIUM CHLORIDE, SODIUM LACTATE AND CALCIUM CHLORIDE: 600; 310; 30; 20 INJECTION, SOLUTION INTRAVENOUS at 09:31

## 2025-02-06 RX ADMIN — PROPOFOL 100 MG: 10 INJECTION, EMULSION INTRAVENOUS at 11:31

## 2025-02-06 RX ADMIN — VANCOMYCIN 1000 MG: 1 INJECTION, SOLUTION INTRAVENOUS at 10:22

## 2025-02-06 RX ADMIN — TRAZODONE HYDROCHLORIDE 50 MG: 50 TABLET ORAL at 22:04

## 2025-02-06 RX ADMIN — PHENYLEPHRINE HYDROCHLORIDE 50 MCG/MIN: 10 INJECTION INTRAVENOUS at 11:57

## 2025-02-06 RX ADMIN — FENTANYL CITRATE 50 MCG: 0.05 INJECTION, SOLUTION INTRAMUSCULAR; INTRAVENOUS at 12:03

## 2025-02-06 RX ADMIN — SODIUM CHLORIDE, PRESERVATIVE FREE 10 ML: 5 INJECTION INTRAVENOUS at 20:19

## 2025-02-06 RX ADMIN — DIPHENHYDRAMINE HYDROCHLORIDE 25 MG: 50 INJECTION, SOLUTION INTRAMUSCULAR; INTRAVENOUS at 12:07

## 2025-02-06 RX ADMIN — HEPARIN SODIUM 400 UNITS/HR: 10000 INJECTION, SOLUTION INTRAVENOUS at 13:15

## 2025-02-06 RX ADMIN — ALTEPLASE 1 MG/HR: 2.2 INJECTION, POWDER, LYOPHILIZED, FOR SOLUTION INTRAVENOUS at 13:15

## 2025-02-06 RX ADMIN — HEPARIN SODIUM 5000 UNITS: 1000 INJECTION, SOLUTION INTRAVENOUS; SUBCUTANEOUS at 12:29

## 2025-02-06 ASSESSMENT — LIFESTYLE VARIABLES: SMOKING_STATUS: 0

## 2025-02-06 ASSESSMENT — COPD QUESTIONNAIRES: CAT_SEVERITY: NO INTERVAL CHANGE

## 2025-02-06 NOTE — ANESTHESIA POSTPROCEDURE EVALUATION
Department of Anesthesiology  Postprocedure Note    Patient: Jong Loyd  MRN: [id number]  YOB: 1950  Date of evaluation: 2/6/2025    Procedure Summary       Date: 02/06/25 Room / Location: [facility] OR [address]    Anesthesia Start: 1124 Anesthesia Stop: 1329    Procedure: INTRAOPERATIVE ANGIOGRAM WITH RUNOFF.  PLACEMENT OF EKOS CATHETER (Bilateral) Diagnosis:       Atherosclerosis of native artery of both lower extremities with intermittent claudication (HCC)      (Atherosclerosis of native artery of both lower extremities with intermittent claudication (HCC) [I70.213])    Surgeons: Karen Morrow DO Responsible Provider: Elif Iverson APRN - CRNA    Anesthesia Type: general ASA Status: 3            Anesthesia Type: No value filed.    Malaika Phase I:      Malaika Phase II:      Anesthesia Post Evaluation    Patient location during evaluation: ICU  Patient participation: complete - patient participated  Level of consciousness: sleepy but conscious  Pain score: 0  Airway patency: patent  Nausea & Vomiting: no nausea and no vomiting  Cardiovascular status: hemodynamically stable  Respiratory status: acceptable  Hydration status: stable  Comments: HR- 65  RR- 17  SAT-99  BP-116/60  Pain management: adequate    No notable events documented.

## 2025-02-06 NOTE — INTERVAL H&P NOTE
Update History & Physical    The patient's History and Physical of February 5, 2025 was reviewed with the patient and I examined the patient. There was no change. The surgical site was confirmed by the patient and me.     Plan: The risks, benefits, expected outcome, and alternative to the recommended procedure have been discussed with the patient. Patient understands and wants to proceed with the procedure.     Electronically signed by Karen Morrow DO on 2/6/2025 at 11:10 AM

## 2025-02-06 NOTE — ANESTHESIA PRE PROCEDURE
attack) 2014    Left side weakness       Past Surgical History:        Procedure Laterality Date    COLONOSCOPY  01/2020    ELBOW SURGERY Right     Removal of spot on elbow    FEMORAL BYPASS Left 10/07/2024    LEFT FEMORAL POPLITEAL BYPASS, REVISION OF LEFT FEMORAL ENDARTERECTOMY, LEFT POPLITEAL ENDARTERECTOMY, ARTERIOGRAM LEFT LOWER EXTREMITY WITH ANGIOPLASTY AND STENT PLACEMENT, FASCIOTOMY performed by Kailee Alaniz MD at [facility] OR    MOUTH SURGERY      Removal of permanent teeth    OTHER SURGICAL HISTORY Left 01/15/2024    Stent placed in L leg    UPPER GASTROINTESTINAL ENDOSCOPY  01/2020    VASCULAR SURGERY Right     bilateral iliofemoral arteriogram and bilateral lower extremity arteriogram 3.  Right external iliac artery stent (Icast 7 mm x 38 mm covered balloon expandable stent) 4.  Selective right lower extremity arteriograms 5.  Atherectomy right popliteal artery behind the knee with jetstream 2.0/3.0 6.  Balloon angioplasty right popliteal artery with 4 mm    VASCULAR SURGERY Right     SLU with stents ? right iliac    VASCULAR SURGERY N/A 01/24/2024    INTRAOPERATIVE AORTOGRAM  WITH RUNOFF,  STENTING  OF LEFT SFA performed by Karen Morrow DO at [facility] OR    VASCULAR SURGERY N/A 03/18/2024    INTRAOPERATIVE ANGIOGRAM WITH RIGHT LEG RUNOFF, ANGIOPLASTY AND STENTING OF RIGHT POPLITEAL ARTERY, ANGIOPLASTY OF POSTERIOR TIBIAL ARTERY, RIGHT LEG PEDAL ACCESS. performed by Karen Morrow DO at [facility] OR    VASCULAR SURGERY Left 07/22/2024    INTRAOPERATIVE ANGIOGRAM WITH RUNOFF POSSIBLE ANGIOPLASTY, ATHERECTOMY, STENT performed by Karen Morrow DO at [facility] OR    VASCULAR SURGERY Left 07/23/2024    LEFT LOWER EXTREMITY ANGIOGRAM, ANGIOPLASTY, STENT PLACEMENT, REMOVAL LYSIS CATHETER performed by Karen Morrow DO at [facility] OR    VASCULAR SURGERY N/A 10/06/2024    ANGIOGRAM performed by Karen Morrow DO at [facility] OR    VASCULAR SURGERY Left 10/06/2024    FEMORAL ENDARTERECTOMY performed by

## 2025-02-06 NOTE — OP NOTE
Operative Note      Patient: Jong Loyd  YOB: 1950  MRN: [id number]    Date of Procedure: 2/6/2025    Pre-Op Diagnosis Codes:      * Atherosclerosis of native artery of both lower extremities with intermittent claudication (HCC) [I70.213]    Post-Op Diagnosis: Same       Procedure:  Aortogram with bilateral lower extremity runoff  IVUS of right SFA, popliteal artery, peroneal artery  Placement of EKOS catheter in the right SFA to peroneal artery with 30 cm infusion length.    Surgeon(s):  Karen Morrow DO    Assistant:   * No surgical staff found *    Anesthesia: General    Estimated Blood Loss (mL): less than 100     Complications: None    Specimens:   * No specimens in log *    Implants:  * No implants in log *      Drains:   Urinary Catheter 02/06/25 2 Way (Active)       Findings:  Patent aorta.  Bilateral common iliac and external iliac arteries patent with patent right external iliac stent.  Right common femoral artery patent, profunda femoral is patent, SFA with some plaque, patent, occluded popliteal artery stent with reconstitution to mid leg peroneal artery.  Intravascular ultrasound showed diffuse location of the soft plaque in the SFA, thrombus in the popliteal stent and patent essentially peroneal artery.  Left common femoral artery patent, profunda femoris patent, femoral to above-knee popliteal bypass occluded with reconstitution to popliteal artery, two-vessel runoff  Detailed Description of Procedure:   Patient was brought to the operating room and placed in supine position.  He received preoperative antibiotics.  He has right flank was prepped in its entirety and left leg to the knee in sterile fashion and draped.  Timeout performed.  Left common femoral artery was accessed with the Cook needle, Amplatz wire was placed and floated proximally, needle was removed and the 5 Persian access sheath was placed.  Omni Flush catheter was positioned juxtarenal aorta.  Aortogram with  bilateral extremity runoff were performed with mentioned above findings using power injection.  Using glide-wire with maneuver that to the right SFA and sheath and the catheter were removed and 7 x 45 destination sheath was placed.  Patient was given 5000 units of heparin.  Then using Rodriguez cross and V18 wire were maneuvered to the peroneal artery.  The wire then was exchanged to V14 wire.  Intravascular ultrasound was performed of the right lower extremity with mentioned above findings.  We then decided to place EKOS catheter with 30 cm infusion length through occluded popliteal stent with the thrombus and into peroneal artery.  The catheter and sheath were secured in place.  Patient Toller procedure well and was transferred to PACU and to ICU for monitoring in stable condition.    Electronically signed by Karen Morrow DO on 2/6/2025 at 1:03 PM

## 2025-02-06 NOTE — PROGRESS NOTES
13:37--patient arrived to ICU from OR s/p EKOS catheter placement. Left groin site CDI. Doppler signals listened for by myself and DIAN Lagos for quite some time. Strong monophasic signal to left PT; right PT and DP and left DP absent. ICU monitoring in progress. No c/o pain.

## 2025-02-07 ENCOUNTER — APPOINTMENT (OUTPATIENT)
Dept: INTERVENTIONAL RADIOLOGY/VASCULAR | Age: 75
End: 2025-02-07
Attending: SURGERY
Payer: OTHER GOVERNMENT

## 2025-02-07 LAB
ANION GAP SERPL CALCULATED.3IONS-SCNC: 9 MMOL/L (ref 8–16)
APTT PPP: 35 SEC (ref 26–36.2)
APTT PPP: 35.1 SEC (ref 26–36.2)
APTT PPP: 35.1 SEC (ref 26–36.2)
APTT PPP: 46 SEC (ref 26–36.2)
BUN SERPL-MCNC: 10 MG/DL (ref 8–23)
CALCIUM SERPL-MCNC: 8.2 MG/DL (ref 8.8–10.2)
CHLORIDE SERPL-SCNC: 109 MMOL/L (ref 98–107)
CO2 SERPL-SCNC: 23 MMOL/L (ref 22–29)
CREAT SERPL-MCNC: 0.8 MG/DL (ref 0.7–1.2)
ERYTHROCYTE [DISTWIDTH] IN BLOOD BY AUTOMATED COUNT: 12.7 % (ref 11.5–14.5)
ERYTHROCYTE [DISTWIDTH] IN BLOOD BY AUTOMATED COUNT: 12.8 % (ref 11.5–14.5)
ERYTHROCYTE [DISTWIDTH] IN BLOOD BY AUTOMATED COUNT: 12.9 % (ref 11.5–14.5)
FIBRINOGEN PPP-MCNC: 250 MG/DL (ref 238–505)
FIBRINOGEN PPP-MCNC: 278 MG/DL (ref 238–505)
FIBRINOGEN PPP-MCNC: 299 MG/DL (ref 238–505)
GLUCOSE SERPL-MCNC: 97 MG/DL (ref 70–99)
HCT VFR BLD AUTO: 35.8 % (ref 42–52)
HCT VFR BLD AUTO: 35.9 % (ref 42–52)
HCT VFR BLD AUTO: 36.3 % (ref 42–52)
HGB BLD-MCNC: 11.2 G/DL (ref 14–18)
HGB BLD-MCNC: 11.3 G/DL (ref 14–18)
HGB BLD-MCNC: 11.5 G/DL (ref 14–18)
MCH RBC QN AUTO: 28.4 PG (ref 27–31)
MCH RBC QN AUTO: 28.6 PG (ref 27–31)
MCH RBC QN AUTO: 28.6 PG (ref 27–31)
MCHC RBC AUTO-ENTMCNC: 31.3 G/DL (ref 33–37)
MCHC RBC AUTO-ENTMCNC: 31.5 G/DL (ref 33–37)
MCHC RBC AUTO-ENTMCNC: 31.7 G/DL (ref 33–37)
MCV RBC AUTO: 90.2 FL (ref 80–94)
MCV RBC AUTO: 90.3 FL (ref 80–94)
MCV RBC AUTO: 91.3 FL (ref 80–94)
PLATELET # BLD AUTO: 121 K/UL (ref 130–400)
PLATELET # BLD AUTO: 121 K/UL (ref 130–400)
PLATELET # BLD AUTO: 139 K/UL (ref 130–400)
PMV BLD AUTO: 10.6 FL (ref 9.4–12.4)
PMV BLD AUTO: 11.3 FL (ref 9.4–12.4)
PMV BLD AUTO: 11.3 FL (ref 9.4–12.4)
POTASSIUM SERPL-SCNC: 3.8 MMOL/L (ref 3.5–5.1)
RBC # BLD AUTO: 3.92 M/UL (ref 4.7–6.1)
RBC # BLD AUTO: 3.98 M/UL (ref 4.7–6.1)
RBC # BLD AUTO: 4.02 M/UL (ref 4.7–6.1)
SODIUM SERPL-SCNC: 141 MMOL/L (ref 136–145)
WBC # BLD AUTO: 4 K/UL (ref 4.8–10.8)
WBC # BLD AUTO: 4.3 K/UL (ref 4.8–10.8)
WBC # BLD AUTO: 5.1 K/UL (ref 4.8–10.8)

## 2025-02-07 PROCEDURE — 37214 CESSJ THERAPY CATH REMOVAL: CPT

## 2025-02-07 PROCEDURE — 2000000000 HC ICU R&B

## 2025-02-07 PROCEDURE — 047K3Z1 DILATION OF RIGHT FEMORAL ARTERY USING DRUG-COATED BALLOON, PERCUTANEOUS APPROACH: ICD-10-PCS | Performed by: SURGERY

## 2025-02-07 PROCEDURE — 04PY3JZ REMOVAL OF SYNTHETIC SUBSTITUTE FROM LOWER ARTERY, PERCUTANEOUS APPROACH: ICD-10-PCS | Performed by: SURGERY

## 2025-02-07 PROCEDURE — 2500000003 HC RX 250 WO HCPCS: Performed by: SURGERY

## 2025-02-07 PROCEDURE — 37224 HC FEM POP TERRITORY PLASTY: CPT

## 2025-02-07 PROCEDURE — 85027 COMPLETE CBC AUTOMATED: CPT

## 2025-02-07 PROCEDURE — 85384 FIBRINOGEN ACTIVITY: CPT

## 2025-02-07 PROCEDURE — 37214 CESSJ THERAPY CATH REMOVAL: CPT | Performed by: SURGERY

## 2025-02-07 PROCEDURE — 99152 MOD SED SAME PHYS/QHP 5/>YRS: CPT

## 2025-02-07 PROCEDURE — 6370000000 HC RX 637 (ALT 250 FOR IP): Performed by: SURGERY

## 2025-02-07 PROCEDURE — 6360000004 HC RX CONTRAST MEDICATION: Performed by: SURGERY

## 2025-02-07 PROCEDURE — 85730 THROMBOPLASTIN TIME PARTIAL: CPT

## 2025-02-07 PROCEDURE — 2580000003 HC RX 258: Performed by: SURGERY

## 2025-02-07 PROCEDURE — 94150 VITAL CAPACITY TEST: CPT

## 2025-02-07 PROCEDURE — 99153 MOD SED SAME PHYS/QHP EA: CPT

## 2025-02-07 PROCEDURE — 94760 N-INVAS EAR/PLS OXIMETRY 1: CPT

## 2025-02-07 PROCEDURE — 37228 PR REVSC OPN/PRQ TIB/PERO W/ANGIOPLASTY UNI: CPT | Performed by: SURGERY

## 2025-02-07 PROCEDURE — 99152 MOD SED SAME PHYS/QHP 5/>YRS: CPT | Performed by: SURGERY

## 2025-02-07 PROCEDURE — 80048 BASIC METABOLIC PNL TOTAL CA: CPT

## 2025-02-07 PROCEDURE — 2709999900 IR ANGIO THROUGH CATHETER FOLLOW UP TRANSCATHETER STUDY

## 2025-02-07 PROCEDURE — 37228 HC TIB PER TERRITORY PLASTY: CPT

## 2025-02-07 PROCEDURE — 37224 PR REVSC OPN/PRG FEM/POP W/ANGIOPLASTY UNI: CPT | Performed by: SURGERY

## 2025-02-07 PROCEDURE — 6360000002 HC RX W HCPCS: Performed by: SURGERY

## 2025-02-07 PROCEDURE — 36415 COLL VENOUS BLD VENIPUNCTURE: CPT

## 2025-02-07 RX ORDER — MIDAZOLAM HYDROCHLORIDE 5 MG/ML
INJECTION, SOLUTION INTRAMUSCULAR; INTRAVENOUS PRN
Status: COMPLETED | OUTPATIENT
Start: 2025-02-07 | End: 2025-02-07

## 2025-02-07 RX ORDER — FENTANYL CITRATE 50 UG/ML
INJECTION, SOLUTION INTRAMUSCULAR; INTRAVENOUS PRN
Status: COMPLETED | OUTPATIENT
Start: 2025-02-07 | End: 2025-02-07

## 2025-02-07 RX ORDER — HYDROCODONE BITARTRATE AND ACETAMINOPHEN 10; 325 MG/1; MG/1
1 TABLET ORAL EVERY 6 HOURS PRN
Status: DISCONTINUED | OUTPATIENT
Start: 2025-02-07 | End: 2025-02-08 | Stop reason: HOSPADM

## 2025-02-07 RX ORDER — SODIUM CHLORIDE 0.9 % (FLUSH) 0.9 %
5-40 SYRINGE (ML) INJECTION PRN
Status: DISCONTINUED | OUTPATIENT
Start: 2025-02-07 | End: 2025-02-08 | Stop reason: HOSPADM

## 2025-02-07 RX ORDER — VANCOMYCIN 1 G/200ML
1000 INJECTION, SOLUTION INTRAVENOUS
Status: COMPLETED | OUTPATIENT
Start: 2025-02-07 | End: 2025-02-07

## 2025-02-07 RX ORDER — PROTAMINE SULFATE 10 MG/ML
INJECTION, SOLUTION INTRAVENOUS PRN
Status: COMPLETED | OUTPATIENT
Start: 2025-02-07 | End: 2025-02-07

## 2025-02-07 RX ORDER — SODIUM CHLORIDE 9 MG/ML
INJECTION, SOLUTION INTRAVENOUS CONTINUOUS
Status: DISCONTINUED | OUTPATIENT
Start: 2025-02-07 | End: 2025-02-08 | Stop reason: HOSPADM

## 2025-02-07 RX ORDER — HEPARIN SODIUM 5000 [USP'U]/ML
INJECTION, SOLUTION INTRAVENOUS; SUBCUTANEOUS PRN
Status: COMPLETED | OUTPATIENT
Start: 2025-02-07 | End: 2025-02-07

## 2025-02-07 RX ORDER — SODIUM CHLORIDE 9 MG/ML
INJECTION, SOLUTION INTRAVENOUS PRN
Status: DISCONTINUED | OUTPATIENT
Start: 2025-02-07 | End: 2025-02-08 | Stop reason: HOSPADM

## 2025-02-07 RX ORDER — VANCOMYCIN 1 G/200ML
1000 INJECTION, SOLUTION INTRAVENOUS
OUTPATIENT
Start: 2025-02-07 | End: 2025-02-07

## 2025-02-07 RX ORDER — METOPROLOL TARTRATE 25 MG/1
25 TABLET, FILM COATED ORAL EVERY 12 HOURS PRN
Status: DISCONTINUED | OUTPATIENT
Start: 2025-02-07 | End: 2025-02-08 | Stop reason: HOSPADM

## 2025-02-07 RX ORDER — SODIUM CHLORIDE 0.9 % (FLUSH) 0.9 %
5-40 SYRINGE (ML) INJECTION EVERY 12 HOURS SCHEDULED
Status: DISCONTINUED | OUTPATIENT
Start: 2025-02-07 | End: 2025-02-08 | Stop reason: HOSPADM

## 2025-02-07 RX ORDER — LIDOCAINE HYDROCHLORIDE 20 MG/ML
INJECTION, SOLUTION EPIDURAL; INFILTRATION; INTRACAUDAL; PERINEURAL PRN
Status: COMPLETED | OUTPATIENT
Start: 2025-02-07 | End: 2025-02-07

## 2025-02-07 RX ORDER — IODIXANOL 320 MG/ML
INJECTION, SOLUTION INTRAVASCULAR PRN
Status: COMPLETED | OUTPATIENT
Start: 2025-02-07 | End: 2025-02-07

## 2025-02-07 RX ADMIN — HEPARIN SODIUM 400 UNITS/HR: 10000 INJECTION, SOLUTION INTRAVENOUS at 18:24

## 2025-02-07 RX ADMIN — ALTEPLASE 1 MG/HR: 2.2 INJECTION, POWDER, LYOPHILIZED, FOR SOLUTION INTRAVENOUS at 04:33

## 2025-02-07 RX ADMIN — IODIXANOL 35 ML: 320 INJECTION, SOLUTION INTRAVASCULAR at 15:50

## 2025-02-07 RX ADMIN — PRAVASTATIN SODIUM 40 MG: 20 TABLET ORAL at 07:43

## 2025-02-07 RX ADMIN — HEPARIN SODIUM 5000 UNITS: 5000 INJECTION INTRAVENOUS; SUBCUTANEOUS at 14:56

## 2025-02-07 RX ADMIN — MIDAZOLAM HYDROCHLORIDE 1 MG: 5 INJECTION, SOLUTION INTRAMUSCULAR; INTRAVENOUS at 14:55

## 2025-02-07 RX ADMIN — LEVOTHYROXINE SODIUM 150 MCG: 75 TABLET ORAL at 07:43

## 2025-02-07 RX ADMIN — FENTANYL CITRATE 50 MCG: 50 INJECTION, SOLUTION INTRAMUSCULAR; INTRAVENOUS at 15:14

## 2025-02-07 RX ADMIN — FINASTERIDE 5 MG: 5 TABLET, FILM COATED ORAL at 07:43

## 2025-02-07 RX ADMIN — HYDROCODONE BITARTRATE AND ACETAMINOPHEN 1 TABLET: 5; 325 TABLET ORAL at 00:20

## 2025-02-07 RX ADMIN — FENTANYL CITRATE 25 MCG: 50 INJECTION, SOLUTION INTRAMUSCULAR; INTRAVENOUS at 15:03

## 2025-02-07 RX ADMIN — MONTELUKAST 10 MG: 10 TABLET, FILM COATED ORAL at 21:39

## 2025-02-07 RX ADMIN — SODIUM CHLORIDE: 9 INJECTION, SOLUTION INTRAVENOUS at 17:09

## 2025-02-07 RX ADMIN — HYDROCODONE BITARTRATE AND ACETAMINOPHEN 1 TABLET: 10; 325 TABLET ORAL at 07:43

## 2025-02-07 RX ADMIN — SODIUM CHLORIDE, PRESERVATIVE FREE 10 ML: 5 INJECTION INTRAVENOUS at 21:39

## 2025-02-07 RX ADMIN — HEPARIN SODIUM 5000 UNITS: 5000 INJECTION INTRAVENOUS; SUBCUTANEOUS at 15:01

## 2025-02-07 RX ADMIN — CLOPIDOGREL BISULFATE 75 MG: 75 TABLET ORAL at 07:43

## 2025-02-07 RX ADMIN — TRAZODONE HYDROCHLORIDE 50 MG: 50 TABLET ORAL at 21:39

## 2025-02-07 RX ADMIN — LIDOCAINE HYDROCHLORIDE 10 ML: 20 INJECTION, SOLUTION EPIDURAL; INFILTRATION; INTRACAUDAL; PERINEURAL at 14:56

## 2025-02-07 RX ADMIN — FERROUS SULFATE TAB 325 MG (65 MG ELEMENTAL FE) 325 MG: 325 (65 FE) TAB at 07:43

## 2025-02-07 RX ADMIN — VANCOMYCIN 1000 MG: 1 INJECTION, SOLUTION INTRAVENOUS at 14:44

## 2025-02-07 RX ADMIN — MIDAZOLAM HYDROCHLORIDE 1 MG: 5 INJECTION, SOLUTION INTRAMUSCULAR; INTRAVENOUS at 15:06

## 2025-02-07 RX ADMIN — FENTANYL CITRATE 25 MCG: 50 INJECTION, SOLUTION INTRAMUSCULAR; INTRAVENOUS at 14:55

## 2025-02-07 RX ADMIN — HYDROCODONE BITARTRATE AND ACETAMINOPHEN 1 TABLET: 10; 325 TABLET ORAL at 17:07

## 2025-02-07 RX ADMIN — PROTAMINE SULFATE 35 MG: 10 INJECTION, SOLUTION INTRAVENOUS at 15:36

## 2025-02-07 ASSESSMENT — PAIN SCALES - GENERAL
PAINLEVEL_OUTOF10: 7
PAINLEVEL_OUTOF10: 0

## 2025-02-07 ASSESSMENT — PAIN - FUNCTIONAL ASSESSMENT: PAIN_FUNCTIONAL_ASSESSMENT: ACTIVITIES ARE NOT PREVENTED

## 2025-02-07 ASSESSMENT — PAIN DESCRIPTION - ORIENTATION: ORIENTATION: MID

## 2025-02-07 ASSESSMENT — PAIN DESCRIPTION - DESCRIPTORS: DESCRIPTORS: ACHING

## 2025-02-07 ASSESSMENT — PAIN DESCRIPTION - LOCATION: LOCATION: GENERALIZED

## 2025-02-07 NOTE — CARE COORDINATION
Case Management Assessment  Initial Evaluation    Date/Time of Evaluation: 2/7/2025 2:14 PM  Assessment Completed by: Monique Blanco RN    If patient is discharged prior to next notation, then this note serves as note for discharge by case management.    Patient Name: Jong Loyd                   YOB: 1950  Diagnosis: Atherosclerosis of native artery of both lower extremities with intermittent claudication (HCC) [I70.213]  PAD (peripheral artery disease) (HCC) [I73.9]                   Date / Time: 2/6/2025  9:22 AM    Patient Admission Status: Inpatient   Readmission Risk (Low < 19, Mod (19-27), High > 27): Readmission Risk Score: 14.6    Current PCP: Giselle Frias APRN - NP  PCP verified by CM? (P) Yes    Chart Reviewed: Yes      History Provided by: (P) Patient  Patient Orientation: (P) Alert and Oriented    Patient Cognition: (P) Alert    Hospitalization in the last 30 days (Readmission):  No    If yes, Readmission Assessment in  Navigator will be completed.    Advance Directives:      Code Status: Full Code   Patient's Primary Decision Maker is: (P) Legal Next of Kin    Primary Decision Maker (Active): KRISTEN FRYE - Child - [phone]    Secondary Decision Maker: Sanaz Dolan  Child - [phone]    Secondary Decision Maker: Dorothea Roger  Child - [phone]    Discharge Planning:    Patient lives with: (P) Spouse/Significant Other, Children Type of Home: (P) House  Primary Care Giver: (P) Self  Patient Support Systems include: (P) Spouse/Significant Other   Current Financial resources: (P)  (VA), Medicare  Current community resources: (P) None  Current services prior to admission: (P) None            Current DME:              Type of Home Care services:  (P) PT, OT, VA    ADLS  Prior functional level: (P) Independent in ADLs/IADLs  Current functional level: (P) Other (see comment) (unable to assess pt is on bedrest)    PT AM-PAC:   /24  OT AM-PAC:   /24    Family can  basic dialogue that supports the patient's individualized plan of care/goals and shares the quality data associated with the providers was provided to:     Patient Representative Name:       The Patient and/or Patient Representative Agree with the Discharge Plan?      Moniqeu Blanco RN  Case Management Department               02/07/25 7503   Service Assessment   Patient Orientation Alert and Oriented   Cognition Alert   History Provided By Patient   Primary Caregiver Self   Accompanied By/Relationship alone at this time   Support Systems Spouse/Significant Other   Patient's Healthcare Decision Maker is: Legal Next of Kin   PCP Verified by CM Yes   Last Visit to PCP Within last 3 months   Prior Functional Level Independent in ADLs/IADLs   Current Functional Level Other (see comment)  (unable to assess pt is on bedrest)   Can patient return to prior living arrangement Unknown at present   Ability to make needs known: Good   Family able to assist with home care needs: No   Would you like for me to discuss the discharge plan with any other family members/significant others, and if so, who? No   Financial Resources  (VA);Medicare   Community Resources None   CM/SW Referral Other (see comment)  (discharge planning)   Social/Functional History   Lives With Significant other   Type of Home House   Home Layout One level   Home Access Stairs to enter with rails   Entrance Stairs - Number of Steps 3   Entrance Stairs - Rails Both   Bathroom Shower/Tub Tub/Shower unit   Bathroom Toilet Standard   Bathroom Equipment None   Bathroom Accessibility Accessible   Home Equipment None   Receives Help From Family   Prior Level of Assist for ADLs Independent   Prior Level of Assist for Homemaking Independent   Homemaking Responsibilities Yes   Ambulation Assistance Independent   Prior Level of Assist for Transfers Independent   Active  Yes   Mode of Transportation Car   Occupation Retired   Discharge Planning   Type of

## 2025-02-07 NOTE — PROGRESS NOTES
Vascular Surgery  Dr. Karen Morrow   Daily Progress Note    Pt Name: Jong Loyd  Medical Record Number: [id number]  Date of Birth 1950   Today's Date: 2/7/2025    SUBJECTIVE:     Patient was seen and examined, stating he does not think he will get good news today about his blood flow.  Back pain is not controlled, stating his feet and legs are not painful.  Has not had nausea/vomiting    OBJECTIVE:     Patient Vitals for the past 24 hrs:   BP Temp Temp src Pulse Resp SpO2 Height Weight   02/07/25 0700 124/60 98.5 °F (36.9 °C) Temporal 69 18 96 % -- --   02/07/25 0600 (!) 97/41 -- -- 66 18 95 % -- --   02/07/25 0530 (!) 155/79 -- -- 80 21 96 % -- --   02/07/25 0500 (!) 125/44 -- -- 70 20 96 % -- --   02/07/25 0400 (!) 107/49 97.7 °F (36.5 °C) Temporal 73 22 94 % -- --   02/07/25 0300 (!) 114/44 -- -- 71 19 95 % -- --   02/07/25 0200 (!) 101/59 -- -- 70 21 95 % -- --   02/07/25 0100 (!) 116/49 -- -- 76 17 94 % -- --   02/07/25 0050 -- -- -- -- 18 -- -- --   02/07/25 0020 -- -- -- -- 18 -- -- --   02/07/25 0000 (!) 116/47 98.9 °F (37.2 °C) Temporal 72 20 96 % -- --   02/06/25 2245 (!) 105/48 -- -- 64 23 91 % -- --   02/06/25 2240 (!) 53/38 -- -- 59 22 (!) 88 % -- --   02/06/25 2235 (!) 45/25 -- -- 61 16 98 % -- --   02/06/25 2230 (!) 57/30 -- -- 63 15 95 % -- --   02/06/25 2215 (!) 145/86 -- -- 68 19 98 % -- --   02/06/25 2200 (!) 159/73 -- -- 66 18 98 % -- --   02/06/25 2100 (!) 149/54 -- -- 74 17 97 % -- --   02/06/25 2000 (!) 150/97 98.7 °F (37.1 °C) Temporal 64 20 97 % -- --   02/06/25 1900 135/62 -- -- 63 20 99 % -- --   02/06/25 1800 133/63 -- -- 60 20 99 % -- --   02/06/25 1700 137/66 -- -- 65 17 97 % -- --   02/06/25 1600 (!) 154/60 -- -- 63 18 100 % -- --   02/06/25 1500 (!) 128/58 -- -- 60 21 99 % -- --   02/06/25 1431 (!) 137/56 -- -- 56 14 -- -- --   02/06/25 1400 126/82 -- -- 58 14 99 % -- --   02/06/25 1345 (!) 144/58 -- -- 59 12 100 % -- --   02/06/25 1337 (!) 138/57 -- -- 61 16 99 % -- --  pain

## 2025-02-07 NOTE — PROGRESS NOTES
Patient's heart rate dropped to 38 and BP to 50s/30s. Patient remained alert and oriented, but states he felt dizzy. Patient placed in trendelenburg position. Patient recovered after a few minutes. Dr. Morrow notified of episode. Went over lab results from earlier this evening. Will continue to monitor patient closely. Per Dr. Morrow, consult hospitalist if patient has another episode.     Electronically signed by Myriam Duron RN on 2/6/2025 at 11:10 PM

## 2025-02-07 NOTE — OP NOTE
Operative Note        Patient Name: Jong Loyd   YOB: 1950   MRN: [id number]     Procedure Date: 2/7/2025     Pre Op Diagnosis: PAD with right leg rest pain.  Thrombolysis using EKOS catheter in the right lower extremity    Post Op Diagnosis: same    Procedure: Removal of EKOS catheter.  Balloon angioplasty of right SFA using 5 x 220 DCB and peroneal artery using 3 x 100 Sebastian balloon    Anesthesia: Local with Moderate Sedation  Anesthesia: local with moderate sedation  Moderate sedation ASA class III  Timing start: 14:55  End time:15:39  Given 2 Versed and 100 Fentanyl  Vital signs were observed by separate provider that had no other duties    Estimated Blood Loss: Less than 100 ml    Complications: None    Implants: none    Procedure Details: Patient was brought to the angiogram suite and placed in supine position.  She received preoperative antibiotics.  She is EKOS therapy was discontinued.  He is left groin and thigh including the catheter and the sheath as well as the at the groin were prepped and draped sterile fashion.  Timeout performed.  Patient was given 5000 units heparin.  V18 wire was placed inside the EKOS catheter.  The catheter was removed.  Using sheath injection right leg runoff was performed showing good resolution of the in-stent thrombus at the level of SFA and popliteal artery without any stenosis.  Diffuse stenosis of the mid SFA.  Stenosis of proximal peroneal artery.  No other tibials were seen.  Balloon angioplasty of the mid SFA stenosis was performed using 5 x 220 DCB.  There was no residual stenosis.  Then we treated proximal peroneal artery stenosis using 3 x 100 Sebastian balloon which was inflated to 8 mat and held for 1 minute.  There was a good flow in the peroneal artery with retrograde flow to posterior tibial artery.  Device were removed and manual pressure held until hemostasis.    Findings: good resolution of the in-stent thrombus at the level of SFA and

## 2025-02-07 NOTE — PROGRESS NOTES
Patient's left groin dressing has became saturated throughout the shift. Per Dr. Morrow, do not change dressing due to risk of pulling out sheath. Will continue to monitor.    Electronically signed by Myriam Duron RN on 2/7/2025 at 1:03 AM

## 2025-02-08 VITALS
BODY MASS INDEX: 22.8 KG/M2 | SYSTOLIC BLOOD PRESSURE: 152 MMHG | HEART RATE: 66 BPM | TEMPERATURE: 99.1 F | DIASTOLIC BLOOD PRESSURE: 50 MMHG | OXYGEN SATURATION: 95 % | HEIGHT: 69 IN | WEIGHT: 153.9 LBS | RESPIRATION RATE: 21 BRPM

## 2025-02-08 LAB
ANION GAP SERPL CALCULATED.3IONS-SCNC: 9 MMOL/L (ref 8–16)
ANION GAP SERPL CALCULATED.3IONS-SCNC: 9 MMOL/L (ref 8–16)
APTT PPP: 45 SEC (ref 26–36.2)
APTT PPP: 46.8 SEC (ref 26–36.2)
BUN SERPL-MCNC: 8 MG/DL (ref 8–23)
BUN SERPL-MCNC: 9 MG/DL (ref 8–23)
CALCIUM SERPL-MCNC: 7.9 MG/DL (ref 8.8–10.2)
CALCIUM SERPL-MCNC: 8 MG/DL (ref 8.8–10.2)
CHLORIDE SERPL-SCNC: 108 MMOL/L (ref 98–107)
CHLORIDE SERPL-SCNC: 108 MMOL/L (ref 98–107)
CO2 SERPL-SCNC: 22 MMOL/L (ref 22–29)
CO2 SERPL-SCNC: 25 MMOL/L (ref 22–29)
CREAT SERPL-MCNC: 0.7 MG/DL (ref 0.7–1.2)
CREAT SERPL-MCNC: 0.9 MG/DL (ref 0.7–1.2)
ERYTHROCYTE [DISTWIDTH] IN BLOOD BY AUTOMATED COUNT: 12.7 % (ref 11.5–14.5)
GLUCOSE SERPL-MCNC: 111 MG/DL (ref 70–99)
GLUCOSE SERPL-MCNC: 99 MG/DL (ref 70–99)
HCT VFR BLD AUTO: 31.4 % (ref 42–52)
HGB BLD-MCNC: 10 G/DL (ref 14–18)
MCH RBC QN AUTO: 29 PG (ref 27–31)
MCHC RBC AUTO-ENTMCNC: 31.8 G/DL (ref 33–37)
MCV RBC AUTO: 91 FL (ref 80–94)
PLATELET # BLD AUTO: 111 K/UL (ref 130–400)
PMV BLD AUTO: 11.6 FL (ref 9.4–12.4)
POTASSIUM SERPL-SCNC: 3.7 MMOL/L (ref 3.5–5.1)
POTASSIUM SERPL-SCNC: 3.8 MMOL/L (ref 3.5–5.1)
RBC # BLD AUTO: 3.45 M/UL (ref 4.7–6.1)
SODIUM SERPL-SCNC: 139 MMOL/L (ref 136–145)
SODIUM SERPL-SCNC: 142 MMOL/L (ref 136–145)
WBC # BLD AUTO: 2.9 K/UL (ref 4.8–10.8)

## 2025-02-08 PROCEDURE — 99238 HOSP IP/OBS DSCHRG MGMT 30/<: CPT | Performed by: SURGERY

## 2025-02-08 PROCEDURE — 2580000003 HC RX 258: Performed by: SURGERY

## 2025-02-08 PROCEDURE — 80048 BASIC METABOLIC PNL TOTAL CA: CPT

## 2025-02-08 PROCEDURE — 85730 THROMBOPLASTIN TIME PARTIAL: CPT

## 2025-02-08 PROCEDURE — 85027 COMPLETE CBC AUTOMATED: CPT

## 2025-02-08 PROCEDURE — 6370000000 HC RX 637 (ALT 250 FOR IP): Performed by: SURGERY

## 2025-02-08 PROCEDURE — 36415 COLL VENOUS BLD VENIPUNCTURE: CPT

## 2025-02-08 RX ADMIN — HYDROCODONE BITARTRATE AND ACETAMINOPHEN 1 TABLET: 10; 325 TABLET ORAL at 05:22

## 2025-02-08 RX ADMIN — FERROUS SULFATE TAB 325 MG (65 MG ELEMENTAL FE) 325 MG: 325 (65 FE) TAB at 07:39

## 2025-02-08 RX ADMIN — SODIUM CHLORIDE: 9 INJECTION, SOLUTION INTRAVENOUS at 00:46

## 2025-02-08 RX ADMIN — PRAVASTATIN SODIUM 40 MG: 20 TABLET ORAL at 07:39

## 2025-02-08 RX ADMIN — CLOPIDOGREL BISULFATE 75 MG: 75 TABLET ORAL at 07:39

## 2025-02-08 RX ADMIN — FINASTERIDE 5 MG: 5 TABLET, FILM COATED ORAL at 07:39

## 2025-02-08 RX ADMIN — APIXABAN 5 MG: 5 TABLET, FILM COATED ORAL at 10:47

## 2025-02-08 RX ADMIN — LEVOTHYROXINE SODIUM 150 MCG: 75 TABLET ORAL at 07:07

## 2025-02-08 ASSESSMENT — PAIN DESCRIPTION - FREQUENCY: FREQUENCY: CONTINUOUS

## 2025-02-08 ASSESSMENT — PAIN SCALES - GENERAL
PAINLEVEL_OUTOF10: 0
PAINLEVEL_OUTOF10: 0
PAINLEVEL_OUTOF10: 6
PAINLEVEL_OUTOF10: 0
PAINLEVEL_OUTOF10: 0

## 2025-02-08 ASSESSMENT — PAIN DESCRIPTION - DESCRIPTORS: DESCRIPTORS: ACHING

## 2025-02-08 ASSESSMENT — PAIN DESCRIPTION - ONSET: ONSET: GRADUAL

## 2025-02-08 ASSESSMENT — PAIN DESCRIPTION - ORIENTATION: ORIENTATION: LOWER

## 2025-02-08 ASSESSMENT — PAIN DESCRIPTION - PAIN TYPE: TYPE: CHRONIC PAIN

## 2025-02-08 ASSESSMENT — PAIN DESCRIPTION - LOCATION: LOCATION: BACK

## 2025-02-08 ASSESSMENT — PAIN - FUNCTIONAL ASSESSMENT: PAIN_FUNCTIONAL_ASSESSMENT: PREVENTS OR INTERFERES SOME ACTIVE ACTIVITIES AND ADLS

## 2025-02-08 NOTE — DISCHARGE SUMMARY
Physician Discharge Summary     Patient ID:  Jong Loyd  [id number]  75 y.o.  1950    Admit date: 2/6/2025    Discharge date and time: 2/8/2025    Admitting Physician: Karen Morrow DO     Discharge Physician: same     Admission Diagnoses: Atherosclerosis of native artery of both lower extremities with intermittent claudication ([facility]) [I70.213]  PAD (peripheral artery disease) ([facility]) [I73.9]    Discharge Diagnoses:   Patient Active Problem List   Diagnosis    Atherosclerosis with claudication of extremity ([facility])    Benign essential HTN    BPH (benign prostatic hyperplasia)    Pulmonary emphysema (HCC)    Carotid stenosis, asymptomatic, bilateral    CLL (chronic lymphocytic leukemia) ([facility])    PVD (peripheral vascular disease) ([facility])    Femoral artery pseudo-aneurysm, left ([facility])    Atherosclerosis of native artery of both lower extremities with intermittent claudication ([facility])    Atherosclerosis of native arteries of extremities with intermittent claudication, left leg ([facility])    PAD (peripheral artery disease) ([facility])    Atherosclerosis of native arteries of extremities with rest pain, left leg ([facility])    Critical limb ischemia of left lower extremity ([facility])    Palliative care patient    Acute blood loss as cause of postoperative anemia    Hematoma    Moderate malnutrition ([facility])       Procedures during admission:   1 Aortogram with bilateral lower extremity runoff  IVUS of right SFA, popliteal artery, peroneal artery  Placement of EKOS catheter in the right SFA to peroneal artery with 30 cm infusion length.  2. Removal of EKOS catheter. Balloon angioplasty of right SFA using 5 x 220 DCB and peroneal artery using 3 x 100 Sebastian balloon     Admission Condition: good    Discharged Condition: good    Indication for Admission: tPa thrombolysis    Hospital Course: ***    Consults: none    Significant Diagnostic Studies: labs: cbc, fibrinogen, chem    Treatments: thrombolysis    Disposition: home    Patient Instructions:

## 2025-02-10 ENCOUNTER — TELEPHONE (OUTPATIENT)
Dept: VASCULAR SURGERY | Age: 75
End: 2025-02-10

## 2025-02-10 ENCOUNTER — HOSPITAL ENCOUNTER (OUTPATIENT)
Dept: VASCULAR LAB | Age: 75
Discharge: HOME | End: 2025-02-12
Payer: OTHER GOVERNMENT

## 2025-02-10 ENCOUNTER — OFFICE VISIT (OUTPATIENT)
Dept: VASCULAR SURGERY | Age: 75
End: 2025-02-10
Payer: OTHER GOVERNMENT

## 2025-02-10 VITALS
OXYGEN SATURATION: 98 % | SYSTOLIC BLOOD PRESSURE: 140 MMHG | TEMPERATURE: 97.9 F | DIASTOLIC BLOOD PRESSURE: 72 MMHG | HEART RATE: 94 BPM

## 2025-02-10 DIAGNOSIS — I70.213 ATHEROSCLER OF NATIVE ARTERY OF BOTH LEGS WITH INTERMIT CLAUDICATION (HCC): Primary | ICD-10-CM

## 2025-02-10 DIAGNOSIS — I70.213 ATHEROSCLER OF NATIVE ARTERY OF BOTH LEGS WITH INTERMIT CLAUDICATION (HCC): ICD-10-CM

## 2025-02-10 LAB
VAS LEFT ABI: 0.33
VAS LEFT ARM BP: 157 MMHG
VAS LEFT DORSALIS PEDIS BP: 48 MMHG
VAS LEFT PTA BP: 52 MMHG
VAS RIGHT ABI: 0.96
VAS RIGHT ARM BP: 149 MMHG
VAS RIGHT DORSALIS PEDIS BP: 138 MMHG
VAS RIGHT PTA BP: 151 MMHG

## 2025-02-10 PROCEDURE — 1123F ACP DISCUSS/DSCN MKR DOCD: CPT | Performed by: NURSE PRACTITIONER

## 2025-02-10 PROCEDURE — 3075F SYST BP GE 130 - 139MM HG: CPT | Performed by: NURSE PRACTITIONER

## 2025-02-10 PROCEDURE — 3078F DIAST BP <80 MM HG: CPT | Performed by: NURSE PRACTITIONER

## 2025-02-10 PROCEDURE — 93922 UPR/L XTREMITY ART 2 LEVELS: CPT | Performed by: SURGERY

## 2025-02-10 PROCEDURE — 93922 UPR/L XTREMITY ART 2 LEVELS: CPT

## 2025-02-10 PROCEDURE — 99214 OFFICE O/P EST MOD 30 MIN: CPT | Performed by: NURSE PRACTITIONER

## 2025-02-10 NOTE — TELEPHONE ENCOUNTER
The patient called into the office today to say that Dr. Morrow worked on his right leg last week and his left leg is hurting.  He was wanting to come into the office.  Dorothy spoke with the patient.

## 2025-02-11 NOTE — PROGRESS NOTES
Jong Loyd (:  1950) is a 75 y.o. male,Established patient, here for evaluation of the following chief complaint(s):  Post-Op Check (Patient c/o left leg pain. )            SUBJECTIVE/OBJECTIVE:  Patient presents for follow up after an arteriogram with EKOS  Balloon angioplasty of right SFA using 5 x 220 DCB and peroneal artery using 3 x 100 Sebastian balloon done 1 weeks ago.  Procedure was done for peripheral vascular disease with claudication. Post op problems include none.  Angiogram complications were none.  He went home 4 days ago.  Went to [employer] today and noticed he could not walk any distance without left leg giving out.  States it is numb.  Starting to wake him up at night.      I have personally reviewed the following: problem list, current meds, allergies, PMH, PSH, family hx, and social hx  Jong Loyd is a 75 y.o. male with the following history as recorded in [facility]:  Patient Active Problem List    Diagnosis Date Noted    Moderate malnutrition ([facility]) 10/11/2024    Acute blood loss as cause of postoperative anemia 10/10/2024    Hematoma 10/10/2024    Palliative care patient 10/09/2024    PAD (peripheral artery disease) ([facility]) 10/05/2024    Atherosclerosis of native arteries of extremities with rest pain, left leg ([facility]) 10/05/2024    Critical limb ischemia of left lower extremity ([facility]) 10/05/2024    Atherosclerosis of native arteries of extremities with intermittent claudication, left leg ([facility]) 2024    Atherosclerosis of native artery of both lower extremities with intermittent claudication ([facility]) 2024    Femoral artery pseudo-aneurysm, left ([facility]) 2024    Atherosclerosis with claudication of extremity ([facility]) 2021    Benign essential HTN 2021    BPH (benign prostatic hyperplasia) 2021    Pulmonary emphysema ([facility]) 2021    Carotid stenosis, asymptomatic, bilateral 2021    CLL (chronic lymphocytic leukemia) ([facility]) 2021    PVD

## 2025-02-13 ENCOUNTER — TELEPHONE (OUTPATIENT)
Dept: VASCULAR SURGERY | Age: 75
End: 2025-02-13

## 2025-02-13 RX ORDER — ASPIRIN 81 MG/1
81 TABLET ORAL ONCE
OUTPATIENT
Start: 2025-02-13 | End: 2025-02-13

## 2025-02-13 RX ORDER — SODIUM CHLORIDE 0.9 % (FLUSH) 0.9 %
5-40 SYRINGE (ML) INJECTION PRN
OUTPATIENT
Start: 2025-02-13

## 2025-02-13 RX ORDER — SODIUM CHLORIDE 0.9 % (FLUSH) 0.9 %
5-40 SYRINGE (ML) INJECTION EVERY 12 HOURS SCHEDULED
OUTPATIENT
Start: 2025-02-13

## 2025-02-13 RX ORDER — CLONIDINE HYDROCHLORIDE 0.1 MG/1
0.1 TABLET ORAL PRN
OUTPATIENT
Start: 2025-02-13

## 2025-02-13 RX ORDER — SODIUM CHLORIDE 9 MG/ML
INJECTION, SOLUTION INTRAVENOUS PRN
OUTPATIENT
Start: 2025-02-13

## 2025-02-13 RX ORDER — SODIUM CHLORIDE 9 MG/ML
INJECTION, SOLUTION INTRAVENOUS CONTINUOUS
OUTPATIENT
Start: 2025-02-13

## 2025-02-13 NOTE — TELEPHONE ENCOUNTER
Called and spoke to the patient to let him know the following. The patient voiced understanding. I will also mail him a letter.              Jong Loyd    Arteriogram Instructions    Report to the [facility] at [facility] (go in the front door and to the left) on Tuesday, 02/25/2025 @ 6:00 AM.   Nothing to eat or drink after midnight the night before the procedure.  Please take all medications as normally scheduled to take unless listed below with a sip of water.  Do not take Eliquis the night before the procedure or the morning of the procedure.    If you have sleep apnea and require C-PAP, please bring this with you to the hospital.  Bring a list of all of your allergies and medications with you to the hospital.  Please let our nurse know if you have had an allergy to iodine, shellfish, or x-ray dye.  Let the nurse know if you take any of the following:  Over the counter herbal supplements  Diclofenec, indomethacin, ketoprofen, Caridopa/levadopa, naproxen, sulindac, piroxicam, glucosamine, Chondrotin, cocchine, or methotrexate.  Plan to stay at the hospital for 4 - 6 hours before being released  by the physician. You will need someone to drive you home after the procedure.  10. Other Directions: For any questions or concerns please contact our office @       [phone] and ask to speak to Dorothea.   11.  You will need a  to take you home.  12.  Follow up appt: 03/11/2025 @ 11:00 AM with Dorothy.     Unless instructed otherwise by your physician, cleanse incision/puncture site twice daily with soap and water.  Apply dry gauze. Do not get in tub.  Okay to shower.  Do not apply any salve, cream, peroxide or alcohol to the incision.  Call with any increasing redness or drainage

## 2025-02-13 NOTE — H&P
instructed to walk as much as possible.  Call our office with any progressive pain in leg(s) or hip(s) with walking.  Take good care of your feet.  Let us know right away if you develop a wound on your foot.    An electronic signature was used to authenticate this note.    --DESMOND Gastelum

## 2025-02-13 NOTE — H&P (VIEW-ONLY)
Jong Loyd (:  1950) is a 75 y.o. male,Established patient, here for evaluation of the following chief complaint(s):  Post-Op Check (Patient c/o left leg pain. )            SUBJECTIVE/OBJECTIVE:  Patient presents for follow up after an arteriogram with EKOS  Balloon angioplasty of right SFA using 5 x 220 DCB and peroneal artery using 3 x 100 Sebastian balloon done 1 weeks ago.  Procedure was done for peripheral vascular disease with claudication. Post op problems include none.  Angiogram complications were none.  He went home 4 days ago.  Went to [employer] today and noticed he could not walk any distance without left leg giving out.  States it is numb.  Starting to wake him up at night.      I have personally reviewed the following: problem list, current meds, allergies, PMH, PSH, family hx, and social hx  Jong Loyd is a 75 y.o. male with the following history as recorded in [facility]:  Patient Active Problem List    Diagnosis Date Noted    Moderate malnutrition ([facility]) 10/11/2024    Acute blood loss as cause of postoperative anemia 10/10/2024    Hematoma 10/10/2024    Palliative care patient 10/09/2024    PAD (peripheral artery disease) ([facility]) 10/05/2024    Atherosclerosis of native arteries of extremities with rest pain, left leg ([facility]) 10/05/2024    Critical limb ischemia of left lower extremity ([facility]) 10/05/2024    Atherosclerosis of native arteries of extremities with intermittent claudication, left leg ([facility]) 2024    Atherosclerosis of native artery of both lower extremities with intermittent claudication ([facility]) 2024    Femoral artery pseudo-aneurysm, left ([facility]) 2024    Atherosclerosis with claudication of extremity ([facility]) 2021    Benign essential HTN 2021    BPH (benign prostatic hyperplasia) 2021    Pulmonary emphysema ([facility]) 2021    Carotid stenosis, asymptomatic, bilateral 2021    CLL (chronic lymphocytic leukemia) ([facility]) 2021    PVD

## 2025-02-24 ENCOUNTER — TELEPHONE (OUTPATIENT)
Dept: VASCULAR SURGERY | Age: 75
End: 2025-02-24

## 2025-02-24 NOTE — TELEPHONE ENCOUNTER
Called and spoke to the patient to let him know that we are going to do his case 2nd tomorrow.  We need him to arrive @ 7:00 AM.  The patient voiced understanding.

## 2025-02-25 ENCOUNTER — HOSPITAL ENCOUNTER (OUTPATIENT)
Dept: INTERVENTIONAL RADIOLOGY/VASCULAR | Age: 75
Discharge: HOME | End: 2025-02-25
Attending: SURGERY
Payer: OTHER GOVERNMENT

## 2025-02-25 VITALS
BODY MASS INDEX: 21.33 KG/M2 | HEART RATE: 64 BPM | RESPIRATION RATE: 14 BRPM | OXYGEN SATURATION: 97 % | DIASTOLIC BLOOD PRESSURE: 61 MMHG | HEIGHT: 69 IN | SYSTOLIC BLOOD PRESSURE: 143 MMHG | TEMPERATURE: 97.2 F | WEIGHT: 144 LBS

## 2025-02-25 DIAGNOSIS — I70.213 ATHEROSCLEROSIS OF NATIVE ARTERIES OF EXTREMITIES WITH INTERMITTENT CLAUDICATION, BILATERAL LEGS: ICD-10-CM

## 2025-02-25 LAB
ANION GAP SERPL CALCULATED.3IONS-SCNC: 8 MMOL/L (ref 8–16)
BUN SERPL-MCNC: 16 MG/DL (ref 8–23)
CALCIUM SERPL-MCNC: 9.2 MG/DL (ref 8.8–10.2)
CHLORIDE SERPL-SCNC: 104 MMOL/L (ref 98–107)
CO2 SERPL-SCNC: 27 MMOL/L (ref 22–29)
CREAT SERPL-MCNC: 1.1 MG/DL (ref 0.7–1.2)
ERYTHROCYTE [DISTWIDTH] IN BLOOD BY AUTOMATED COUNT: 13.3 % (ref 11.5–14.5)
GLUCOSE SERPL-MCNC: 103 MG/DL (ref 70–99)
HCT VFR BLD AUTO: 42.5 % (ref 42–52)
HGB BLD-MCNC: 13.4 G/DL (ref 14–18)
MCH RBC QN AUTO: 28.6 PG (ref 27–31)
MCHC RBC AUTO-ENTMCNC: 31.5 G/DL (ref 33–37)
MCV RBC AUTO: 90.8 FL (ref 80–94)
PLATELET # BLD AUTO: 256 K/UL (ref 130–400)
PMV BLD AUTO: 10.7 FL (ref 9.4–12.4)
POTASSIUM SERPL-SCNC: 4.3 MMOL/L (ref 3.5–5.1)
RBC # BLD AUTO: 4.68 M/UL (ref 4.7–6.1)
SODIUM SERPL-SCNC: 139 MMOL/L (ref 136–145)
WBC # BLD AUTO: 3.3 K/UL (ref 4.8–10.8)

## 2025-02-25 PROCEDURE — 2500000003 HC RX 250 WO HCPCS: Performed by: SURGERY

## 2025-02-25 PROCEDURE — 6360000004 HC RX CONTRAST MEDICATION: Performed by: SURGERY

## 2025-02-25 PROCEDURE — 2580000003 HC RX 258: Performed by: SURGERY

## 2025-02-25 PROCEDURE — 37184 PRIM ART M-THRMBC 1ST VSL: CPT

## 2025-02-25 PROCEDURE — 36415 COLL VENOUS BLD VENIPUNCTURE: CPT

## 2025-02-25 PROCEDURE — 80048 BASIC METABOLIC PNL TOTAL CA: CPT

## 2025-02-25 PROCEDURE — 37228 HC TIB PER TERRITORY PLASTY: CPT

## 2025-02-25 PROCEDURE — 99153 MOD SED SAME PHYS/QHP EA: CPT | Performed by: SURGERY

## 2025-02-25 PROCEDURE — 6360000002 HC RX W HCPCS: Performed by: SURGERY

## 2025-02-25 PROCEDURE — 37226 HC FEM POP TERR PLASTY AND STENT: CPT

## 2025-02-25 PROCEDURE — 2580000003 HC RX 258: Performed by: NURSE PRACTITIONER

## 2025-02-25 PROCEDURE — C2623 CATH, TRANSLUMIN, DRUG-COAT: HCPCS

## 2025-02-25 PROCEDURE — 99152 MOD SED SAME PHYS/QHP 5/>YRS: CPT

## 2025-02-25 PROCEDURE — 76937 US GUIDE VASCULAR ACCESS: CPT

## 2025-02-25 PROCEDURE — 6370000000 HC RX 637 (ALT 250 FOR IP): Performed by: NURSE PRACTITIONER

## 2025-02-25 PROCEDURE — 85027 COMPLETE CBC AUTOMATED: CPT

## 2025-02-25 RX ORDER — VANCOMYCIN 1 G/200ML
1000 INJECTION, SOLUTION INTRAVENOUS
Status: DISCONTINUED | OUTPATIENT
Start: 2025-02-25 | End: 2025-02-25

## 2025-02-25 RX ORDER — SODIUM CHLORIDE 0.9 % (FLUSH) 0.9 %
5-40 SYRINGE (ML) INJECTION EVERY 12 HOURS SCHEDULED
Status: DISCONTINUED | OUTPATIENT
Start: 2025-02-25 | End: 2025-02-27 | Stop reason: HOSPADM

## 2025-02-25 RX ORDER — NITROGLYCERIN 20 MG/100ML
INJECTION INTRAVENOUS PRN
Status: COMPLETED | OUTPATIENT
Start: 2025-02-25 | End: 2025-02-25

## 2025-02-25 RX ORDER — CLONIDINE HYDROCHLORIDE 0.1 MG/1
0.1 TABLET ORAL PRN
Status: DISCONTINUED | OUTPATIENT
Start: 2025-02-25 | End: 2025-02-27 | Stop reason: HOSPADM

## 2025-02-25 RX ORDER — ASPIRIN 81 MG/1
81 TABLET ORAL ONCE
Status: COMPLETED | OUTPATIENT
Start: 2025-02-25 | End: 2025-02-25

## 2025-02-25 RX ORDER — SODIUM CHLORIDE 9 MG/ML
INJECTION, SOLUTION INTRAVENOUS PRN
Status: DISCONTINUED | OUTPATIENT
Start: 2025-02-25 | End: 2025-02-27 | Stop reason: HOSPADM

## 2025-02-25 RX ORDER — SODIUM CHLORIDE 0.9 % (FLUSH) 0.9 %
5-40 SYRINGE (ML) INJECTION PRN
Status: DISCONTINUED | OUTPATIENT
Start: 2025-02-25 | End: 2025-02-27 | Stop reason: HOSPADM

## 2025-02-25 RX ORDER — IODIXANOL 320 MG/ML
INJECTION, SOLUTION INTRAVASCULAR PRN
Status: COMPLETED | OUTPATIENT
Start: 2025-02-25 | End: 2025-02-25

## 2025-02-25 RX ORDER — FENTANYL CITRATE 50 UG/ML
INJECTION, SOLUTION INTRAMUSCULAR; INTRAVENOUS PRN
Status: COMPLETED | OUTPATIENT
Start: 2025-02-25 | End: 2025-02-25

## 2025-02-25 RX ORDER — MIDAZOLAM HYDROCHLORIDE 1 MG/ML
INJECTION, SOLUTION INTRAMUSCULAR; INTRAVENOUS PRN
Status: COMPLETED | OUTPATIENT
Start: 2025-02-25 | End: 2025-02-25

## 2025-02-25 RX ORDER — HEPARIN SODIUM 5000 [USP'U]/ML
INJECTION, SOLUTION INTRAVENOUS; SUBCUTANEOUS PRN
Status: COMPLETED | OUTPATIENT
Start: 2025-02-25 | End: 2025-02-25

## 2025-02-25 RX ORDER — SODIUM CHLORIDE 9 MG/ML
INJECTION, SOLUTION INTRAVENOUS CONTINUOUS
Status: DISCONTINUED | OUTPATIENT
Start: 2025-02-25 | End: 2025-02-27 | Stop reason: HOSPADM

## 2025-02-25 RX ORDER — LIDOCAINE HYDROCHLORIDE 20 MG/ML
INJECTION, SOLUTION EPIDURAL; INFILTRATION; INTRACAUDAL; PERINEURAL PRN
Status: COMPLETED | OUTPATIENT
Start: 2025-02-25 | End: 2025-02-25

## 2025-02-25 RX ADMIN — MIDAZOLAM 0.5 MG: 1 INJECTION INTRAMUSCULAR; INTRAVENOUS at 10:51

## 2025-02-25 RX ADMIN — NITROGLYCERIN 600 MCG: 20 INJECTION INTRAVENOUS at 12:21

## 2025-02-25 RX ADMIN — MIDAZOLAM 0.5 MG: 1 INJECTION INTRAMUSCULAR; INTRAVENOUS at 12:22

## 2025-02-25 RX ADMIN — HEPARIN SODIUM 1000 UNITS: 5000 INJECTION INTRAVENOUS; SUBCUTANEOUS at 12:37

## 2025-02-25 RX ADMIN — FENTANYL CITRATE 25 MCG: 50 INJECTION, SOLUTION INTRAMUSCULAR; INTRAVENOUS at 11:42

## 2025-02-25 RX ADMIN — FENTANYL CITRATE 25 MCG: 50 INJECTION, SOLUTION INTRAMUSCULAR; INTRAVENOUS at 10:31

## 2025-02-25 RX ADMIN — FENTANYL CITRATE 25 MCG: 50 INJECTION, SOLUTION INTRAMUSCULAR; INTRAVENOUS at 11:56

## 2025-02-25 RX ADMIN — IODIXANOL 80 ML: 320 INJECTION, SOLUTION INTRAVASCULAR at 13:02

## 2025-02-25 RX ADMIN — MIDAZOLAM 0.5 MG: 1 INJECTION INTRAMUSCULAR; INTRAVENOUS at 10:36

## 2025-02-25 RX ADMIN — MIDAZOLAM 1 MG: 1 INJECTION INTRAMUSCULAR; INTRAVENOUS at 10:32

## 2025-02-25 RX ADMIN — LIDOCAINE HYDROCHLORIDE 10 ML: 20 INJECTION, SOLUTION EPIDURAL; INFILTRATION; INTRACAUDAL; PERINEURAL at 10:33

## 2025-02-25 RX ADMIN — HEPARIN SODIUM 5000 UNITS: 5000 INJECTION INTRAVENOUS; SUBCUTANEOUS at 10:33

## 2025-02-25 RX ADMIN — SODIUM CHLORIDE: 9 INJECTION, SOLUTION INTRAVENOUS at 08:36

## 2025-02-25 RX ADMIN — MIDAZOLAM 0.5 MG: 1 INJECTION INTRAMUSCULAR; INTRAVENOUS at 12:54

## 2025-02-25 RX ADMIN — FENTANYL CITRATE 25 MCG: 50 INJECTION, SOLUTION INTRAMUSCULAR; INTRAVENOUS at 12:55

## 2025-02-25 RX ADMIN — SODIUM CHLORIDE: 9 INJECTION, SOLUTION INTRAVENOUS at 14:58

## 2025-02-25 RX ADMIN — ASPIRIN 81 MG: 81 TABLET, COATED ORAL at 08:37

## 2025-02-25 RX ADMIN — FENTANYL CITRATE 25 MCG: 50 INJECTION, SOLUTION INTRAMUSCULAR; INTRAVENOUS at 11:13

## 2025-02-25 RX ADMIN — WATER 2000 MG: 1 INJECTION INTRAMUSCULAR; INTRAVENOUS; SUBCUTANEOUS at 09:59

## 2025-02-25 RX ADMIN — HEPARIN SODIUM 1000 UNITS: 5000 INJECTION INTRAVENOUS; SUBCUTANEOUS at 11:40

## 2025-02-25 RX ADMIN — HEPARIN SODIUM 5000 UNITS: 5000 INJECTION INTRAVENOUS; SUBCUTANEOUS at 10:41

## 2025-02-25 RX ADMIN — MIDAZOLAM 1 MG: 1 INJECTION INTRAMUSCULAR; INTRAVENOUS at 12:03

## 2025-02-25 NOTE — INTERVAL H&P NOTE
Update History & Physical    The patient's History and Physical of February 13, 2025 was reviewed with the patient and I examined the patient. There was no change. The surgical site was confirmed by the patient and me.     Plan: The risks, benefits, expected outcome, and alternative to the recommended procedure have been discussed with the patient. Patient understands and wants to proceed with the procedure.     Moderate sedation  ASA III, Mallampati 3    Electronically signed by Karen Morrow DO on 2/25/2025 at 10:18 AM

## 2025-02-25 NOTE — PROGRESS NOTES
Pt bedrest up and pt got up out of bed without problems. Proceeded to get dressed. Mynx booklet and stent card were given to pt at this time. Dc instructions were given, all questions answered, pt verbalized understanding.

## 2025-02-25 NOTE — OP NOTE
Patient Name: Jong Loyd   YOB: 1950   MRN: [id number]     Procedure Date: 2/25/2025     Pre Op Diagnosis: PAD with claudication    Post Op Diagnosis: same    Procedure: Left leg angiogram, balloon angioplasty of the left SFA, popliteal artery stenting with 5 x 5, 5 x 10 Viabahn stenting, balloon angioplasty of the peroneal artery    Anesthesia: Local with Moderate Sedation  Anesthesia: local with moderate sedation  Moderate sedation ASA class III  Timing start: 10:31  End time:13:04  Given 4 Versed and 125 Fentanyl  Vital signs were observed by separate provider that had no other duties    Estimated Blood Loss: Less than 100 ml    Complications: None    Implants: Viabahn 5 x 5, 5 x 10     Procedure Details: Patient was brought to the angiogram suite and placed in supine position. Preoperative antibiotics were given.  Bilateral groins were prepped and draped sterile fashion.  Timeout performed.  Right common femoral artery was accessed under continuous ultrasound guidance using standard micropuncture technique.  5 Kyrgyz glide sheath was inserted.  J-wire was advanced proximally and using Omni Flush catheter advanced to the left groin and proximal SFA.  The catheter and the sheath were removed and 7 x 45 destination Terumo sheath was placed.  Patient was given 5000 units of heparin.  Using glide advantage wire and Rodriguez cross catheter I was able to navigate those through the occluded SFA close 2 stents.  Then I navigated my Rodriguez cross and attempted to use Lima wire, V18 wire and eventually navigate my Rodriguez cross with the wire into the popliteal artery.  Then using 0.014 glide advantage wire and CXI catheter I was able to navigate those into the peroneal artery and confirmed to be intravascular.  Then I performed balloon angioplasty of SFA, popliteal artery and peroneal artery using 2 x 120 coyote balloon.  Then I performed balloon angioplasty of the SFA throughout its length and popliteal artery

## 2025-03-04 ENCOUNTER — TELEPHONE (OUTPATIENT)
Dept: HEMATOLOGY | Age: 75
End: 2025-03-04

## 2025-03-04 NOTE — TELEPHONE ENCOUNTER
I called patient and left detailed voicemail about their appointment on 03/07/2025. I made patient aware not to arrive any earlier than the appointment time and to come at the time of the follow up not the time of the lab appointment if it is different than the follow up appt time. I also made patient aware to eat a meal (Our labs are not fasting labs) and drink plenty of water to hydrate their veins properly before coming to these appointments because this will make their lab draw much easier. Made patient aware that we are now located at the [facility] at [address]. Located between [facility] and the [facility]. Front entrance faces [place].

## 2025-03-06 NOTE — PROGRESS NOTES
He has discontinued iron supplementation and ceased smoking approximately 12 years ago.    12/2/2024 Patient was seen in office visit by DESMOND Malik ([facility]): regarding a history of bladder cancer. Recommended urine for cytology, and cystoscopy by Dr. Maradiaga.     12/2/2024 Urine Cytology: negative for high grade urothelial carcinoma.      He has been followed for a tiny nodule in the left upper part of his lung, which appears stable. However, a new small nodule was been identified 1/15/2025, necessitating further monitoring.    He is currently on Plavix, Eliquis, tramadol, and trazodone for sleep. He does not take aspirin.      Diagnosis  CLL, April 2021  High-grade urothelial carcinoma, March 2022  7.22 x 8.55 mm cavitating lesion in the left midlung field (negative on PET scan dated 5/11/2023)     Treatment Summary  CLL: Zanubrutinib 320 mg daily initiated 10/2/2023  Singulair 10 mg daily initiated 10/2/2023  TURBT followed by Intravesicular BCG at [place] for his bladder cancer  Left lung nodule monitored on serial imaging, next CT chest due 1/2025     Hematology/Cancer History  Jong Loyd was first seen by Dr. Boyle on 4/6/2021.    He was referred for findings of CLL on flow cytometry.  He denies any B symptoms. He denies any family history of CLL. This was incidentally discovered during blood work.  The patient is a smoker for more than 50 years.  He has never had CT lung cancer screening although had a CT chest about 2 years ago was reportedly unremarkable.  He also reports a 40 pound weight loss over the last few years although this has been stable recently.  He denies any hematuria, melena, hematochezia.  No hemoptysis.  2/26/21 CBC: WBC 18.1 HGB 15.4 MCV 88.8   2/26/21 Peripheral blood smear: Slightly left shifted granulocytosis suggestive of a reactive or infectious process. Absolute monocytosis. Monocytosis which might be cause by chronic inflammatory disorder,

## 2025-03-07 ENCOUNTER — HOSPITAL ENCOUNTER (OUTPATIENT)
Dept: INFUSION THERAPY | Age: 75
Discharge: HOME | End: 2025-03-07
Payer: OTHER GOVERNMENT

## 2025-03-07 ENCOUNTER — OFFICE VISIT (OUTPATIENT)
Dept: HEMATOLOGY | Age: 75
End: 2025-03-07

## 2025-03-07 VITALS
HEIGHT: 69 IN | SYSTOLIC BLOOD PRESSURE: 110 MMHG | HEART RATE: 70 BPM | WEIGHT: 143.7 LBS | OXYGEN SATURATION: 98 % | BODY MASS INDEX: 21.28 KG/M2 | TEMPERATURE: 97.8 F | DIASTOLIC BLOOD PRESSURE: 62 MMHG

## 2025-03-07 DIAGNOSIS — I70.213 ATHEROSCLEROSIS OF NATIVE ARTERY OF BOTH LOWER EXTREMITIES WITH INTERMITTENT CLAUDICATION: ICD-10-CM

## 2025-03-07 DIAGNOSIS — R91.1 NODULE OF LEFT LUNG: ICD-10-CM

## 2025-03-07 DIAGNOSIS — Z51.11 CHEMOTHERAPY MANAGEMENT, ENCOUNTER FOR: ICD-10-CM

## 2025-03-07 DIAGNOSIS — C91.10 CLL (CHRONIC LYMPHOCYTIC LEUKEMIA) (HCC): ICD-10-CM

## 2025-03-07 DIAGNOSIS — Z85.51 HISTORY OF BLADDER CANCER: ICD-10-CM

## 2025-03-07 DIAGNOSIS — Z71.89 CARE PLAN DISCUSSED WITH PATIENT: ICD-10-CM

## 2025-03-07 DIAGNOSIS — C91.10 CLL (CHRONIC LYMPHOCYTIC LEUKEMIA) (HCC): Primary | ICD-10-CM

## 2025-03-07 DIAGNOSIS — D64.9 NORMOCYTIC ANEMIA: ICD-10-CM

## 2025-03-07 LAB
BASOPHILS # BLD: 0.05 K/UL (ref 0–0.2)
BASOPHILS NFR BLD: 0.8 % (ref 0–1)
EOSINOPHIL # BLD: 0.14 K/UL (ref 0–0.6)
EOSINOPHIL NFR BLD: 2.1 % (ref 0–5)
ERYTHROCYTE [DISTWIDTH] IN BLOOD BY AUTOMATED COUNT: 13.2 % (ref 11.5–14.5)
HCT VFR BLD AUTO: 39.4 % (ref 42–52)
HGB BLD-MCNC: 12.5 G/DL (ref 14–18)
IGG SERPL-MCNC: 880 MG/DL (ref 700–1600)
LYMPHOCYTES # BLD: 1.34 K/UL (ref 1.1–4.5)
LYMPHOCYTES NFR BLD: 20.2 % (ref 20–40)
MCH RBC QN AUTO: 28.4 PG (ref 27–31)
MCHC RBC AUTO-ENTMCNC: 31.7 G/DL (ref 33–37)
MCV RBC AUTO: 89.5 FL (ref 80–94)
MONOCYTES # BLD: 0.45 K/UL (ref 0–0.9)
MONOCYTES NFR BLD: 6.8 % (ref 1–10)
NEUTROPHILS # BLD: 4.62 K/UL (ref 1.5–7.5)
NEUTS SEG NFR BLD: 69.8 % (ref 50–65)
PLATELET # BLD AUTO: 197 K/UL (ref 130–400)
PMV BLD AUTO: 10.4 FL (ref 9.4–12.4)
RBC # BLD AUTO: 4.4 M/UL (ref 4.7–6.1)
WBC # BLD AUTO: 6.62 K/UL (ref 4.8–10.8)

## 2025-03-07 PROCEDURE — 85306 CLOT INHIBIT PROT S FREE: CPT

## 2025-03-07 PROCEDURE — 85613 RUSSELL VIPER VENOM DILUTED: CPT

## 2025-03-07 PROCEDURE — 81270 JAK2 GENE: CPT

## 2025-03-07 PROCEDURE — 81240 F2 GENE: CPT

## 2025-03-07 PROCEDURE — 86146 BETA-2 GLYCOPROTEIN ANTIBODY: CPT

## 2025-03-07 PROCEDURE — 83090 ASSAY OF HOMOCYSTEINE: CPT

## 2025-03-07 PROCEDURE — 85610 PROTHROMBIN TIME: CPT

## 2025-03-07 PROCEDURE — 99213 OFFICE O/P EST LOW 20 MIN: CPT

## 2025-03-07 PROCEDURE — 81219 CALR GENE COM VARIANTS: CPT

## 2025-03-07 PROCEDURE — 82784 ASSAY IGA/IGD/IGG/IGM EACH: CPT

## 2025-03-07 PROCEDURE — 85307 ASSAY ACTIVATED PROTEIN C: CPT

## 2025-03-07 PROCEDURE — 86147 CARDIOLIPIN ANTIBODY EA IG: CPT

## 2025-03-07 PROCEDURE — 85730 THROMBOPLASTIN TIME PARTIAL: CPT

## 2025-03-07 PROCEDURE — 85303 CLOT INHIBIT PROT C ACTIVITY: CPT

## 2025-03-07 PROCEDURE — 36415 COLL VENOUS BLD VENIPUNCTURE: CPT

## 2025-03-07 PROCEDURE — 85300 ANTITHROMBIN III ACTIVITY: CPT

## 2025-03-07 PROCEDURE — 85025 COMPLETE CBC W/AUTO DIFF WBC: CPT

## 2025-03-07 ASSESSMENT — ENCOUNTER SYMPTOMS
EYE ITCHING: 0
NAUSEA: 0
COUGH: 0
CONSTIPATION: 0
VOMITING: 0
ABDOMINAL PAIN: 0
SORE THROAT: 0
SHORTNESS OF BREATH: 0
EYE DISCHARGE: 0
WHEEZING: 0
DIARRHEA: 0
TROUBLE SWALLOWING: 0

## 2025-03-11 ENCOUNTER — OFFICE VISIT (OUTPATIENT)
Dept: VASCULAR SURGERY | Age: 75
End: 2025-03-11
Payer: OTHER GOVERNMENT

## 2025-03-11 VITALS
HEART RATE: 75 BPM | OXYGEN SATURATION: 99 % | TEMPERATURE: 98 F | DIASTOLIC BLOOD PRESSURE: 80 MMHG | SYSTOLIC BLOOD PRESSURE: 140 MMHG

## 2025-03-11 DIAGNOSIS — I70.213 ATHEROSCLER OF NATIVE ARTERY OF BOTH LEGS WITH INTERMIT CLAUDICATION: Primary | ICD-10-CM

## 2025-03-11 PROCEDURE — 99214 OFFICE O/P EST MOD 30 MIN: CPT | Performed by: NURSE PRACTITIONER

## 2025-03-11 PROCEDURE — 1123F ACP DISCUSS/DSCN MKR DOCD: CPT | Performed by: NURSE PRACTITIONER

## 2025-03-11 PROCEDURE — 3075F SYST BP GE 130 - 139MM HG: CPT | Performed by: NURSE PRACTITIONER

## 2025-03-11 PROCEDURE — 3079F DIAST BP 80-89 MM HG: CPT | Performed by: NURSE PRACTITIONER

## 2025-03-11 NOTE — PROGRESS NOTES
Jong Loyd (:  1950) is a 75 y.o. male,Established patient, here for evaluation of the following chief complaint(s):  Follow-up (2 week follow up angiogram. )            SUBJECTIVE/OBJECTIVE:  Patient presents for follow up after an arteriogram with  Left leg angiogram, balloon angioplasty of the left SFA, popliteal artery stenting with 5 x 5, 5 x 10 Viabahn stenting, balloon angioplasty of the peroneal artery  done 2 weeks ago.  Procedure was done for peripheral vascular disease with claudication. Post op problems include none.  Angiogram complications were none.  Post op pain and swelling are minimal.  At present, he reports claudication at a distance of   yards.  Jong reports that the left leg is more significant than the right.  He reports claudication is improved since prior to procedure but has progressed since procedure. He has a short recovery time. This is reproducible in nature. He reports ischemic rest pain 0 times per night.  He reports walking with cart does not help.      I have personally reviewed the following: problem list, current meds, allergies, PMH, PSH, family hx, and social hx  Jong Loyd is a 75 y.o. male with the following history as recorded in [facility]:  Patient Active Problem List    Diagnosis Date Noted    Moderate malnutrition 10/11/2024    Acute blood loss as cause of postoperative anemia 10/10/2024    Hematoma 10/10/2024    Palliative care patient 10/09/2024    PAD (peripheral artery disease) 10/05/2024    Atherosclerosis of native arteries of extremities with rest pain, left leg (HCC) 10/05/2024    Critical limb ischemia of left lower extremity (HCC) 10/05/2024    Atherosclerosis of native arteries of extremities with intermittent claudication, left leg 2024    Atherosclerosis of native arteries of extremities with intermittent claudication, bilateral legs 2024    Femoral artery pseudo-aneurysm, left 2024    Atherosclerosis with

## 2025-03-12 LAB
ANNOTATION COMMENT IMP: ABNORMAL
APCR PPP: 5.25 {RATIO}
APTT SCREEN TO CONFIRM RATIO: 1.01 {RATIO}
AT III ACT/NOR PPP CHRO: 121 % (ref 76–128)
B2 GLYCOPROT1 IGG SERPL IA-ACNC: <10 SGU
B2 GLYCOPROT1 IGM SERPL IA-ACNC: <10 SMU
CARDIOLIPIN IGG SER IA-ACNC: <10 GPL
CARDIOLIPIN IGM SER IA-ACNC: <10 MPL
CONFIRM DRVVT STA-STACLOT: ABNORMAL S
DRVVT SCREEN TO CONFIRM RATIO: 1.53 {RATIO}
DRVVT SCREEN TO CONFIRM RATIO: ABNORMAL {RATIO}
DRVVT/DRVVT CFM P DOAC NEUT NORM PPP-RTO: 1.02 {RATIO}
F2 C.20210G>A GENO BLD/T: NEGATIVE
F5 P.R506Q BLD/T QL: ABNORMAL
HCYS SERPL-SCNC: 13 UMOL/L (ref 0–15)
HEPARIN NT PPP QL: ABNORMAL
LA 3 SCREEN W REFLEX-IMP: ABNORMAL
LMW HEPARIN IND PLT AB SER QL: PRESENT
MIXING DRVVT: ABNORMAL S
PROT C ACT/NOR PPP: 129 % (ref 83–168)
PROT S FREE AG ACT/NOR PPP IA: 77 % (ref 74–147)
PROTHROMBIN TIME: 16.3 S (ref 12–15.5)
SCREEN APTT: ABNORMAL S
SPECIMEN SOURCE: ABNORMAL
SPECIMEN SOURCE: ABNORMAL
THROMBIN TIME: ABNORMAL S

## 2025-03-19 LAB
CALR EXON 9 MUT ANL BLD/T: NORMAL
CITATION REF LAB TEST: NORMAL
JAK2 GENE MUT ANL BLD/T: NORMAL
JAK2 P.V617F BLD/T QL: NORMAL
LAB DIRECTOR NAME PROVIDER: NORMAL
MPL GENE MUT TESTED MAR: NORMAL
REF LAB TEST METHOD: NORMAL
REFLEX: NORMAL
TEST PERFORMANCE INFO SPEC: NORMAL

## 2025-04-07 ENCOUNTER — HOSPITAL ENCOUNTER (OUTPATIENT)
Dept: CT IMAGING | Age: 75
Discharge: HOME | End: 2025-04-07
Payer: OTHER GOVERNMENT

## 2025-04-07 DIAGNOSIS — R91.1 NODULE OF LEFT LUNG: ICD-10-CM

## 2025-04-07 PROCEDURE — 71250 CT THORAX DX C-: CPT

## 2025-04-08 DIAGNOSIS — C91.10 CLL (CHRONIC LYMPHOCYTIC LEUKEMIA) (HCC): Primary | ICD-10-CM

## 2025-04-18 ENCOUNTER — HOSPITAL ENCOUNTER (OUTPATIENT)
Dept: INFUSION THERAPY | Age: 75
Discharge: HOME | End: 2025-04-18
Payer: OTHER GOVERNMENT

## 2025-04-18 DIAGNOSIS — C91.10 CLL (CHRONIC LYMPHOCYTIC LEUKEMIA) (HCC): ICD-10-CM

## 2025-04-18 DIAGNOSIS — C91.10 CLL (CHRONIC LYMPHOCYTIC LEUKEMIA) (HCC): Primary | ICD-10-CM

## 2025-04-18 LAB
BASOPHILS # BLD: 0.04 K/UL (ref 0–0.2)
BASOPHILS NFR BLD: 0.8 % (ref 0–1)
EOSINOPHIL # BLD: 0.03 K/UL (ref 0–0.6)
EOSINOPHIL NFR BLD: 0.6 % (ref 0–5)
ERYTHROCYTE [DISTWIDTH] IN BLOOD BY AUTOMATED COUNT: 13.6 % (ref 11.5–14.5)
HCT VFR BLD AUTO: 41.4 % (ref 42–52)
HGB BLD-MCNC: 13 G/DL (ref 14–18)
LYMPHOCYTES # BLD: 1.32 K/UL (ref 1.1–4.5)
LYMPHOCYTES NFR BLD: 25.9 % (ref 20–40)
MCH RBC QN AUTO: 27.7 PG (ref 27–31)
MCHC RBC AUTO-ENTMCNC: 31.4 G/DL (ref 33–37)
MCV RBC AUTO: 88.3 FL (ref 80–94)
MONOCYTES # BLD: 0.43 K/UL (ref 0–0.9)
MONOCYTES NFR BLD: 8.4 % (ref 1–10)
NEUTROPHILS # BLD: 3.24 K/UL (ref 1.5–7.5)
NEUTS SEG NFR BLD: 63.5 % (ref 50–65)
PLATELET # BLD AUTO: 176 K/UL (ref 130–400)
PMV BLD AUTO: 11.1 FL (ref 9.4–12.4)
RBC # BLD AUTO: 4.69 M/UL (ref 4.7–6.1)
WBC # BLD AUTO: 5.1 K/UL (ref 4.8–10.8)

## 2025-04-18 PROCEDURE — 85025 COMPLETE CBC W/AUTO DIFF WBC: CPT

## 2025-04-18 PROCEDURE — 36415 COLL VENOUS BLD VENIPUNCTURE: CPT

## 2025-06-03 DIAGNOSIS — I70.213 ATHEROSCLER OF NATIVE ARTERY OF BOTH LEGS WITH INTERMIT CLAUDICATION: ICD-10-CM

## 2025-06-04 RX ORDER — APIXABAN 5 MG/1
5 TABLET, FILM COATED ORAL 2 TIMES DAILY
Qty: 180 TABLET | Refills: 1 | Status: SHIPPED | OUTPATIENT
Start: 2025-06-04

## 2025-06-25 ENCOUNTER — HOSPITAL ENCOUNTER (OUTPATIENT)
Dept: VASCULAR LAB | Age: 75
Discharge: HOME | End: 2025-06-27
Payer: OTHER GOVERNMENT

## 2025-06-25 ENCOUNTER — HOSPITAL ENCOUNTER (OUTPATIENT)
Dept: PREADMISSION TESTING | Age: 75
Discharge: HOME | End: 2025-06-29
Payer: OTHER GOVERNMENT

## 2025-06-25 ENCOUNTER — OFFICE VISIT (OUTPATIENT)
Dept: VASCULAR SURGERY | Age: 75
End: 2025-06-25
Payer: OTHER GOVERNMENT

## 2025-06-25 VITALS — WEIGHT: 141.3 LBS | BODY MASS INDEX: 20.93 KG/M2 | HEIGHT: 69 IN

## 2025-06-25 VITALS
DIASTOLIC BLOOD PRESSURE: 76 MMHG | TEMPERATURE: 97.8 F | HEART RATE: 73 BPM | OXYGEN SATURATION: 99 % | SYSTOLIC BLOOD PRESSURE: 146 MMHG

## 2025-06-25 DIAGNOSIS — I70.213 ATHEROSCLER OF NATIVE ARTERY OF BOTH LEGS WITH INTERMIT CLAUDICATION: ICD-10-CM

## 2025-06-25 DIAGNOSIS — I70.213 ATHEROSCLEROSIS OF NATIVE ARTERY OF BOTH LOWER EXTREMITIES WITH INTERMITTENT CLAUDICATION: Primary | ICD-10-CM

## 2025-06-25 DIAGNOSIS — I70.213 ATHEROSCLEROSIS OF NATIVE ARTERY OF BOTH LOWER EXTREMITIES WITH INTERMITTENT CLAUDICATION: ICD-10-CM

## 2025-06-25 LAB
ABO/RH: NORMAL
ALBUMIN SERPL-MCNC: 4.3 G/DL (ref 3.5–5.2)
ALP SERPL-CCNC: 89 U/L (ref 40–129)
ALT SERPL-CCNC: 10 U/L (ref 10–50)
ANION GAP SERPL CALCULATED.3IONS-SCNC: 10 MMOL/L (ref 8–16)
ANTIBODY SCREEN: NORMAL
APTT PPP: 29.5 SEC (ref 26–36.2)
AST SERPL-CCNC: 21 U/L (ref 10–50)
BASOPHILS # BLD: 0 K/UL (ref 0–0.2)
BASOPHILS NFR BLD: 0.7 % (ref 0–1)
BILIRUB SERPL-MCNC: 0.6 MG/DL (ref 0.2–1.2)
BUN SERPL-MCNC: 11 MG/DL (ref 8–23)
CALCIUM SERPL-MCNC: 9.2 MG/DL (ref 8.8–10.2)
CHLORIDE SERPL-SCNC: 106 MMOL/L (ref 98–107)
CO2 SERPL-SCNC: 26 MMOL/L (ref 22–29)
CREAT SERPL-MCNC: 1 MG/DL (ref 0.7–1.2)
EOSINOPHIL # BLD: 0.1 K/UL (ref 0–0.6)
EOSINOPHIL NFR BLD: 0.9 % (ref 0–5)
ERYTHROCYTE [DISTWIDTH] IN BLOOD BY AUTOMATED COUNT: 14.9 % (ref 11.5–14.5)
GLUCOSE SERPL-MCNC: 103 MG/DL (ref 70–99)
HCT VFR BLD AUTO: 41.3 % (ref 42–52)
HGB BLD-MCNC: 13 G/DL (ref 14–18)
IMM GRANULOCYTES # BLD: 0 K/UL
INR PPP: 1.19 (ref 0.88–1.18)
LYMPHOCYTES # BLD: 1.3 K/UL (ref 1.1–4.5)
LYMPHOCYTES NFR BLD: 21.9 % (ref 20–40)
MCH RBC QN AUTO: 27.8 PG (ref 27–31)
MCHC RBC AUTO-ENTMCNC: 31.5 G/DL (ref 33–37)
MCV RBC AUTO: 88.2 FL (ref 80–94)
MONOCYTES # BLD: 0.4 K/UL (ref 0–0.9)
MONOCYTES NFR BLD: 7.5 % (ref 0–10)
NEUTROPHILS # BLD: 4 K/UL (ref 1.5–7.5)
NEUTS SEG NFR BLD: 68.7 % (ref 50–65)
PLATELET # BLD AUTO: 190 K/UL (ref 130–400)
PMV BLD AUTO: 10.9 FL (ref 9.4–12.4)
POTASSIUM SERPL-SCNC: 4.2 MMOL/L (ref 3.5–5.1)
PROT SERPL-MCNC: 6.5 G/DL (ref 6.4–8.3)
PROTHROMBIN TIME: 15 SEC (ref 12–14.6)
RBC # BLD AUTO: 4.68 M/UL (ref 4.7–6.1)
SODIUM SERPL-SCNC: 142 MMOL/L (ref 136–145)
WBC # BLD AUTO: 5.8 K/UL (ref 4.8–10.8)

## 2025-06-25 PROCEDURE — 3078F DIAST BP <80 MM HG: CPT | Performed by: NURSE PRACTITIONER

## 2025-06-25 PROCEDURE — 86850 RBC ANTIBODY SCREEN: CPT

## 2025-06-25 PROCEDURE — 93922 UPR/L XTREMITY ART 2 LEVELS: CPT

## 2025-06-25 PROCEDURE — 3077F SYST BP >= 140 MM HG: CPT | Performed by: NURSE PRACTITIONER

## 2025-06-25 PROCEDURE — 85610 PROTHROMBIN TIME: CPT

## 2025-06-25 PROCEDURE — 80053 COMPREHEN METABOLIC PANEL: CPT

## 2025-06-25 PROCEDURE — 85730 THROMBOPLASTIN TIME PARTIAL: CPT

## 2025-06-25 PROCEDURE — 86900 BLOOD TYPING SEROLOGIC ABO: CPT

## 2025-06-25 PROCEDURE — 99214 OFFICE O/P EST MOD 30 MIN: CPT | Performed by: NURSE PRACTITIONER

## 2025-06-25 PROCEDURE — 85025 COMPLETE CBC W/AUTO DIFF WBC: CPT

## 2025-06-25 PROCEDURE — 86901 BLOOD TYPING SEROLOGIC RH(D): CPT

## 2025-06-25 PROCEDURE — 93005 ELECTROCARDIOGRAM TRACING: CPT | Performed by: ANESTHESIOLOGY

## 2025-06-25 PROCEDURE — 93926 LOWER EXTREMITY STUDY: CPT

## 2025-06-25 PROCEDURE — 1123F ACP DISCUSS/DSCN MKR DOCD: CPT | Performed by: NURSE PRACTITIONER

## 2025-06-25 NOTE — PROGRESS NOTES
[facility]    Surgery Directions    Report to the outpatient registration at [facility] on Monday, surgery will call Friday after 2 for time of arrival  Nothing to eat or drink after midnight the night before the procedure.  Please take all medications as normally scheduled to take unless listed below with a sip of water  Do not take eliquis night before or morning of.  If you have sleep apnea and require C-PAP, please bring this with you to the hospital.  Bring a list of all of your allergies and medications with you to the hospital.  Please let our nurse know if you have had an allergy to iodine, shellfish, or x-ray dye.  Let the nurse know if you take any of the following:  Over the counter herbal supplements  Diclofenec, indomethacin, ketoprofen, Caridopa/levadopa, naproxen, sulindac, piroxicam, glucosamine, Chondrotin, cocchine, or methotrexate.  Plan to stay at the hospital overnight  Please stop at your local [employer] or pharmacy and buy a bottle of Hibiclens. Wash thoroughly with this the night before and the morning of the procedure, paying special attention to the area that will be operated on.  Make sure you rinse very well. The Hibiclens should only be used prior to surgery.    15.  You will need a  to take you home      Unless instructed otherwise by your physician, cleanse incision/puncture site twice daily with soap and water.  Apply dry gauze. Do not get in tub.  Okay to shower.  Do not apply any salve, cream, peroxide or alcohol to the incision.  Call with any increasing redness or drainage

## 2025-06-25 NOTE — PROGRESS NOTES
Jong Loyd (:  1950) is a 75 y.o. male,Established patient, here for evaluation of the following chief complaint(s):  3 Month Follow-Up (KG)            SUBJECTIVE/OBJECTIVE:  Jong has a history of peripheral vascular disease of the lower extremities.  He has had this for 1 - 5 years. Current treatment includes plavix and eliquis.  Jong has not had new wounds. Recently, he reports claudication at a distance of  a few feet.  Started about a month ago.  Jong reports that the right leg is equal to the left.  He reports claudication is worsened and is mostly in the form of crampy type pain starting in the calves and feet. He has a short recovery time. This is reproducible in nature. He reports ischemic rest pain 0 times per night.  He reports walking with cart does not help.        I have personally reviewed the following: problem list, current meds, allergies, PMH, PSH, family hx, and social hx  Jong Loyd is a 75 y.o. male with the following history as recorded in [facility]:  Patient Active Problem List    Diagnosis Date Noted    Moderate malnutrition 10/11/2024    Acute blood loss as cause of postoperative anemia 10/10/2024    Hematoma 10/10/2024    Palliative care patient 10/09/2024    PAD (peripheral artery disease) 10/05/2024    Atherosclerosis of native arteries of extremities with rest pain, left leg (HCC) 10/05/2024    Critical limb ischemia of left lower extremity (HCC) 10/05/2024    Atherosclerosis of native arteries of extremities with intermittent claudication, left leg 2024    Atherosclerosis of native arteries of extremities with intermittent claudication, bilateral legs 2024    Femoral artery pseudo-aneurysm, left 2024    Atherosclerosis with claudication of extremity 2021    Benign essential HTN 2021    BPH (benign prostatic hyperplasia) 2021    Pulmonary emphysema (HCC) 2021    Carotid stenosis, asymptomatic, bilateral 2021

## 2025-06-25 NOTE — DISCHARGE INSTRUCTIONS
The day before surgery you will receive a phone call from the surgery nurse to let you know what time to arrive on the day of surgery. This call will usually be between 2-4 PM. If you do not receive a phone call by 4 PM the day before your surgery please call [phone] and let them know you have not received an arrival time. If your surgery is on Monday, your call will be on the Friday before your Monday surgery. Please check your voicemail as they may leave a message with that information.  If you are running late or wake up sick the day of surgery please call the above number for further instructions.      Chlorhexidine Gluconate 4% Solution    Patient should shower with this soap a minimum of 3 consecutive showers (2 nights before surgery, the night before surgery and the morning of surgery) washing from the neck down (avoiding contact with genitalia).      DO NOT WASH YOUR HAIR OR FACE WITH THIS SOAP.  When washing with this soap, apply enough to suds up the body thoroughly, turn the water away from your body and allow the soap suds to remain on the body for 2 full minutes, then rinse body completely.      After using this soap on the body, please do not apply powders or lotions to your body.  After the shower the night before surgery, please dry off with a new towel, sleep in new freshly laundered pj's, and change your bed linen before going to sleep.      IF YOU HAVE A PET IN YOUR HOME, please do not allow your pet to sleep in the bed with you after you have showered with your surgery prep soap.     Please remember that it is not recommended to allow your pet to sleep with you post op, until your incision has healed.  This can increase your risk of post op infection.      The morning of surgery, you may take all your prescribed medications with a sip of water unless instructed not to take.  Any exceptions to this would be listed below:  Always follow your surgeon or providers instructions on taking blood

## 2025-06-26 LAB
EKG P AXIS: 24 DEGREES
EKG P-R INTERVAL: 138 MS
EKG Q-T INTERVAL: 442 MS
EKG QRS DURATION: 98 MS
EKG QTC CALCULATION (BAZETT): 435 MS
EKG T AXIS: 72 DEGREES

## 2025-06-26 PROCEDURE — 93010 ELECTROCARDIOGRAM REPORT: CPT | Performed by: INTERNAL MEDICINE

## 2025-06-26 RX ORDER — SODIUM CHLORIDE 0.9 % (FLUSH) 0.9 %
5-40 SYRINGE (ML) INJECTION PRN
OUTPATIENT
Start: 2025-06-26

## 2025-06-26 RX ORDER — SODIUM CHLORIDE 0.9 % (FLUSH) 0.9 %
5-40 SYRINGE (ML) INJECTION EVERY 12 HOURS SCHEDULED
OUTPATIENT
Start: 2025-06-26

## 2025-06-26 RX ORDER — ASPIRIN 81 MG/1
81 TABLET ORAL ONCE
OUTPATIENT
Start: 2025-06-26 | End: 2025-06-26

## 2025-06-26 RX ORDER — SODIUM CHLORIDE 9 MG/ML
INJECTION, SOLUTION INTRAVENOUS PRN
OUTPATIENT
Start: 2025-06-26

## 2025-06-26 NOTE — H&P (VIEW-ONLY)
Jong Loyd (:  1950) is a 75 y.o. male,Established patient, here for evaluation of the following chief complaint(s):  3 Month Follow-Up (KG)            SUBJECTIVE/OBJECTIVE:  Jong has a history of peripheral vascular disease of the lower extremities.  He has had this for 1 - 5 years. Current treatment includes plavix and eliquis.  Jong has not had new wounds. Recently, he reports claudication at a distance of  a few feet.  Started about a month ago.  Jong reports that the right leg is equal to the left.  He reports claudication is worsened and is mostly in the form of crampy type pain starting in the calves and feet. He has a short recovery time. This is reproducible in nature. He reports ischemic rest pain 0 times per night.  He reports walking with cart does not help.        I have personally reviewed the following: problem list, current meds, allergies, PMH, PSH, family hx, and social hx  Jong Loyd is a 75 y.o. male with the following history as recorded in [facility]:  Patient Active Problem List    Diagnosis Date Noted    Moderate malnutrition 10/11/2024    Acute blood loss as cause of postoperative anemia 10/10/2024    Hematoma 10/10/2024    Palliative care patient 10/09/2024    PAD (peripheral artery disease) 10/05/2024    Atherosclerosis of native arteries of extremities with rest pain, left leg (HCC) 10/05/2024    Critical limb ischemia of left lower extremity (HCC) 10/05/2024    Atherosclerosis of native arteries of extremities with intermittent claudication, left leg 2024    Atherosclerosis of native arteries of extremities with intermittent claudication, bilateral legs 2024    Femoral artery pseudo-aneurysm, left 2024    Atherosclerosis with claudication of extremity 2021    Benign essential HTN 2021    BPH (benign prostatic hyperplasia) 2021    Pulmonary emphysema (HCC) 2021    Carotid stenosis, asymptomatic, bilateral 2021

## 2025-06-26 NOTE — H&P
Jong Loyd (:  1950) is a 75 y.o. male,Established patient, here for evaluation of the following chief complaint(s):  3 Month Follow-Up (KG)            SUBJECTIVE/OBJECTIVE:  Jong has a history of peripheral vascular disease of the lower extremities.  He has had this for 1 - 5 years. Current treatment includes plavix and eliquis.  Jong has not had new wounds. Recently, he reports claudication at a distance of  a few feet.  Started about a month ago.  Jong reports that the right leg is equal to the left.  He reports claudication is worsened and is mostly in the form of crampy type pain starting in the calves and feet. He has a short recovery time. This is reproducible in nature. He reports ischemic rest pain 0 times per night.  He reports walking with cart does not help.        I have personally reviewed the following: problem list, current meds, allergies, PMH, PSH, family hx, and social hx  Jong Loyd is a 75 y.o. male with the following history as recorded in [facility]:  Patient Active Problem List    Diagnosis Date Noted    Moderate malnutrition 10/11/2024    Acute blood loss as cause of postoperative anemia 10/10/2024    Hematoma 10/10/2024    Palliative care patient 10/09/2024    PAD (peripheral artery disease) 10/05/2024    Atherosclerosis of native arteries of extremities with rest pain, left leg (HCC) 10/05/2024    Critical limb ischemia of left lower extremity (HCC) 10/05/2024    Atherosclerosis of native arteries of extremities with intermittent claudication, left leg 2024    Atherosclerosis of native arteries of extremities with intermittent claudication, bilateral legs 2024    Femoral artery pseudo-aneurysm, left 2024    Atherosclerosis with claudication of extremity 2021    Benign essential HTN 2021    BPH (benign prostatic hyperplasia) 2021    Pulmonary emphysema (HCC) 2021    Carotid stenosis, asymptomatic, bilateral 2021

## 2025-06-28 LAB
APTT SCREEN TO CONFIRM RATIO: 1.04 {RATIO}
B2 GLYCOPROT1 IGG SERPL IA-ACNC: <10 SGU
B2 GLYCOPROT1 IGM SERPL IA-ACNC: <10 SMU
CARDIOLIPIN IGG SER IA-ACNC: <10 GPL
CARDIOLIPIN IGM SER IA-ACNC: <10 MPL
CONFIRM DRVVT STA-STACLOT: ABNORMAL S
DRVVT SCREEN TO CONFIRM RATIO: 1.42 {RATIO}
DRVVT SCREEN TO CONFIRM RATIO: ABNORMAL {RATIO}
DRVVT/DRVVT CFM P DOAC NEUT NORM PPP-RTO: 1.01 {RATIO}
HEPARIN NT PPP QL: ABNORMAL
LA 3 SCREEN W REFLEX-IMP: ABNORMAL
LA 3 SCREEN W REFLEX-IMP: ABNORMAL
LMW HEPARIN IND PLT AB SER QL: PRESENT
MIXING DRVVT: ABNORMAL S
PROTHROMBIN TIME: 15 S (ref 12–15.5)
SCREEN APTT: ABNORMAL S
THROMBIN TIME: ABNORMAL S
VAS LEFT ABI: 0.63
VAS LEFT ARM BP: 170 MMHG
VAS LEFT ATA MID PSV: 14.4 CM/S
VAS LEFT CFA PROX PSV: 180 CM/S
VAS LEFT DIST OUTFLOW PSV: 298 CM/S
VAS LEFT DORSALIS PEDIS BP: 92 MMHG
VAS LEFT GRAFT 1: NORMAL
VAS LEFT MID OUTFLOW PSV: 16.8 CM/S
VAS LEFT PERONEAL MID PSV: 19.4 CM/S
VAS LEFT PFA PROX PSV: 168 CM/S
VAS LEFT POP A DIST PSV: 46.7 CM/S
VAS LEFT POP A DIST VEL RATIO: 0.74
VAS LEFT POP A MID PSV: 62.9 CM/S
VAS LEFT PROX OUTFLOW PSV: 15.8 CM/S
VAS LEFT PTA BP: 108 MMHG
VAS LEFT PTA MID PSV: 29.3 CM/S
VAS LEFT SFA MID PSV: 10.5 CM/S
VAS LEFT SFA MID VEL RATIO: 0.05
VAS LEFT SFA PROX PSV: 216 CM/S
VAS LEFT SFA PROX VEL RATIO: 1.2
VAS LEFT TBI: 0.26
VAS LEFT TOE PRESSURE: 45 MMHG
VAS RIGHT ABI: 0.3
VAS RIGHT ARM BP: 172 MMHG
VAS RIGHT ATA MID PSV: 16.9 CM/S
VAS RIGHT CFA PROX PSV: 174 CM/S
VAS RIGHT DIST OUTFLOW PSV: 0 CM/S
VAS RIGHT DORSALIS PEDIS BP: 36 MMHG
VAS RIGHT GRAFT 1: NORMAL
VAS RIGHT MID OUTFLOW PSV: 0 CM/S
VAS RIGHT PERONEAL MID PSV: 13.8 CM/S
VAS RIGHT PFA PROX PSV: 173 CM/S
VAS RIGHT POP A DIST PSV: 0 CM/S
VAS RIGHT POP A PROX PSV: 0 CM/S
VAS RIGHT POP A PROX VEL RATIO: 0
VAS RIGHT PROX OUTFLOW PSV: 0 CM/S
VAS RIGHT PTA BP: 51 MMHG
VAS RIGHT PTA MID PSV: 20.7 CM/S
VAS RIGHT SFA DIST PSV: 201 CM/S
VAS RIGHT SFA DIST VEL RATIO: 2.35
VAS RIGHT SFA MID PSV: 85.6 CM/S
VAS RIGHT SFA MID VEL RATIO: 0.5
VAS RIGHT SFA PROX PSV: 187 CM/S
VAS RIGHT SFA PROX VEL RATIO: 1.1
VAS RIGHT TBI: 0
VAS RIGHT TOE PRESSURE: 0 MMHG

## 2025-06-30 ENCOUNTER — ANESTHESIA EVENT (OUTPATIENT)
Dept: OPERATING ROOM | Age: 75
End: 2025-06-30
Payer: OTHER GOVERNMENT

## 2025-06-30 ENCOUNTER — APPOINTMENT (OUTPATIENT)
Dept: INTERVENTIONAL RADIOLOGY/VASCULAR | Age: 75
End: 2025-06-30
Attending: SURGERY
Payer: OTHER GOVERNMENT

## 2025-06-30 ENCOUNTER — ANESTHESIA (OUTPATIENT)
Dept: OPERATING ROOM | Age: 75
End: 2025-06-30
Payer: OTHER GOVERNMENT

## 2025-06-30 ENCOUNTER — HOSPITAL ENCOUNTER (OUTPATIENT)
Age: 75
Setting detail: OUTPATIENT SURGERY
Discharge: HOME | End: 2025-06-30
Attending: SURGERY | Admitting: SURGERY
Payer: OTHER GOVERNMENT

## 2025-06-30 VITALS
SYSTOLIC BLOOD PRESSURE: 147 MMHG | WEIGHT: 141 LBS | HEIGHT: 69 IN | TEMPERATURE: 97 F | BODY MASS INDEX: 20.88 KG/M2 | HEART RATE: 70 BPM | DIASTOLIC BLOOD PRESSURE: 66 MMHG | OXYGEN SATURATION: 100 % | RESPIRATION RATE: 20 BRPM

## 2025-06-30 LAB
ABO/RH: NORMAL
ANTIBODY SCREEN: NORMAL

## 2025-06-30 PROCEDURE — 6360000002 HC RX W HCPCS: Performed by: ANESTHESIOLOGY

## 2025-06-30 PROCEDURE — 7100000000 HC PACU RECOVERY - FIRST 15 MIN: Performed by: SURGERY

## 2025-06-30 PROCEDURE — C1893 INTRO/SHEATH, FIXED,NON-PEEL: HCPCS | Performed by: SURGERY

## 2025-06-30 PROCEDURE — 6360000002 HC RX W HCPCS: Performed by: NURSE ANESTHETIST, CERTIFIED REGISTERED

## 2025-06-30 PROCEDURE — 3700000000 HC ANESTHESIA ATTENDED CARE: Performed by: SURGERY

## 2025-06-30 PROCEDURE — C1725 CATH, TRANSLUMIN NON-LASER: HCPCS | Performed by: SURGERY

## 2025-06-30 PROCEDURE — 2580000003 HC RX 258: Performed by: NURSE ANESTHETIST, CERTIFIED REGISTERED

## 2025-06-30 PROCEDURE — 6370000000 HC RX 637 (ALT 250 FOR IP): Performed by: NURSE PRACTITIONER

## 2025-06-30 PROCEDURE — 86850 RBC ANTIBODY SCREEN: CPT

## 2025-06-30 PROCEDURE — 2709999900 HC NON-CHARGEABLE SUPPLY: Performed by: SURGERY

## 2025-06-30 PROCEDURE — C1887 CATHETER, GUIDING: HCPCS | Performed by: SURGERY

## 2025-06-30 PROCEDURE — 75716 ARTERY X-RAYS ARMS/LEGS: CPT

## 2025-06-30 PROCEDURE — 37226 PR REVSC OPN/PRQ FEM/POP W/STNT/ANGIOP SM VSL: CPT | Performed by: SURGERY

## 2025-06-30 PROCEDURE — 2580000003 HC RX 258: Performed by: ANESTHESIOLOGY

## 2025-06-30 PROCEDURE — 75774 ARTERY X-RAY EACH VESSEL: CPT

## 2025-06-30 PROCEDURE — C1894 INTRO/SHEATH, NON-LASER: HCPCS | Performed by: SURGERY

## 2025-06-30 PROCEDURE — C1769 GUIDE WIRE: HCPCS | Performed by: SURGERY

## 2025-06-30 PROCEDURE — 6360000004 HC RX CONTRAST MEDICATION: Performed by: SURGERY

## 2025-06-30 PROCEDURE — 37228 PR REVSC OPN/PRQ TIB/PERO W/ANGIOPLASTY UNI: CPT | Performed by: SURGERY

## 2025-06-30 PROCEDURE — C1757 CATH, THROMBECTOMY/EMBOLECT: HCPCS | Performed by: SURGERY

## 2025-06-30 PROCEDURE — C1874 STENT, COATED/COV W/DEL SYS: HCPCS | Performed by: SURGERY

## 2025-06-30 PROCEDURE — 75625 CONTRAST EXAM ABDOMINL AORTA: CPT

## 2025-06-30 PROCEDURE — 37252 INTRVASC US NONCORONARY 1ST: CPT | Performed by: SURGERY

## 2025-06-30 PROCEDURE — 75716 ARTERY X-RAYS ARMS/LEGS: CPT | Performed by: SURGERY

## 2025-06-30 PROCEDURE — 3600000017 HC SURGERY HYBRID ADDL 15MIN: Performed by: SURGERY

## 2025-06-30 PROCEDURE — C1753 CATH, INTRAVAS ULTRASOUND: HCPCS | Performed by: SURGERY

## 2025-06-30 PROCEDURE — 7100000010 HC PHASE II RECOVERY - FIRST 15 MIN: Performed by: SURGERY

## 2025-06-30 PROCEDURE — 3700000001 HC ADD 15 MINUTES (ANESTHESIA): Performed by: SURGERY

## 2025-06-30 PROCEDURE — 2500000003 HC RX 250 WO HCPCS: Performed by: NURSE ANESTHETIST, CERTIFIED REGISTERED

## 2025-06-30 PROCEDURE — 86901 BLOOD TYPING SEROLOGIC RH(D): CPT

## 2025-06-30 PROCEDURE — 6360000002 HC RX W HCPCS: Performed by: SURGERY

## 2025-06-30 PROCEDURE — 2500000003 HC RX 250 WO HCPCS: Performed by: SURGERY

## 2025-06-30 PROCEDURE — 3600000007 HC SURGERY HYBRID BASE: Performed by: SURGERY

## 2025-06-30 PROCEDURE — 7100000011 HC PHASE II RECOVERY - ADDTL 15 MIN: Performed by: SURGERY

## 2025-06-30 PROCEDURE — 37184 PRIM ART M-THRMBC 1ST VSL: CPT | Performed by: SURGERY

## 2025-06-30 PROCEDURE — 86900 BLOOD TYPING SEROLOGIC ABO: CPT

## 2025-06-30 PROCEDURE — 37253 INTRVASC US NONCORONARY ADDL: CPT | Performed by: SURGERY

## 2025-06-30 PROCEDURE — 36415 COLL VENOUS BLD VENIPUNCTURE: CPT

## 2025-06-30 PROCEDURE — 7100000001 HC PACU RECOVERY - ADDTL 15 MIN: Performed by: SURGERY

## 2025-06-30 PROCEDURE — 76937 US GUIDE VASCULAR ACCESS: CPT

## 2025-06-30 PROCEDURE — C2623 CATH, TRANSLUMIN, DRUG-COAT: HCPCS | Performed by: SURGERY

## 2025-06-30 DEVICE — IMPLANTABLE DEVICE: Type: IMPLANTABLE DEVICE | Site: LEG | Status: FUNCTIONAL

## 2025-06-30 DEVICE — GRAFT EVAR L150MM DIA5MM CATH L120CM DIA6FR 0.035IN HEP: Type: IMPLANTABLE DEVICE | Status: FUNCTIONAL

## 2025-06-30 RX ORDER — PROPOFOL 10 MG/ML
INJECTION, EMULSION INTRAVENOUS
Status: DISCONTINUED | OUTPATIENT
Start: 2025-06-30 | End: 2025-06-30 | Stop reason: SDUPTHER

## 2025-06-30 RX ORDER — FENTANYL CITRATE 50 UG/ML
INJECTION, SOLUTION INTRAMUSCULAR; INTRAVENOUS
Status: DISCONTINUED | OUTPATIENT
Start: 2025-06-30 | End: 2025-06-30 | Stop reason: SDUPTHER

## 2025-06-30 RX ORDER — SODIUM CHLORIDE 0.9 % (FLUSH) 0.9 %
5-40 SYRINGE (ML) INJECTION EVERY 12 HOURS SCHEDULED
Status: DISCONTINUED | OUTPATIENT
Start: 2025-06-30 | End: 2025-06-30 | Stop reason: HOSPADM

## 2025-06-30 RX ORDER — HYDROMORPHONE HYDROCHLORIDE 1 MG/ML
0.25 INJECTION, SOLUTION INTRAMUSCULAR; INTRAVENOUS; SUBCUTANEOUS EVERY 5 MIN PRN
Status: DISCONTINUED | OUTPATIENT
Start: 2025-06-30 | End: 2025-06-30 | Stop reason: HOSPADM

## 2025-06-30 RX ORDER — LIDOCAINE HYDROCHLORIDE 10 MG/ML
INJECTION, SOLUTION INFILTRATION; PERINEURAL
Status: DISCONTINUED | OUTPATIENT
Start: 2025-06-30 | End: 2025-06-30 | Stop reason: SDUPTHER

## 2025-06-30 RX ORDER — NALOXONE HYDROCHLORIDE 0.4 MG/ML
INJECTION, SOLUTION INTRAMUSCULAR; INTRAVENOUS; SUBCUTANEOUS PRN
Status: DISCONTINUED | OUTPATIENT
Start: 2025-06-30 | End: 2025-06-30 | Stop reason: HOSPADM

## 2025-06-30 RX ORDER — SODIUM CHLORIDE 0.9 % (FLUSH) 0.9 %
5-40 SYRINGE (ML) INJECTION EVERY 12 HOURS SCHEDULED
Status: CANCELLED | OUTPATIENT
Start: 2025-06-30

## 2025-06-30 RX ORDER — PROCHLORPERAZINE EDISYLATE 5 MG/ML
5 INJECTION INTRAMUSCULAR; INTRAVENOUS
Status: DISCONTINUED | OUTPATIENT
Start: 2025-06-30 | End: 2025-06-30 | Stop reason: HOSPADM

## 2025-06-30 RX ORDER — HYDRALAZINE HYDROCHLORIDE 20 MG/ML
10 INJECTION INTRAMUSCULAR; INTRAVENOUS
Status: DISCONTINUED | OUTPATIENT
Start: 2025-06-30 | End: 2025-06-30 | Stop reason: HOSPADM

## 2025-06-30 RX ORDER — LABETALOL HYDROCHLORIDE 5 MG/ML
10 INJECTION, SOLUTION INTRAVENOUS
Status: DISCONTINUED | OUTPATIENT
Start: 2025-06-30 | End: 2025-06-30 | Stop reason: HOSPADM

## 2025-06-30 RX ORDER — SODIUM CHLORIDE 9 MG/ML
INJECTION, SOLUTION INTRAVENOUS PRN
Status: CANCELLED | OUTPATIENT
Start: 2025-06-30

## 2025-06-30 RX ORDER — SODIUM CHLORIDE 9 MG/ML
INJECTION, SOLUTION INTRAVENOUS PRN
Status: DISCONTINUED | OUTPATIENT
Start: 2025-06-30 | End: 2025-06-30 | Stop reason: HOSPADM

## 2025-06-30 RX ORDER — HYDROMORPHONE HYDROCHLORIDE 1 MG/ML
0.5 INJECTION, SOLUTION INTRAMUSCULAR; INTRAVENOUS; SUBCUTANEOUS EVERY 5 MIN PRN
Status: DISCONTINUED | OUTPATIENT
Start: 2025-06-30 | End: 2025-06-30 | Stop reason: HOSPADM

## 2025-06-30 RX ORDER — SODIUM CHLORIDE 9 MG/ML
INJECTION, SOLUTION INTRAVENOUS CONTINUOUS
Status: CANCELLED | OUTPATIENT
Start: 2025-06-30

## 2025-06-30 RX ORDER — SODIUM CHLORIDE, SODIUM LACTATE, POTASSIUM CHLORIDE, CALCIUM CHLORIDE 600; 310; 30; 20 MG/100ML; MG/100ML; MG/100ML; MG/100ML
INJECTION, SOLUTION INTRAVENOUS CONTINUOUS
Status: DISCONTINUED | OUTPATIENT
Start: 2025-06-30 | End: 2025-06-30 | Stop reason: HOSPADM

## 2025-06-30 RX ORDER — IODIXANOL 320 MG/ML
INJECTION, SOLUTION INTRAVASCULAR PRN
Status: DISCONTINUED | OUTPATIENT
Start: 2025-06-30 | End: 2025-06-30 | Stop reason: ALTCHOICE

## 2025-06-30 RX ORDER — ROCURONIUM BROMIDE 10 MG/ML
INJECTION, SOLUTION INTRAVENOUS
Status: DISCONTINUED | OUTPATIENT
Start: 2025-06-30 | End: 2025-06-30 | Stop reason: SDUPTHER

## 2025-06-30 RX ORDER — HEPARIN SODIUM 1000 [USP'U]/ML
INJECTION, SOLUTION INTRAVENOUS; SUBCUTANEOUS
Status: DISCONTINUED | OUTPATIENT
Start: 2025-06-30 | End: 2025-06-30 | Stop reason: SDUPTHER

## 2025-06-30 RX ORDER — SODIUM CHLORIDE 0.9 % (FLUSH) 0.9 %
5-40 SYRINGE (ML) INJECTION PRN
Status: DISCONTINUED | OUTPATIENT
Start: 2025-06-30 | End: 2025-06-30 | Stop reason: HOSPADM

## 2025-06-30 RX ORDER — DIPHENHYDRAMINE HYDROCHLORIDE 50 MG/ML
12.5 INJECTION, SOLUTION INTRAMUSCULAR; INTRAVENOUS
Status: DISCONTINUED | OUTPATIENT
Start: 2025-06-30 | End: 2025-06-30 | Stop reason: HOSPADM

## 2025-06-30 RX ORDER — SODIUM CHLORIDE 0.9 % (FLUSH) 0.9 %
5-40 SYRINGE (ML) INJECTION PRN
Status: CANCELLED | OUTPATIENT
Start: 2025-06-30

## 2025-06-30 RX ORDER — EPHEDRINE SULFATE/0.9% NACL/PF 25 MG/5 ML
SYRINGE (ML) INTRAVENOUS
Status: DISCONTINUED | OUTPATIENT
Start: 2025-06-30 | End: 2025-06-30 | Stop reason: SDUPTHER

## 2025-06-30 RX ORDER — ONDANSETRON 2 MG/ML
INJECTION INTRAMUSCULAR; INTRAVENOUS
Status: DISCONTINUED | OUTPATIENT
Start: 2025-06-30 | End: 2025-06-30 | Stop reason: SDUPTHER

## 2025-06-30 RX ORDER — ASPIRIN 81 MG/1
81 TABLET ORAL ONCE
Status: COMPLETED | OUTPATIENT
Start: 2025-06-30 | End: 2025-06-30

## 2025-06-30 RX ORDER — IPRATROPIUM BROMIDE AND ALBUTEROL SULFATE 2.5; .5 MG/3ML; MG/3ML
1 SOLUTION RESPIRATORY (INHALATION)
Status: DISCONTINUED | OUTPATIENT
Start: 2025-06-30 | End: 2025-06-30 | Stop reason: HOSPADM

## 2025-06-30 RX ADMIN — PROPOFOL 120 MG: 10 INJECTION, EMULSION INTRAVENOUS at 09:22

## 2025-06-30 RX ADMIN — EPHEDRINE SULFATE 10 MG: 5 INJECTION INTRAVENOUS at 09:30

## 2025-06-30 RX ADMIN — HEPARIN SODIUM 5000 UNITS: 1000 INJECTION, SOLUTION INTRAVENOUS; SUBCUTANEOUS at 10:30

## 2025-06-30 RX ADMIN — ONDANSETRON 4 MG: 2 INJECTION INTRAMUSCULAR; INTRAVENOUS at 14:21

## 2025-06-30 RX ADMIN — HYDROMORPHONE HYDROCHLORIDE 0.25 MG: 1 INJECTION, SOLUTION INTRAMUSCULAR; INTRAVENOUS; SUBCUTANEOUS at 15:26

## 2025-06-30 RX ADMIN — PHENYLEPHRINE HYDROCHLORIDE 25 MCG/MIN: 10 INJECTION INTRAVENOUS at 09:38

## 2025-06-30 RX ADMIN — FENTANYL CITRATE 50 MCG: 0.05 INJECTION, SOLUTION INTRAMUSCULAR; INTRAVENOUS at 09:51

## 2025-06-30 RX ADMIN — PHENYLEPHRINE HYDROCHLORIDE 50 MCG: 10 INJECTION INTRAVENOUS at 09:33

## 2025-06-30 RX ADMIN — ROCURONIUM BROMIDE 50 MG: 10 INJECTION, SOLUTION INTRAVENOUS at 09:22

## 2025-06-30 RX ADMIN — SODIUM CHLORIDE, SODIUM LACTATE, POTASSIUM CHLORIDE, AND CALCIUM CHLORIDE: .6; .31; .03; .02 INJECTION, SOLUTION INTRAVENOUS at 08:17

## 2025-06-30 RX ADMIN — HEPARIN SODIUM 1000 UNITS: 1000 INJECTION, SOLUTION INTRAVENOUS; SUBCUTANEOUS at 12:23

## 2025-06-30 RX ADMIN — ROCURONIUM BROMIDE 30 MG: 10 INJECTION, SOLUTION INTRAVENOUS at 11:58

## 2025-06-30 RX ADMIN — HEPARIN SODIUM 1000 UNITS: 1000 INJECTION, SOLUTION INTRAVENOUS; SUBCUTANEOUS at 13:25

## 2025-06-30 RX ADMIN — ROCURONIUM BROMIDE 20 MG: 10 INJECTION, SOLUTION INTRAVENOUS at 12:58

## 2025-06-30 RX ADMIN — LIDOCAINE HYDROCHLORIDE 50 MG: 10 INJECTION, SOLUTION INFILTRATION; PERINEURAL at 09:22

## 2025-06-30 RX ADMIN — FENTANYL CITRATE 50 MCG: 0.05 INJECTION, SOLUTION INTRAMUSCULAR; INTRAVENOUS at 09:21

## 2025-06-30 RX ADMIN — ASPIRIN 81 MG: 81 TABLET, COATED ORAL at 08:23

## 2025-06-30 RX ADMIN — SUGAMMADEX 200 MG: 100 INJECTION, SOLUTION INTRAVENOUS at 14:21

## 2025-06-30 RX ADMIN — HEPARIN SODIUM 1000 UNITS: 1000 INJECTION, SOLUTION INTRAVENOUS; SUBCUTANEOUS at 11:23

## 2025-06-30 RX ADMIN — PROPOFOL 80 MG: 10 INJECTION, EMULSION INTRAVENOUS at 09:51

## 2025-06-30 ASSESSMENT — PAIN - FUNCTIONAL ASSESSMENT
PAIN_FUNCTIONAL_ASSESSMENT: NONE - DENIES PAIN
PAIN_FUNCTIONAL_ASSESSMENT: PREVENTS OR INTERFERES SOME ACTIVE ACTIVITIES AND ADLS

## 2025-06-30 ASSESSMENT — PAIN DESCRIPTION - LOCATION: LOCATION: LEG;ANKLE

## 2025-06-30 ASSESSMENT — PAIN SCALES - GENERAL: PAINLEVEL_OUTOF10: 6

## 2025-06-30 ASSESSMENT — PAIN DESCRIPTION - ORIENTATION: ORIENTATION: LEFT

## 2025-06-30 ASSESSMENT — LIFESTYLE VARIABLES: SMOKING_STATUS: 0

## 2025-06-30 ASSESSMENT — PAIN DESCRIPTION - DESCRIPTORS: DESCRIPTORS: ACHING

## 2025-06-30 NOTE — ANESTHESIA PROCEDURE NOTES
Arterial Line:    An arterial line was placed using ultrasound guidance, in the holding area for the following indication(s): continuous blood pressure monitoring and blood sampling needed.    A 20 gauge (size), 4.45 cm (length), Arrow (type) catheter was placed, Seldinger technique used, into the right radial artery and 1% lidocaine 0.25 ml, secured by tape and Tegaderm and biopatch.  Anesthesia type: Local  Local infiltration: Injection    Events:  patient tolerated procedure well with no complications and EBL 0mL.6/30/2025 9:08 AM6/30/2025 9:10 AM  Anesthesiologist: Yudi Trejo MD  Performed: Anesthesiologist   Preanesthetic Checklist  Completed: patient identified, IV checked, site marked, risks and benefits discussed, surgical/procedural consents, equipment checked, pre-op evaluation, timeout performed, anesthesia consent given, oxygen available, monitors applied/VS acknowledged, fire risk safety assessment completed and verbalized and blood product R/B/A discussed and consented

## 2025-06-30 NOTE — ANESTHESIA POSTPROCEDURE EVALUATION
Department of Anesthesiology  Postprocedure Note    Patient: Jong Loyd  MRN: [id number]  YOB: 1950  Date of evaluation: 6/30/2025    Procedure Summary       Date: 06/30/25 Room / Location: [facility] OR [address]    Anesthesia Start: 0917 Anesthesia Stop: 1451    Procedure: INTEROPERATIVE ANGIOGRAM OF RIGHT LEG WITH ANGIOPLASTY AND STENTING (Right) Diagnosis:       Atherosclerosis of native artery of both lower extremities with intermittent claudication      (Atherosclerosis of native artery of both lower extremities with intermittent claudication [I70.213])    Surgeons: Karen Morrow DO Responsible Provider: Valencia Landeros APRN - CRNA    Anesthesia Type: General, TIVA ASA Status: 3            Anesthesia Type: General, TIVA    Malaika Phase I: Malaika Score: 10    Malaika Phase II:      Anesthesia Post Evaluation    Patient location during evaluation: PACU  Patient participation: complete - patient participated  Level of consciousness: awake and alert  Pain score: 0  Airway patency: patent  Nausea & Vomiting: no nausea and no vomiting  Cardiovascular status: blood pressure returned to baseline  Respiratory status: acceptable, spontaneous ventilation, nonlabored ventilation and face mask  Hydration status: euvolemic  Comments: BP (!) 160/72   Pulse 65   Temp 97.1 °F (36.2 °C) (Tympanic)   Resp 14   Ht 1.753 m (5' 9\")   Wt 64 kg (141 lb)   SpO2 98%   BMI 20.82 kg/m²     Pain management: adequate    No notable events documented.

## 2025-06-30 NOTE — INTERVAL H&P NOTE
Update History & Physical    The patient's History and Physical of June 26, 2025 was reviewed with the patient and I examined the patient. There was no change. The surgical site was confirmed by the patient and me.     Plan: The risks, benefits, expected outcome, and alternative to the recommended procedure have been discussed with the patient. Patient understands and wants to proceed with the procedure.     Electronically signed by Karen Morrow DO on 6/30/2025 at 9:07 AM

## 2025-06-30 NOTE — DISCHARGE INSTRUCTIONS
POST ANGIOGRAM INSTRUCTIONS  1.  You should rest until the morning after your procedure, up around the house and to the bathroom only on the day of your procedure.  2.  You must be transported home by a responsible person after your procedure, you cannot drive yourself home.  3.  Do not drive for 48 hours after discharge home.  4.  Drink a 6-8 ounce glass of non-alcoholic liquid every 2 hours until bedtime on the day of the procedure to help flush the contrast used (you will make more urine) .  5.  Check the puncture site after each activity for bleeding or swelling.    6.  If bleeding occurs, apply pressure to site and call 911 or rush to the nearest emergency room (do NOT drive yourself!).  7.  Resume normal non-strenuous activity 48 hours after discharge home.  8.  Bruising and soreness at the puncture site may occur.  This will heal and clear after several weeks.    9.  Keep your leg (with puncture site) mostly straight while sitting or lying for the first 24 hours.    10. No heavy lifting or straining (more than 10 pounds) for 48 hours after the procedure.  11.  Keep the bandage over the puncture site for 24 hours after discharge home.  You may remove the bandage and shower after 24 hours.  12.  Once a day for the next 3 days, look at the puncture site, call physician if you    notice:  * red and/or hot at the puncture site  * bloody drainage, foul-smelling, yellowish or greenish drainage from the puncture site  *a very large bruise under/arond the puncture site (often firm to touch)numbness, tingling in foot/leg/or loss of motion/sensation in foot or leg  *itching/hives on any part of your body; or nausea/vomiting after receiving the dye

## 2025-06-30 NOTE — ANESTHESIA PRE PROCEDURE
Department of Anesthesiology  Preprocedure Note       Name:  Jong Loyd   Age:  75 y.o.  :  1950                                          MRN:  [id number]         Date:  2025      Surgeon: Surgeon(s):  Karen Morrow DO    Procedure: Procedure(s):  INTEROPERATIVE ANGIOGRAM OF RIGHT LEG WITH ANBGIOPLASTY STENT AND ATHERECTOMY LYSIS    Medications prior to admission:   Prior to Admission medications    Medication Sig Start Date End Date Taking? Authorizing Provider   ELIQUIS 5 MG TABS tablet TAKE ONE TABLET BY MOUTH TWICE A DAY 25  Yes Dorothy Poole APRN   Zanubrutinib 80 MG CAPS Take 2 capsules by mouth daily  Patient taking differently: Take 4 capsules by mouth nightly 25  Yes Ileana Strange APRN   traZODone (DESYREL) 50 MG tablet Take 1 tablet by mouth nightly   Yes Provider, MD Wilmer   levothyroxine (SYNTHROID) 150 MCG tablet Take 1 tablet by mouth Daily   Yes ProviderWilmer MD   clopidogrel (PLAVIX) 75 MG tablet Take 1 tablet by mouth daily   Yes ProviderWilmer MD   finasteride (PROSCAR) 5 MG tablet Take 1 tablet by mouth daily   Yes Provider, MD Wilmer   pravastatin (PRAVACHOL) 40 MG tablet Take 1 tablet by mouth daily   Yes ProviderWilmer MD   montelukast (SINGULAIR) 10 MG tablet Take 1 tablet by mouth nightly    ProviderWilmer MD       Current medications:    Current Facility-Administered Medications   Medication Dose Route Frequency Provider Last Rate Last Admin    sodium chloride flush 0.9 % injection 5-40 mL  5-40 mL IntraVENous 2 times per day Dorothy Poole APRN        sodium chloride flush 0.9 % injection 5-40 mL  5-40 mL IntraVENous PRN Dorothy Poole APRN        0.9 % sodium chloride infusion   IntraVENous PRN Dorothy Poole APRN        ceFAZolin (ANCEF) 2,000 mg in sterile water 20 mL IV syringe  2,000 mg IntraVENous On Call to OR Dorothy Poole APRN        lactated ringers infusion   IntraVENous Continuous

## 2025-06-30 NOTE — OP NOTE
Operative Note      Patient: Jong Loyd  YOB: 1950  MRN: [id number]    Date of Procedure: 6/30/2025    Pre-Op Diagnosis Codes:      * Atherosclerosis of native artery of both lower extremities with intermittent claudication [I70.213]    Post-Op Diagnosis: Same       Procedure(s):  INTEROPERATIVE ANGIOGRAM OF RIGHT LEG WITH ANGIOPLASTY AND STENTING  IVUS    Surgeon(s):  Karen Morrow DO    Assistant:   * No surgical staff found *    Anesthesia: General    Estimated Blood Loss (mL): 200     Complications: None    Specimens:   * No specimens in log *    Implants:  Implant Name Type Inv. Item Serial No.  Lot No. LRB No. Used Action   GRAFT EVAR L150MM DIA5MM CATH L120CM DIA6FR 0.035IN HEP - [id number] Peripheral stents GRAFT EVAR L150MM DIA5MM CATH L120CM DIA6FR 0.035IN HEP [id number] [employer]  Right 1 Implanted   STENT PERIPH L100MM DIA5MM CATH L120CM DIA6FR 0.035IN HEP - [id number] Peripheral stents STENT PERIPH L100MM DIA5MM CATH L120CM DIA6FR 0.035IN HEP [id number] [employer]  Right 1 Implanted         Drains:   Urinary Catheter 06/30/25 2 Way (Active)       Findings:  Aorta normal caliber, bilateral common iliac arteries patent, right external iliac stent patent, left external iliac artery patent.  Right common femoral artery patent, profunda femoris patent, SFA with stenosis proximally, patent to the point right before the mid SFA, stent is occluded with reconstitution to below-knee popliteal artery, for short segment, poor tibial outflow essentially just collateralized flow to distal PT and portion of AT.  Left common femoral artery patent, profunda femoris patent, occluded with occlusion inside the stent.  Reconstitution to below-knee popliteal artery segment and in collateralized flow to peroneal artery.    Detailed Description of Procedure:   Patient was brought to the hybrid operating room and placed in supine position.  He received

## 2025-06-30 NOTE — DISCHARGE INSTR - PHARMACY
the amount of fluids you drink.     You can eat your normal diet. If your stomach is upset, try bland, low-fat foods like plain rice, broiled chicken, toast, and yogurt.   Medicines    Be safe with medicines. Read and follow all instructions on the label.  If the doctor gave you a prescription medicine for pain, take it as prescribed.  If you are not taking a prescription pain medicine, ask your doctor if you can take an over-the-counter medicine.     If you stopped taking aspirin or some other blood thinner, your doctor will tell you when to start taking it again.     Your doctor will tell you if and when you can restart your medicines. He or she will also give you instructions about taking any new medicines.   Care of the catheter site    You will have a dressing over the cut (incision). A dressing helps the incision heal and protects it. Your doctor will tell you how to take care of this.     Put ice or a cold pack on the area for 10 to 20 minutes at a time to help with soreness or swelling. Put a thin cloth between the ice and your skin.     You may shower 24 to 48 hours after the procedure, if your doctor okays it. Pat the incision dry.     Do not soak the catheter site until it is healed. Don't take a bath for 1 week, or until your doctor tells you it is okay.     Watch for bleeding from the site. A small amount of blood (up to the size of a quarter) on the bandage can be normal.     If you are bleeding, lie down and press on the area for 15 minutes to try to make it stop. If the bleeding does not stop, call your doctor or seek immediate medical care.   Follow-up care is a key part of your treatment and safety. Be sure to make and go to all appointments, and call your doctor if you are having problems. It's also a good idea to know your test results and keep a list of the medicines you take.  When should you call for help?   Call 911 anytime you think you may need emergency care. For example, call if:    You  passed out (lost consciousness).     You have severe trouble breathing.     You have sudden chest pain and shortness of breath, or you cough up blood.   Call your doctor now or seek immediate medical care if:    You are bleeding from the area where the catheter was put in your artery.     You have a fast-growing, painful lump at the catheter site.     You have signs of infection, such as:  Increased pain, swelling, warmth, or redness.  Red streaks leading from the incision.  Pus draining from the incision.  A fever.   Watch closely for any changes in your health, and be sure to contact your doctor if:    You don't get better as expected.   Where can you learn more?  Go to https://www.[employer].net/patientEd and enter F688 to learn more about \"Angiogram: What to Expect at Home.\"  Current as of: July 31, 2024  Content Version: 14.5  © 2024-2025 [employer].   Care instructions adapted under license by [employer]. If you have questions about a medical condition or this instruction, always ask your healthcare professional. [employer] disclaims any warranty or liability for your use of this information.

## 2025-07-23 ENCOUNTER — TELEPHONE (OUTPATIENT)
Dept: HEMATOLOGY | Age: 75
End: 2025-07-23

## 2025-07-23 NOTE — TELEPHONE ENCOUNTER
Called pt. to remind them of appointment on 07/28/2025 and had to leave a detailed voicemail with appointment date and time. Reminded patient to just come at appointment time, and to not come at the lab appointment time. We have now moved to the [facility] [place] that is located between our old office and the ER at the [facility]. Letting the Pt know that our front entrance faces the [employer] fields and leaving our address. Reminded pt to eat well and be well hydrated for their labs.   
No

## 2025-07-24 ENCOUNTER — OFFICE VISIT (OUTPATIENT)
Dept: OTOLARYNGOLOGY | Facility: CLINIC | Age: 75
End: 2025-07-24
Payer: OTHER GOVERNMENT

## 2025-07-24 VITALS — BODY MASS INDEX: 20.88 KG/M2 | TEMPERATURE: 98.6 F | HEIGHT: 69 IN | WEIGHT: 141 LBS | HEART RATE: 78 BPM

## 2025-07-24 DIAGNOSIS — R09.81 NASAL CONGESTION: ICD-10-CM

## 2025-07-24 DIAGNOSIS — J34.2 NASAL SEPTAL DEVIATION: ICD-10-CM

## 2025-07-24 DIAGNOSIS — J33.9 NASAL POLYP: Primary | ICD-10-CM

## 2025-07-24 RX ORDER — CEFUROXIME AXETIL 500 MG/1
500 TABLET ORAL 2 TIMES DAILY
Qty: 20 TABLET | Refills: 0 | Status: SHIPPED | OUTPATIENT
Start: 2025-07-24 | End: 2025-08-03

## 2025-07-24 NOTE — PROGRESS NOTES
YOB: 1950  Location: [facility]  Location Address: [address] [place] [address], [facility], [address] [place] [address]  Location Phone: [phone]    Chief Complaint   Patient presents with    Nasal Mass       History of Present Illness  Jose Mendez is a 75 y.o. male.  Jose Mendez is here for evaluation of ENT complaints. The patient has had problems with difficulty breathing through the nose for the last 6 weeks. He presented to [facility] and had ct scan performed revealing nasal mass   Patient states he has not been able to breath through the right side of his nose in the past several weeks.   Patient has been placed on antibiotics with no improvement in symptoms     Patient is currently taking eliquis and plavix     [facility]        Past Medical History:   Diagnosis Date    Atherosclerosis     Disease of thyroid gland     Hyperlipidemia     Hypertension        Past Surgical History:   Procedure Laterality Date    ANKLE FUSION Bilateral     APPENDECTOMY      COLONOSCOPY N/A 10/14/2019    Procedure: COLONOSCOPY WITH ANESTHESIA;  Surgeon: Crow Patton DO;  Location: [facility] ENDOSCOPY;  Service: Gastroenterology    ENDOSCOPY N/A 10/10/2019    Procedure: ESOPHAGOGASTRODUODENOSCOPY WITH ANESTHESIA;  Surgeon: Crow Patton DO;  Location: [facility] ENDOSCOPY;  Service: Gastroenterology    ENDOSCOPY N/A 1/3/2020    Procedure: ESOPHAGOGASTRODUODENOSCOPY WITH ANESTHESIA;  Surgeon: Crow Patton DO;  Location: [facility] ENDOSCOPY;  Service: Gastroenterology    FEMORAL ARTERY STENT Right     OTHER SURGICAL HISTORY Right approx.2008    stent right leg       Outpatient Medications Marked as Taking for the 25 encounter (Office Visit) with Mansoor Kay MD   Medication Sig Dispense Refill    apixaban (Eliquis) 5 MG tablet tablet Take 1 tablet by mouth 2 (Two) Times a Day. Indications: Post Surgical - Other      clopidogrel (PLAVIX) 75 MG tablet Take 1 tablet by mouth  Daily. Indications: Disease of the Peripheral Arteries      finasteride (PROSCAR) 5 MG tablet Take 1 tablet by mouth Daily. Indications: Benign Enlargement of Prostate      levothyroxine (SYNTHROID, LEVOTHROID) 100 MCG tablet Take 1 tablet by mouth Daily. Indications: Underactive Thyroid      losartan (COZAAR) 100 MG tablet Take 1 tablet by mouth Daily. Indications: High Blood Pressure      pravastatin (PRAVACHOL) 40 MG tablet Take 1 tablet by mouth Daily. Indications: Disease of the Peripheral Arteries      sildenafil (VIAGRA) 100 MG tablet Take 1 tablet by mouth Daily As Needed for Erectile Dysfunction.      traZODone (DESYREL) 50 MG tablet Take 1 tablet by mouth Every Night. Indications: Trouble Sleeping      Zanubrutinib (BRUKINSA) 80 MG chemo capsule Take 2 capsules by mouth 2 (Two) Times a Day. Indications: Small Lymphocytic Lymphoma         Augmentin [amoxicillin-pot clavulanate], Codeine, Hydroxyzine, Tramadol, and Vancomycin    Family History   Problem Relation Age of Onset    Colon cancer Neg Hx     Colon polyps Neg Hx     Esophageal cancer Neg Hx        Social History     Socioeconomic History    Marital status:    Tobacco Use    Smoking status: Former     Current packs/day: 0.25     Average packs/day: 0.3 packs/day for 50.0 years (12.5 ttl pk-yrs)     Types: Cigarettes    Smokeless tobacco: Never   Vaping Use    Vaping status: Never Used   Substance and Sexual Activity    Alcohol use: No    Drug use: No    Sexual activity: Defer       Review of Systems   Constitutional: Negative.    HENT:  Positive for congestion and sinus pressure.        Vitals:    07/24/25 1505   Pulse: 78   Temp: 98.6 °F (37 °C)       Body mass index is 20.82 kg/m².    Objective     Physical Exam  Vitals reviewed.   Constitutional:       Appearance: Normal appearance. He is normal weight.   HENT:      Head: Normocephalic.      Right Ear: Tympanic membrane, ear canal and external ear normal.      Left Ear: Tympanic membrane,  ear canal and external ear normal.      Nose:      Comments: Septal deviation   Polypoid mass noted to right nasal cavity        Mouth/Throat:      Lips: Pink.      Mouth: Mucous membranes are moist.   Neurological:      Mental Status: He is alert.         Assessment & Plan   Diagnoses and all orders for this visit:    1. Nasal polyp (Primary)  -     CT Sinus Without Contrast; Future  -     Case Request; Standing  -     Follow Anesthesia Guidelines / Protocol; Future  -     Follow Anesthesia Guidelines / Protocol; Standing  -     Verify NPO Status; Standing  -     SCD (Sequential Compression Device) - To Be Placed on Patient in Pre-Op; Standing  -     Patient to Void Prior to Transfer to OR; Standing  -     Instructions for Nursing; Standing  -     Case Request    2. Nasal congestion  -     Case Request; Standing  -     Follow Anesthesia Guidelines / Protocol; Future  -     Follow Anesthesia Guidelines / Protocol; Standing  -     Verify NPO Status; Standing  -     SCD (Sequential Compression Device) - To Be Placed on Patient in Pre-Op; Standing  -     Patient to Void Prior to Transfer to OR; Standing  -     Instructions for Nursing; Standing  -     Case Request    3. Nasal septal deviation  -     Case Request; Standing  -     Follow Anesthesia Guidelines / Protocol; Future  -     Follow Anesthesia Guidelines / Protocol; Standing  -     Verify NPO Status; Standing  -     SCD (Sequential Compression Device) - To Be Placed on Patient in Pre-Op; Standing  -     Patient to Void Prior to Transfer to OR; Standing  -     Instructions for Nursing; Standing  -     Case Request    Other orders  -     cefuroxime (CEFTIN) 500 MG tablet; Take 1 tablet by mouth 2 (Two) Times a Day for 10 days.  Dispense: 20 tablet; Refill: 0      RIGHT ENDOSCOPIC FUNCTIONAL SINUS SURGERY W TRACKING WITH SPHENOIDOTOMY WITH REMOVAL OF NASAL POLYP (Right), LEFT RIGID NASAL ENDOSCOPY (N/A)  Orders Placed This Encounter   Procedures    CT Sinus Without  Contrast     Standing Status:   Future     Expected Date:   7/29/2025     Expiration Date:   7/24/2026     Scheduling Instructions:      Stealth -  image guidance     Release to patient:   Routine Release [[id number]]     Reason for Exam::   nasal polyp     No follow-ups on file.     Repeat image guided ct scan   Surgical vs conservative options discussed  Will discuss case with vascular surgeon for definitive plan on surgery date       Patient Instructions   FUNCTIONAL ENDOSCOPIC SINUS SURGERY: A functional endoscopic sinus surgery was recommended. The risks and benefits were explained including but not limited to pain, bleeding (with the possible need for nasal packing), infection, risks of the general anesthesia, orbital injury with blurred vision or visual loss, cerebrospinal fluid leak, persistent disease, scarring, synichiae and the possibility for the need of reoperation. Possibilities of additional sinus work or less sinus work depending on the status of the nose at the time of the operation was discussed. Alternatives were discussed. No guarantees were made or implied. Questions were asked appropriately answered.

## 2025-07-25 ENCOUNTER — TELEPHONE (OUTPATIENT)
Dept: OTOLARYNGOLOGY | Facility: CLINIC | Age: 75
End: 2025-07-25
Payer: OTHER GOVERNMENT

## 2025-07-25 ENCOUNTER — TELEPHONE (OUTPATIENT)
Dept: VASCULAR SURGERY | Facility: CLINIC | Age: 75
End: 2025-07-25
Payer: OTHER GOVERNMENT

## 2025-07-25 NOTE — TELEPHONE ENCOUNTER
Spoke with patient and informed we have him a new appointment at 0900 on 7/29/2025 and he has voiced understanding and will attend appointment

## 2025-07-25 NOTE — TELEPHONE ENCOUNTER
Phone call to patient to advise of appt for ct sinus.  7/29 at 3:30 with an arrival time of 3:00 pm at main entrance of hospital Patient voiced understanding

## 2025-07-28 ENCOUNTER — TELEPHONE (OUTPATIENT)
Dept: VASCULAR SURGERY | Facility: CLINIC | Age: 75
End: 2025-07-28
Payer: OTHER GOVERNMENT

## 2025-07-29 ENCOUNTER — TELEPHONE (OUTPATIENT)
Dept: OTOLARYNGOLOGY | Facility: CLINIC | Age: 75
End: 2025-07-29
Payer: OTHER GOVERNMENT

## 2025-07-29 ENCOUNTER — OFFICE VISIT (OUTPATIENT)
Dept: VASCULAR SURGERY | Facility: CLINIC | Age: 75
End: 2025-07-29
Payer: OTHER GOVERNMENT

## 2025-07-29 ENCOUNTER — HOSPITAL ENCOUNTER (OUTPATIENT)
Dept: CT IMAGING | Facility: HOSPITAL | Age: 75
Discharge: HOME | End: 2025-07-29
Payer: OTHER GOVERNMENT

## 2025-07-29 ENCOUNTER — PRE-ADMISSION TESTING (OUTPATIENT)
Dept: PREADMISSION TESTING | Facility: HOSPITAL | Age: 75
End: 2025-07-29
Payer: OTHER GOVERNMENT

## 2025-07-29 VITALS
HEART RATE: 70 BPM | SYSTOLIC BLOOD PRESSURE: 144 MMHG | HEIGHT: 69 IN | BODY MASS INDEX: 21.13 KG/M2 | DIASTOLIC BLOOD PRESSURE: 75 MMHG | OXYGEN SATURATION: 99 % | WEIGHT: 142.64 LBS | RESPIRATION RATE: 18 BRPM

## 2025-07-29 VITALS
HEIGHT: 69 IN | HEART RATE: 65 BPM | SYSTOLIC BLOOD PRESSURE: 136 MMHG | OXYGEN SATURATION: 98 % | WEIGHT: 137 LBS | DIASTOLIC BLOOD PRESSURE: 68 MMHG | BODY MASS INDEX: 20.29 KG/M2

## 2025-07-29 DIAGNOSIS — I73.9 PAD (PERIPHERAL ARTERY DISEASE): Primary | ICD-10-CM

## 2025-07-29 DIAGNOSIS — I10 BENIGN ESSENTIAL HTN: ICD-10-CM

## 2025-07-29 DIAGNOSIS — J33.9 NASAL POLYP: ICD-10-CM

## 2025-07-29 PROBLEM — C91.10 CLL (CHRONIC LYMPHOCYTIC LEUKEMIA): Status: ACTIVE | Noted: 2021-05-17

## 2025-07-29 PROBLEM — D62 ACUTE BLOOD LOSS AS CAUSE OF POSTOPERATIVE ANEMIA: Status: ACTIVE | Noted: 2024-10-10

## 2025-07-29 PROBLEM — E44.0 MODERATE MALNUTRITION: Status: ACTIVE | Noted: 2024-10-11

## 2025-07-29 PROBLEM — H02.003 ENTROPION OF RIGHT EYELID: Status: ACTIVE | Noted: 2022-01-11

## 2025-07-29 PROBLEM — N32.89 MASS OF URINARY BLADDER: Status: ACTIVE | Noted: 2021-12-15

## 2025-07-29 PROBLEM — Z90.79 HISTORY OF ORCHIECTOMY: Status: ACTIVE | Noted: 2021-10-28

## 2025-07-29 PROBLEM — I65.23 CAROTID STENOSIS, ASYMPTOMATIC, BILATERAL: Status: ACTIVE | Noted: 2021-05-17

## 2025-07-29 PROBLEM — I70.202 ATHEROSCLEROSIS OF ARTERY OF LEFT LOWER EXTREMITY: Status: ACTIVE | Noted: 2024-07-22

## 2025-07-29 PROBLEM — I70.219 ATHEROSCLEROSIS WITH CLAUDICATION OF EXTREMITY: Status: ACTIVE | Noted: 2021-05-17

## 2025-07-29 PROBLEM — I82.409 DEEP VEIN THROMBOSIS (DVT) OF LOWER EXTREMITY: Status: ACTIVE | Noted: 2025-07-29

## 2025-07-29 PROBLEM — N40.0 BPH (BENIGN PROSTATIC HYPERPLASIA): Status: ACTIVE | Noted: 2021-05-17

## 2025-07-29 PROBLEM — J43.9 PULMONARY EMPHYSEMA: Status: ACTIVE | Noted: 2021-05-17

## 2025-07-29 PROBLEM — T14.8XXA HEMATOMA: Status: ACTIVE | Noted: 2024-10-10

## 2025-07-29 PROBLEM — R82.89 URINE CYTOLOGY ABNORMAL: Status: ACTIVE | Noted: 2021-12-15

## 2025-07-29 PROBLEM — C67.9 MALIGNANT NEOPLASM OF URINARY BLADDER: Status: ACTIVE | Noted: 2022-01-03

## 2025-07-29 PROBLEM — Z98.890 POST-OPERATIVE STATE: Status: ACTIVE | Noted: 2022-02-15

## 2025-07-29 PROBLEM — R93.89 ABNORMAL COMPUTED TOMOGRAPHY SCAN: Status: ACTIVE | Noted: 2021-12-15

## 2025-07-29 PROBLEM — I70.222 CRITICAL LIMB ISCHEMIA OF LEFT LOWER EXTREMITY: Status: ACTIVE | Noted: 2024-10-05

## 2025-07-29 PROBLEM — Z51.5 PALLIATIVE CARE PATIENT: Status: ACTIVE | Noted: 2019-09-26

## 2025-07-29 PROBLEM — I70.213 ATHEROSCLEROSIS OF NATIVE ARTERIES OF EXTREMITIES WITH INTERMITTENT CLAUDICATION, BILATERAL LEGS: Status: ACTIVE | Noted: 2024-07-03

## 2025-07-29 LAB
ANION GAP SERPL CALCULATED.3IONS-SCNC: 10 MMOL/L (ref 5–15)
BUN SERPL-MCNC: 16.4 MG/DL (ref 8–23)
BUN/CREAT SERPL: 17.1 (ref 7–25)
CALCIUM SPEC-SCNC: 9 MG/DL (ref 8.6–10.5)
CHLORIDE SERPL-SCNC: 105 MMOL/L (ref 98–107)
CO2 SERPL-SCNC: 26 MMOL/L (ref 22–29)
CREAT SERPL-MCNC: 0.96 MG/DL (ref 0.76–1.27)
DEPRECATED RDW RBC AUTO: 46 FL (ref 37–54)
EGFRCR SERPLBLD CKD-EPI 2021: 82.4 ML/MIN/1.73
ERYTHROCYTE [DISTWIDTH] IN BLOOD BY AUTOMATED COUNT: 14.5 % (ref 12.3–15.4)
GLUCOSE SERPL-MCNC: 106 MG/DL (ref 65–99)
HCT VFR BLD AUTO: 39.6 % (ref 37.5–51)
HGB BLD-MCNC: 11.8 G/DL (ref 13–17.7)
MCH RBC QN AUTO: 25.9 PG (ref 26.6–33)
MCHC RBC AUTO-ENTMCNC: 29.8 G/DL (ref 31.5–35.7)
MCV RBC AUTO: 87 FL (ref 79–97)
PLATELET # BLD AUTO: 167 10*3/MM3 (ref 140–450)
PMV BLD AUTO: 11.2 FL (ref 6–12)
POTASSIUM SERPL-SCNC: 4.2 MMOL/L (ref 3.5–5.2)
RBC # BLD AUTO: 4.55 10*6/MM3 (ref 4.14–5.8)
SODIUM SERPL-SCNC: 141 MMOL/L (ref 136–145)
WBC NRBC COR # BLD AUTO: 6.41 10*3/MM3 (ref 3.4–10.8)

## 2025-07-29 PROCEDURE — 80048 BASIC METABOLIC PNL TOTAL CA: CPT

## 2025-07-29 PROCEDURE — 36415 COLL VENOUS BLD VENIPUNCTURE: CPT

## 2025-07-29 PROCEDURE — 70486 CT MAXILLOFACIAL W/O DYE: CPT

## 2025-07-29 PROCEDURE — 93005 ELECTROCARDIOGRAM TRACING: CPT

## 2025-07-29 PROCEDURE — 85027 COMPLETE CBC AUTOMATED: CPT

## 2025-07-29 PROCEDURE — 99203 OFFICE O/P NEW LOW 30 MIN: CPT | Performed by: SURGERY

## 2025-07-29 NOTE — DISCHARGE INSTRUCTIONS
Preparing for Surgery  Follow these instructions before the procedure:  Several days or weeks before your procedure  Medication(s) you need to stop   _______ days/week prior to surgery: FOLLOW DOCTOR INSTRUCTIONS FOR HOLDING ELIQUIS AND PLAVIX      Ask your health care provider about:  Changing or stopping your regular medicines. This is especially important if you are taking diabetes medicines or blood thinners.  Taking medicines such as aspirin and ibuprofen. These medicines can thin your blood. Do not take these medicines unless your health care provider tells you to take them.  Taking over-the-counter medicines, vitamins, herbs, and supplements.    Contact your surgeon if you:  Develop a fever of more than 100.4°F (38°C) or other feelings of illness during the 48 hours before your surgery.  Have symptoms that get worse.  Have questions or concerns about your surgery.  If you are going home the same day of your surgery you will need to arrange for a responsible adult, age 18 years old or older, to drive you home from the hospital and stay with you for 24 hours. Verification of the  will be made prior to any procedure requiring sedation. You may not go home in a taxi or any form of public transportation by yourself.     Day before your procedure  Medication(s) you need to stop the day before your surgery:VIAGRA    24 hours before your procedure DO NOT drink alcoholic beverages or smoke.  24 hours before your procedure STOP taking Erectile Dysfunction medication (i.e.,Cialis, Viagra)   You may be asked to shower with a germ-killing soap.  Day of your procedure   You may take the following medication(s) the morning of surgery with a sip of water: NORCO      8 hours before your scheduled arrival time, STOP all food, any dairy products, and full liquids. This includes hard candy, chewing gum or mints. This is extremely important to prevent serious complications.     Up to 2 hours before your scheduled arrival  time, you may have clear liquids no cream, powder, or pulp of any kind. Safe options are water, black coffee, plain tea, soda, Gatorade/Powerade, clear broth, apple juice.    2 hours before your scheduled arrival time, STOP drinking clear liquids.    You may need to take another shower with a germ-killing soap before you leave home in the morning. Do not use perfumes, colognes, or body lotions.  Wear comfortable loose-fitting clothing.  Remove all jewelry including body piercing and rings, dark colored nail polish, and make up prior to arrival at the hospital. Leave all valuables at home.   Bring your hearing aids if you rely on them.  Do not wear contact lenses. If you wear eyeglasses remember to bring a case to store them in while you are in surgery.  Do not use denture adhesives since you will be asked to remove them during your surgery.    You do not need to bring your home medications into the hospital.   Bring your sleep apnea device with you on the day of your surgery (if this applies to you).  If you have an Inspire implant for sleep apnea, please bring the remote with you on the day of surgery.  If you wear portable oxygen, bring it with you.   If you are staying overnight, you may bring a bag of items you may need such as slippers, robe and a change of clothes for your discharge. You may want to leave these items in the car until you are ready for them since your family will take your belongings when you leave the pre-operative area.  Arrive at the hospital as scheduled by the office. You will be asked to arrive 2 hours prior to your surgery time in order to prepare for your procedure.  When you arrive at the hospital  Go to the registration desk located at the main entrance of the hospital.  After registration is completed, you will be given a beeper and a sticker sheet. Take the stickers to Outpatient Surgery and place in the tray at the end of the desk to notify the staff that you have arrived and  registered.   Return to the lobby to wait. You are not always called back according to the time of arrival but rather the time your doctor will be ready.  When your beeper lights up and vibrates proceed through the double doors, under the stairs, and a member of the Outpatient Surgery staff will escort you to your preoperative room.

## 2025-07-29 NOTE — LETTER
July 29, 2025     Clary Vargas MD  [address]  [address]  [facility] [address]    Patient: Jose Mendez   YOB: 1950   Date of Visit: 7/29/2025     Dear Clary Vargas MD:       Thank you for referring Jose Mendez to me for evaluation. Below are the relevant portions of my assessment and plan of care.    If you have questions, please do not hesitate to call me. I look forward to following Jose along with you.         Sincerely,        Misha Smart,         CC: ALFRED Mason Griffin K, DO  07/29/25 1223  Addendum  07/29/2025      Angelia Jacobson APRN  [address] [place] [address]    Jose Mendez  1950    Chief Complaint   Patient presents with   • NEW PATIENT     Referred from Angelia Vargas for atherosclerosis. Complains of legs tiring out easily for past few years. Has had about 14 procedures Ileana and Marci Stewart in past 2 years. Legs are still having limiting flow. Former smoker of 60 years and quit 1 year ago.        Dear Angelia Jacobson AP*    HPI  I had the pleasure of seeing your patient Jose Mendez in the office today.  Thank you kindly for this consultation.  As you recall, Jose Mendez is a 75 y.o.  male who you are currently following for routine health maintenance.  He is here today because Dr. Beasley told him there is nothing more he can do.  First stenting to his leg was done in [place] in 2010.  Then Dr. Harrison performed stenting, and in 2024 his care was transferred to Dr. Beasley. She performed multiple surgeries over the last year. He is here to establish care.  He has claudication to his bilateral lower extremities, left worse than right.  He sleeps with legs elevated, and states it causes him to be waken in the middle of the night.  He did recently had RLE angio by Dr. Beasely on 6/30/25. He is a former smoker.  He is maintained on Plavix, Eliquis, and Pravachol.         Past Medical History:   Diagnosis Date   • Atherosclerosis    • Disease of thyroid gland    • Hyperlipidemia    • Hypertension    • Nasal polyp        Past Surgical History:   Procedure Laterality Date   • ANKLE FUSION Bilateral    • APPENDECTOMY     • COLONOSCOPY N/A 10/14/2019    Procedure: COLONOSCOPY WITH ANESTHESIA;  Surgeon: Crow Patton DO;  Location: [facility] ENDOSCOPY;  Service: Gastroenterology   • ENDOSCOPY N/A 10/10/2019    Procedure: ESOPHAGOGASTRODUODENOSCOPY WITH ANESTHESIA;  Surgeon: Crow Patton DO;  Location: [facility] ENDOSCOPY;  Service: Gastroenterology   • ENDOSCOPY N/A 01/03/2020    Procedure: ESOPHAGOGASTRODUODENOSCOPY WITH ANESTHESIA;  Surgeon: Crow Patton DO;  Location: [facility] ENDOSCOPY;  Service: Gastroenterology   • FEMORAL ARTERY STENT Right    • OTHER SURGICAL HISTORY Right approx.2008    stent right leg   • TUMOR REMOVAL  2022    bladder--@ [facility]       Family History   Problem Relation Age of Onset   • Cancer Mother    • Cancer Father    • Diabetes Father    • Cancer Maternal Grandmother    • Colon cancer Neg Hx    • Colon polyps Neg Hx    • Esophageal cancer Neg Hx        Social History     Socioeconomic History   • Marital status:    Tobacco Use   • Smoking status: Former     Current packs/day: 0.25     Average packs/day: 0.3 packs/day for 50.0 years (12.5 ttl pk-yrs)     Types: Cigarettes   • Smokeless tobacco: Never   Vaping Use   • Vaping status: Never Used   Substance and Sexual Activity   • Alcohol use: No   • Drug use: No   • Sexual activity: Defer       Allergies   Allergen Reactions   • Augmentin [Amoxicillin-Pot Clavulanate] Other (See Comments)     Not sure   • Codeine Other (See Comments)     Not sure   • Hydroxyzine Other (See Comments)     Not sure   • Tramadol Other (See Comments)     Not sure   • Vancomycin Itching     Benadryl Helped         Current Outpatient Medications:   •  apixaban (Eliquis) 5 MG tablet tablet, Take 1 tablet by mouth 2  (Two) Times a Day. Indications: Post Surgical - Other, Disp: , Rfl:   •  cefuroxime (CEFTIN) 500 MG tablet, Take 1 tablet by mouth 2 (Two) Times a Day for 10 days., Disp: 20 tablet, Rfl: 0  •  clopidogrel (PLAVIX) 75 MG tablet, Take 1 tablet by mouth Daily. Indications: Disease of the Peripheral Arteries, Disp: , Rfl:   •  finasteride (PROSCAR) 5 MG tablet, Take 1 tablet by mouth Daily. Indications: Benign Enlargement of Prostate, Disp: , Rfl:   •  HYDROcodone-acetaminophen (NORCO) 5-325 MG per tablet, Take 1 tablet by mouth Every 4 (Four) Hours As Needed for Moderate Pain. Indications: Pain, for 72 hours, Disp: , Rfl:   •  levothyroxine (SYNTHROID, LEVOTHROID) 100 MCG tablet, Take 1 tablet by mouth Daily. Indications: Underactive Thyroid, Disp: , Rfl:   •  pravastatin (PRAVACHOL) 40 MG tablet, Take 1 tablet by mouth Daily. Indications: Disease of the Peripheral Arteries, Disp: , Rfl:   •  traZODone (DESYREL) 50 MG tablet, Take 1 tablet by mouth Every Night. Indications: Trouble Sleeping, Disp: , Rfl:   •  Zanubrutinib (BRUKINSA) 80 MG chemo capsule, Take 2 capsules by mouth 2 (Two) Times a Day. Indications: Small Lymphocytic Lymphoma, Disp: , Rfl:   •  losartan (COZAAR) 100 MG tablet, Take 1 tablet by mouth Daily. Indications: High Blood Pressure (Patient not taking: Reported on 7/29/2025), Disp: , Rfl:   •  pantoprazole (PROTONIX) 40 MG EC tablet, Take 40 mg by mouth. (Patient not taking: Reported on 7/29/2025), Disp: , Rfl:   •  pentoxifylline (TRENtal) 400 MG CR tablet, Take 400 mg by mouth 3 (Three) Times a Day With Meals. (Patient not taking: Reported on 7/29/2025), Disp: , Rfl:   •  sildenafil (VIAGRA) 100 MG tablet, Take 1 tablet by mouth Daily As Needed for Erectile Dysfunction., Disp: , Rfl:     Review of Systems   Constitutional: Negative.    HENT: Negative.     Eyes: Negative.    Respiratory: Negative.     Cardiovascular: Negative.         Claudication to bilateral lower extremities, left worse than  "right   Gastrointestinal: Negative.    Endocrine: Negative.    Genitourinary: Negative.    Musculoskeletal:  Positive for arthralgias.   Skin: Negative.    Allergic/Immunologic: Negative.    Neurological: Negative.    Hematological: Negative.    Psychiatric/Behavioral: Negative.     All other systems reviewed and are negative.      /68   Pulse 65   Ht 175.3 cm (69\")   Wt 62.1 kg (137 lb)   SpO2 98%   BMI 20.23 kg/m²     Physical Exam  Vitals and nursing note reviewed.   Constitutional:       Appearance: He is well-developed.   HENT:      Head: Normocephalic and atraumatic.   Eyes:      General: No scleral icterus.     Pupils: Pupils are equal, round, and reactive to light.   Neck:      Thyroid: No thyromegaly.      Vascular: No carotid bruit or JVD.   Cardiovascular:      Rate and Rhythm: Normal rate and regular rhythm.      Pulses:           Dorsalis pedis pulses are 0 on the left side.        Posterior tibial pulses are detected w/ Doppler on the left side.      Heart sounds: Normal heart sounds.      Comments: Left peroneal signal dopplered/weak PT signal dopplered  Pulmonary:      Effort: Pulmonary effort is normal.      Breath sounds: Normal breath sounds.   Abdominal:      General: Bowel sounds are normal. There is no distension or abdominal bruit.      Palpations: Abdomen is soft. There is no mass.      Tenderness: There is no abdominal tenderness.   Musculoskeletal:         General: Normal range of motion.      Cervical back: Neck supple.   Lymphadenopathy:      Cervical: No cervical adenopathy.   Skin:     General: Skin is warm and dry.   Neurological:      Mental Status: He is alert and oriented to person, place, and time.      Cranial Nerves: No cranial nerve deficit.      Sensory: No sensory deficit.         Patient Active Problem List   Diagnosis   • Gastroesophageal reflux disease   • Palliative care patient   • Acute gastric ulcer without hemorrhage or perforation   • Nasal polyp   • Nasal " congestion   • Nasal septal deviation   • Acute blood loss as cause of postoperative anemia   • Abnormal computed tomography scan   • Atherosclerosis of artery of left lower extremity   • Atherosclerosis of native arteries of extremities with intermittent claudication, bilateral legs   • Atherosclerosis with claudication of extremity   • Benign essential HTN   • BPH (benign prostatic hyperplasia)   • Carotid stenosis, asymptomatic, bilateral   • CLL (chronic lymphocytic leukemia)   • Critical limb ischemia of left lower extremity   • Deep vein thrombosis (DVT) of lower extremity   • Entropion of right eyelid   • Hematoma   • History of orchiectomy   • Malignant neoplasm of urinary bladder   • Mass of urinary bladder   • Moderate malnutrition   • PAD (peripheral artery disease)   • PVD (peripheral vascular disease)   • Post-operative state   • Pulmonary emphysema   • Urine cytology abnormal        Diagnosis Plan   1. PAD (peripheral artery disease)  US Arterial Doppler Lower Extremity Bilateral      2. Benign essential HTN            Plan: After thoroughly evaluating Jose Mendez, I believe the best course of action is to remain conservative from vasculare surgery standpoint.  We will see the patient back in 2 weeks with noninvasive testing including bilateral lower extremity arterial duplex for continued surveillance. He should continue Plavix 75 mg daily, Eliquis 5 mg twice daily, and Pravachol 40 mg daily.  I did discuss vascular risk factors as they pertain to the progression of vascular disease including controlling his hypertension.  His blood pressure is stable today in the office.  His PCP can order Lovenox while Eliquis is being held, aspirin 81 mg daily should be taken while he holds Plavix.  He should resume Eliquis and Plavix as soon as possible.  He has extensive stenting to his bilateral lower extremities, most recently on 6/30/25.  Body mass index is 20.23 kg/m².  BMI is within normal parameters. No  other follow-up for BMI required.      This was all discussed in full with complete understanding.    Thank you for allowing me to participate in the care of your patient.  Please do not hesitate to call with any questions or concerns.  We will keep you aware of any further encounters with Jose Mendez.        Sincerely yours,         DO Sam Mckeon Patricia Mary, MD

## 2025-07-29 NOTE — PROGRESS NOTES
07/29/2025      Angelia Jacobson, APRN  [address] [place] [address]    Jose Mendez  1950    Chief Complaint   Patient presents with    NEW PATIENT     Referred from Angelia Vargas for atherosclerosis. Complains of legs tiring out easily for past few years. Has had about 14 procedures Ileana and Riverer Terry in past 2 years. Legs are still having limiting flow. Former smoker of 60 years and quit 1 year ago.        Dear Angelia Jacobson, AP*    HPI  I had the pleasure of seeing your patient Jose Mendez in the office today.  Thank you kindly for this consultation.  As you recall, Jose Mendez is a 75 y.o.  male who you are currently following for routine health maintenance.  He is here today because Dr. Beasley told him there is nothing more he can do.  First stenting to his leg was done in [place] in 2010.  Then Dr. Harrison performed stenting, and in 2024 his care was transferred to Dr. Beasley. She performed multiple surgeries over the last year. He is here to establish care.  He has claudication to his bilateral lower extremities, left worse than right.  He sleeps with legs elevated, and states it causes him to be waken in the middle of the night.  He did recently had RLE angio by Dr. Beasley on 6/30/25. He is a former smoker.  He is maintained on Plavix, Eliquis, and Pravachol.        Past Medical History:   Diagnosis Date    Atherosclerosis     Disease of thyroid gland     Hyperlipidemia     Hypertension     Nasal polyp        Past Surgical History:   Procedure Laterality Date    ANKLE FUSION Bilateral     APPENDECTOMY      COLONOSCOPY N/A 10/14/2019    Procedure: COLONOSCOPY WITH ANESTHESIA;  Surgeon: Crow Patton DO;  Location: [facility] ENDOSCOPY;  Service: Gastroenterology    ENDOSCOPY N/A 10/10/2019    Procedure: ESOPHAGOGASTRODUODENOSCOPY WITH ANESTHESIA;  Surgeon: Crow Patton DO;  Location: [facility] ENDOSCOPY;  Service: Gastroenterology     ENDOSCOPY N/A 01/03/2020    Procedure: ESOPHAGOGASTRODUODENOSCOPY WITH ANESTHESIA;  Surgeon: Crow Patton DO;  Location: [facility] ENDOSCOPY;  Service: Gastroenterology    FEMORAL ARTERY STENT Right     OTHER SURGICAL HISTORY Right approx.2008    stent right leg    TUMOR REMOVAL  2022    bladder--@ [facility]       Family History   Problem Relation Age of Onset    Cancer Mother     Cancer Father     Diabetes Father     Cancer Maternal Grandmother     Colon cancer Neg Hx     Colon polyps Neg Hx     Esophageal cancer Neg Hx        Social History     Socioeconomic History    Marital status:    Tobacco Use    Smoking status: Former     Current packs/day: 0.25     Average packs/day: 0.3 packs/day for 50.0 years (12.5 ttl pk-yrs)     Types: Cigarettes    Smokeless tobacco: Never   Vaping Use    Vaping status: Never Used   Substance and Sexual Activity    Alcohol use: No    Drug use: No    Sexual activity: Defer       Allergies   Allergen Reactions    Augmentin [Amoxicillin-Pot Clavulanate] Other (See Comments)     Not sure    Codeine Other (See Comments)     Not sure    Hydroxyzine Other (See Comments)     Not sure    Tramadol Other (See Comments)     Not sure    Vancomycin Itching     Benadryl Helped         Current Outpatient Medications:     apixaban (Eliquis) 5 MG tablet tablet, Take 1 tablet by mouth 2 (Two) Times a Day. Indications: Post Surgical - Other, Disp: , Rfl:     cefuroxime (CEFTIN) 500 MG tablet, Take 1 tablet by mouth 2 (Two) Times a Day for 10 days., Disp: 20 tablet, Rfl: 0    clopidogrel (PLAVIX) 75 MG tablet, Take 1 tablet by mouth Daily. Indications: Disease of the Peripheral Arteries, Disp: , Rfl:     finasteride (PROSCAR) 5 MG tablet, Take 1 tablet by mouth Daily. Indications: Benign Enlargement of Prostate, Disp: , Rfl:     HYDROcodone-acetaminophen (NORCO) 5-325 MG per tablet, Take 1 tablet by mouth Every 4 (Four) Hours As Needed for Moderate Pain. Indications: Pain, for 72 hours, Disp: ,  "Rfl:     levothyroxine (SYNTHROID, LEVOTHROID) 100 MCG tablet, Take 1 tablet by mouth Daily. Indications: Underactive Thyroid, Disp: , Rfl:     pravastatin (PRAVACHOL) 40 MG tablet, Take 1 tablet by mouth Daily. Indications: Disease of the Peripheral Arteries, Disp: , Rfl:     traZODone (DESYREL) 50 MG tablet, Take 1 tablet by mouth Every Night. Indications: Trouble Sleeping, Disp: , Rfl:     Zanubrutinib (BRUKINSA) 80 MG chemo capsule, Take 2 capsules by mouth 2 (Two) Times a Day. Indications: Small Lymphocytic Lymphoma, Disp: , Rfl:     losartan (COZAAR) 100 MG tablet, Take 1 tablet by mouth Daily. Indications: High Blood Pressure (Patient not taking: Reported on 7/29/2025), Disp: , Rfl:     pantoprazole (PROTONIX) 40 MG EC tablet, Take 40 mg by mouth. (Patient not taking: Reported on 7/29/2025), Disp: , Rfl:     pentoxifylline (TRENtal) 400 MG CR tablet, Take 400 mg by mouth 3 (Three) Times a Day With Meals. (Patient not taking: Reported on 7/29/2025), Disp: , Rfl:     sildenafil (VIAGRA) 100 MG tablet, Take 1 tablet by mouth Daily As Needed for Erectile Dysfunction., Disp: , Rfl:     Review of Systems   Constitutional: Negative.    HENT: Negative.     Eyes: Negative.    Respiratory: Negative.     Cardiovascular: Negative.         Claudication to bilateral lower extremities, left worse than right   Gastrointestinal: Negative.    Endocrine: Negative.    Genitourinary: Negative.    Musculoskeletal:  Positive for arthralgias.   Skin: Negative.    Allergic/Immunologic: Negative.    Neurological: Negative.    Hematological: Negative.    Psychiatric/Behavioral: Negative.     All other systems reviewed and are negative.      /68   Pulse 65   Ht 175.3 cm (69\")   Wt 62.1 kg (137 lb)   SpO2 98%   BMI 20.23 kg/m²     Physical Exam  Vitals and nursing note reviewed.   Constitutional:       Appearance: He is well-developed.   HENT:      Head: Normocephalic and atraumatic.   Eyes:      General: No scleral icterus.   "   Pupils: Pupils are equal, round, and reactive to light.   Neck:      Thyroid: No thyromegaly.      Vascular: No carotid bruit or JVD.   Cardiovascular:      Rate and Rhythm: Normal rate and regular rhythm.      Pulses:           Dorsalis pedis pulses are 0 on the left side.        Posterior tibial pulses are detected w/ Doppler on the left side.      Heart sounds: Normal heart sounds.      Comments: Left peroneal signal dopplered/weak PT signal dopplered  Pulmonary:      Effort: Pulmonary effort is normal.      Breath sounds: Normal breath sounds.   Abdominal:      General: Bowel sounds are normal. There is no distension or abdominal bruit.      Palpations: Abdomen is soft. There is no mass.      Tenderness: There is no abdominal tenderness.   Musculoskeletal:         General: Normal range of motion.      Cervical back: Neck supple.   Lymphadenopathy:      Cervical: No cervical adenopathy.   Skin:     General: Skin is warm and dry.   Neurological:      Mental Status: He is alert and oriented to person, place, and time.      Cranial Nerves: No cranial nerve deficit.      Sensory: No sensory deficit.         Patient Active Problem List   Diagnosis    Gastroesophageal reflux disease    Palliative care patient    Acute gastric ulcer without hemorrhage or perforation    Nasal polyp    Nasal congestion    Nasal septal deviation    Acute blood loss as cause of postoperative anemia    Abnormal computed tomography scan    Atherosclerosis of artery of left lower extremity    Atherosclerosis of native arteries of extremities with intermittent claudication, bilateral legs    Atherosclerosis with claudication of extremity    Benign essential HTN    BPH (benign prostatic hyperplasia)    Carotid stenosis, asymptomatic, bilateral    CLL (chronic lymphocytic leukemia)    Critical limb ischemia of left lower extremity    Deep vein thrombosis (DVT) of lower extremity    Entropion of right eyelid    Hematoma    History of orchiectomy     Malignant neoplasm of urinary bladder    Mass of urinary bladder    Moderate malnutrition    PAD (peripheral artery disease)    PVD (peripheral vascular disease)    Post-operative state    Pulmonary emphysema    Urine cytology abnormal        Diagnosis Plan   1. PAD (peripheral artery disease)  US Arterial Doppler Lower Extremity Bilateral      2. Benign essential HTN            Plan: After thoroughly evaluating Jose Mendez, I believe the best course of action is to remain conservative from vasculare surgery standpoint.  We will see the patient back in 2 weeks with noninvasive testing including bilateral lower extremity arterial duplex for continued surveillance. He should continue Plavix 75 mg daily, Eliquis 5 mg twice daily, and Pravachol 40 mg daily.  I did discuss vascular risk factors as they pertain to the progression of vascular disease including controlling his hypertension.  His blood pressure is stable today in the office.  His PCP can order Lovenox while Eliquis is being held, aspirin 81 mg daily should be taken while he holds Plavix.  He should resume Eliquis and Plavix as soon as possible.  He has extensive stenting to his bilateral lower extremities, most recently on 6/30/25.  Body mass index is 20.23 kg/m².  BMI is within normal parameters. No other follow-up for BMI required.      This was all discussed in full with complete understanding.    Thank you for allowing me to participate in the care of your patient.  Please do not hesitate to call with any questions or concerns.  We will keep you aware of any further encounters with Jose Mendez.        Sincerely yours,         Misha Smart, Clary Carr MD

## 2025-07-31 ENCOUNTER — TELEPHONE (OUTPATIENT)
Dept: VASCULAR SURGERY | Facility: CLINIC | Age: 75
End: 2025-07-31
Payer: OTHER GOVERNMENT

## 2025-07-31 LAB
QT INTERVAL: 430 MS
QTC INTERVAL: 440 MS

## 2025-07-31 NOTE — TELEPHONE ENCOUNTER
"The [facility] received a fax that requires your attention. The document has been indexed to the patient’s chart for your review.      Reason for sending: EXTERNAL MEDICAL RECORD NOTIFICATION     Documents Description: SIGNATURE LEIGHTON-J[facility]SY [facility]-7.31.25    Name of Sender: [facility]    Date Indexed: 7.31.25    \"PLEASE HAVE PROVIDER SIGN PROGRESS NOTE AND REFAX. ELECTRONIC SIGNATURES MUST BE DATE AND TIME STAMPED. THANK YOU\"    "

## 2025-08-04 ENCOUNTER — TELEPHONE (OUTPATIENT)
Dept: VASCULAR SURGERY | Facility: CLINIC | Age: 75
End: 2025-08-04
Payer: OTHER GOVERNMENT

## 2025-08-05 RX ORDER — ENOXAPARIN SODIUM 100 MG/ML
60 INJECTION SUBCUTANEOUS 2 TIMES DAILY
Qty: 10 EACH | Refills: 1 | OUTPATIENT
Start: 2025-08-05

## 2025-08-07 ENCOUNTER — TELEPHONE (OUTPATIENT)
Dept: OTOLARYNGOLOGY | Facility: CLINIC | Age: 75
End: 2025-08-07
Payer: OTHER GOVERNMENT

## 2025-08-08 ENCOUNTER — ANESTHESIA (OUTPATIENT)
Dept: PERIOP | Facility: HOSPITAL | Age: 75
End: 2025-08-08
Payer: OTHER GOVERNMENT

## 2025-08-08 ENCOUNTER — HOSPITAL ENCOUNTER (OUTPATIENT)
Facility: HOSPITAL | Age: 75
Setting detail: HOSPITAL OUTPATIENT SURGERY
Discharge: HOME | End: 2025-08-08
Attending: OTOLARYNGOLOGY | Admitting: OTOLARYNGOLOGY
Payer: OTHER GOVERNMENT

## 2025-08-08 ENCOUNTER — ANESTHESIA EVENT (OUTPATIENT)
Dept: PERIOP | Facility: HOSPITAL | Age: 75
End: 2025-08-08
Payer: OTHER GOVERNMENT

## 2025-08-08 VITALS
OXYGEN SATURATION: 96 % | RESPIRATION RATE: 20 BRPM | TEMPERATURE: 97.4 F | HEART RATE: 64 BPM | DIASTOLIC BLOOD PRESSURE: 53 MMHG | SYSTOLIC BLOOD PRESSURE: 128 MMHG

## 2025-08-08 DIAGNOSIS — J33.9 NASAL POLYP: ICD-10-CM

## 2025-08-08 DIAGNOSIS — R09.81 NASAL CONGESTION: ICD-10-CM

## 2025-08-08 DIAGNOSIS — J34.2 NASAL SEPTAL DEVIATION: ICD-10-CM

## 2025-08-08 PROCEDURE — C2625 STENT, NON-COR, TEM W/DEL SY: HCPCS | Performed by: OTOLARYNGOLOGY

## 2025-08-08 PROCEDURE — 25010000002 DEXAMETHASONE PER 1 MG: Performed by: NURSE ANESTHETIST, CERTIFIED REGISTERED

## 2025-08-08 PROCEDURE — 61782 SCAN PROC CRANIAL EXTRA: CPT | Performed by: OTOLARYNGOLOGY

## 2025-08-08 PROCEDURE — 25010000002 COCAINE HCL 40 MG/ML SOLUTION: Performed by: OTOLARYNGOLOGY

## 2025-08-08 PROCEDURE — 31255 NSL/SINS NDSC W/TOT ETHMDCT: CPT | Performed by: OTOLARYNGOLOGY

## 2025-08-08 PROCEDURE — 25010000002 SUGAMMADEX 200 MG/2ML SOLUTION: Performed by: NURSE ANESTHETIST, CERTIFIED REGISTERED

## 2025-08-08 PROCEDURE — 25810000003 LACTATED RINGERS PER 1000 ML: Performed by: OTOLARYNGOLOGY

## 2025-08-08 PROCEDURE — 25010000002 ONDANSETRON PER 1 MG: Performed by: NURSE ANESTHETIST, CERTIFIED REGISTERED

## 2025-08-08 PROCEDURE — 25010000002 FENTANYL CITRATE (PF) 100 MCG/2ML SOLUTION: Performed by: NURSE ANESTHETIST, CERTIFIED REGISTERED

## 2025-08-08 PROCEDURE — 25010000002 LIDOCAINE PF 2% 2 % SOLUTION: Performed by: NURSE ANESTHETIST, CERTIFIED REGISTERED

## 2025-08-08 PROCEDURE — 25010000002 FENTANYL CITRATE (PF) 50 MCG/ML SOLUTION: Performed by: ANESTHESIOLOGY

## 2025-08-08 PROCEDURE — 31256 EXPLORATION MAXILLARY SINUS: CPT | Performed by: OTOLARYNGOLOGY

## 2025-08-08 PROCEDURE — 88305 TISSUE EXAM BY PATHOLOGIST: CPT | Performed by: OTOLARYNGOLOGY

## 2025-08-08 PROCEDURE — 25010000002 LIDOCAINE 1% - EPINEPHRINE 1:100000 1 %-1:100000 SOLUTION: Performed by: OTOLARYNGOLOGY

## 2025-08-08 PROCEDURE — C9046 COCAINE HCL NASAL SOLUTION: HCPCS | Performed by: OTOLARYNGOLOGY

## 2025-08-08 PROCEDURE — 25010000002 LABETALOL 5 MG/ML SOLUTION: Performed by: NURSE ANESTHETIST, CERTIFIED REGISTERED

## 2025-08-08 PROCEDURE — 25010000002 PROPOFOL 10 MG/ML EMULSION: Performed by: NURSE ANESTHETIST, CERTIFIED REGISTERED

## 2025-08-08 DEVICE — PROPEL SINUS IMPLANT
Type: IMPLANTABLE DEVICE | Site: SINUS | Status: FUNCTIONAL
Brand: PROPEL

## 2025-08-08 DEVICE — SEAL HEMO SURG ARISTA/AH ABS/PWDR 3GM: Type: IMPLANTABLE DEVICE | Site: SINUS | Status: FUNCTIONAL

## 2025-08-08 DEVICE — PROPEL CONTOUR SINUS IMPLANT
Type: IMPLANTABLE DEVICE | Site: SINUS | Status: FUNCTIONAL
Brand: PROPEL CONTOUR

## 2025-08-08 RX ORDER — DROPERIDOL 2.5 MG/ML
0.62 INJECTION, SOLUTION INTRAMUSCULAR; INTRAVENOUS ONCE AS NEEDED
Status: DISCONTINUED | OUTPATIENT
Start: 2025-08-08 | End: 2025-08-08 | Stop reason: HOSPADM

## 2025-08-08 RX ORDER — ONDANSETRON 2 MG/ML
4 INJECTION INTRAMUSCULAR; INTRAVENOUS
Status: DISCONTINUED | OUTPATIENT
Start: 2025-08-08 | End: 2025-08-08 | Stop reason: HOSPADM

## 2025-08-08 RX ORDER — SODIUM CHLORIDE/ALOE VERA
GEL (GRAM) NASAL AS NEEDED
Status: DISCONTINUED | OUTPATIENT
Start: 2025-08-08 | End: 2025-08-08 | Stop reason: HOSPADM

## 2025-08-08 RX ORDER — SODIUM CHLORIDE, SODIUM LACTATE, POTASSIUM CHLORIDE, CALCIUM CHLORIDE 600; 310; 30; 20 MG/100ML; MG/100ML; MG/100ML; MG/100ML
1000 INJECTION, SOLUTION INTRAVENOUS CONTINUOUS
Status: DISCONTINUED | OUTPATIENT
Start: 2025-08-08 | End: 2025-08-08 | Stop reason: HOSPADM

## 2025-08-08 RX ORDER — SODIUM CHLORIDE 0.9 % (FLUSH) 0.9 %
3 SYRINGE (ML) INJECTION AS NEEDED
Status: DISCONTINUED | OUTPATIENT
Start: 2025-08-08 | End: 2025-08-08 | Stop reason: HOSPADM

## 2025-08-08 RX ORDER — FENTANYL CITRATE 50 UG/ML
INJECTION, SOLUTION INTRAMUSCULAR; INTRAVENOUS AS NEEDED
Status: DISCONTINUED | OUTPATIENT
Start: 2025-08-08 | End: 2025-08-08 | Stop reason: SURG

## 2025-08-08 RX ORDER — ACETAMINOPHEN 500 MG
1000 TABLET ORAL ONCE
Status: COMPLETED | OUTPATIENT
Start: 2025-08-08 | End: 2025-08-08

## 2025-08-08 RX ORDER — SODIUM CHLORIDE, SODIUM LACTATE, POTASSIUM CHLORIDE, CALCIUM CHLORIDE 600; 310; 30; 20 MG/100ML; MG/100ML; MG/100ML; MG/100ML
100 INJECTION, SOLUTION INTRAVENOUS CONTINUOUS
Status: DISCONTINUED | OUTPATIENT
Start: 2025-08-08 | End: 2025-08-08 | Stop reason: HOSPADM

## 2025-08-08 RX ORDER — CEFUROXIME AXETIL 500 MG/1
250 TABLET ORAL 2 TIMES DAILY
Qty: 10 TABLET | Refills: 0 | Status: SHIPPED | OUTPATIENT
Start: 2025-08-08 | End: 2025-08-18

## 2025-08-08 RX ORDER — COCAINE HYDROCHLORIDE 40 MG/ML
SOLUTION NASAL
Status: DISCONTINUED
Start: 2025-08-08 | End: 2025-08-08 | Stop reason: HOSPADM

## 2025-08-08 RX ORDER — SODIUM CHLORIDE 9 MG/ML
40 INJECTION, SOLUTION INTRAVENOUS AS NEEDED
Status: DISCONTINUED | OUTPATIENT
Start: 2025-08-08 | End: 2025-08-08 | Stop reason: HOSPADM

## 2025-08-08 RX ORDER — LABETALOL HYDROCHLORIDE 5 MG/ML
5 INJECTION, SOLUTION INTRAVENOUS
Status: DISCONTINUED | OUTPATIENT
Start: 2025-08-08 | End: 2025-08-08 | Stop reason: HOSPADM

## 2025-08-08 RX ORDER — SODIUM CHLORIDE 0.9 % (FLUSH) 0.9 %
3-10 SYRINGE (ML) INJECTION AS NEEDED
Status: DISCONTINUED | OUTPATIENT
Start: 2025-08-08 | End: 2025-08-08 | Stop reason: HOSPADM

## 2025-08-08 RX ORDER — LIDOCAINE HYDROCHLORIDE AND EPINEPHRINE 10; 10 MG/ML; UG/ML
INJECTION, SOLUTION INFILTRATION; PERINEURAL AS NEEDED
Status: DISCONTINUED | OUTPATIENT
Start: 2025-08-08 | End: 2025-08-08 | Stop reason: HOSPADM

## 2025-08-08 RX ORDER — ASPIRIN 81 MG/1
81 TABLET, CHEWABLE ORAL ONCE
Status: COMPLETED | OUTPATIENT
Start: 2025-08-08 | End: 2025-08-08

## 2025-08-08 RX ORDER — NALOXONE HCL 0.4 MG/ML
0.04 VIAL (ML) INJECTION AS NEEDED
Status: DISCONTINUED | OUTPATIENT
Start: 2025-08-08 | End: 2025-08-08 | Stop reason: HOSPADM

## 2025-08-08 RX ORDER — PROPOFOL 10 MG/ML
VIAL (ML) INTRAVENOUS AS NEEDED
Status: DISCONTINUED | OUTPATIENT
Start: 2025-08-08 | End: 2025-08-08 | Stop reason: SURG

## 2025-08-08 RX ORDER — OXYMETAZOLINE HYDROCHLORIDE 0.05 G/100ML
2 SPRAY NASAL
Status: COMPLETED | OUTPATIENT
Start: 2025-08-08 | End: 2025-08-08

## 2025-08-08 RX ORDER — FLUMAZENIL 0.1 MG/ML
0.2 INJECTION INTRAVENOUS AS NEEDED
Status: DISCONTINUED | OUTPATIENT
Start: 2025-08-08 | End: 2025-08-08 | Stop reason: HOSPADM

## 2025-08-08 RX ORDER — LABETALOL HYDROCHLORIDE 5 MG/ML
INJECTION, SOLUTION INTRAVENOUS AS NEEDED
Status: DISCONTINUED | OUTPATIENT
Start: 2025-08-08 | End: 2025-08-08 | Stop reason: SURG

## 2025-08-08 RX ORDER — HYDROMORPHONE HYDROCHLORIDE 1 MG/ML
0.5 INJECTION, SOLUTION INTRAMUSCULAR; INTRAVENOUS; SUBCUTANEOUS
Status: DISCONTINUED | OUTPATIENT
Start: 2025-08-08 | End: 2025-08-08 | Stop reason: HOSPADM

## 2025-08-08 RX ORDER — SODIUM CHLORIDE, SODIUM LACTATE, POTASSIUM CHLORIDE, CALCIUM CHLORIDE 600; 310; 30; 20 MG/100ML; MG/100ML; MG/100ML; MG/100ML
INJECTION, SOLUTION INTRAVENOUS CONTINUOUS PRN
Status: COMPLETED | OUTPATIENT
Start: 2025-08-08 | End: 2025-08-08

## 2025-08-08 RX ORDER — SODIUM CHLORIDE 0.9 % (FLUSH) 0.9 %
3 SYRINGE (ML) INJECTION EVERY 12 HOURS SCHEDULED
Status: DISCONTINUED | OUTPATIENT
Start: 2025-08-08 | End: 2025-08-08 | Stop reason: HOSPADM

## 2025-08-08 RX ORDER — OXYCODONE AND ACETAMINOPHEN 10; 325 MG/1; MG/1
1 TABLET ORAL EVERY 4 HOURS PRN
Status: DISCONTINUED | OUTPATIENT
Start: 2025-08-08 | End: 2025-08-08 | Stop reason: HOSPADM

## 2025-08-08 RX ORDER — MAGNESIUM HYDROXIDE 1200 MG/15ML
LIQUID ORAL AS NEEDED
Status: DISCONTINUED | OUTPATIENT
Start: 2025-08-08 | End: 2025-08-08 | Stop reason: HOSPADM

## 2025-08-08 RX ORDER — ONDANSETRON 2 MG/ML
INJECTION INTRAMUSCULAR; INTRAVENOUS AS NEEDED
Status: DISCONTINUED | OUTPATIENT
Start: 2025-08-08 | End: 2025-08-08 | Stop reason: SURG

## 2025-08-08 RX ORDER — WATER 10 ML/10ML
INJECTION INTRAMUSCULAR; INTRAVENOUS; SUBCUTANEOUS AS NEEDED
Status: DISCONTINUED | OUTPATIENT
Start: 2025-08-08 | End: 2025-08-08 | Stop reason: HOSPADM

## 2025-08-08 RX ORDER — FENTANYL CITRATE 50 UG/ML
50 INJECTION, SOLUTION INTRAMUSCULAR; INTRAVENOUS
Status: COMPLETED | OUTPATIENT
Start: 2025-08-08 | End: 2025-08-08

## 2025-08-08 RX ORDER — HYDROCODONE BITARTRATE AND ACETAMINOPHEN 5; 325 MG/1; MG/1
1 TABLET ORAL ONCE AS NEEDED
Refills: 0 | Status: DISCONTINUED | OUTPATIENT
Start: 2025-08-08 | End: 2025-08-08 | Stop reason: HOSPADM

## 2025-08-08 RX ORDER — PHENYLEPHRINE HCL IN 0.9% NACL 1 MG/10 ML
SYRINGE (ML) INTRAVENOUS AS NEEDED
Status: DISCONTINUED | OUTPATIENT
Start: 2025-08-08 | End: 2025-08-08 | Stop reason: SURG

## 2025-08-08 RX ORDER — LIDOCAINE HYDROCHLORIDE 10 MG/ML
0.5 INJECTION, SOLUTION EPIDURAL; INFILTRATION; INTRACAUDAL; PERINEURAL ONCE AS NEEDED
Status: DISCONTINUED | OUTPATIENT
Start: 2025-08-08 | End: 2025-08-08 | Stop reason: HOSPADM

## 2025-08-08 RX ORDER — EPHEDRINE SULFATE 50 MG/ML
INJECTION INTRAVENOUS AS NEEDED
Status: DISCONTINUED | OUTPATIENT
Start: 2025-08-08 | End: 2025-08-08 | Stop reason: SURG

## 2025-08-08 RX ORDER — ROCURONIUM BROMIDE 10 MG/ML
INJECTION, SOLUTION INTRAVENOUS AS NEEDED
Status: DISCONTINUED | OUTPATIENT
Start: 2025-08-08 | End: 2025-08-08 | Stop reason: SURG

## 2025-08-08 RX ORDER — COCAINE HYDROCHLORIDE 40 MG/ML
SOLUTION NASAL AS NEEDED
Status: DISCONTINUED | OUTPATIENT
Start: 2025-08-08 | End: 2025-08-08 | Stop reason: HOSPADM

## 2025-08-08 RX ORDER — LIDOCAINE HYDROCHLORIDE 20 MG/ML
INJECTION, SOLUTION EPIDURAL; INFILTRATION; INTRACAUDAL; PERINEURAL AS NEEDED
Status: DISCONTINUED | OUTPATIENT
Start: 2025-08-08 | End: 2025-08-08 | Stop reason: SURG

## 2025-08-08 RX ORDER — DEXAMETHASONE SODIUM PHOSPHATE 4 MG/ML
INJECTION, SOLUTION INTRA-ARTICULAR; INTRALESIONAL; INTRAMUSCULAR; INTRAVENOUS; SOFT TISSUE AS NEEDED
Status: DISCONTINUED | OUTPATIENT
Start: 2025-08-08 | End: 2025-08-08 | Stop reason: SURG

## 2025-08-08 RX ADMIN — LABETALOL HYDROCHLORIDE 10 MG: 5 INJECTION, SOLUTION INTRAVENOUS at 15:44

## 2025-08-08 RX ADMIN — Medication 200 MCG: at 14:59

## 2025-08-08 RX ADMIN — DEXAMETHASONE SODIUM PHOSPHATE 8 MG: 4 INJECTION, SOLUTION INTRA-ARTICULAR; INTRALESIONAL; INTRAMUSCULAR; INTRAVENOUS; SOFT TISSUE at 15:38

## 2025-08-08 RX ADMIN — OXYCODONE AND ACETAMINOPHEN 1 TABLET: 325; 10 TABLET ORAL at 16:02

## 2025-08-08 RX ADMIN — EPHEDRINE SULFATE 20 MG: 50 INJECTION INTRAVENOUS at 15:03

## 2025-08-08 RX ADMIN — OXYMETAZOLINE HYDROCHLORIDE 2 SPRAY: 0.5 SPRAY NASAL at 14:37

## 2025-08-08 RX ADMIN — LIDOCAINE HYDROCHLORIDE 100 MG: 20 INJECTION, SOLUTION EPIDURAL; INFILTRATION; INTRACAUDAL; PERINEURAL at 14:52

## 2025-08-08 RX ADMIN — LABETALOL HYDROCHLORIDE 5 MG: 5 INJECTION, SOLUTION INTRAVENOUS at 15:38

## 2025-08-08 RX ADMIN — ASPIRIN 81 MG CHEWABLE TABLET 81 MG: 81 TABLET CHEWABLE at 14:37

## 2025-08-08 RX ADMIN — PROPOFOL 100 MG: 10 INJECTION, EMULSION INTRAVENOUS at 14:52

## 2025-08-08 RX ADMIN — FENTANYL CITRATE 50 MCG: 50 INJECTION, SOLUTION INTRAMUSCULAR; INTRAVENOUS at 16:22

## 2025-08-08 RX ADMIN — FENTANYL CITRATE 50 MCG: 50 INJECTION, SOLUTION INTRAMUSCULAR; INTRAVENOUS at 16:07

## 2025-08-08 RX ADMIN — SODIUM CHLORIDE, POTASSIUM CHLORIDE, SODIUM LACTATE AND CALCIUM CHLORIDE: 600; 310; 30; 20 INJECTION, SOLUTION INTRAVENOUS at 15:39

## 2025-08-08 RX ADMIN — FENTANYL CITRATE 100 MCG: 50 INJECTION, SOLUTION INTRAMUSCULAR; INTRAVENOUS at 14:52

## 2025-08-08 RX ADMIN — ACETAMINOPHEN 1000 MG: 500 TABLET, FILM COATED ORAL at 14:37

## 2025-08-08 RX ADMIN — SODIUM CHLORIDE, POTASSIUM CHLORIDE, SODIUM LACTATE AND CALCIUM CHLORIDE 1000 ML: 600; 310; 30; 20 INJECTION, SOLUTION INTRAVENOUS at 10:33

## 2025-08-08 RX ADMIN — ROCURONIUM BROMIDE 40 MG: 10 INJECTION, SOLUTION INTRAVENOUS at 14:52

## 2025-08-08 RX ADMIN — SUGAMMADEX 200 MG: 100 INJECTION, SOLUTION INTRAVENOUS at 15:38

## 2025-08-08 RX ADMIN — ONDANSETRON 4 MG: 2 INJECTION INTRAMUSCULAR; INTRAVENOUS at 15:38

## 2025-08-09 ENCOUNTER — APPOINTMENT (OUTPATIENT)
Dept: GENERAL RADIOLOGY | Facility: HOSPITAL | Age: 75
End: 2025-08-09
Payer: OTHER GOVERNMENT

## 2025-08-09 ENCOUNTER — HOSPITAL ENCOUNTER (EMERGENCY)
Facility: HOSPITAL | Age: 75
Discharge: HOME | End: 2025-08-09
Payer: OTHER GOVERNMENT

## 2025-08-09 VITALS
RESPIRATION RATE: 20 BRPM | WEIGHT: 142 LBS | TEMPERATURE: 97.5 F | DIASTOLIC BLOOD PRESSURE: 89 MMHG | HEIGHT: 69 IN | BODY MASS INDEX: 21.03 KG/M2 | SYSTOLIC BLOOD PRESSURE: 159 MMHG | HEART RATE: 68 BPM | OXYGEN SATURATION: 99 %

## 2025-08-09 DIAGNOSIS — R04.0 EPISTAXIS: Primary | ICD-10-CM

## 2025-08-09 LAB
ALBUMIN SERPL-MCNC: 3.9 G/DL (ref 3.5–5.2)
ALBUMIN/GLOB SERPL: 1.7 G/DL
ALP SERPL-CCNC: 75 U/L (ref 39–117)
ALT SERPL W P-5'-P-CCNC: 18 U/L (ref 1–41)
ANION GAP SERPL CALCULATED.3IONS-SCNC: 9 MMOL/L (ref 5–15)
APTT PPP: 31.4 SECONDS (ref 24.5–36)
AST SERPL-CCNC: 23 U/L (ref 1–40)
BASOPHILS # BLD AUTO: 0.01 10*3/MM3 (ref 0–0.2)
BASOPHILS NFR BLD AUTO: 0.1 % (ref 0–1.5)
BILIRUB SERPL-MCNC: 0.6 MG/DL (ref 0–1.2)
BUN SERPL-MCNC: 19 MG/DL (ref 8–23)
BUN/CREAT SERPL: 17.8 (ref 7–25)
CALCIUM SPEC-SCNC: 8.8 MG/DL (ref 8.6–10.5)
CHLORIDE SERPL-SCNC: 108 MMOL/L (ref 98–107)
CO2 SERPL-SCNC: 25 MMOL/L (ref 22–29)
CREAT SERPL-MCNC: 1.07 MG/DL (ref 0.76–1.27)
DEPRECATED RDW RBC AUTO: 46.7 FL (ref 37–54)
EGFRCR SERPLBLD CKD-EPI 2021: 72.4 ML/MIN/1.73
EOSINOPHIL # BLD AUTO: 0.02 10*3/MM3 (ref 0–0.4)
EOSINOPHIL NFR BLD AUTO: 0.2 % (ref 0.3–6.2)
ERYTHROCYTE [DISTWIDTH] IN BLOOD BY AUTOMATED COUNT: 14.9 % (ref 12.3–15.4)
GLOBULIN UR ELPH-MCNC: 2.3 GM/DL
GLUCOSE SERPL-MCNC: 108 MG/DL (ref 65–99)
HCT VFR BLD AUTO: 31.1 % (ref 37.5–51)
HGB BLD-MCNC: 9.5 G/DL (ref 13–17.7)
IMM GRANULOCYTES # BLD AUTO: 0.03 10*3/MM3 (ref 0–0.05)
IMM GRANULOCYTES NFR BLD AUTO: 0.3 % (ref 0–0.5)
INR PPP: 1.06 (ref 0.91–1.09)
LYMPHOCYTES # BLD AUTO: 1.07 10*3/MM3 (ref 0.7–3.1)
LYMPHOCYTES NFR BLD AUTO: 11.3 % (ref 19.6–45.3)
MCH RBC QN AUTO: 26.6 PG (ref 26.6–33)
MCHC RBC AUTO-ENTMCNC: 30.5 G/DL (ref 31.5–35.7)
MCV RBC AUTO: 87.1 FL (ref 79–97)
MONOCYTES # BLD AUTO: 0.62 10*3/MM3 (ref 0.1–0.9)
MONOCYTES NFR BLD AUTO: 6.5 % (ref 5–12)
NEUTROPHILS NFR BLD AUTO: 7.74 10*3/MM3 (ref 1.7–7)
NEUTROPHILS NFR BLD AUTO: 81.6 % (ref 42.7–76)
NRBC BLD AUTO-RTO: 0 /100 WBC (ref 0–0.2)
PLATELET # BLD AUTO: 148 10*3/MM3 (ref 140–450)
PMV BLD AUTO: 11.1 FL (ref 6–12)
POTASSIUM SERPL-SCNC: 4 MMOL/L (ref 3.5–5.2)
PROT SERPL-MCNC: 6.2 G/DL (ref 6–8.5)
PROTHROMBIN TIME: 14.3 SECONDS (ref 11.8–14.8)
RBC # BLD AUTO: 3.57 10*6/MM3 (ref 4.14–5.8)
SODIUM SERPL-SCNC: 142 MMOL/L (ref 136–145)
WBC NRBC COR # BLD AUTO: 9.49 10*3/MM3 (ref 3.4–10.8)

## 2025-08-09 PROCEDURE — 70360 X-RAY EXAM OF NECK: CPT

## 2025-08-09 PROCEDURE — 85730 THROMBOPLASTIN TIME PARTIAL: CPT | Performed by: PHYSICIAN ASSISTANT

## 2025-08-09 PROCEDURE — 85025 COMPLETE CBC W/AUTO DIFF WBC: CPT | Performed by: PHYSICIAN ASSISTANT

## 2025-08-09 PROCEDURE — 99283 EMERGENCY DEPT VISIT LOW MDM: CPT

## 2025-08-09 PROCEDURE — 80053 COMPREHEN METABOLIC PANEL: CPT | Performed by: PHYSICIAN ASSISTANT

## 2025-08-09 PROCEDURE — 85610 PROTHROMBIN TIME: CPT | Performed by: PHYSICIAN ASSISTANT

## 2025-08-09 RX ORDER — SODIUM CHLORIDE 0.9 % (FLUSH) 0.9 %
10 SYRINGE (ML) INJECTION AS NEEDED
Status: DISCONTINUED | OUTPATIENT
Start: 2025-08-09 | End: 2025-08-09 | Stop reason: HOSPADM

## 2025-08-09 RX ORDER — TRANEXAMIC ACID 100 MG/ML
500 INJECTION, SOLUTION INTRAVENOUS ONCE
Status: COMPLETED | OUTPATIENT
Start: 2025-08-09 | End: 2025-08-09

## 2025-08-09 RX ORDER — ACETAMINOPHEN 325 MG/1
650 TABLET ORAL ONCE
Status: COMPLETED | OUTPATIENT
Start: 2025-08-09 | End: 2025-08-09

## 2025-08-09 RX ADMIN — ACETAMINOPHEN 650 MG: 325 TABLET ORAL at 19:48

## 2025-08-09 RX ADMIN — PHENYLEPHRINE HYDROCHLORIDE 2 SPRAY: 0.5 SPRAY NASAL at 14:45

## 2025-08-09 RX ADMIN — TRANEXAMIC ACID 500 MG: 100 INJECTION, SOLUTION INTRAVENOUS at 16:50

## 2025-08-11 ENCOUNTER — TELEPHONE (OUTPATIENT)
Dept: HEMATOLOGY | Age: 75
End: 2025-08-11

## 2025-08-12 ENCOUNTER — OFFICE VISIT (OUTPATIENT)
Dept: OTOLARYNGOLOGY | Facility: CLINIC | Age: 75
End: 2025-08-12
Payer: OTHER GOVERNMENT

## 2025-08-12 VITALS — WEIGHT: 142 LBS | HEIGHT: 69 IN | BODY MASS INDEX: 21.03 KG/M2

## 2025-08-12 DIAGNOSIS — J34.2 NASAL SEPTAL DEVIATION: ICD-10-CM

## 2025-08-12 DIAGNOSIS — R09.81 NASAL CONGESTION: ICD-10-CM

## 2025-08-12 DIAGNOSIS — J33.9 NASAL POLYP: ICD-10-CM

## 2025-08-12 DIAGNOSIS — R04.0 EPISTAXIS: ICD-10-CM

## 2025-08-12 DIAGNOSIS — Z98.890 S/P FESS (FUNCTIONAL ENDOSCOPIC SINUS SURGERY): Primary | ICD-10-CM

## 2025-08-13 LAB
CYTO UR: NORMAL
LAB AP CASE REPORT: NORMAL
LAB AP DIAGNOSIS COMMENT: NORMAL
LAB AP INTRADEPARTMENTAL CONSULT: NORMAL
Lab: NORMAL
PATH REPORT.FINAL DX SPEC: NORMAL
PATH REPORT.GROSS SPEC: NORMAL

## 2025-08-14 ENCOUNTER — HOSPITAL ENCOUNTER (OUTPATIENT)
Dept: ULTRASOUND IMAGING | Facility: HOSPITAL | Age: 75
Discharge: HOME | End: 2025-08-14
Admitting: SURGERY
Payer: OTHER GOVERNMENT

## 2025-08-14 DIAGNOSIS — I73.9 PAD (PERIPHERAL ARTERY DISEASE): ICD-10-CM

## 2025-08-14 PROCEDURE — 93925 LOWER EXTREMITY STUDY: CPT

## 2025-08-18 ENCOUNTER — TELEPHONE (OUTPATIENT)
Dept: OTOLARYNGOLOGY | Facility: CLINIC | Age: 75
End: 2025-08-18
Payer: OTHER GOVERNMENT

## 2025-08-18 DIAGNOSIS — J33.9 NASAL POLYP: Primary | ICD-10-CM

## 2025-08-18 DIAGNOSIS — C80.1 ADENOCARCINOMA IN A POLYP: ICD-10-CM

## 2025-08-19 ENCOUNTER — TELEPHONE (OUTPATIENT)
Dept: VASCULAR SURGERY | Facility: CLINIC | Age: 75
End: 2025-08-19
Payer: OTHER GOVERNMENT

## 2025-08-20 ENCOUNTER — OFFICE VISIT (OUTPATIENT)
Dept: VASCULAR SURGERY | Facility: CLINIC | Age: 75
End: 2025-08-20
Payer: OTHER GOVERNMENT

## 2025-08-20 VITALS
OXYGEN SATURATION: 99 % | WEIGHT: 138 LBS | SYSTOLIC BLOOD PRESSURE: 146 MMHG | HEART RATE: 73 BPM | BODY MASS INDEX: 20.44 KG/M2 | DIASTOLIC BLOOD PRESSURE: 80 MMHG | HEIGHT: 69 IN

## 2025-08-20 DIAGNOSIS — Z01.818 PREOP TESTING: ICD-10-CM

## 2025-08-20 DIAGNOSIS — I73.9 CLAUDICATION: ICD-10-CM

## 2025-08-20 DIAGNOSIS — I73.9 PAD (PERIPHERAL ARTERY DISEASE): Primary | ICD-10-CM

## 2025-08-21 ENCOUNTER — OFFICE VISIT (OUTPATIENT)
Dept: OTOLARYNGOLOGY | Facility: CLINIC | Age: 75
End: 2025-08-21
Payer: OTHER GOVERNMENT

## 2025-08-21 VITALS
TEMPERATURE: 97.8 F | HEART RATE: 71 BPM | DIASTOLIC BLOOD PRESSURE: 67 MMHG | HEIGHT: 69 IN | WEIGHT: 138 LBS | BODY MASS INDEX: 20.44 KG/M2 | SYSTOLIC BLOOD PRESSURE: 164 MMHG

## 2025-08-21 DIAGNOSIS — C80.1 ADENOCARCINOMA IN A POLYP: ICD-10-CM

## 2025-08-21 DIAGNOSIS — Z98.890 S/P FESS (FUNCTIONAL ENDOSCOPIC SINUS SURGERY): ICD-10-CM

## 2025-08-21 DIAGNOSIS — J33.9 NASAL POLYP: Primary | ICD-10-CM

## 2025-08-25 PROBLEM — Z01.818 PREOP TESTING: Status: ACTIVE | Noted: 2025-08-20

## 2025-08-26 ENCOUNTER — TELEPHONE (OUTPATIENT)
Dept: VASCULAR SURGERY | Facility: CLINIC | Age: 75
End: 2025-08-26
Payer: OTHER GOVERNMENT

## 2025-08-27 ENCOUNTER — PRE-ADMISSION TESTING (OUTPATIENT)
Dept: PREADMISSION TESTING | Facility: HOSPITAL | Age: 75
End: 2025-08-27
Payer: OTHER GOVERNMENT

## 2025-08-27 VITALS
OXYGEN SATURATION: 99 % | BODY MASS INDEX: 20.8 KG/M2 | DIASTOLIC BLOOD PRESSURE: 63 MMHG | RESPIRATION RATE: 18 BRPM | HEIGHT: 69 IN | WEIGHT: 140.43 LBS | HEART RATE: 83 BPM | SYSTOLIC BLOOD PRESSURE: 141 MMHG

## 2025-08-27 DIAGNOSIS — I73.9 PAD (PERIPHERAL ARTERY DISEASE): ICD-10-CM

## 2025-08-27 DIAGNOSIS — Z01.818 PREOP TESTING: ICD-10-CM

## 2025-08-27 LAB
ANION GAP SERPL CALCULATED.3IONS-SCNC: 11 MMOL/L (ref 5–15)
APTT PPP: 35.9 SECONDS (ref 24.5–36)
BASOPHILS # BLD AUTO: 0.05 10*3/MM3 (ref 0–0.2)
BASOPHILS NFR BLD AUTO: 1 % (ref 0–1.5)
BUN SERPL-MCNC: 13.6 MG/DL (ref 8–23)
BUN/CREAT SERPL: 15.1 (ref 7–25)
CALCIUM SPEC-SCNC: 9 MG/DL (ref 8.6–10.5)
CHLORIDE SERPL-SCNC: 107 MMOL/L (ref 98–107)
CO2 SERPL-SCNC: 25 MMOL/L (ref 22–29)
CREAT SERPL-MCNC: 0.9 MG/DL (ref 0.76–1.27)
DEPRECATED RDW RBC AUTO: 47.4 FL (ref 37–54)
EGFRCR SERPLBLD CKD-EPI 2021: 89.1 ML/MIN/1.73
EOSINOPHIL # BLD AUTO: 0.08 10*3/MM3 (ref 0–0.4)
EOSINOPHIL NFR BLD AUTO: 1.5 % (ref 0.3–6.2)
ERYTHROCYTE [DISTWIDTH] IN BLOOD BY AUTOMATED COUNT: 14.8 % (ref 12.3–15.4)
GLUCOSE SERPL-MCNC: 115 MG/DL (ref 65–99)
HCT VFR BLD AUTO: 34.7 % (ref 37.5–51)
HGB BLD-MCNC: 10 G/DL (ref 13–17.7)
IMM GRANULOCYTES # BLD AUTO: 0.02 10*3/MM3 (ref 0–0.05)
IMM GRANULOCYTES NFR BLD AUTO: 0.4 % (ref 0–0.5)
INR PPP: 1.14 (ref 0.91–1.09)
LYMPHOCYTES # BLD AUTO: 1.27 10*3/MM3 (ref 0.7–3.1)
LYMPHOCYTES NFR BLD AUTO: 24.2 % (ref 19.6–45.3)
MCH RBC QN AUTO: 25.1 PG (ref 26.6–33)
MCHC RBC AUTO-ENTMCNC: 28.8 G/DL (ref 31.5–35.7)
MCV RBC AUTO: 87.2 FL (ref 79–97)
MONOCYTES # BLD AUTO: 0.44 10*3/MM3 (ref 0.1–0.9)
MONOCYTES NFR BLD AUTO: 8.4 % (ref 5–12)
NEUTROPHILS NFR BLD AUTO: 3.38 10*3/MM3 (ref 1.7–7)
NEUTROPHILS NFR BLD AUTO: 64.5 % (ref 42.7–76)
NRBC BLD AUTO-RTO: 0 /100 WBC (ref 0–0.2)
PLATELET # BLD AUTO: 164 10*3/MM3 (ref 140–450)
PMV BLD AUTO: 11.4 FL (ref 6–12)
POTASSIUM SERPL-SCNC: 4 MMOL/L (ref 3.5–5.2)
PROTHROMBIN TIME: 15.2 SECONDS (ref 11.8–14.8)
RBC # BLD AUTO: 3.98 10*6/MM3 (ref 4.14–5.8)
SODIUM SERPL-SCNC: 143 MMOL/L (ref 136–145)
WBC NRBC COR # BLD AUTO: 5.24 10*3/MM3 (ref 3.4–10.8)

## 2025-08-27 PROCEDURE — 85730 THROMBOPLASTIN TIME PARTIAL: CPT

## 2025-08-27 PROCEDURE — 36415 COLL VENOUS BLD VENIPUNCTURE: CPT

## 2025-08-27 PROCEDURE — 85610 PROTHROMBIN TIME: CPT

## 2025-08-27 PROCEDURE — 80048 BASIC METABOLIC PNL TOTAL CA: CPT

## 2025-08-27 PROCEDURE — 85025 COMPLETE CBC W/AUTO DIFF WBC: CPT

## 2025-08-28 ENCOUNTER — TELEPHONE (OUTPATIENT)
Dept: VASCULAR SURGERY | Facility: CLINIC | Age: 75
End: 2025-08-28
Payer: OTHER GOVERNMENT

## 2025-08-28 ENCOUNTER — ANESTHESIA EVENT (OUTPATIENT)
Dept: PERIOP | Facility: HOSPITAL | Age: 75
End: 2025-08-28
Payer: OTHER GOVERNMENT

## 2025-08-29 ENCOUNTER — HOSPITAL ENCOUNTER (OUTPATIENT)
Facility: HOSPITAL | Age: 75
Setting detail: HOSPITAL OUTPATIENT SURGERY
Discharge: HOME | End: 2025-08-29
Attending: SURGERY | Admitting: SURGERY
Payer: OTHER GOVERNMENT

## 2025-08-29 ENCOUNTER — APPOINTMENT (OUTPATIENT)
Dept: INTERVENTIONAL RADIOLOGY/VASCULAR | Facility: HOSPITAL | Age: 75
End: 2025-08-29
Payer: OTHER GOVERNMENT

## 2025-08-29 ENCOUNTER — ANESTHESIA (OUTPATIENT)
Dept: PERIOP | Facility: HOSPITAL | Age: 75
End: 2025-08-29
Payer: OTHER GOVERNMENT

## 2025-08-29 VITALS
RESPIRATION RATE: 16 BRPM | SYSTOLIC BLOOD PRESSURE: 111 MMHG | DIASTOLIC BLOOD PRESSURE: 69 MMHG | TEMPERATURE: 97 F | HEART RATE: 63 BPM | OXYGEN SATURATION: 100 %

## 2025-08-29 DIAGNOSIS — Z01.818 PREOP TESTING: ICD-10-CM

## 2025-08-29 DIAGNOSIS — I73.9 PAD (PERIPHERAL ARTERY DISEASE): ICD-10-CM

## 2025-08-29 LAB
ABO GROUP BLD: NORMAL
BLD GP AB SCN SERPL QL: NEGATIVE
CYTO UR: NORMAL
LAB AP CASE REPORT: NORMAL
LAB AP DIAGNOSIS COMMENT: NORMAL
LAB AP INTRADEPARTMENTAL CONSULT: NORMAL
Lab: NORMAL
PATH REPORT.ADDENDUM SPEC: NORMAL
PATH REPORT.FINAL DX SPEC: NORMAL
PATH REPORT.GROSS SPEC: NORMAL
RH BLD: POSITIVE
T&S EXPIRATION DATE: NORMAL

## 2025-08-29 PROCEDURE — 86901 BLOOD TYPING SEROLOGIC RH(D): CPT | Performed by: NURSE PRACTITIONER

## 2025-08-29 PROCEDURE — 76000 FLUOROSCOPY <1 HR PHYS/QHP: CPT

## 2025-08-29 PROCEDURE — 25010000002 HEPARIN (PORCINE) PER 1000 UNITS: Performed by: SURGERY

## 2025-08-29 PROCEDURE — 25810000003 LACTATED RINGERS PER 1000 ML: Performed by: SURGERY

## 2025-08-29 PROCEDURE — 25010000002 BUPIVACAINE (PF) 0.5 % SOLUTION: Performed by: SURGERY

## 2025-08-29 PROCEDURE — 75710 ARTERY X-RAYS ARM/LEG: CPT

## 2025-08-29 PROCEDURE — 25010000002 PROPOFOL 1000 MG/100ML EMULSION: Performed by: NURSE ANESTHETIST, CERTIFIED REGISTERED

## 2025-08-29 PROCEDURE — 25010000002 LIDOCAINE PF 2% 2 % SOLUTION: Performed by: NURSE ANESTHETIST, CERTIFIED REGISTERED

## 2025-08-29 PROCEDURE — 25010000002 HEPARIN (PORCINE) PER 1000 UNITS: Performed by: NURSE ANESTHETIST, CERTIFIED REGISTERED

## 2025-08-29 PROCEDURE — 25010000002 FENTANYL CITRATE (PF) 100 MCG/2ML SOLUTION: Performed by: NURSE ANESTHETIST, CERTIFIED REGISTERED

## 2025-08-29 PROCEDURE — C1887 CATHETER, GUIDING: HCPCS | Performed by: SURGERY

## 2025-08-29 PROCEDURE — C1769 GUIDE WIRE: HCPCS | Performed by: SURGERY

## 2025-08-29 PROCEDURE — 25010000002 CLINDAMYCIN 900 MG/50ML SOLUTION: Performed by: NURSE PRACTITIONER

## 2025-08-29 PROCEDURE — 25510000001 IOPAMIDOL 61 % SOLUTION: Performed by: SURGERY

## 2025-08-29 PROCEDURE — C1894 INTRO/SHEATH, NON-LASER: HCPCS | Performed by: SURGERY

## 2025-08-29 PROCEDURE — 86900 BLOOD TYPING SEROLOGIC ABO: CPT | Performed by: NURSE PRACTITIONER

## 2025-08-29 PROCEDURE — 86850 RBC ANTIBODY SCREEN: CPT | Performed by: NURSE PRACTITIONER

## 2025-08-29 PROCEDURE — 75625 CONTRAST EXAM ABDOMINL AORTA: CPT

## 2025-08-29 RX ORDER — NALOXONE HCL 0.4 MG/ML
0.4 VIAL (ML) INJECTION AS NEEDED
Status: DISCONTINUED | OUTPATIENT
Start: 2025-08-29 | End: 2025-08-29 | Stop reason: HOSPADM

## 2025-08-29 RX ORDER — FLUMAZENIL 0.1 MG/ML
0.2 INJECTION INTRAVENOUS AS NEEDED
Status: DISCONTINUED | OUTPATIENT
Start: 2025-08-29 | End: 2025-08-29 | Stop reason: HOSPADM

## 2025-08-29 RX ORDER — HEPARIN SODIUM 1000 [USP'U]/ML
INJECTION, SOLUTION INTRAVENOUS; SUBCUTANEOUS AS NEEDED
Status: DISCONTINUED | OUTPATIENT
Start: 2025-08-29 | End: 2025-08-29 | Stop reason: SURG

## 2025-08-29 RX ORDER — LIDOCAINE HYDROCHLORIDE 10 MG/ML
0.5 INJECTION, SOLUTION EPIDURAL; INFILTRATION; INTRACAUDAL; PERINEURAL ONCE AS NEEDED
Status: DISCONTINUED | OUTPATIENT
Start: 2025-08-29 | End: 2025-08-29 | Stop reason: HOSPADM

## 2025-08-29 RX ORDER — LIDOCAINE HYDROCHLORIDE 20 MG/ML
INJECTION, SOLUTION EPIDURAL; INFILTRATION; INTRACAUDAL; PERINEURAL AS NEEDED
Status: DISCONTINUED | OUTPATIENT
Start: 2025-08-29 | End: 2025-08-29 | Stop reason: SURG

## 2025-08-29 RX ORDER — ACETAMINOPHEN 500 MG
1000 TABLET ORAL ONCE
Status: COMPLETED | OUTPATIENT
Start: 2025-08-29 | End: 2025-08-29

## 2025-08-29 RX ORDER — FENTANYL CITRATE 50 UG/ML
50 INJECTION, SOLUTION INTRAMUSCULAR; INTRAVENOUS
Status: DISCONTINUED | OUTPATIENT
Start: 2025-08-29 | End: 2025-08-29 | Stop reason: HOSPADM

## 2025-08-29 RX ORDER — SODIUM CHLORIDE 9 MG/ML
40 INJECTION, SOLUTION INTRAVENOUS AS NEEDED
Status: DISCONTINUED | OUTPATIENT
Start: 2025-08-29 | End: 2025-08-29 | Stop reason: HOSPADM

## 2025-08-29 RX ORDER — PROPOFOL 10 MG/ML
INJECTION, EMULSION INTRAVENOUS CONTINUOUS PRN
Status: DISCONTINUED | OUTPATIENT
Start: 2025-08-29 | End: 2025-08-29 | Stop reason: SURG

## 2025-08-29 RX ORDER — BUPIVACAINE HYDROCHLORIDE 5 MG/ML
INJECTION, SOLUTION EPIDURAL; INTRACAUDAL; PERINEURAL AS NEEDED
Status: DISCONTINUED | OUTPATIENT
Start: 2025-08-29 | End: 2025-08-29 | Stop reason: HOSPADM

## 2025-08-29 RX ORDER — SODIUM CHLORIDE, SODIUM LACTATE, POTASSIUM CHLORIDE, CALCIUM CHLORIDE 600; 310; 30; 20 MG/100ML; MG/100ML; MG/100ML; MG/100ML
100 INJECTION, SOLUTION INTRAVENOUS CONTINUOUS
Status: DISCONTINUED | OUTPATIENT
Start: 2025-08-29 | End: 2025-08-29 | Stop reason: HOSPADM

## 2025-08-29 RX ORDER — FENTANYL CITRATE 50 UG/ML
INJECTION, SOLUTION INTRAMUSCULAR; INTRAVENOUS AS NEEDED
Status: DISCONTINUED | OUTPATIENT
Start: 2025-08-29 | End: 2025-08-29 | Stop reason: SURG

## 2025-08-29 RX ORDER — MIDAZOLAM HYDROCHLORIDE 2 MG/2ML
0.5 INJECTION, SOLUTION INTRAMUSCULAR; INTRAVENOUS
Status: DISCONTINUED | OUTPATIENT
Start: 2025-08-29 | End: 2025-08-29 | Stop reason: HOSPADM

## 2025-08-29 RX ORDER — DROPERIDOL 2.5 MG/ML
0.62 INJECTION, SOLUTION INTRAMUSCULAR; INTRAVENOUS ONCE AS NEEDED
Status: DISCONTINUED | OUTPATIENT
Start: 2025-08-29 | End: 2025-08-29 | Stop reason: HOSPADM

## 2025-08-29 RX ORDER — SODIUM CHLORIDE, SODIUM LACTATE, POTASSIUM CHLORIDE, CALCIUM CHLORIDE 600; 310; 30; 20 MG/100ML; MG/100ML; MG/100ML; MG/100ML
1000 INJECTION, SOLUTION INTRAVENOUS CONTINUOUS
Status: DISCONTINUED | OUTPATIENT
Start: 2025-08-29 | End: 2025-08-29 | Stop reason: HOSPADM

## 2025-08-29 RX ORDER — HYDROCODONE BITARTRATE AND ACETAMINOPHEN 5; 325 MG/1; MG/1
1 TABLET ORAL EVERY 4 HOURS PRN
Status: DISCONTINUED | OUTPATIENT
Start: 2025-08-29 | End: 2025-08-29 | Stop reason: HOSPADM

## 2025-08-29 RX ORDER — EPHEDRINE SULFATE 50 MG/ML
INJECTION INTRAVENOUS AS NEEDED
Status: DISCONTINUED | OUTPATIENT
Start: 2025-08-29 | End: 2025-08-29 | Stop reason: SURG

## 2025-08-29 RX ORDER — HYDROCODONE BITARTRATE AND ACETAMINOPHEN 10; 325 MG/1; MG/1
1 TABLET ORAL EVERY 4 HOURS PRN
Status: DISCONTINUED | OUTPATIENT
Start: 2025-08-29 | End: 2025-08-29 | Stop reason: HOSPADM

## 2025-08-29 RX ORDER — ONDANSETRON 2 MG/ML
4 INJECTION INTRAMUSCULAR; INTRAVENOUS ONCE AS NEEDED
Status: DISCONTINUED | OUTPATIENT
Start: 2025-08-29 | End: 2025-08-29 | Stop reason: HOSPADM

## 2025-08-29 RX ORDER — SODIUM CHLORIDE 0.9 % (FLUSH) 0.9 %
3-10 SYRINGE (ML) INJECTION AS NEEDED
Status: DISCONTINUED | OUTPATIENT
Start: 2025-08-29 | End: 2025-08-29 | Stop reason: HOSPADM

## 2025-08-29 RX ORDER — IOPAMIDOL 612 MG/ML
INJECTION, SOLUTION INTRAVASCULAR AS NEEDED
Status: DISCONTINUED | OUTPATIENT
Start: 2025-08-29 | End: 2025-08-29 | Stop reason: HOSPADM

## 2025-08-29 RX ORDER — CLINDAMYCIN PHOSPHATE 900 MG/50ML
900 INJECTION, SOLUTION INTRAVENOUS ONCE
Status: COMPLETED | OUTPATIENT
Start: 2025-08-29 | End: 2025-08-29

## 2025-08-29 RX ORDER — HYDROCODONE BITARTRATE AND ACETAMINOPHEN 5; 325 MG/1; MG/1
1 TABLET ORAL ONCE AS NEEDED
Refills: 0 | Status: DISCONTINUED | OUTPATIENT
Start: 2025-08-29 | End: 2025-08-29 | Stop reason: HOSPADM

## 2025-08-29 RX ORDER — IBUPROFEN 600 MG/1
600 TABLET, FILM COATED ORAL EVERY 6 HOURS PRN
Status: DISCONTINUED | OUTPATIENT
Start: 2025-08-29 | End: 2025-08-29 | Stop reason: HOSPADM

## 2025-08-29 RX ORDER — SODIUM CHLORIDE 0.9 % (FLUSH) 0.9 %
3 SYRINGE (ML) INJECTION EVERY 12 HOURS SCHEDULED
Status: DISCONTINUED | OUTPATIENT
Start: 2025-08-29 | End: 2025-08-29 | Stop reason: HOSPADM

## 2025-08-29 RX ORDER — SODIUM CHLORIDE 0.9 % (FLUSH) 0.9 %
3 SYRINGE (ML) INJECTION AS NEEDED
Status: DISCONTINUED | OUTPATIENT
Start: 2025-08-29 | End: 2025-08-29 | Stop reason: HOSPADM

## 2025-08-29 RX ORDER — LABETALOL HYDROCHLORIDE 5 MG/ML
5 INJECTION, SOLUTION INTRAVENOUS
Status: DISCONTINUED | OUTPATIENT
Start: 2025-08-29 | End: 2025-08-29 | Stop reason: HOSPADM

## 2025-08-29 RX ADMIN — CLINDAMYCIN PHOSPHATE 900 MG: 900 INJECTION, SOLUTION INTRAVENOUS at 09:13

## 2025-08-29 RX ADMIN — HEPARIN SODIUM 1000 UNITS: 1000 INJECTION, SOLUTION INTRAVENOUS; SUBCUTANEOUS at 09:24

## 2025-08-29 RX ADMIN — SODIUM CHLORIDE, POTASSIUM CHLORIDE, SODIUM LACTATE AND CALCIUM CHLORIDE 1000 ML: 600; 310; 30; 20 INJECTION, SOLUTION INTRAVENOUS at 07:21

## 2025-08-29 RX ADMIN — PROPOFOL 100 MCG/KG/MIN: 10 INJECTION, EMULSION INTRAVENOUS at 09:10

## 2025-08-29 RX ADMIN — EPHEDRINE SULFATE 10 MG: 50 INJECTION INTRAVENOUS at 09:21

## 2025-08-29 RX ADMIN — LIDOCAINE HYDROCHLORIDE 80 MG: 20 INJECTION, SOLUTION EPIDURAL; INFILTRATION; INTRACAUDAL; PERINEURAL at 09:10

## 2025-08-29 RX ADMIN — HYDROCODONE BITARTRATE AND ACETAMINOPHEN 1 TABLET: 10; 325 TABLET ORAL at 12:59

## 2025-08-29 RX ADMIN — ACETAMINOPHEN 1000 MG: 500 TABLET, FILM COATED ORAL at 08:31

## 2025-08-29 RX ADMIN — FENTANYL CITRATE 100 MCG: 50 INJECTION, SOLUTION INTRAMUSCULAR; INTRAVENOUS at 09:10

## 2025-09-05 ENCOUNTER — HOSPITAL ENCOUNTER (OUTPATIENT)
Dept: INFUSION THERAPY | Age: 75
Discharge: HOME | End: 2025-09-05

## 2025-09-05 ENCOUNTER — OFFICE VISIT (OUTPATIENT)
Dept: HEMATOLOGY | Age: 75
End: 2025-09-05

## 2025-09-05 ENCOUNTER — CLINICAL DOCUMENTATION (OUTPATIENT)
Dept: HEMATOLOGY | Age: 75
End: 2025-09-05

## 2025-09-05 VITALS
OXYGEN SATURATION: 99 % | BODY MASS INDEX: 19.61 KG/M2 | HEART RATE: 68 BPM | DIASTOLIC BLOOD PRESSURE: 72 MMHG | TEMPERATURE: 97.3 F | WEIGHT: 137 LBS | HEIGHT: 70 IN | SYSTOLIC BLOOD PRESSURE: 118 MMHG

## 2025-09-05 DIAGNOSIS — C91.10 CLL (CHRONIC LYMPHOCYTIC LEUKEMIA) (HCC): ICD-10-CM

## 2025-09-05 DIAGNOSIS — Z51.11 CHEMOTHERAPY MANAGEMENT, ENCOUNTER FOR: ICD-10-CM

## 2025-09-05 DIAGNOSIS — Z71.89 CARE PLAN DISCUSSED WITH PATIENT: ICD-10-CM

## 2025-09-05 DIAGNOSIS — C31.1: Primary | ICD-10-CM

## 2025-09-05 ASSESSMENT — ENCOUNTER SYMPTOMS
EYE ITCHING: 0
SHORTNESS OF BREATH: 0
EYE DISCHARGE: 0
VOMITING: 0
COUGH: 0
DIARRHEA: 0
ABDOMINAL PAIN: 0
SORE THROAT: 0
CONSTIPATION: 0
NAUSEA: 0
TROUBLE SWALLOWING: 0
WHEEZING: 0

## (undated) DEVICE — PINNACLE PRECISION ACCESS SYSTEM INTRODUCER SHEATH: Brand: PINNACLE PRECISION ACCESS SYSTEM

## (undated) DEVICE — SOLUTION IV 1000ML 0.9% SOD CHL PH 5 INJ USP VIAFLX PLAS

## (undated) DEVICE — CATHETER ASPIR 6FR L140CM GWIRE 0.014IN OPTIMIZED TIP DSGN

## (undated) DEVICE — GLOVE SURG SZ 7 L12IN FNGR THK79MIL GRN LTX FREE

## (undated) DEVICE — C-ARM: Brand: UNBRANDED

## (undated) DEVICE — GLOVE SURG SZ 85 L12IN FNGR THK79MIL GRN LTX FREE

## (undated) DEVICE — YANKAUER,BULB TIP WITH VENT: Brand: ARGYLE

## (undated) DEVICE — Device

## (undated) DEVICE — PACK,UNIVERSAL,NO GOWNS: Brand: MEDLINE

## (undated) DEVICE — SENSR O2 OXIMAX FNGR A/ 18IN NONSTR

## (undated) DEVICE — RADIFOCUS GLIDEWIRE ADVANTAGE GUIDEWIRE: Brand: GLIDEWIRE ADVANTAGE

## (undated) DEVICE — CATHETER KIT 5 FR 21 GAX7 CM MICROINTRODUCER GUIDEWIRE STIFF

## (undated) DEVICE — NG KIT, 21 GA, 10 PACK: Brand: SITE-RITE

## (undated) DEVICE — SENSOR PLSE OXMTR AD CBL L36IN ADH FRM FIT SPO2 DISP

## (undated) DEVICE — PATIENT TRACKER 9734887XOM NON-INVASIVE

## (undated) DEVICE — GOWN, ORBIS, XLARGE, STERILE: Brand: MEDLINE

## (undated) DEVICE — SOLUTION IV 500ML 0.9% SOD CHL PH 5 INJ USP VIAFLX PLAS

## (undated) DEVICE — ANGIOGRAPHY PK

## (undated) DEVICE — NAVICROSS SUPPORT CATHETER: Brand: NAVICROSS

## (undated) DEVICE — GLOVE SURG SZ 7 CRM LTX FREE POLYISOPRENE POLYMER BEAD ANTI

## (undated) DEVICE — SPONGE,NEURO,0.5"X3",XR,STRL,LF,10/PK: Brand: MEDLINE

## (undated) DEVICE — SUTURE VICRYL SZ 2-0 L36IN ABSRB UD L36MM CT-1 1/2 CIR J945H

## (undated) DEVICE — THE CHANNEL CLEANING BRUSH IS A NYLON FLEXI BRUSH ATTACHED TO A FLEXIBLE PLASTIC SHEATH DESIGNED TO SAFELY REMOVE DEBRIS FROM FLEXIBLE ENDOSCOPES.

## (undated) DEVICE — MEDIA CONTRAST INJ VISIPAQUE 150ML 320MG

## (undated) DEVICE — INFLATION DEVICE: Brand: ENCORE™ 26

## (undated) DEVICE — PERCUTANEOUS ENTRY THINWALL NEEDLE  ONE-PART: Brand: COOK

## (undated) DEVICE — SYRINGE KIT,PACKAGED,,150FT,MK 7(ANGIO-ARTERION, 150ML SYR KIT W/QFT,MC)(60729385): Brand: MEDRAD® MARK 7 ARTERION DISPOSABLE SYRINGE 150 ML WITH QUICK FILL TUBE

## (undated) DEVICE — SOLUTION IV 250ML 0.9% SOD CHL PH 5 INJ USP VIAFLX PLAS

## (undated) DEVICE — DRAPE SHEET: Brand: UNBRANDED

## (undated) DEVICE — 3M™ IOBAN™ 2 ANTIMICROBIAL INCISE DRAPE 6650EZ: Brand: IOBAN™ 2

## (undated) DEVICE — ENDOGATOR AUXILIARY WATER JET CONNECTOR: Brand: ENDOGATOR

## (undated) DEVICE — FRCP BX RADJAW4 NDL 2.8 240 STD OG

## (undated) DEVICE — PENCIL BTTN S S CAUT TIP W HOLSTER 25 50

## (undated) DEVICE — CATHETER ANGIO AD 5FR L65CM DIA0.049IN GWIRE 0.035IN SHEP

## (undated) DEVICE — GLOVE ORTHO 8   MSG9480

## (undated) DEVICE — PROVE COVER: Brand: UNBRANDED

## (undated) DEVICE — DESTINATION RENAL GUIDING SHEATH: Brand: DESTINATION

## (undated) DEVICE — TOWEL,OR,DSP,ST,BLUE,DLX,4/PK,20PK/CS: Brand: MEDLINE

## (undated) DEVICE — GLIDESHEATH BASIC HYDROPHILIC COATED INTRODUCER SHEATH: Brand: GLIDESHEATH

## (undated) DEVICE — GOWN, ORBIS, LARGE, STERILE: Brand: MEDLINE

## (undated) DEVICE — BLANKET WRM W29.9XL79.1IN UP BODY FORC AIR MISTRAL-AIR

## (undated) DEVICE — NEEDLE HYPO 18GA L1.5IN PNK POLYPR HUB S STL REG BVL STR

## (undated) DEVICE — CUFF,BP,DISP,1 TUBE,ADULT,HP: Brand: MEDLINE

## (undated) DEVICE — STRIP SKIN CLSR 1/4INX1.5IN REINF WND NYL CURAD

## (undated) DEVICE — GUIDEWIRE WITH ICE™ HYDROPHILIC COATING: Brand: V-18™ CONTROL WIRE™

## (undated) DEVICE — CATHETER PTCA 5FR L130CM BLLN L150MM DIA4MM GWIRE 0.035IN

## (undated) DEVICE — CATHETER PTCA 5FR L130CM BLLN L100MM DIA4MM GWIRE 0.035IN

## (undated) DEVICE — Device: Brand: DEFENDO AIR/WATER/SUCTION AND BIOPSY VALVE

## (undated) DEVICE — MASK,OXYGEN,MED CONC,ADLT,7' TUB, UC: Brand: PENDING

## (undated) DEVICE — SYRINGE 20ML LL S/C 50

## (undated) DEVICE — SENSOR PLSE OXMTR AD CBL L3FT ADH TRANSMISSIVE

## (undated) DEVICE — TUBING ANGIO L72IN 900PSI CLR PVC M TO FEM AIRLESS ROT ADPT

## (undated) DEVICE — NDL HYPO PRECISIONGLIDE/REG 18G 11/2 PNK

## (undated) DEVICE — NITROGLYCERIN 0.2 MG/ML IN D5W

## (undated) DEVICE — ROYAL SILK SURGICAL GOWN, XXL: Brand: CONVERTORS

## (undated) DEVICE — TRAP FLD MINIVAC MEGADYNE 100ML

## (undated) DEVICE — GUIDEWIRE VASC L180CM DIA0.035IN L10CM DIA3MM J TIP PTFE S

## (undated) DEVICE — GUIDEWIRE WITH ICE™ HYDROPHILIC COATING: Brand: V-14™ CONTROL WIRE™

## (undated) DEVICE — SYRINGE MED 50ML LUERLOCK TIP

## (undated) DEVICE — GLV SURG BIOGEL M LTX PF 7 1/2

## (undated) DEVICE — SUTURE PROL SZ 5-0 L24IN NONABSORBABLE BLU L9.3MM BV-1 3/8 9702H

## (undated) DEVICE — SOLUTION IV IRRIG POUR BRL 0.9% SODIUM CHL 2F7124

## (undated) DEVICE — PTA BALLOON DILATATION CATHETER: Brand: STERLING® SL

## (undated) DEVICE — MARKER,SKIN,WI/RULER AND LABELS: Brand: MEDLINE

## (undated) DEVICE — CURAVIEW LED LARYNSCP BLDE

## (undated) DEVICE — MAJOR DOUBLE BASIN W/GOWNS II: Brand: MEDLINE INDUSTRIES, INC.

## (undated) DEVICE — PINNACLE INTRODUCER SHEATH: Brand: PINNACLE

## (undated) DEVICE — PITCHER PT 1200ML W HNDL CSR WRP

## (undated) DEVICE — PK ENT HD AND NK 30

## (undated) DEVICE — DRESSING BORDERED ADH GZ UNIV GEN USE 8INX4IN AND 6INX2IN

## (undated) DEVICE — ELECTRD BLD EZ CLN MOD XLNG 2.75IN

## (undated) DEVICE — HANDLE LT FOR NLC C SECT RM ST/5

## (undated) DEVICE — SOLUTION IV 100ML 0.9% SOD CHL PLAS CONT USP VIAFLX 1 PER

## (undated) DEVICE — DRAPE C ARM W42XL120IN W POLY STRP W OUT LENS

## (undated) DEVICE — GUIDEWIRE VASC J 3 MM 0.035 INX260 CM 10 CM PTFE SS STRT

## (undated) DEVICE — FOGARTY ARTERIAL EMBOLECTOMY CATHETER 3F 40CM: Brand: FOGARTY

## (undated) DEVICE — GUIDEWIRE VASC L260CM DIA0.035IN TIP L7CM AMPLATZ SUPER STIFF (INARI)

## (undated) DEVICE — SUTURE PROL SZ 7-0 L24IN NONABSORBABLE BLU L9.3MM BV-1 3/8 M8702

## (undated) DEVICE — ELECTROSURGICAL PENCIL BUTTON SWITCH NON COATED BLADE ELECTRODE 10 FT (3 M) CORD HOLSTER: Brand: MEGADYNE

## (undated) DEVICE — DRAPE KEYBOARD W26XL36IN ADH

## (undated) DEVICE — GUIDEWIRE VASC L260CM DIA0.035IN TIP L7CM PTFE S STL STR

## (undated) DEVICE — CONMED SCOPE SAVER BITE BLOCK, 20X27 MM: Brand: SCOPE SAVER

## (undated) DEVICE — TUBE ET 7MM NSL ORAL BASIC CUF INTMED MURPHY EYE RADPQ MRK

## (undated) DEVICE — ADAPTER CATH HEMOSTATIC VLV Y GATEWY +

## (undated) DEVICE — SNAR POLYP CAPTIVATOR MICROHEX 13 240CM

## (undated) DEVICE — SUTURE BRDD CTX 48 MM CT CRCLE TPR PNT NDLE PLGLCTN PLU

## (undated) DEVICE — 1LYRTR 16FR10ML100%SIL UMS SNP: Brand: MEDLINE INDUSTRIES, INC.

## (undated) DEVICE — PTA BALLOON DILATATION CATHETER: Brand: CHARGER™

## (undated) DEVICE — PTA BALLOON DILATATION CATHETER: Brand: STERLING™

## (undated) DEVICE — COVER US PRB W5XL96IN LTX W/ GEL

## (undated) DEVICE — GOWN, ORBIS, XLNG/XXLARGE, STRL: Brand: MEDLINE

## (undated) DEVICE — SUTURE PERMAHAND SZ 4-0 L12X30IN NONABSORBABLE BLK SILK A303H

## (undated) DEVICE — SUTURE VICRYL + SZ 2-0 L18IN ABSRB UD CT1 L36MM 1/2 CIR VCP839D

## (undated) DEVICE — NASAL PACKING CS3600-10 NOVAPAK 10PK STD: Brand: NOVAPAK

## (undated) DEVICE — TBG SMPL FLTR LINE NASL 02/C02 A/ BX/100

## (undated) DEVICE — SYRINGE MED 3ML NDL 23GA L15IN LUERLOCK TIP REG BVL SFTY N

## (undated) DEVICE — SUTURE NONABSORBABLE MONOFILAMENT 5-0 C-1 1X24 IN PROLENE 8725H

## (undated) DEVICE — SYRINGE MED 10ML LUERLOCK TIP W/O SFTY DISP

## (undated) DEVICE — YANKAUER,TAPERED BULBOUS TIP,VENTED: Brand: MEDLINE

## (undated) DEVICE — SUT MNCRYL 5/0 P3 18IN UD MCP493G

## (undated) DEVICE — SUTURE VICRYL + SZ 2-0 L36IN ABSRB UD L36MM CT-1 1/2 CIR VCP945H

## (undated) DEVICE — GLOVE SURG SZ 65 CRM LTX FREE POLYISOPRENE POLYMER BEAD ANTI

## (undated) DEVICE — HI-TORQUE COMMAND ES GUIDE WIRE .014" 300 CM: Brand: HI-TORQUE COMMAND

## (undated) DEVICE — TUBING 1895522 5PK STRAIGHTSHOT TO XPS: Brand: STRAIGHTSHOT®

## (undated) DEVICE — COLLECTION SOCK, GENERAL: Brand: DEROYAL

## (undated) DEVICE — FLEXOR, CHECK-FLO, INTRODUCER RAABE MODIFICATION: Brand: FLEXOR

## (undated) DEVICE — DRESSING TRNSPAR W5XL4.5IN FLM SHT SEMIPERMEABLE WIND

## (undated) DEVICE — PTA BALLOON DILATATION CATHETER: Brand: COYOTE™

## (undated) DEVICE — INSTR TRACKER 9733533 EM ENT

## (undated) DEVICE — SINU FOAM: Brand: SINU-FOAM

## (undated) DEVICE — SUT GUT CHRM 4/0 P3 18IN 1654G

## (undated) DEVICE — CATHETER PTCA 5FR L130CM BLLN L150MM DIA5MM GWIRE 0.035IN

## (undated) DEVICE — KIT,ANTI FOG,W/SPONGE & FLUID,SOFT PACK: Brand: MEDLINE

## (undated) DEVICE — THE SINGLE USE ETRAP – POLYP TRAP IS USED FOR SUCTION RETRIEVAL OF ENDOSCOPICALLY REMOVED POLYPS.: Brand: ETRAP

## (undated) DEVICE — TUBE ET 7.5MM NSL ORAL BASIC CUF INTMED MURPHY EYE RADPQ

## (undated) DEVICE — ELECTRODE PT RET AD L9FT HI MOIST COND ADH HYDRGEL CORDED

## (undated) DEVICE — TUBING INJ 900 PSI 72 IN FIX M CONN CNTRST HI PRESSURE PVC

## (undated) DEVICE — SUTURE NONABSORBABLE MONOFILAMENT 6-0 BV-1 1X30 IN PROLENE 8709H

## (undated) DEVICE — Device: Brand: VISIONS PV .014P RX DIGITAL IVUS CATHETER

## (undated) DEVICE — AGENT HEMSTAT W4XL4IN OXIDIZED REGENERATED CELOS STRUCTURED

## (undated) DEVICE — ANGIOGRAPHY DRAPE PACK: Brand: MEDLINE INDUSTRIES, INC.

## (undated) DEVICE — GLOVE SURG SZ 65 L12IN FNGR THK79MIL GRN LTX FREE

## (undated) DEVICE — SUTURE PROL SZ 6-0 L30IN NONABSORBABLE BLU L9.3MM BV-1 3/8 M8709

## (undated) DEVICE — TOTAL TRAY, 16FR 10ML SIL FOLEY, URN: Brand: MEDLINE

## (undated) DEVICE — SUTURE VICRYL + SZ 3-0 L27IN ABSRB UD L26MM SH 1/2 CIR VCP416H

## (undated) DEVICE — DECANTER BAG 9": Brand: MEDLINE INDUSTRIES, INC.

## (undated) DEVICE — SUTURE PERMAHAND SZ 3-0 L30IN NONABSORBABLE BLK SILK BRAID A304H

## (undated) DEVICE — BLADE 1884012EM RAD12 4MM M4 ROTATE ROHS: Brand: FUSION®

## (undated) DEVICE — CATHETER FOL 2 W 12 FRX16 IN 5 CC URETH ROUNDED TIP DOVER

## (undated) DEVICE — LOOP VES W25MM THK1MM MAXI RED SIL FLD REPELLENT 100 PER

## (undated) DEVICE — 150 ML FASTURN SYRINGE WITH QUICK FILL TUBE(SET,PKG,150ML FT,W/QFT,RAD,JAPAN,MC)(60729679): Brand: MEDRAD® MARK V PROVIS STERILE DISPOSABLE SYRINGE 150ML & QFT

## (undated) DEVICE — 3 ML SYRINGE WITH HYPODERMIC SAFETY NEEDLE: Brand: MAGELLAN

## (undated) DEVICE — SOLUTION INJ VISIPAQUE 150ML

## (undated) DEVICE — FOR ASEPTIC DECANTING OF I.V. FLUIDS FROM FLEXIBLE CONTAINERS.: Brand: BAG DECANTER

## (undated) DEVICE — ULTRACLEAN ACCESSORY ELECTRODE 1" (2.54 CM) COATED BLADE: Brand: ULTRACLEAN

## (undated) DEVICE — SUTURE PERMAHAND SZ 3-0 L30IN NONABSORBABLE BLK L26MM SH C017D

## (undated) DEVICE — CURAVIEW LED LARYNGOSCOPE BLADE & HANDLE,DISPOSABLE,MILLER 2: Brand: CURAPLEX

## (undated) DEVICE — CATHETER SUPP AD PED 4FR L135CM GWIRE 0.018IN STR TIP W/O

## (undated) DEVICE — SUTURE PERMAHAND SZ 2-0 L30IN NONABSORBABLE BLK SILK W/O A305H

## (undated) DEVICE — DRAPE,UTILITY,XL,4/PK,STERILE: Brand: MEDLINE

## (undated) DEVICE — MAJOR VASCULAR PACK: Brand: CARDINAL HEALTH

## (undated) DEVICE — GLOW 'N TELL 30CM TAPE (50 STRIPS): Brand: VASCUTAPE RADIOPAQUE TAPE

## (undated) DEVICE — CATHETER URETH 12FR BLLN 5CC LTX HYDRGEL 2 W BARDX LUB F

## (undated) DEVICE — POSITIONER,HEAD,MULTIRING,36CS: Brand: MEDLINE

## (undated) DEVICE — TOWEL,OR,DSP,ST,BLUE,STD,4/PK,20PK/CS: Brand: MEDLINE

## (undated) DEVICE — LOOP VES W1.3MM THK0.9MM MINI WHT SIL FLD REPELLENT

## (undated) DEVICE — SUTURE VICRYL + SZ 3-0 L18IN ABSRB UD SH 1/2 CIR TAPERCUT NDL VCP864D

## (undated) DEVICE — 3M™ TEGADERM™ TRANSPARENT FILM DRESSING FRAME STYLE, 1628, 6 IN X 8 IN (15 CM X 20 CM), 10/CT 8CT/CASE: Brand: 3M™ TEGADERM™

## (undated) DEVICE — DEVICE VASC CLSR 5FR GRY W/ INTEGR SEAL 10ML LOK SYR

## (undated) DEVICE — STERILE POLYISOPRENE POWDER-FREE SURGICAL GLOVES: Brand: PROTEXIS

## (undated) DEVICE — SET ADMIN 25ML L117IN PMP MOD CK VLV RLER CLMP 2 SMRTSITE

## (undated) DEVICE — SUTURE PERMAHAND SZ 3-0 L18IN NONABSORBABLE BLK L26MM SH C013D

## (undated) DEVICE — CURAVIEW LED LARYNGOSCOPE BLADE & HANDLE,DISPOSABLE,MAC 3.5: Brand: CURAPLEX

## (undated) DEVICE — 4-PORT MANIFOLD: Brand: NEPTUNE 2

## (undated) DEVICE — FOGARTY ARTERIAL EMBOLECTOMY CATHETER 3F 80CM: Brand: FOGARTY

## (undated) DEVICE — AGENT HEMOSTATIC SURG ORIGINAL ABS 4X8IN LOOSE KNIT 12/CA

## (undated) DEVICE — SPONGE LAP W18XL18IN WHT COT 4 PLY FLD STRUNG RADPQ DISP ST 2 PER PACK

## (undated) DEVICE — SUTURE PROL SZ 6-0 L24IN NONABSORBABLE BLU BV-1 L9.3MM 3/8 M8805

## (undated) DEVICE — Z DISCONTINUED USE 2218132 SUTURE ETHILON SZ 3-0 L30IN NONABSORBABLE BLK L36MM FSLX 3/8 1673BH

## (undated) DEVICE — TUBING, SUCTION, 1/4" X 12', STRAIGHT: Brand: MEDLINE

## (undated) DEVICE — OPTIFOAM GENTLE SA, POSTOP, 4X8: Brand: MEDLINE

## (undated) DEVICE — NEPTUNE 2X2 HEMOSTATIC PAD: Brand: NEPTUNE

## (undated) DEVICE — COVER LT HNDL PLAS RIG 2 PER PK